# Patient Record
Sex: MALE | Race: BLACK OR AFRICAN AMERICAN | NOT HISPANIC OR LATINO | Employment: STUDENT
[De-identification: names, ages, dates, MRNs, and addresses within clinical notes are randomized per-mention and may not be internally consistent; named-entity substitution may affect disease eponyms.]

---

## 2024-03-23 ENCOUNTER — HEALTH MAINTENANCE LETTER (OUTPATIENT)
Age: 34
End: 2024-03-23

## 2024-04-03 ENCOUNTER — HOSPITAL ENCOUNTER (INPATIENT)
Facility: CLINIC | Age: 34
LOS: 36 days | Discharge: PSYCHIATRIC OR SUBSTANCE USE FACILITY | DRG: 885 | End: 2024-05-14
Attending: FAMILY MEDICINE | Admitting: PSYCHIATRY & NEUROLOGY
Payer: MEDICARE

## 2024-04-03 ENCOUNTER — TELEPHONE (OUTPATIENT)
Dept: BEHAVIORAL HEALTH | Facility: CLINIC | Age: 34
End: 2024-04-03
Payer: MEDICARE

## 2024-04-03 ENCOUNTER — TELEPHONE (OUTPATIENT)
Dept: BEHAVIORAL HEALTH | Facility: CLINIC | Age: 34
End: 2024-04-03

## 2024-04-03 DIAGNOSIS — R05.9 COUGH, UNSPECIFIED TYPE: ICD-10-CM

## 2024-04-03 DIAGNOSIS — F41.9 ANXIETY: ICD-10-CM

## 2024-04-03 DIAGNOSIS — R06.02 SOB (SHORTNESS OF BREATH): ICD-10-CM

## 2024-04-03 DIAGNOSIS — F25.0 SCHIZOAFFECTIVE DISORDER, BIPOLAR TYPE (H): ICD-10-CM

## 2024-04-03 DIAGNOSIS — Z13.6 CARDIOVASCULAR SCREENING; LDL GOAL LESS THAN 160: ICD-10-CM

## 2024-04-03 DIAGNOSIS — R45.1 AGITATION: ICD-10-CM

## 2024-04-03 DIAGNOSIS — F19.10 POLYSUBSTANCE ABUSE (H): ICD-10-CM

## 2024-04-03 DIAGNOSIS — F60.2 ANTISOCIAL PERSONALITY DISORDER (H): Primary | ICD-10-CM

## 2024-04-03 PROBLEM — F12.10 CANNABIS ABUSE: Status: ACTIVE | Noted: 2024-04-03

## 2024-04-03 LAB
ALBUMIN SERPL BCG-MCNC: 5 G/DL (ref 3.5–5.2)
ALP SERPL-CCNC: 74 U/L (ref 40–150)
ALT SERPL W P-5'-P-CCNC: 34 U/L (ref 0–70)
AMPHETAMINES UR QL SCN: ABNORMAL
ANION GAP SERPL CALCULATED.3IONS-SCNC: 11 MMOL/L (ref 7–15)
AST SERPL W P-5'-P-CCNC: 103 U/L (ref 0–45)
BARBITURATES UR QL SCN: ABNORMAL
BASOPHILS # BLD AUTO: 0.1 10E3/UL (ref 0–0.2)
BASOPHILS NFR BLD AUTO: 1 %
BENZODIAZ UR QL SCN: ABNORMAL
BILIRUB SERPL-MCNC: 1.5 MG/DL
BUN SERPL-MCNC: 24.5 MG/DL (ref 6–20)
BZE UR QL SCN: ABNORMAL
CALCIUM SERPL-MCNC: 9.8 MG/DL (ref 8.6–10)
CANNABINOIDS UR QL SCN: ABNORMAL
CHLORIDE SERPL-SCNC: 99 MMOL/L (ref 98–107)
CREAT SERPL-MCNC: 1.18 MG/DL (ref 0.67–1.17)
DEPRECATED HCO3 PLAS-SCNC: 28 MMOL/L (ref 22–29)
EGFRCR SERPLBLD CKD-EPI 2021: 84 ML/MIN/1.73M2
EOSINOPHIL # BLD AUTO: 0.1 10E3/UL (ref 0–0.7)
EOSINOPHIL NFR BLD AUTO: 1 %
ERYTHROCYTE [DISTWIDTH] IN BLOOD BY AUTOMATED COUNT: 13.4 % (ref 10–15)
FENTANYL UR QL: ABNORMAL
GLUCOSE SERPL-MCNC: 91 MG/DL (ref 70–99)
HCT VFR BLD AUTO: 42.7 % (ref 40–53)
HGB BLD-MCNC: 13.5 G/DL (ref 13.3–17.7)
IMM GRANULOCYTES # BLD: 0 10E3/UL
IMM GRANULOCYTES NFR BLD: 0 %
LYMPHOCYTES # BLD AUTO: 2.9 10E3/UL (ref 0.8–5.3)
LYMPHOCYTES NFR BLD AUTO: 26 %
MCH RBC QN AUTO: 27.3 PG (ref 26.5–33)
MCHC RBC AUTO-ENTMCNC: 31.6 G/DL (ref 31.5–36.5)
MCV RBC AUTO: 86 FL (ref 78–100)
MONOCYTES # BLD AUTO: 0.9 10E3/UL (ref 0–1.3)
MONOCYTES NFR BLD AUTO: 9 %
NEUTROPHILS # BLD AUTO: 7 10E3/UL (ref 1.6–8.3)
NEUTROPHILS NFR BLD AUTO: 63 %
NRBC # BLD AUTO: 0 10E3/UL
NRBC BLD AUTO-RTO: 0 /100
OPIATES UR QL SCN: ABNORMAL
PCP QUAL URINE (ROCHE): ABNORMAL
PLATELET # BLD AUTO: 213 10E3/UL (ref 150–450)
POTASSIUM SERPL-SCNC: 4.2 MMOL/L (ref 3.4–5.3)
PROT SERPL-MCNC: 7.8 G/DL (ref 6.4–8.3)
RBC # BLD AUTO: 4.94 10E6/UL (ref 4.4–5.9)
SODIUM SERPL-SCNC: 138 MMOL/L (ref 135–145)
WBC # BLD AUTO: 11.1 10E3/UL (ref 4–11)

## 2024-04-03 PROCEDURE — 99285 EMERGENCY DEPT VISIT HI MDM: CPT | Performed by: FAMILY MEDICINE

## 2024-04-03 PROCEDURE — 84155 ASSAY OF PROTEIN SERUM: CPT | Performed by: FAMILY MEDICINE

## 2024-04-03 PROCEDURE — 250N000013 HC RX MED GY IP 250 OP 250 PS 637: Performed by: FAMILY MEDICINE

## 2024-04-03 PROCEDURE — 80307 DRUG TEST PRSMV CHEM ANLYZR: CPT | Performed by: FAMILY MEDICINE

## 2024-04-03 PROCEDURE — 85025 COMPLETE CBC W/AUTO DIFF WBC: CPT | Performed by: FAMILY MEDICINE

## 2024-04-03 PROCEDURE — 36415 COLL VENOUS BLD VENIPUNCTURE: CPT | Performed by: FAMILY MEDICINE

## 2024-04-03 RX ORDER — LORAZEPAM 1 MG/1
1 TABLET ORAL ONCE
Status: COMPLETED | OUTPATIENT
Start: 2024-04-03 | End: 2024-04-03

## 2024-04-03 RX ORDER — ATORVASTATIN CALCIUM 40 MG/1
40 TABLET, FILM COATED ORAL DAILY
COMMUNITY
Start: 2024-01-02

## 2024-04-03 RX ORDER — ALBUTEROL SULFATE 90 UG/1
1-2 AEROSOL, METERED RESPIRATORY (INHALATION) 4 TIMES DAILY PRN
Status: DISCONTINUED | OUTPATIENT
Start: 2024-04-03 | End: 2024-05-14 | Stop reason: HOSPADM

## 2024-04-03 RX ORDER — HYDROXYZINE HYDROCHLORIDE 50 MG/1
50 TABLET, FILM COATED ORAL EVERY 6 HOURS PRN
Status: DISCONTINUED | OUTPATIENT
Start: 2024-04-03 | End: 2024-04-08

## 2024-04-03 RX ORDER — OLANZAPINE 10 MG/1
10 TABLET, ORALLY DISINTEGRATING ORAL ONCE
Status: COMPLETED | OUTPATIENT
Start: 2024-04-03 | End: 2024-04-03

## 2024-04-03 RX ORDER — GABAPENTIN 600 MG/1
600 TABLET ORAL 2 TIMES DAILY
Status: ON HOLD | COMMUNITY
Start: 2024-01-04 | End: 2024-05-13

## 2024-04-03 RX ORDER — HYDROXYZINE PAMOATE 50 MG/1
50 CAPSULE ORAL EVERY 6 HOURS PRN
Status: DISCONTINUED | OUTPATIENT
Start: 2024-04-03 | End: 2024-04-03

## 2024-04-03 RX ORDER — HYDROXYZINE PAMOATE 50 MG/1
50 CAPSULE ORAL EVERY 6 HOURS PRN
COMMUNITY
Start: 2024-02-27

## 2024-04-03 RX ORDER — CLOZAPINE 100 MG/1
100 TABLET ORAL AT BEDTIME
Status: DISCONTINUED | OUTPATIENT
Start: 2024-04-09 | End: 2024-04-11

## 2024-04-03 RX ORDER — GUAIFENESIN 200 MG/10ML
100 LIQUID ORAL EVERY 6 HOURS PRN
Status: DISCONTINUED | OUTPATIENT
Start: 2024-04-03 | End: 2024-05-14 | Stop reason: HOSPADM

## 2024-04-03 RX ORDER — ALBUTEROL SULFATE 90 UG/1
1-2 AEROSOL, METERED RESPIRATORY (INHALATION) EVERY 4 HOURS PRN
COMMUNITY
Start: 2023-08-22

## 2024-04-03 RX ORDER — HYDROCHLOROTHIAZIDE 25 MG/1
1 TABLET ORAL DAILY
Status: ON HOLD | COMMUNITY
Start: 2023-08-22 | End: 2024-05-13

## 2024-04-03 RX ORDER — CLOZAPINE 50 MG/1
50 TABLET ORAL DAILY
Status: DISCONTINUED | OUTPATIENT
Start: 2024-04-09 | End: 2024-05-14 | Stop reason: HOSPADM

## 2024-04-03 RX ORDER — HYDROCHLOROTHIAZIDE 25 MG/1
25 TABLET ORAL DAILY
Status: DISCONTINUED | OUTPATIENT
Start: 2024-04-03 | End: 2024-05-14 | Stop reason: HOSPADM

## 2024-04-03 RX ORDER — ATORVASTATIN CALCIUM 10 MG/1
40 TABLET, FILM COATED ORAL DAILY
Status: DISCONTINUED | OUTPATIENT
Start: 2024-04-03 | End: 2024-05-14 | Stop reason: HOSPADM

## 2024-04-03 RX ORDER — CLOZAPINE 25 MG/1
12.5 TABLET ORAL AT BEDTIME
Status: COMPLETED | OUTPATIENT
Start: 2024-04-03 | End: 2024-04-03

## 2024-04-03 RX ORDER — DIVALPROEX SODIUM 500 MG/1
500 TABLET, EXTENDED RELEASE ORAL 2 TIMES DAILY
Status: ON HOLD | COMMUNITY
Start: 2023-08-22 | End: 2024-05-13

## 2024-04-03 RX ORDER — DIVALPROEX SODIUM 500 MG/1
500 TABLET, EXTENDED RELEASE ORAL 2 TIMES DAILY
Status: DISCONTINUED | OUTPATIENT
Start: 2024-04-03 | End: 2024-04-12

## 2024-04-03 RX ORDER — CLOZAPINE 100 MG/1
3 TABLET ORAL AT BEDTIME
Status: ON HOLD | COMMUNITY
Start: 2024-01-13 | End: 2024-05-13

## 2024-04-03 RX ORDER — GUAIFENESIN 200 MG/10ML
100 LIQUID ORAL EVERY 6 HOURS PRN
COMMUNITY
Start: 2024-02-27

## 2024-04-03 RX ORDER — CLOZAPINE 25 MG/1
25 TABLET ORAL 2 TIMES DAILY
Status: COMPLETED | OUTPATIENT
Start: 2024-04-04 | End: 2024-04-05

## 2024-04-03 RX ORDER — CLOZAPINE 50 MG/1
50 TABLET ORAL 2 TIMES DAILY
Status: DISCONTINUED | OUTPATIENT
Start: 2024-04-06 | End: 2024-04-06

## 2024-04-03 RX ORDER — GABAPENTIN 600 MG/1
600 TABLET ORAL 2 TIMES DAILY
Status: DISCONTINUED | OUTPATIENT
Start: 2024-04-03 | End: 2024-04-12

## 2024-04-03 RX ADMIN — OLANZAPINE 10 MG: 10 TABLET, ORALLY DISINTEGRATING ORAL at 17:28

## 2024-04-03 RX ADMIN — GABAPENTIN 600 MG: 600 TABLET, FILM COATED ORAL at 23:55

## 2024-04-03 RX ADMIN — HYDROCHLOROTHIAZIDE 25 MG: 25 TABLET ORAL at 17:26

## 2024-04-03 RX ADMIN — DIVALPROEX SODIUM 500 MG: 250 TABLET, FILM COATED, EXTENDED RELEASE ORAL at 23:55

## 2024-04-03 RX ADMIN — CLOZAPINE 12.5 MG: 25 TABLET ORAL at 23:56

## 2024-04-03 RX ADMIN — ATORVASTATIN CALCIUM 40 MG: 40 TABLET, FILM COATED ORAL at 17:26

## 2024-04-03 RX ADMIN — OLANZAPINE 10 MG: 10 TABLET, ORALLY DISINTEGRATING ORAL at 14:07

## 2024-04-03 RX ADMIN — LORAZEPAM 1 MG: 1 TABLET ORAL at 17:27

## 2024-04-03 RX ADMIN — HYDROXYZINE HYDROCHLORIDE 50 MG: 50 TABLET, FILM COATED ORAL at 23:56

## 2024-04-03 ASSESSMENT — COLUMBIA-SUICIDE SEVERITY RATING SCALE - C-SSRS
6. HAVE YOU EVER DONE ANYTHING, STARTED TO DO ANYTHING, OR PREPARED TO DO ANYTHING TO END YOUR LIFE?: NO
1. IN THE PAST MONTH, HAVE YOU WISHED YOU WERE DEAD OR WISHED YOU COULD GO TO SLEEP AND NOT WAKE UP?: NO
2. HAVE YOU ACTUALLY HAD ANY THOUGHTS OF KILLING YOURSELF IN THE PAST MONTH?: NO

## 2024-04-03 ASSESSMENT — ACTIVITIES OF DAILY LIVING (ADL)
ADLS_ACUITY_SCORE: 35
ADLS_ACUITY_SCORE: 33
ADLS_ACUITY_SCORE: 35

## 2024-04-03 NOTE — TELEPHONE ENCOUNTER
R: [place]  Access Inpatient Bed Call Log 4/3/24 4:00PM   Intake has called facilities that have not updated the bed status within the last 12 hours.                 Adults:          [facility]               [facility] is at capacity            [facility] is posting 0 beds. [phone]    [facility] is posting 3 bed. Negative covid required.  [phone]        [facility] is posting 0 beds. Neg covid. No high school/[facility]. [phone] Per call to [place] currently full.    [facility] is posting 2 beds. [phone]   [facility] is posting 0 beds. [phone]    [facility] is posting 2 bed for Young adults. Negative covid. [phone].  PT IS NOT AGE APPROPRIATE   [facility] is posting 3 bed [phone]       Pt remains on waitlist pending appropriate availability.

## 2024-04-03 NOTE — PLAN OF CARE
Ward ASHLEIGH Dawson  April 3, 2024  Plan of Care Hand-off Note     Patient Care Path: inpatient mental health    Plan for Care:   Patient is manic, disorganized, confused.  He has not slept in 3 days and received narcan x2 last night.  Medication non-compliance over his pass this weekend is suspected.  Patient is trespassed from his [facility] program due to current functioning and property destruction.    Identified Goals and Safety Issues: Further evaluation and stablization    Overview:  Heidy Pop, ACT team, [phone]            Legal Status: Legal Status at Admission: Voluntary/Patient has signed consent for treatment    Psychiatry Consult: not ordered at this time       Updated Dr Claros regarding plan of care.           PETER RubinSW

## 2024-04-03 NOTE — ED TRIAGE NOTES
Patient presents from [facility] today after ECT today. Patient went into [facility] yesterday due to hypertension. Patient did not want to go to the psych [place]. Patient was elevated, yelling, narcan used, and patient unable to sleep for 3 days.     Triage Assessment (Adult)       Row Name 04/03/24 1158          Triage Assessment    Airway WDL WDL        Respiratory WDL    Respiratory WDL WDL        Skin Circulation/Temperature WDL    Skin Circulation/Temperature WDL WDL        Cardiac WDL    Cardiac WDL WDL        Peripheral/Neurovascular WDL    Peripheral Neurovascular WDL WDL        Cognitive/Neuro/Behavioral WDL    Cognitive/Neuro/Behavioral WDL X;all     Level of Consciousness alert     Arousal Level opens eyes spontaneously     Orientation oriented x 4     Speech spontaneous;clear     Mood/Behavior cooperative;anxious        [place] Coma Scale    Best Eye Response 4-->(E4) spontaneous     Best Motor Response 6-->(M6) obeys commands     Best Verbal Response 5-->(V5) oriented     [place] Coma Scale Score 15

## 2024-04-03 NOTE — ED NOTES
Contact Heidy Pop MS, [facility], CRC,CPRP  for more information at [phone] or [phone].

## 2024-04-03 NOTE — TELEPHONE ENCOUNTER
Chey cabrera,  with [facility] IRTS #[phone] called to update that Pt is en route to ED with Heidy, his ACT Radius .    Pt is in midst of a acute manic episode which is not his baseline.  Pt has not slept in approximately 85 hours and his behavior keeps escalating.  Per Chey for the last 4 days Pt has been tangential, agitated and destructive.  Per Chey, Pt is an active danger to himself and to others at this time.  The IRTs will probably be discharging him due to property damage.    Pt has a diagnosis of Schizoaffective disorder bipolar type.  Pt can appear better than he is.  Pt has been screaming at staff and other residents.  Pt was climbing 15 ft ladders to climb into the trees, throwing balls through and breaking windows and hitting staff.  Pt also last night appeared to have overdose twice on possible opiates.  Pt was given Narcan and the EMS was called each time but Pt refused transport to hospital.  Today Pt had an ECT appt and returned groggy but behavior continued.  Pt's ACT team came today and got Pt to voluntarily go to hospital.    Pts ACT Radius Case Mgrs are Yamila Cota #[phone] and Evi (Heidy) who is accompanying Pt to the ED.

## 2024-04-03 NOTE — ED NOTES
Per Heidy - Patient was very aggressive and breaking things at residence last night. Patient has been trespassed from current place of living.  were called to take patient, but due to a flaw in the system patient was not taken to retirement. [facility] is requesting patient be placed on a hold due to not taking medications and needing to sleep. Patient is rambling with flight of ideas. Patient cooperative with staff. Patient is his own guardian.

## 2024-04-03 NOTE — CONSULTS
Diagnostic Evaluation Consultation  Crisis Assessment    Patient Name: Ward Dawson  Age:  34 year old  Legal Sex: male  Gender Identity: male  Pronouns:   Race: Black or   Ethnicity: Not  or   Language: English      Patient was assessed: In person      Patient location: [facility] EMERGENCY DEPARTMENT                             ED16A    Referral Data and Chief Complaint  Ward Dawson presents to the ED other (see comment). Patient is presenting to the ED for the following concerns: Significant behavioral change, Verbal agitation, Worsening psychosocial stress, Substance use.   Factors that make the mental health crisis life threatening or complex are:  Patient was brought to the ED from [facility] following his ECT treatment this morning at [facility].  Patient states he does not know why he is here and also states he knows why he is here.  He presents as manic, disorganized, and confused.   He is distractable and not a clear historian at this time.  Patient states he has not slept in a long time and received narcan last night.  Per ACT team  and [facility] IRTS staff patient has been elevated, intrusive, and disruptive.  Patient broke a window yesterday.  IRTS reports patient had turned table over and tried using them as skate board ramps.  Patient was noted for have been fairly stable prior to his pass this weekend.  CM reports patient went to a skateboarding competition in [place].  Patient returned in a manic state.  CM reports pt may not appear as manic as he has been the past few days due to sedation and ECT this morning.  She reports patient has not slept in 3 days.  IRTS reports pt received narcan twice last night.  Patient has been trespassed from the IRTS facility.  [facility] is postive for cannabis..      Informed Consent and Assessment Methods  Explained the crisis assessment process, including applicable information disclosures and limits to  confidentiality, assessed understanding of the process, and obtained consent to proceed with the assessment.  Assessment methods included conducting a formal interview with patient, review of medical records, collaboration with medical staff, and obtaining relevant collateral information from family and community providers when available.  : done     Patient response to interventions: acceptance expressed  Coping skills were attempted to reduce the crisis:        History of the Crisis   Patient has a hx of schizoaffective disorder bipolar type, antisocial personality disorder, and cannabis use disorder.  Suspected polysubstance.  Patient has a hx of multiple commitments.  Patient is currently not under commitment.  CM reports they did not receive the amended commitment paperwork and had the wrong date.  Pt was under commitment for 9 months instead of the 12 pt was thought to be under.  CM reports they attempted to revoke patient's provisional discharge 1.5-2 months ago and learned at that time that he was no longer under commitment.  Patient has hx of prior commitments. multiple hospitalizations and came to the hospital from hospitals.    Brief Psychosocial History  Family:  Single, Children yes  Support System:   (ACT team)  Employment Status:  unemployed  Source of Income:  unable to assess  Financial Environmental Concerns:  other (see comments) (lost [facility] housing)  Current Hobbies:     Barriers in Personal Life:  mental health concerns    Significant Clinical History  Current Anxiety Symptoms:  anxious, racing thoughts  Current Depression/Trauma:  crying or feels like crying, impaired decision making, irritable, difficulty concentrating  Current Somatic Symptoms:  anxious, racing thoughts  Current Psychosis/Thought Disturbance:  distractability, high risk behavior, anger  Current Eating Symptoms:   (focused on food preferences)  Chemical Use History:  Alcohol:  (unable to assess)  Benzodiazepines:  (unable to  assess)  Opiates:  (unable to assess.  Received Narcan x2 last night, suspected optiates)  Cocaine:  (unable to assess)  Marijuana:  (positive for cannabis.)  Other Use:  (unable to assess)  Withdrawal Symptoms:  (denies)  Addictions:  (unable to assess)   Past diagnosis:  Personality Disorder (schizoaffective bipolar type, antisocial personality disorder, marijuana)  Family history:   (unknown)  Past treatment:  ACT, Inpatient Hospitalization, Residential Treatment, Civil Commitment, Case management, Psychiatric Medication Management, Other  Details of most recent treatment:  Patient is currently at [facility] and has been there since discharge from [facility] (2/19/-2/28/24)  Other relevant history:  Patient makes reference to his 10 year old son and being kept from him.       Collateral Information  Is there collateral information: Yes     Collateral information name, relationship, phone number:  Heidy Pop, ACT , [facility] ACT team [phone] and Chey at [facility] [phone].    What happened today: See collateral in presenting summary     What is different about patient's functioning: Manic, disorganized, yelling, disruptive, confused, not tracking appropriately, has not slept in 3 days and destroyed property.     Concern about alcohol/drug use:      What do you think the patient needs:      Has patient made comments about wanting to kill themselves/others: no    If d/c is recommended, can they take part in safety/aftercare planning:  yes    Additional collateral information:        Risk Assessment  [place] Suicide Severity Rating Scale Full Clinical Version:  Suicidal Ideation  Q6 Suicide Behavior (Lifetime): no          [place] Suicide Severity Rating Scale Recent:   Suicidal Ideation (Recent)  Q1 Wished to be Dead (Past Month): no  Q2 Suicidal Thoughts (Past Month): no  Level of Risk per Screen: no risks indicated          Environmental or Psychosocial Events:  challenging interpersonal relationships, impulsivity/recklessness, unstable housing, homelessness, ongoing abuse of substances  Protective Factors: Protective Factors: other (see comment) (bond to son)    Does the patient have thoughts of harming others? Feels Like Hurting Others: no  Previous Attempt to Hurt Others:  (unknown)  Current presentation: Confused, Irritable  Violence Threats in Past 6 Months: unknown  Current Violence Plan or Thoughts: IRTS program denies pt has been physically aggessive to people.  He has engaged in property destruction and has been tresspassed from the IRTS.  Is the patient engaging in sexually inappropriate behavior?: no  Duty to warn initiated: no  Duty to warn details: n/a    Is the patient engaging in sexually inappropriate behavior?  no        Mental Status Exam   Affect: Labile  Appearance: Appropriate  Attention Span/Concentration: Inattentive  Eye Contact: Variable    Fund of Knowledge: Appropriate   Language /Speech Content: Expressive Speech  Language /Speech Volume: Normal  Language /Speech Rate/Productions: Normal  Recent Memory: Variable  Remote Memory: Variable  Mood: Anxious, Irritable, Sad, Depressed  Orientation to Person: Yes   Orientation to Place: Yes  Orientation to Time of Day: Yes  Orientation to Date:  (did not assess)     Situation (Do they understand why they are here?): No  Psychomotor Behavior: Normal  Thought Content: Clear  Thought Form: Tangential     Mini-Cog Assessment  Number of Words Recalled:    Clock-Drawing Test:     Three Item Recall:    Mini-Cog Total Score:       Medication  Psychotropic medications:   Medication Orders - Psychiatric (From admission, onward)      None             Current Care Team  Patient Care Team:  No Ref-Primary, Physician as PCP - General  Heidy Pop, MS, LPCC, CRC, CPRP as   Camron Watson (Psychiatry)    Diagnosis  Patient Active Problem List   Diagnosis    CARDIOVASCULAR SCREENING; LDL GOAL LESS THAN  160    Schizoaffective disorder, bipolar type (H)    Antisocial personality disorder (H)    Cannabis abuse       Primary Problem This Admission  Active Hospital Problems    Schizoaffective disorder, bipolar type (H)      Antisocial personality disorder (H)      Cannabis abuse        Clinical Summary and Substantiation of Recommendations   Patient is manic, disorganized, confused.  He has not slept in 3 days and received narcan x2 last night.  Medication non-compliance over his pass this weekend is suspected.  Patient is trespassed from his [employer] program due to current functioning and property destruction.          Severe psychiatric, behavioral or other comorbid conditions are appropriate for management at inpatient mental health as indicated by at least one of the following: Psychiatric Symptoms, Impaired impulse control, judgement, or insight, Symptoms of impact to function, Comorbid substance use disorder  Severe dysfunction in daily living is present as indicated by at least one of the following: Other evidence of severe dysfunction  Situation and expectations are appropriate for inpatient care: Patient management/treatment at lower level of care is not feasible or is inappropriate  Inpatient mental health services are necessary to meet patient needs and at least one of the following: Specific condition related to admission diagnosis is present and judged likely to further improve at proposed level of care, Specific condition related to admission diagnosis is present and judged likely to deteriorate in absence of treatment at proposed level of care, Patient is receiving continuing care (eg, transition of care from less intensive level of care)      Patient coping skills attempted to reduce the crisis:       Disposition  Recommended disposition: Inpatient Mental Health        Reviewed case and recommendations with attending provider. Attending Name: Dr Jalloh       Attending concurs with disposition: yes        Patient and/or validated legal guardian concurs with disposition:   yes       Final disposition:  inpatient mental health    Legal status on admission: Voluntary/Patient has signed consent for treatment    Assessment Details   Total duration spent with the patient: 30 min     CPT code(s) utilized: 42758 - Psychotherapy for Crisis - 60 (30-74*) min    PRABHAKAR Rubin, Psychotherapist  [facility] - Triage & Transition Services  Callback: [phone]

## 2024-04-03 NOTE — TELEPHONE ENCOUNTER
S: [facility] , DEC  Mariam  calling at 1:56 PM  about a 34 year old/Male presenting with Bijal and Disorganized thinking.     B: Pt arrived via  Act Team CM . Presenting problem, stressors: Cpt came in from an IRTS program, after ECT tx this morning. He prefers [facility] for continuity of care for ECTs. Pt caused property destruction at IRTS and cannot go back. He is jumping from topic to topic. Pt has not been med compliant and reports that he has not slept in 85 hours.     Pt affect in ED: Labile and Tearful  Pt Dx: Schizoaffective Disorder and Antisocial Personality Disorder.  Previous IPMH hx? Yes: February 19th-28th, 2024 at [place].  Pt denies SI   Hx of suicide attempt? No  Pt denies SIB  Pt denies HI   Pt denies hallucinations .   Pt RARS Score: 2    Hx of aggression/violence, sexual offenses, legal concerns, Epic care plan? describe: Property destruction at IRTS. Recent felony, unknown reason.  Current concerns for aggression this visit? No  Does pt have a history of Civil Commitment? Yes, most recent commitment Clerical error on most recent, he was on a 9 month commitment instead of 12 mo.  Has  long hx since 2013.  Pt their own guardian? Yes    Pt is prescribed medication. Is patient medication compliant?  May have missed meds over the weekend.  Pt endorses OP services: Psychiatrist, Therapist, , and ACT Team [facility]  CD concerns: Actively using/consuming Cannabinoids. Narcan used twice last night for suspected opiate use. Utox negative for opiates.  Acute or chronic medical concerns: No  Does Pt present with specific needs, assistive devices, or exclusionary criteria? None    Pt is ambulatory  Pt is able to perform ADLs independently    A: Pt to be reviewed for IP admission. Pt is Voluntary  Preferred placement: [facility]    COVID Symptoms: No  If yes, COVID test required   Utox: Positive for Cannabinoids.    CMP: In process  CBC: Abnormalities: WBC 11.1      R: Patient cleared  and ready for behavioral bed placement: Yes  Pt placed on IPMH worklist? Yes    Does Patient need a Transfer Center request created? No, Pt is located within [facility] ED, [facility] ED, or [facility] ED

## 2024-04-03 NOTE — MEDICATION SCRIBE - ADMISSION MEDICATION HISTORY
Medication Scribe Admission Medication History    Admission medication history is complete. The information provided in this note is only as accurate as the sources available at the time of the update.    Information Source(s): Hospital records and CareEverywhere/SureScripts via N/A    Pertinent Information: Per collateral in chart, does not seem as if Pt has been med compliant this past week. Unsure of when last doses were.     Changes made to PTA medication list:  Added:   All meds below   Deleted:   Zyprexa   Changed: None    Allergies reviewed with patient and updates made in EHR: yes    Medication History Completed By: Summer Greer 4/3/2024 3:47 PM    PTA Med List   Medication Sig Last Dose    albuterol (PROAIR HFA/PROVENTIL HFA/VENTOLIN HFA) 108 (90 Base) MCG/ACT inhaler Inhale 1-2 puffs into the lungs 4 times daily Unknown    atorvastatin (LIPITOR) 40 MG tablet Take 40 mg by mouth daily Unknown    cloZAPine (CLOZARIL) 100 MG tablet Take 3 tablets by mouth at bedtime Unknown    divalproex sodium extended-release (DEPAKOTE ER) 500 MG 24 hr tablet Take 500 mg by mouth 2 times daily Unknown    gabapentin (NEURONTIN) 600 MG tablet Take 600 mg by mouth 2 times daily Unknown    guaiFENesin (ROBITUSSIN) 20 mg/mL liquid Take 100 mg by mouth every 6 hours as needed Unknown    hydrochlorothiazide (HYDRODIURIL) 25 MG tablet Take 1 tablet by mouth daily Unknown    hydrOXYzine (VISTARIL) 50 MG capsule Take 50 mg by mouth every 6 hours as needed Unknown

## 2024-04-04 ENCOUNTER — TELEPHONE (OUTPATIENT)
Dept: BEHAVIORAL HEALTH | Facility: CLINIC | Age: 34
End: 2024-04-04
Payer: MEDICARE

## 2024-04-04 PROCEDURE — 250N000013 HC RX MED GY IP 250 OP 250 PS 637: Performed by: FAMILY MEDICINE

## 2024-04-04 PROCEDURE — 250N000013 HC RX MED GY IP 250 OP 250 PS 637: Performed by: EMERGENCY MEDICINE

## 2024-04-04 PROCEDURE — 99292 CRITICAL CARE ADDL 30 MIN: CPT | Performed by: EMERGENCY MEDICINE

## 2024-04-04 PROCEDURE — 99291 CRITICAL CARE FIRST HOUR: CPT | Performed by: EMERGENCY MEDICINE

## 2024-04-04 RX ORDER — LORAZEPAM 2 MG/1
2 TABLET ORAL ONCE
Status: COMPLETED | OUTPATIENT
Start: 2024-04-04 | End: 2024-04-04

## 2024-04-04 RX ORDER — QUETIAPINE FUMARATE 50 MG/1
50 TABLET, FILM COATED ORAL ONCE
Status: COMPLETED | OUTPATIENT
Start: 2024-04-04 | End: 2024-04-04

## 2024-04-04 RX ORDER — OLANZAPINE 10 MG/1
10 TABLET, ORALLY DISINTEGRATING ORAL ONCE
Status: COMPLETED | OUTPATIENT
Start: 2024-04-04 | End: 2024-04-04

## 2024-04-04 RX ORDER — LORAZEPAM 1 MG/1
1 TABLET ORAL ONCE
Status: COMPLETED | OUTPATIENT
Start: 2024-04-04 | End: 2024-04-04

## 2024-04-04 RX ORDER — LORAZEPAM 2 MG/1
2 TABLET ORAL
Status: COMPLETED | OUTPATIENT
Start: 2024-04-04 | End: 2024-04-04

## 2024-04-04 RX ORDER — OLANZAPINE 5 MG/1
5 TABLET, ORALLY DISINTEGRATING ORAL
Status: COMPLETED | OUTPATIENT
Start: 2024-04-04 | End: 2024-04-04

## 2024-04-04 RX ORDER — HYDROXYZINE HYDROCHLORIDE 50 MG/1
50 TABLET, FILM COATED ORAL ONCE
Status: COMPLETED | OUTPATIENT
Start: 2024-04-04 | End: 2024-04-04

## 2024-04-04 RX ORDER — NICOTINE 21 MG/24HR
1 PATCH, TRANSDERMAL 24 HOURS TRANSDERMAL DAILY
Status: DISCONTINUED | OUTPATIENT
Start: 2024-04-04 | End: 2024-05-14 | Stop reason: HOSPADM

## 2024-04-04 RX ORDER — OLANZAPINE 5 MG/1
5 TABLET, ORALLY DISINTEGRATING ORAL ONCE
Status: COMPLETED | OUTPATIENT
Start: 2024-04-04 | End: 2024-04-04

## 2024-04-04 RX ADMIN — HYDROCHLOROTHIAZIDE 25 MG: 25 TABLET ORAL at 06:50

## 2024-04-04 RX ADMIN — DIVALPROEX SODIUM 500 MG: 250 TABLET, FILM COATED, EXTENDED RELEASE ORAL at 07:38

## 2024-04-04 RX ADMIN — NICOTINE 1 PATCH: 21 PATCH, EXTENDED RELEASE TRANSDERMAL at 15:32

## 2024-04-04 RX ADMIN — LORAZEPAM 2 MG: 2 TABLET ORAL at 04:35

## 2024-04-04 RX ADMIN — OLANZAPINE 5 MG: 5 TABLET, ORALLY DISINTEGRATING ORAL at 07:38

## 2024-04-04 RX ADMIN — HYDROXYZINE HYDROCHLORIDE 50 MG: 50 TABLET ORAL at 10:38

## 2024-04-04 RX ADMIN — QUETIAPINE FUMARATE 50 MG: 50 TABLET ORAL at 10:38

## 2024-04-04 RX ADMIN — GABAPENTIN 600 MG: 600 TABLET, FILM COATED ORAL at 06:52

## 2024-04-04 RX ADMIN — OLANZAPINE 5 MG: 5 TABLET, ORALLY DISINTEGRATING ORAL at 14:36

## 2024-04-04 RX ADMIN — HYDROXYZINE HYDROCHLORIDE 50 MG: 50 TABLET, FILM COATED ORAL at 17:38

## 2024-04-04 RX ADMIN — LORAZEPAM 2 MG: 2 TABLET ORAL at 14:36

## 2024-04-04 RX ADMIN — NICOTINE POLACRILEX 4 MG: 4 GUM, CHEWING BUCCAL at 15:32

## 2024-04-04 RX ADMIN — OLANZAPINE 10 MG: 10 TABLET, ORALLY DISINTEGRATING ORAL at 00:44

## 2024-04-04 RX ADMIN — ATORVASTATIN CALCIUM 40 MG: 40 TABLET, FILM COATED ORAL at 06:52

## 2024-04-04 RX ADMIN — LORAZEPAM 1 MG: 1 TABLET ORAL at 06:50

## 2024-04-04 RX ADMIN — LORAZEPAM 2 MG: 2 TABLET ORAL at 07:39

## 2024-04-04 NOTE — TELEPHONE ENCOUNTER
R: [place]  Access Inpatient Bed Call Log  4/4/2024 12:04 AM  Intake has called facilities that have not updated their bed status within the last 12 hours.??      ADULTS:     *[facility]  [place] -- [facility]: @ cap per website.  [place] -- [facility]:  @ cap per website. Per call @ 12:27AM, they are able to review. Faxed clinical @ 12:35AM  [place] -- [facility]: @ Posting 1 bed.  [place] -- [facility]: @ cap per website. -  12:14 AM Per Viky, they are capped.  [place] -- [facility]: @ Cap per website.  [facility] -- [facility]: @ cap per website.  [place] [facility] @ Posting 1 beds. Ages 18-28, Voluntary only, COVID test req'd, NO aggression, physical or sexual assault, violence hx or drug abuse, or psychosis. Pt not age appropriate   [place] -- [facility]: @ Cap per website.  [place] -- [facility]: @ cap per website.  [place] -- [facility]:  @ Posting 2 bed. - Per Nabila @ 12:32AM, no beds available at the moment      01:44 - Conchis from [facility] called back to report that pt was declined for placement d/t pt acuity    Pt remains on waitlist pending appropriate placement availability.

## 2024-04-04 NOTE — ED NOTES
Within an hour after restraint an in person face to face assessment was completed at 0521, including an evaluation of the patient's immediate reaction to the intervention, behavioral assessment and review/assessment of history, drugs and medications, recent labs, etc., and behavioral condition.  The patient experienced: No adverse physical outcome from seclusion/restraint initiation.  The intervention of restraint or seclusion needs to continue.       Nabila Martinez MD  04/04/24 0595

## 2024-04-04 NOTE — PROGRESS NOTES
"Triage & Transition Services, Extended Care       Patient: Cristel goes by \"Scoobie,\" uses he/him pronouns  Date of Service: April 4, 2024  Site of Service: [facility] EMERGENCY DEPARTMENT                             ED14  Patient was seen yes In person  Mode of Assessment: In person    Patient is followed related to: long wait time for admission  Notable observations from today's encounter include: Pt has presented irritable and aggressive while in the ED.  Restraints and seclusion has been needed to keep the patient and others in the ED safe. Pt was observed pacing in his room. He requested to meet with Writer in her office and expressed frustration when Writer informed him she doesn't have an office. Pt stated \"you're wasting my time then\".  The care team is working towards the following: Demonstrate Calm, Non Violent/Destructive Behavior for at least 24 hours  Significant status changes: no    Recommendations: inpatient mental health  Plan for Care Team Review: RN  Patient and/or validated legal guardian concurs: yes  Clinical Substantiation: Continue with recommendation for inpatient mental health admission for safety and stabilization. Patient is currently assessed to be an imminent risk to self or others if he were to discharge to the community.  If patient requests to discharge, due to acute symptoms of evens and agitation with limited insight, writer recommends reassessment of patient's risk to determine if involuntary hospitalization is necessary.      Legal Status:    Legal Status at Admission: Voluntary/Patient has signed consent for treatment    PRABHAKAR Donaldson   Licensed Mental Health Professional (LMHP), Extended Care  [phone]   "

## 2024-04-04 NOTE — ED NOTES
The patient was accepted at shift change signout pending inpatient mental health admission.  He did have some ongoing escalation, required additional medication treatment with Zyprexa (he does have a allergy listed to Haldol) as well as sedation.  He unfortunately did not seem to calm at all with this medication or with the seclusion.  I did attempt to order Ativan but did see a flag on this due to drug interaction with clozapine.  I did speak with the pharmacist who reviewed the patient's chart, does not feel there is likely a significant enough interaction to preclude the use of Ativan, felt that 2 mg orally is reasonable given his ongoing agitation and lack of response to Zyprexa thus far.  This was ordered, the patient does state that he will be willing to take an oral dose.    Critical care time: 30 minutes    Dictation Disclaimer: Some of this Note has been completed with voice-recognition dictation software. Although errors are generally corrected real-time, there is the potential for a rare error to be present in the completed chart.       Nabila Martinez MD  04/04/24 4335

## 2024-04-04 NOTE — TELEPHONE ENCOUNTER
R: [place]  Access Inpatient Bed Call Log 4/4/24 @ 5:15 PM     Intake has called facilities that have not updated the bed status within the last 12 hours.               PT IS CURRENTLY IN RESTRAINTS     Adults:     [facility] is at capacity.           [facility] is posting 0 beds. [phone];   [facility] is posting 0 beds. Negative covid required.  [phone]       [facility] is posting 0 beds. Neg covid. No high school/[employer]-psych. [phone]   [facility] is posting 0 beds. [phone]  [facility] is posting 0 beds.  [facility] is posting 0 beds. [phone]   [facility] is posting 0 beds for Young adults ages 18-28. Negative covid. [phone].   [facility] is posting 1 bed. [phone]    Pt remains on work list pending appropriate availability.

## 2024-04-04 NOTE — ED PROVIDER NOTES
7 AM signout     7:25 AM -called to the bedside as patient is becoming increasingly agitated.  Verbal de-escalation with security and nurse surrounding.  I performed a face-to-face with the patient who was able to express that he would willingly take oral medications.    Patient has allergy to Haldol.    Has tolerated 3 mg of oral Ativan.  Will dose with 2 additional milligrams of oral Ativan and 5 mg Zydis, along with patient's daily medication which is Depakote,     Active decision regarding behavioral emergency team code,   Will consider restraints via seclusion.     745am:  restraint via seclusion, given that patient was threatening to physically strike security staff after multiple attempts at de-escalation.  Fortunately, patient is agreeable to taking his oral medication.      7:55 AM: Patient in seclusion.  Alert, eyes open, ambulatory, breathing comfortably    ---------------------    1030am: Code 21 called as patient was posturing after he was released from seclusion restraints.  Security appropriately removed chairs attempted to de-escalate, tasers were pulled out but not use.  Patient again agreeable to take oral medications.  I reviewed his medications seem that he has had close to a maximum 24-hour dose of Zyprexa so we will switch to another atypical psychotic Seroquel for mood stabilization and agitation.  Patient also has taken hydroxyzine in the past and has not had it during this ED encounter so will dose with 50 mg of hydroxyzine now .      1045am: On seclusion, ambulatory in room    11am: Still in seclusion, breathing comfortably        Critical Care Addendum    My initial assessment, based on my review of nursing observations, review of vital signs, focused history, and physical exam, established that Ward Dawson has severe agitation, which requires immediate intervention, and therefore He is critically ill.     After the initial assessment, the care team initiated medication therapy with  p.o. Zyprexa and Ativan  to provide stabilization care. Due to the critical nature of this patient, I reassessed nursing observations, vital signs, physical exam, and mental status multiple times prior to He disposition.     Time also spent performing documentation, reviewing test results, and coordination of care.     Critical care time (excluding teaching time and procedures): 75 minutes.            Xavi Medeiros MD  04/04/24 0757       Xavi Medeiros MD  04/04/24 1111

## 2024-04-04 NOTE — ED NOTES
Pt woke-up agitated talking loudly and pacing in the garcia.  Pt able to be redirected by security at that time.  While walking in the garcia pt threw his phone.  When in garcia was verbally aggressive towards another pt, was redirected by security back into room.  Pt displaying manic like behavior, speech forced, thoughts disorganized.  Mood labile.  While in room pt would take shirt off and posture towards security pt able to be redirected.  Pt threw phone and glasses in room.  Pt broke glasses.  Pt lifted bed off the floor and started to bench press bed than threw bed up against the wall.  Code 21 called pt walked to seclusion.

## 2024-04-04 NOTE — ED NOTES
IP MH Referral Acuity Rating Score (RARS)    LMHP complete at referral to IP MH, with DEC; and, daily while awaiting IP MH placement. Call score to PPS.  CRITERIA SCORING   New 74 HH and Involuntary for IP MH (not adolescent) 0/1   Boarding over 24 hours 1/1   Vulnerable adult at least 55+ with multiple co morbidities; or, Patient age 11 or under 0/1   Suicide ideation without relief of precipitating factors 0/1   Current plan for suicide 0/1   Current plan for homicide 0/1   Imminent risk or actual attempt to seriously harm another without relief of factors precipitating the attempt 1/1   Severe dysfunction in daily living (ex: complete neglect for self care, extreme disruption in vegetative function, extreme deterioration in social interactions) 1/1   Recent (last 2 weeks) or current physical aggression in the ED 1/1   Restraints or seclusion episode in ED 1/1   Verbal aggression, agitation, yelling, etc., while in the ED 1/1   Active psychosis with psychomotor agitation or catatonia 1/1   Need for constant or near constant redirection (from leaving, from others, etc).  1/1   Intrusive or disruptive behaviors 0/1   TOTAL Acuity Total Score: 8

## 2024-04-04 NOTE — ED NOTES
While in seclusion pt looking out window threatening harm to a staff member. Pt asked numerous times to calm down, but con't to yell and hit door.

## 2024-04-04 NOTE — TELEPHONE ENCOUNTER
7:49 AM: Intake called [facility] and was informed that they do have beds available, but do not take direct admits. Pt would need to be assessed through APS, contact number is [phone].    7:53 AM: Intake called APS and spoke with Jonnathan. They are reviewing for low acuity only and are not able to accommodate Pt d/t acuity.     R: [place] MH Access Inpatient Bed Call Log 4/4/24 @ 7:25 am:    Intake has called facilities that have not updated the bed status within the last 12 hours.                 Adults:                     [facility] is at capacity.            [facility] is posting 0 beds. [phone]; per call at 7:25 am to Jonnathan, possibly 1 low acuity bed avail.   [facility] is posting 0 beds. Negative covid required.  [phone]        [facility] is posting 0 beds. Neg covid. No high school/[employer]-psych. [phone]; per call at 7:29 am to Shantel, they are at cap but we can call again later.    [facility] is posting 0 beds. [phone]   [facility] is posting 0 beds.   [facility] is posting 0 beds. [phone]      [facility] is posting 2 beds. [phone]       Pt remains on work list pending appropriate availability.

## 2024-04-04 NOTE — ED NOTES
Within an hour after restraint an in person face to face assessment was completed at 0125, including an evaluation of the patient's immediate reaction to the intervention, behavioral assessment and review/assessment of history, drugs and medications, recent labs, etc., and behavioral condition.  The patient experienced: No adverse physical outcome from seclusion/restraint initiation.  The intervention of restraint or seclusion needs to continue.       Nabila Martinez MD  04/04/24 0125

## 2024-04-04 NOTE — ED NOTES
Pt exited room and approached sitter, stepping directly into staffs face. Security was able to have Pt back away, Pt then grabbed chair sitter was sitting on, when security took chair away, Pt took an aggressive posture toward security, security pulled tazer on Pt, ordering hi back into his room, Code 21 called. Pt given oral Seroquel and Hydroxyzine. Pt returned to seclusion. Security observed Pt punching the wall and window on door, the pulling his right hand away in pain. MD notified, will address assessment of hand when safe.

## 2024-04-04 NOTE — ED NOTES
"0745, Pt was given emergency oral medication due to aggressive behavior, making threatening statements to harm security. Pt took medication with some prompting, went into other rooms, took chairs from other room. Pt continued to step into security and posture aggressively stating \"why are you getting in my face\". Pt stated \"If you keep putting your hands on me, I will have to defend myself.\" Security and writer explained that we have to keep a safe distance and that is why staff is extending their hands, so Pt does not walk into their bubble.\"  "

## 2024-04-04 NOTE — ED PROVIDER NOTES
ED Provider Note  [facility]      History     Chief Complaint   Patient presents with    Manic Behavior     Patient presents from [facility] today after ECT today. Patient went into [facility] due to hypertension. Patient did not want to go to the psych [place]. Patient was elevated, yelling, narcan used, and patient unable to sleep for 3 days.     HPI  Ward Dawson is a 34 year old male who arrives here in the emergency room from his [facility] facility through [employer]. patient has been having increased disruptive behaviors elevated mood yelling flipping tables and was noted to have been out on past this weekend returned increased manage in the and at times had been somewhat sedated with questioning whether or not he had used narcotics they utilize Narcan 2 times last night today patient remains manic disruptive and was sent to his normal appointment at [facility] for ECT this morning but continues to be manic staff who then sent him here for evaluation.    Past Medical History  No past medical history on file.  No past surgical history on file.  albuterol (PROAIR HFA/PROVENTIL HFA/VENTOLIN HFA) 108 (90 Base) MCG/ACT inhaler  atorvastatin (LIPITOR) 40 MG tablet  cloZAPine (CLOZARIL) 100 MG tablet  divalproex sodium extended-release (DEPAKOTE ER) 500 MG 24 hr tablet  gabapentin (NEURONTIN) 600 MG tablet  guaiFENesin (ROBITUSSIN) 20 mg/mL liquid  hydrochlorothiazide (HYDRODIURIL) 25 MG tablet  hydrOXYzine (VISTARIL) 50 MG capsule      Allergies   Allergen Reactions    Haloperidol Confusion and Other (See Comments)     Prone to EPSE. COnsider IM Zyprexa or be sure to give with benadryl     Family History  Family History   Problem Relation Age of Onset    Cancer No family hx of     Diabetes No family hx of     Hypertension No family hx of     Cerebrovascular Disease No family hx of     Thyroid Disease No family hx of     Glaucoma No family hx of     Macular Degeneration No family hx of      Social  History   Social History     Substance Use Topics    Alcohol use: No    Drug use: No         A medically appropriate review of systems was performed with pertinent positives and negatives noted in the HPI, and all other systems negative.    Physical Exam   BP: (!) 139/114  Pulse: 91  Temp: 98.5  F (36.9  C)  Resp: 16  Height: 182.9 cm (6')  Weight: 108 kg (238 lb)  SpO2: 94 %  Physical Exam  Constitutional:       General: He is not in acute distress.     Appearance: Normal appearance. He is not toxic-appearing.   HENT:      Head: Atraumatic.   Eyes:      General: No scleral icterus.     Conjunctiva/sclera: Conjunctivae normal.   Cardiovascular:      Rate and Rhythm: Normal rate.      Heart sounds: Normal heart sounds.   Pulmonary:      Effort: Pulmonary effort is normal. No respiratory distress.      Breath sounds: Normal breath sounds.   Abdominal:      Palpations: Abdomen is soft.      Tenderness: There is no abdominal tenderness.   Musculoskeletal:         General: No deformity.      Cervical back: Neck supple.   Skin:     General: Skin is warm.   Neurological:      General: No focal deficit present.      Mental Status: He is alert.      Sensory: No sensory deficit.      Motor: No weakness.      Coordination: Coordination normal.   Psychiatric:         Mood and Affect: Mood is anxious. Affect is inappropriate.         Speech: Speech is rapid and pressured and tangential.         Behavior: Behavior is agitated.         Thought Content: Thought content is paranoid. Thought content does not include homicidal or suicidal ideation.           ED Course, Procedures, & Data      Procedures          Results for orders placed or performed during the hospital encounter of 04/03/24   Urine Drug Screen Panel     Status: Abnormal   Result Value Ref Range    Amphetamines Urine Screen Negative Screen Negative    Barbituates Urine Screen Negative Screen Negative    Benzodiazepine Urine Screen Negative Screen Negative     Cannabinoids Urine Screen Positive (A) Screen Negative    Cocaine Urine Screen Negative Screen Negative    Fentanyl Qual Urine Screen Negative Screen Negative    Opiates Urine Screen Negative Screen Negative    PCP Urine Screen Negative Screen Negative   Comprehensive metabolic panel     Status: Abnormal   Result Value Ref Range    Sodium 138 135 - 145 mmol/L    Potassium 4.2 3.4 - 5.3 mmol/L    Carbon Dioxide (CO2) 28 22 - 29 mmol/L    Anion Gap 11 7 - 15 mmol/L    Urea Nitrogen 24.5 (H) 6.0 - 20.0 mg/dL    Creatinine 1.18 (H) 0.67 - 1.17 mg/dL    GFR Estimate 84 >60 mL/min/1.73m2    Calcium 9.8 8.6 - 10.0 mg/dL    Chloride 99 98 - 107 mmol/L    Glucose 91 70 - 99 mg/dL    Alkaline Phosphatase 74 40 - 150 U/L     (H) 0 - 45 U/L    ALT 34 0 - 70 U/L    Protein Total 7.8 6.4 - 8.3 g/dL    Albumin 5.0 3.5 - 5.2 g/dL    Bilirubin Total 1.5 (H) <=1.2 mg/dL   CBC with platelets and differential     Status: Abnormal   Result Value Ref Range    WBC Count 11.1 (H) 4.0 - 11.0 10e3/uL    RBC Count 4.94 4.40 - 5.90 10e6/uL    Hemoglobin 13.5 13.3 - 17.7 g/dL    Hematocrit 42.7 40.0 - 53.0 %    MCV 86 78 - 100 fL    MCH 27.3 26.5 - 33.0 pg    MCHC 31.6 31.5 - 36.5 g/dL    RDW 13.4 10.0 - 15.0 %    Platelet Count 213 150 - 450 10e3/uL    % Neutrophils 63 %    % Lymphocytes 26 %    % Monocytes 9 %    % Eosinophils 1 %    % Basophils 1 %    % Immature Granulocytes 0 %    NRBCs per 100 WBC 0 <1 /100    Absolute Neutrophils 7.0 1.6 - 8.3 10e3/uL    Absolute Lymphocytes 2.9 0.8 - 5.3 10e3/uL    Absolute Monocytes 0.9 0.0 - 1.3 10e3/uL    Absolute Eosinophils 0.1 0.0 - 0.7 10e3/uL    Absolute Basophils 0.1 0.0 - 0.2 10e3/uL    Absolute Immature Granulocytes 0.0 <=0.4 10e3/uL    Absolute NRBCs 0.0 10e3/uL   Urine Drug Screen     Status: Abnormal    Narrative    The following orders were created for panel order Urine Drug Screen.  Procedure                               Abnormality         Status                     ---------                                -----------         ------                     Urine Drug Screen Panel[[id number]]      Abnormal            Final result                 Please view results for these tests on the individual orders.   CBC with platelets differential     Status: Abnormal    Narrative    The following orders were created for panel order CBC with platelets differential.  Procedure                               Abnormality         Status                     ---------                               -----------         ------                     CBC with platelets and d...[[id number]]  Abnormal            Final result                 Please view results for these tests on the individual orders.     Medications   hydrOXYzine HCl (ATARAX) tablet 50 mg (has no administration in time range)   albuterol (PROVENTIL HFA/VENTOLIN HFA) inhaler (has no administration in time range)   atorvastatin (LIPITOR) tablet 40 mg (40 mg Oral $Given 4/3/24 1726)   divalproex sodium extended-release (DEPAKOTE ER) 24 hr tablet 500 mg (has no administration in time range)   gabapentin (NEURONTIN) tablet 600 mg (has no administration in time range)   guaiFENesin (ROBITUSSIN) 20 mg/mL solution 100 mg (has no administration in time range)   hydrochlorothiazide (HYDRODIURIL) tablet 25 mg (25 mg Oral $Given 4/3/24 1726)   cloZAPine (CLOZARIL) half-tab 12.5 mg (has no administration in time range)     Followed by   cloZAPine (CLOZARIL) tablet 25 mg (has no administration in time range)     Followed by   cloZAPine (CLOZARIL) tablet 50 mg (has no administration in time range)     Followed by   cloZAPine (CLOZARIL) tablet 75 mg (has no administration in time range)   cloZAPine (CLOZARIL) tablet 50 mg (has no administration in time range)   cloZAPine (CLOZARIL) tablet 100 mg (has no administration in time range)   OLANZapine zydis (zyPREXA) ODT tab 10 mg (10 mg Oral $Given 4/3/24 1407)   OLANZapine zydis (zyPREXA) ODT tab 10 mg (10 mg Oral $Given  4/3/24 1728)   LORazepam (ATIVAN) tablet 1 mg (1 mg Oral $Given 4/3/24 1727)     Labs Ordered and Resulted from Time of ED Arrival to Time of ED Departure   URINE DRUG SCREEN PANEL - Abnormal       Result Value    Amphetamines Urine Screen Negative      Barbituates Urine Screen Negative      Benzodiazepine Urine Screen Negative      Cannabinoids Urine Screen Positive (*)     Cocaine Urine Screen Negative      Fentanyl Qual Urine Screen Negative      Opiates Urine Screen Negative      PCP Urine Screen Negative     COMPREHENSIVE METABOLIC PANEL - Abnormal    Sodium 138      Potassium 4.2      Carbon Dioxide (CO2) 28      Anion Gap 11      Urea Nitrogen 24.5 (*)     Creatinine 1.18 (*)     GFR Estimate 84      Calcium 9.8      Chloride 99      Glucose 91      Alkaline Phosphatase 74       (*)     ALT 34      Protein Total 7.8      Albumin 5.0      Bilirubin Total 1.5 (*)    CBC WITH PLATELETS AND DIFFERENTIAL - Abnormal    WBC Count 11.1 (*)     RBC Count 4.94      Hemoglobin 13.5      Hematocrit 42.7      MCV 86      MCH 27.3      MCHC 31.6      RDW 13.4      Platelet Count 213      % Neutrophils 63      % Lymphocytes 26      % Monocytes 9      % Eosinophils 1      % Basophils 1      % Immature Granulocytes 0      NRBCs per 100 WBC 0      Absolute Neutrophils 7.0      Absolute Lymphocytes 2.9      Absolute Monocytes 0.9      Absolute Eosinophils 0.1      Absolute Basophils 0.1      Absolute Immature Granulocytes 0.0      Absolute NRBCs 0.0     COVID-19 VIRUS (CORONAVIRUS) BY PCR     No orders to display          Critical care was not performed.     Medical Decision Making  The patient's presentation was of high complexity (a chronic illness severe exacerbation, progression, or side effect of treatment).    The patient's evaluation involved:  ordering and/or review of 3+ test(s) in this encounter (see separate area of note for details)    The patient's management necessitated high risk (a decision regarding  hospitalization).    Assessment & Plan        I have reviewed the nursing notes. I have reviewed the findings, diagnosis, plan and need for follow up with the patient.    Patient will be admitted to inpatient psychiatric services for stabilization and treatment we will titrate his Clazuril and starting back on medications I believe he may have been noncompliant over the weekend on pass.    Final diagnoses:   Schizoaffective disorder, bipolar type (H)   Polysubstance abuse (H)       Jaswinder Claros  [facility] EMERGENCY DEPARTMENT  4/3/2024     Jaswinder Claros MD  04/03/24 2051

## 2024-04-04 NOTE — ED NOTES
"Writer attempted to discuss with Pt expectations for exiting seclusion, Pt stated he needed evidence I provided medications this morning. Pt also asked why I haven't come to see him yet this morning. Writer explained that I did, when I gave him medication. Writer offered bathroom and breakfast when we can trust he will not be aggressive to staff and other patients, Pt walked away stating \"I have no idea what you are talking about.\"  "

## 2024-04-04 NOTE — ED NOTES
Pt attempted to leave pushing at security.  Code 21 called. Pt walked back to room with direction given to him by security.  Pt remains outside of room near doorway listening to music.

## 2024-04-05 ENCOUNTER — TELEPHONE (OUTPATIENT)
Dept: BEHAVIORAL HEALTH | Facility: CLINIC | Age: 34
End: 2024-04-05
Payer: MEDICARE

## 2024-04-05 LAB
SARS-COV-2 RNA RESP QL NAA+PROBE: NEGATIVE
VALPROATE SERPL-MCNC: 68.4 UG/ML

## 2024-04-05 PROCEDURE — 36415 COLL VENOUS BLD VENIPUNCTURE: CPT

## 2024-04-05 PROCEDURE — 250N000011 HC RX IP 250 OP 636: Mod: JZ | Performed by: EMERGENCY MEDICINE

## 2024-04-05 PROCEDURE — 96372 THER/PROPH/DIAG INJ SC/IM: CPT | Performed by: FAMILY MEDICINE

## 2024-04-05 PROCEDURE — 80164 ASSAY DIPROPYLACETIC ACD TOT: CPT

## 2024-04-05 PROCEDURE — 250N000013 HC RX MED GY IP 250 OP 250 PS 637: Performed by: STUDENT IN AN ORGANIZED HEALTH CARE EDUCATION/TRAINING PROGRAM

## 2024-04-05 PROCEDURE — 250N000011 HC RX IP 250 OP 636: Performed by: FAMILY MEDICINE

## 2024-04-05 PROCEDURE — 250N000013 HC RX MED GY IP 250 OP 250 PS 637: Performed by: EMERGENCY MEDICINE

## 2024-04-05 PROCEDURE — 250N000013 HC RX MED GY IP 250 OP 250 PS 637: Performed by: FAMILY MEDICINE

## 2024-04-05 PROCEDURE — 96372 THER/PROPH/DIAG INJ SC/IM: CPT | Performed by: EMERGENCY MEDICINE

## 2024-04-05 PROCEDURE — 99232 SBSQ HOSP IP/OBS MODERATE 35: CPT

## 2024-04-05 PROCEDURE — 87635 SARS-COV-2 COVID-19 AMP PRB: CPT | Performed by: FAMILY MEDICINE

## 2024-04-05 RX ORDER — OLANZAPINE 10 MG/1
10 TABLET, ORALLY DISINTEGRATING ORAL ONCE
Status: COMPLETED | OUTPATIENT
Start: 2024-04-05 | End: 2024-04-05

## 2024-04-05 RX ORDER — OLANZAPINE 10 MG/2ML
10 INJECTION, POWDER, FOR SOLUTION INTRAMUSCULAR ONCE
Status: COMPLETED | OUTPATIENT
Start: 2024-04-05 | End: 2024-04-05

## 2024-04-05 RX ORDER — OLANZAPINE 10 MG/1
10 TABLET ORAL 2 TIMES DAILY PRN
Status: DISCONTINUED | OUTPATIENT
Start: 2024-04-05 | End: 2024-04-08

## 2024-04-05 RX ORDER — LORAZEPAM 2 MG/1
2 TABLET ORAL ONCE
Status: COMPLETED | OUTPATIENT
Start: 2024-04-05 | End: 2024-04-05

## 2024-04-05 RX ORDER — ZIPRASIDONE MESYLATE 20 MG/ML
20 INJECTION, POWDER, LYOPHILIZED, FOR SOLUTION INTRAMUSCULAR ONCE
Status: COMPLETED | OUTPATIENT
Start: 2024-04-05 | End: 2024-04-05

## 2024-04-05 RX ADMIN — OLANZAPINE 10 MG: 10 TABLET, ORALLY DISINTEGRATING ORAL at 13:23

## 2024-04-05 RX ADMIN — HYDROXYZINE HYDROCHLORIDE 50 MG: 50 TABLET, FILM COATED ORAL at 17:34

## 2024-04-05 RX ADMIN — HYDROCHLOROTHIAZIDE 25 MG: 25 TABLET ORAL at 09:02

## 2024-04-05 RX ADMIN — ATORVASTATIN CALCIUM 40 MG: 40 TABLET, FILM COATED ORAL at 09:03

## 2024-04-05 RX ADMIN — NICOTINE POLACRILEX 4 MG: 4 GUM, CHEWING BUCCAL at 06:00

## 2024-04-05 RX ADMIN — LORAZEPAM 2 MG: 2 TABLET ORAL at 13:23

## 2024-04-05 RX ADMIN — DIVALPROEX SODIUM 500 MG: 250 TABLET, FILM COATED, EXTENDED RELEASE ORAL at 21:31

## 2024-04-05 RX ADMIN — OLANZAPINE 10 MG: 10 INJECTION, POWDER, FOR SOLUTION INTRAMUSCULAR at 18:30

## 2024-04-05 RX ADMIN — DIVALPROEX SODIUM 500 MG: 250 TABLET, FILM COATED, EXTENDED RELEASE ORAL at 09:03

## 2024-04-05 RX ADMIN — OLANZAPINE 10 MG: 10 TABLET, ORALLY DISINTEGRATING ORAL at 17:34

## 2024-04-05 RX ADMIN — GUAIFENESIN 100 MG: 100 SOLUTION ORAL at 12:17

## 2024-04-05 RX ADMIN — ZIPRASIDONE MESYLATE 20 MG: 20 INJECTION, POWDER, LYOPHILIZED, FOR SOLUTION INTRAMUSCULAR at 03:25

## 2024-04-05 RX ADMIN — HYDROXYZINE HYDROCHLORIDE 50 MG: 50 TABLET, FILM COATED ORAL at 04:52

## 2024-04-05 RX ADMIN — DIVALPROEX SODIUM 500 MG: 250 TABLET, FILM COATED, EXTENDED RELEASE ORAL at 02:04

## 2024-04-05 RX ADMIN — HYDROXYZINE HYDROCHLORIDE 50 MG: 50 TABLET, FILM COATED ORAL at 12:14

## 2024-04-05 RX ADMIN — GABAPENTIN 600 MG: 600 TABLET, FILM COATED ORAL at 21:31

## 2024-04-05 RX ADMIN — GABAPENTIN 600 MG: 600 TABLET, FILM COATED ORAL at 09:03

## 2024-04-05 RX ADMIN — CLOZAPINE 25 MG: 25 TABLET ORAL at 02:05

## 2024-04-05 RX ADMIN — CLOZAPINE 25 MG: 25 TABLET ORAL at 21:31

## 2024-04-05 RX ADMIN — GABAPENTIN 600 MG: 600 TABLET, FILM COATED ORAL at 02:05

## 2024-04-05 RX ADMIN — CLOZAPINE 25 MG: 25 TABLET ORAL at 09:02

## 2024-04-05 NOTE — ED NOTES
Bed: ED15  Expected date: 4/4/24  Expected time:   Means of arrival:   Comments:  Hold 14 if good til dinner

## 2024-04-05 NOTE — TELEPHONE ENCOUNTER
R: [place]  Access Inpatient Bed Call Log 4/5/24 @ 12:15AM  Intake has called facilities that have not updated the bed status within the last 12 hours.                 [place]:  [facility] is at capacity.            [facility] is posting 0 beds. [phone]; Declined 4/4 d/t acuity    [facility] is posting 0 beds. Negative covid required.  [phone]        [facility] is posting 0 beds. Neg covid. No high school/[employer]-psych. [phone]; Per Owen @ 12:16AM, they are full     [facility] is posting 0 beds. [phone]   [facility] is posting 0 beds.   [facility] is posting 0 beds. [phone]    [facility] is posting 0 beds for Young adults ages 18-28. Negative covid. [phone].    [facility] is posting 1 bed. [phone]; Per [place] @ 12:117AM, they are at full capacity until 9 AM       Pt remains on work list pending appropriate availability

## 2024-04-05 NOTE — PROGRESS NOTES
"Triage & Transition Services, Extended Care       Patient: Cristel goes by \"Sundaybiaustin,\" uses he/him pronouns  Date of Service: April 5, 2024  Site of Service: [facility] EMERGENCY DEPARTMENT                             ED15  Patient was seen: no   Mode of Assessment:  (n/a)    Patient is followed related to: long wait time for admission    Notable observations from today's encounter include: Per review of the record and consultation with ED team.  Patient was not appropriate to see via telehealth.    The care team is working towards the following: Demonstrate Calm, Non Violent/Destructive Behavior for at least 24 hours    Significant status changes: no    Case Management included: Case Management Included: collaborating with patient's support system  Details on Collaborating with Patient's Support System: Collaborated with consulting psychiatry provider, Josephine Garcia.    Recommendations: Final Disposition / Recommended Care Path: inpatient mental health  Plan for Care reviewed with assigned Medical Provider: no  Plan for Care Team Review: RN, other (see comment) (consulting psychiatry provider, Josephine Garcia)  Patient and/or validated legal guardian concurs: yes  Clinical Substantiation: Recommendation continues for inpatient mental health admission for safety and stabilization.  Pt continues to be at risk to harm himself or others.  If patient requests to discharge, due to acute symptoms of evens and agitation with limited insight, reassess for involuntary hospitalization.    Summary: Recommendation continues for inpatient mental health admission for safety and stabilization.  Pt continues to be at risk to harm himself or others.  If patient requests to discharge, due to acute symptoms of evens and agitation with limited insight, reassess for involuntary hospitalization.    Legal Status:    Legal Status at Admission: Voluntary/Patient has signed consent for treatment    PRABHAKAR Rubin   Licensed " Mental Health Professional (LMHP), [facility]  [phone]

## 2024-04-05 NOTE — ED NOTES
Patient up in room and hallway. Pressured speech, demanding belongings. Educated that he was given watch earlier but as stated earlier, would not get additional jewelry. Patient allowed to have person bluetooth headphones.    Continued to be agitated and needing redirect. Agreeable to PO medication. Ativan and Zyprexa given per MAR.

## 2024-04-05 NOTE — ED NOTES
Report to Manuela TYLER RN. All questions and concerns addressed. Transferring care at this time.

## 2024-04-05 NOTE — TELEPHONE ENCOUNTER
R:   Intake Paged Anup @ 8:05am to present pt for unit 12.    Anup responded to PPS writer via TEAMs @ 8:09am and due to complexity of unit, no admits today.      Pt is voluntary and prefers [facility] placement only.    [facility] Bed search initiated @ 8:12am:  [facility] is posting 0 beds. [phone]; Per Oscar - no beds available    [facility] is posting 0 beds. Negative covid required.  [phone]        [facility] is posting 0 beds. Neg covid. No high school/[facility]. [phone]; Per Owen @ 12:16AM, they are full     [facility] is posting 0 beds. [phone]   [facility] is posting 0 beds.   [facility] is posting 0 beds. [phone]    [facility] is posting 0 beds for Young adults ages 18-28. Negative covid. [phone].    [facility] is posting 1 bed. [phone]; Per Celso - no 'high acuity' capacity.  Pt to remain on the Adult worklist pending Appropriate bed availaility

## 2024-04-05 NOTE — CONSULTS
"      Initial Psychiatric Consult   Consult date: 2024         Reason for Consult, requesting source:    follow up; ongoing agitation; recommendations   Requesting source:     Labs and imaging reviewed. Spoke with RN, Mary and Dr. Lauren.     DEC assessment by Mariam Ball from 4/3/24 reviewed        HPI:   Cristel is a 33 yo male with a past psychiatric history of schizoaffective disorder, bipolar type, antisocial personality disorder, and polysubstance use who presented to the ED from his IRTS facility with increasing mood dysregulation and property destruction. He has not slept in 3 days. Staff at the IRTS facility reported that patient had been quite stable prior to passes last weekend. Staff suspect possible substance use contributing to current evens. Patient received Narcan 2 times the night prior to coming to the ED. He has a history of numerous previous  mental health hospitalizations and commitments. Last MI commitment  24. Patient currently [place] [facility] [place] last treatment being the morning of 4/3/24.      Cristel reluctantly agreed to meet with me. He was listening to music loudly on the phone yet was receptive to turning down the volume when asked. He tells me he's not sure why he's here. He appeared to yawn, I asked him if he was tired and he told me no. He then accused writer of \"putting words in my mouth\" and stated \"you're dumb and incompetent if you have to ask me these questions.\" He denies that he has been disruptive in the ED. He denies substance use and denies medication noncompliance. He tells me he just needs to watch TV, He then says he needs to go and get his stuff from [place] where it is currently locked up. He has an ACT team and attempted to call someone named Yamila from his ACT team as I was standing talking to him. He denies SI and denies AH/VH. He says the people at his IRTS facility assume he's the one using heroin but that he's " not. He asks writer to go and figure out where his belongings are and find housing for him. He was observed to yawn several times and his speech was slurred and difficult to make out at times.     Per nursing, he has continued to escalate at various times today. Last Code 21 called for patient was at 0430 this morning. He has been intrusive with other staff, pacing the ED, and taking off his shirt. He has been playing music loudly. He has been awake since 0200. He continues to present as manic with pressured speech. He was agreeable to taking oral ativan and olanzapine about a half hour prior to meeting with me. He appeared much calmer and sedated following medication administration.         Past Psychiatric History:   Record review notes previous dx of schizoaffective disorder, bipolar type; antisocial personality disorder, polysubstance use, PTSD, and depression.     Psychiatrist is Dr. Watson.   Zeeshan manages blood draws and clozapine dispensing.   Heidy Pop, ACT , [facility] ACT team [phone] and Chey at [facility] I[facility] [phone].     Past medication trials noted in record review include: Benadryl, Risperdal, Zyprexa, Depakote ER, Tramadol, Klonopin, Hydroxyzine, Cogentin, Prazosin, Gabapentin, Trazodone, Seroquel, Thorazine, Haldol Dec CARLOS     Most recent Discharge Summary from 2/28/24 by Dr. Brock notes:  This patient has had many previous psychiatric hospitalizations, and the last [place] [facility] [place] ago. He was under a mental health commitment at that time, he has an ACT team through [facility]. He is no longer on a commitment, but works with the ACT team on a voluntary basis. After discussion of the indications, risks, benefits, alternatives and consequences of no treatment, the patient was agreeable to continuing on Depakote and Clozaril, although he requested a slightly reduced dose of Clozaril, as he finds 300 mg too sedating. His dose was reduced to 200 mg  "nightly, and he reported improved daytime wakefulness at this dose. He continued to sleep well each night during his stay. He was initially somewhat intrusive with peers, seem to have a \"assisted house\" mentality, where he needs to assert himself with peers, although he tended to be quite pleasant with staff. As his stay went on, this behavior decreased he became more calm, pleasant and cooperative, even when interacting with peers. He did not display any psychotic symptoms during his stay, and did not appear to be manic. He denied having depression or suicidal thoughts throughout his stay. He was interested in going to an IRTS from here, and referrals were made. He was accepted at [facility], which [facility]. They had a bed available for him there today. As such, he requested discharge. As there are no indications of dangerous ideations given the improvements noted above, his request for discharge was granted.         Substance Use and History:   UDS notable for cannabis. Record review notes that patient has previously reports using xanax, cannabis, cocaine, percocet, and OxyContin         Past Medical History:   PAST MEDICAL HISTORY: No past medical history on file.    PAST SURGICAL HISTORY: No past surgical history on file.          Family History:   FAMILY HISTORY:   Family History   Problem Relation Age of Onset    Cancer No family hx of     Diabetes No family hx of     Hypertension No family hx of     Cerebrovascular Disease No family hx of     Thyroid Disease No family hx of     Glaucoma No family hx of     Macular Degeneration No family hx of            Social History:   SOCIAL HISTORY:   Social History     Tobacco Use    Smoking status: Not on file    Smokeless tobacco: Not on file   Substance Use Topics    Alcohol use: No       Patient was residing at IRTS facility, per record review patient cannot return there due to property destruction. Record review indicates that patient has a 10 year " old son.          Physical ROS:   The 10 point Review of Systems is negative other than noted in the HPI or here.           Medications:     Current Facility-Administered Medications   Medication Dose Route Frequency Provider Last Rate Last Admin    atorvastatin (LIPITOR) tablet 40 mg  40 mg Oral Daily Jaswinder Claros MD   40 mg at 04/05/24 0903    [START ON 4/9/2024] cloZAPine (CLOZARIL) tablet 100 mg  100 mg Oral At Bedtime Jaswinder Claros MD        cloZAPine (CLOZARIL) tablet 25 mg  25 mg Oral BID Jaswinder Claros MD   25 mg at 04/05/24 0902    Followed by    [START ON 4/6/2024] cloZAPine (CLOZARIL) tablet 50 mg  50 mg Oral BID Jaswinder Claros MD        Followed by    [START ON 4/8/2024] cloZAPine (CLOZARIL) tablet 75 mg  75 mg Oral At Bedtime Jaswinder Claros MD        [START ON 4/9/2024] cloZAPine (CLOZARIL) tablet 50 mg  50 mg Oral Daily Jaswinder Claros MD        divalproex sodium extended-release (DEPAKOTE ER) 24 hr tablet 500 mg  500 mg Oral BID Jaswinder Claros MD   500 mg at 04/05/24 0903    gabapentin (NEURONTIN) tablet 600 mg  600 mg Oral BID Jaswinder Claros MD   600 mg at 04/05/24 0903    hydrochlorothiazide (HYDRODIURIL) tablet 25 mg  25 mg Oral Daily Jaswinder Claros MD   25 mg at 04/05/24 0902    nicotine (NICODERM CQ) 21 MG/24HR 24 hr patch 1 patch  1 patch Transdermal Daily Xavi Medeiros MD   1 patch at 04/04/24 1532              Allergies:     Allergies   Allergen Reactions    Haloperidol Confusion and Other (See Comments)     Prone to EPSE. COnsider IM Zyprexa or be sure to give with benadryl          Labs:     Recent Results (from the past 48 hour(s))   Urine Drug Screen Panel    Collection Time: 04/03/24 12:42 PM   Result Value Ref Range    Amphetamines Urine Screen Negative Screen Negative    Barbituates Urine Screen Negative Screen Negative    Benzodiazepine Urine Screen Negative Screen Negative     Cannabinoids Urine Screen Positive (A) Screen Negative    Cocaine Urine Screen Negative Screen Negative    Fentanyl Qual Urine Screen Negative Screen Negative    Opiates Urine Screen Negative Screen Negative    PCP Urine Screen Negative Screen Negative   Comprehensive metabolic panel    Collection Time: 04/03/24  2:16 PM   Result Value Ref Range    Sodium 138 135 - 145 mmol/L    Potassium 4.2 3.4 - 5.3 mmol/L    Carbon Dioxide (CO2) 28 22 - 29 mmol/L    Anion Gap 11 7 - 15 mmol/L    Urea Nitrogen 24.5 (H) 6.0 - 20.0 mg/dL    Creatinine 1.18 (H) 0.67 - 1.17 mg/dL    GFR Estimate 84 >60 mL/min/1.73m2    Calcium 9.8 8.6 - 10.0 mg/dL    Chloride 99 98 - 107 mmol/L    Glucose 91 70 - 99 mg/dL    Alkaline Phosphatase 74 40 - 150 U/L     (H) 0 - 45 U/L    ALT 34 0 - 70 U/L    Protein Total 7.8 6.4 - 8.3 g/dL    Albumin 5.0 3.5 - 5.2 g/dL    Bilirubin Total 1.5 (H) <=1.2 mg/dL   CBC with platelets and differential    Collection Time: 04/03/24  2:16 PM   Result Value Ref Range    WBC Count 11.1 (H) 4.0 - 11.0 10e3/uL    RBC Count 4.94 4.40 - 5.90 10e6/uL    Hemoglobin 13.5 13.3 - 17.7 g/dL    Hematocrit 42.7 40.0 - 53.0 %    MCV 86 78 - 100 fL    MCH 27.3 26.5 - 33.0 pg    MCHC 31.6 31.5 - 36.5 g/dL    RDW 13.4 10.0 - 15.0 %    Platelet Count 213 150 - 450 10e3/uL    % Neutrophils 63 %    % Lymphocytes 26 %    % Monocytes 9 %    % Eosinophils 1 %    % Basophils 1 %    % Immature Granulocytes 0 %    NRBCs per 100 WBC 0 <1 /100    Absolute Neutrophils 7.0 1.6 - 8.3 10e3/uL    Absolute Lymphocytes 2.9 0.8 - 5.3 10e3/uL    Absolute Monocytes 0.9 0.0 - 1.3 10e3/uL    Absolute Eosinophils 0.1 0.0 - 0.7 10e3/uL    Absolute Basophils 0.1 0.0 - 0.2 10e3/uL    Absolute Immature Granulocytes 0.0 <=0.4 10e3/uL    Absolute NRBCs 0.0 10e3/uL          Physical and Psychiatric Examination:     BP (!) 139/114 (BP Location: Left arm, Patient Position: Sitting, Cuff Size: Adult Regular)   Pulse 91   Temp 98.5  F (36.9  C) (Oral)    "Resp 16   Ht 1.829 m (6')   Wt 108 kg (238 lb)   SpO2 94%   BMI 32.28 kg/m    Weight is 238 lbs 0 oz  Body mass index is 32.28 kg/m .    Physical Exam:  I have reviewed the physical exam as documented by by the medical team and agree with findings and assessment and have no additional findings to add at this time.    Mental Status Exam:    Appearance: adequately groomed, appeared as age stated, and casually dressed, wearing shower cap, sitting on bed   Attitude:  somewhat cooperative  Eye Contact:  poor   Mood:   \"I'm tired\"  Affect:  mood congruent  Speech:  normal prosody and slurred speech, mumbling   Psychomotor Behavior:  no evidence of tardive dyskinesia, dystonia, or tics, intact station, gait and muscle tone, and sitting on bed eating   Thought Process:  disorganized  Associations:  no loose associations  Thought Content:  no evidence of suicidal ideation or homicidal ideation and no evidence of psychotic thought  Insight:  limited  Judgement:  limited  Oriented to:  time, person, and place  Attention Span and Concentration:  fair  Recent and Remote Memory:  fair               DSM-5 Diagnosis:      Schizoaffective disorder, bipolar type, currently manic       Antisocial personality disorder       Cannabis abuse          Assessment:   \"Cristel\" is a 35 yo male with a past psychiatric history of schizoaffective disorder, bipolar type, antisocial personality disorder, and polysubstance use who presented to the ED from his [facility] facility with increasing mood dysregulation, evens, and property destruction. He has not slept in 3 days. Staff at the [facility] facility reported that patient had been quite stable prior to passes last weekend. Staff suspect possible substance use contributing to current evens. Patient received Narcan 2 times the night prior to coming to the ED. UDS notable for cannabis. He has a history of numerous previous  mental health hospitalizations and commitments. Last MI commitment  " 1/13/24. Patient currently receives maintenance ECT through [facility] with last treatment being the morning of 4/3/24.      While in the ED, Cristel has been intrusive with staff. At times he has been verbally redirectable however he has also required restraints. Last code 21 called was at 0430 on 4/5/24. He continues to present with ongoing symptoms of evens including mood lability, pressured speech, and decreased need for sleep. Today, Cristel appeared somewhat sedated when I met with him. He had just received oral olanzapine and ativan approximately 30 min prior to me meeting with him. He denies SI and denies AH/VH. He continues to state that he's unsure why he's here and made accusatory statements directed at writer when being questioned about his current symptoms. Record review does indicate that patient does present as intrusive and often asserts himself at baseline. Given the acute change in presentation over the weekend while patient was on pass, noted by IRTS staff and ACT team, substance use and medication noncompliance are potential contributing factors to current presentation. Patient's Clozaril is being retitrated given concerns for medication noncompliance. Patient would benefit from additional observation and stabilization on an inpatient mental health unit, patient is voluntary at this time. Should placement be delayed and patient continues to stabilize, consider alternate disposition to community-based supports.           Summary of Recommendations:   Continue PTA medications including Depakote 500mg twice daily, gabapentin 600mg twcie daily, and Clozaril. Of note, Clozaril is being retitrated due to concerns with medication noncompliance over the weekend at his IRTS facility   PRN olanzapine 10mg twice daily for agitation and aggression   Labs reviewed, ANC 7.0 on 4/3/24  Valproic Acid level ordered to ensure therapeutic dosing of Depakote, could consider dosage increase to further target mood lability    Patient would benefit from additional observation and stabilization on  mental health unit. Patient is voluntary at this time.       AMY [facility]    Consult/Liaison Psychiatry   [facility]    Contact information available via [facility] Paging/Directory  If I am not available, then GEETA AKERS line ([phone]) should know who is covering our consult service.

## 2024-04-05 NOTE — ED PROVIDER NOTES
[facility] ED Mental Health Handoff Note:       Brief HPI:  This is a 34 year old male signed out to me by Dr. Lauren.  See initial ED Provider note for full details of the presentation. Interval history is pertinent for no events in the immediate time prior to signout.    Home meds reviewed and ordered/administered: Yes    Medically stable for inpatient mental health admission: Yes.    Evaluated by mental health: Yes. The recommendation is for inpatient mental health treatment. Bed search in process    Safety concerns: At the time I received sign out, the patient required medications for agitation and is now more calm and the patient required physical restraints due to agitation/violence, but those have been discontinued.    Hold Status:  Active Orders   N/A            Exam:   Patient Vitals for the past 24 hrs:   BP Pulse Resp SpO2   04/05/24 0913 (!) 143/102 87 16 96 %       General: Patient is in no acute distress and is resting comfortably.  He is pacing in the room.  HEENT: Normocephalic atraumatic  Neck: Supple  Cardiovascular: Heart rate normal  Pulmonary: Patient is in no respiratory distress  Extremities: No signs of any significant or life-threatening trauma.  Neurologic: No new focal neurologic deficits.      ED Course:    Medications   hydrOXYzine HCl (ATARAX) tablet 50 mg (50 mg Oral $Given 4/5/24 1734)   albuterol (PROVENTIL HFA/VENTOLIN HFA) inhaler (has no administration in time range)   atorvastatin (LIPITOR) tablet 40 mg (40 mg Oral $Given 4/5/24 0903)   divalproex sodium extended-release (DEPAKOTE ER) 24 hr tablet 500 mg (500 mg Oral $Given 4/5/24 0903)   gabapentin (NEURONTIN) tablet 600 mg (600 mg Oral $Given 4/5/24 0903)   guaiFENesin (ROBITUSSIN) 20 mg/mL solution 100 mg (100 mg Oral $Given 4/5/24 1217)   hydrochlorothiazide (HYDRODIURIL) tablet 25 mg (25 mg Oral $Given 4/5/24 0902)   cloZAPine (CLOZARIL) half-tab 12.5 mg (12.5 mg Oral $Given 4/3/24 5836)     Followed by   cloZAPine  (CLOZARIL) tablet 25 mg (25 mg Oral $Given 4/5/24 0902)     Followed by   cloZAPine (CLOZARIL) tablet 50 mg (has no administration in time range)     Followed by   cloZAPine (CLOZARIL) tablet 75 mg (has no administration in time range)   cloZAPine (CLOZARIL) tablet 50 mg (has no administration in time range)   cloZAPine (CLOZARIL) tablet 100 mg (has no administration in time range)   nicotine polacrilex (NICORETTE) gum 4 mg (4 mg Buccal $Given 4/5/24 0600)   nicotine (NICODERM CQ) 21 MG/24HR 24 hr patch 1 patch (1 patch Transdermal Not Given 4/5/24 0913)   OLANZapine (zyPREXA) tablet 10 mg (has no administration in time range)   OLANZapine zydis (zyPREXA) ODT tab 10 mg (10 mg Oral $Given 4/3/24 1407)   OLANZapine zydis (zyPREXA) ODT tab 10 mg (10 mg Oral $Given 4/3/24 1728)   LORazepam (ATIVAN) tablet 1 mg (1 mg Oral $Given 4/3/24 1727)   OLANZapine zydis (zyPREXA) ODT tab 10 mg (10 mg Oral $Given 4/4/24 0044)   LORazepam (ATIVAN) tablet 2 mg (2 mg Oral $Given 4/4/24 0435)   LORazepam (ATIVAN) tablet 1 mg (1 mg Oral $Given 4/4/24 0650)   LORazepam (ATIVAN) tablet 2 mg (2 mg Oral $Given 4/4/24 0739)   OLANZapine zydis (zyPREXA) ODT tab 5 mg (5 mg Oral $Given 4/4/24 0738)   hydrOXYzine HCl (ATARAX) tablet 50 mg (50 mg Oral $Given 4/4/24 1038)   QUEtiapine (SEROquel) tablet 50 mg (50 mg Oral $Given 4/4/24 1038)   LORazepam (ATIVAN) tablet 2 mg (2 mg Oral $Given 4/4/24 1436)   OLANZapine zydis (zyPREXA) ODT tab 5 mg (5 mg Oral $Given 4/4/24 1436)   ziprasidone (GEODON) injection 20 mg (20 mg Intramuscular $Given 4/5/24 0325)   OLANZapine zydis (zyPREXA) ODT tab 10 mg (10 mg Oral $Given 4/5/24 1323)   LORazepam (ATIVAN) tablet 2 mg (2 mg Oral $Given 4/5/24 1323)   OLANZapine zydis (zyPREXA) ODT tab 10 mg (10 mg Oral $Given 4/5/24 9622)            There were significant events during my shift.  Patient was coming out of the room, posturing at the staff, attempting to walk out of the emergency department and into other  patient's rooms.  A code 21 was called.  He had to be given oral meds and was placed in seclusion.      Critical Care Addendum  My initial assessment, based on my review of nursing observations, review of vital signs, focused history, physical exam, and and review of prior charts, prior meds given , established a high suspicion that Ward Dawson has severe agitation, which requires immediate intervention, and therefore he is critically ill.     After the initial assessment, the care team initiated medication therapy with Zyprexa and initiated physical restraints and seclusion to provide stabilization care to provide stabilization care. Due to the critical nature of this patient, I reassessed nursing observations, vital signs, physical exam, and respiratory status multiple times prior to his disposition.     Time also spent performing documentation, coordination of care, and and serial exam .     Critical care time (excluding teaching time and procedures): 20 minutes.       Patient was signed out to the oncoming provider      Impression:    ICD-10-CM    1. Schizoaffective disorder, bipolar type (H)  F25.0       2. Polysubstance abuse (H)  F19.10       3. Agitation  R45.1           Plan:    Awaiting inpatient mental health admission/transfer.      RESULTS:   Results for orders placed or performed during the hospital encounter of 04/03/24 (from the past 24 hour(s))   Psychiatry IP Consult: follow up; ongoing agitation; recommendations; Consultant may enter orders: No; Requesting provider? Other supervising provider; Name: Radha Barnett     Status: None ()    Collection Time: 04/05/24  6:49 AM    Josephine Duong APRN CNP     4/5/2024  5:24 PM        Initial Psychiatric Consult   Consult date: April 5, 2024         Reason for Consult, requesting source:    follow up; ongoing agitation; recommendations   Requesting source:     Labs and imaging reviewed. Spoke with RNMary and Dr. Lauren.       DEC  "assessment by Mariam Ball from 4/3/24 reviewed        HPI:   Cristel is a 33 yo male with a past psychiatric history of   schizoaffective disorder, bipolar type, antisocial personality   disorder, and polysubstance use who presented to the ED from his   IRTS facility with increasing mood dysregulation and property   destruction. He has not slept in 3 days. Staff at the [facility]S   facility reported that patient had been quite stable prior to   passes last weekend. Staff suspect possible substance use   contributing to current evens. Patient received Narcan 2 times   the night prior to coming to the ED. He has a history of numerous   previous  mental health hospitalizations and commitments. Last   MI commitment  24. Patient currently receives   maintenance ECT through [facility] with last treatment being the   morning of 4/3/24.      Cristel reluctantly agreed to meet with me. He was listening to   music loudly on the phone yet was receptive to turning down the   volume when asked. He tells me he's not sure why he's here. He   appeared to yawn, I asked him if he was tired and he told me no.   He then accused writer of \"putting words in my mouth\" and stated   \"you're dumb and incompetent if you have to ask me these   questions.\" He denies that he has been disruptive in the ED. He   denies substance use and denies medication noncompliance. He   tells me he just needs to watch TV, He then says he needs to go   and get his stuff from [place] where it is currently locked up.   He has an ACT team and attempted to call someone named Yamila   from his ACT team as I was standing talking to him. He denies SI   and denies AH/VH. He says the people at his IRTS facility assume   he's the one using heroin but that he's not. He asks writer to go   and figure out where his belongings are and find housing for him.   He was observed to yawn several times and his speech was slurred   and difficult to make out at times.     Per " nursing, he has continued to escalate at various times today.   Last Code 21 called for patient was at 0430 this morning. He has   been intrusive with other staff, pacing the ED, and taking off   his shirt. He has been playing music loudly. He has been awake   since 0200. He continues to present as manic with pressured   speech. He was agreeable to taking oral ativan and olanzapine   about a half hour prior to meeting with me. He appeared much   calmer and sedated following medication administration.         Past Psychiatric History:   Record review notes previous dx of schizoaffective disorder,   bipolar type; antisocial personality disorder, polysubstance use,   PTSD, and depression.     Psychiatrist is Dr. Watson.   Zeeshan manages blood draws and clozapine dispensing.   Heidy Pop, ACT , [facility] ACT team   [phone] and Chey at [facility] [phone].     Past medication trials noted in record review include: Benadryl,   Risperdal, Zyprexa, Depakote ER, Tramadol, Klonopin, Hydroxyzine,   Cogentin, Prazosin, Gabapentin, Trazodone, Seroquel, Thorazine,   Haldol Dec CARLOS     Most recent Discharge Summary from 2/28/24 by Dr. Brock notes:  This patient has had many previous psychiatric hospitalizations,   and the last was at [facility] [place] 3 years ago. He was under a mental   health commitment at that time, he has an ACT team through   [facility]. He is no longer on a commitment, but works with   the ACT team on a voluntary basis. After discussion of the   indications, risks, benefits, alternatives and consequences of no   treatment, the patient was agreeable to continuing on Depakote   and Clozaril, although he requested a slightly reduced dose of   Clozaril, as he finds 300 mg too sedating. His dose was reduced   to 200 mg nightly, and he reported improved daytime wakefulness   at this dose. He continued to sleep well each night during his   stay. He was initially somewhat  "intrusive with peers, seem to   have a \"[facility] house\" mentality, where he needs to assert himself   with peers, although he tended to be quite pleasant with staff.   As his stay went on, this behavior decreased he became more calm,   pleasant and cooperative, even when interacting with peers. He   did not display any psychotic symptoms during his stay, and did   not appear to be manic. He denied having depression or suicidal   thoughts throughout his stay. He was interested in going to an   IRTS from here, and referrals were made. He was accepted at [facility], which is [facility] of [facility]. They had a bed   available for him there today. As such, he requested discharge.   As there are no indications of dangerous ideations given the   improvements noted above, his request for discharge was granted.         Substance Use and History:   UDS notable for cannabis. Record review notes that patient has   previously reports using xanax, cannabis, cocaine, percocet, and   OxyContin         Past Medical History:   PAST MEDICAL HISTORY: No past medical history on file.    PAST SURGICAL HISTORY: No past surgical history on file.          Family History:   FAMILY HISTORY:   Family History   Problem Relation Age of Onset    Cancer No family hx of     Diabetes No family hx of     Hypertension No family hx of     Cerebrovascular Disease No family hx of     Thyroid Disease No family hx of     Glaucoma No family hx of     Macular Degeneration No family hx of            Social History:   SOCIAL HISTORY:   Social History     Tobacco Use    Smoking status: Not on file    Smokeless tobacco: Not on file   Substance Use Topics    Alcohol use: No       Patient was residing at IRTS facility, per record review patient   cannot return there due to property destruction. Record review   indicates that patient has a 10 year old son.          Physical ROS:   The 10 point Review of Systems is negative other than noted in   the HPI or " here.           Medications:     Current Facility-Administered Medications   Medication Dose Route Frequency Provider Last Rate Last Admin    atorvastatin (LIPITOR) tablet 40 mg  40 mg Oral Daily   Jaswinder Claros MD   40 mg at 04/05/24 0903    [START ON 4/9/2024] cloZAPine (CLOZARIL) tablet 100 mg  100 mg   Oral At Bedtime Jaswinder Claros MD        cloZAPine (CLOZARIL) tablet 25 mg  25 mg Oral BID Jaswinder Claros MD   25 mg at 04/05/24 0902    Followed by    [START ON 4/6/2024] cloZAPine (CLOZARIL) tablet 50 mg  50 mg   Oral BID Jaswinder Claros MD        Followed by    [START ON 4/8/2024] cloZAPine (CLOZARIL) tablet 75 mg  75 mg   Oral At Bedtime Jaswinder Claros MD        [START ON 4/9/2024] cloZAPine (CLOZARIL) tablet 50 mg  50 mg   Oral Daily Jaswinder Claros MD        divalproex sodium extended-release (DEPAKOTE ER) 24 hr tablet   500 mg  500 mg Oral BID Jaswinder Claros MD   500 mg at   04/05/24 0903    gabapentin (NEURONTIN) tablet 600 mg  600 mg Oral BID   Jaswinder Claros MD   600 mg at 04/05/24 0903    hydrochlorothiazide (HYDRODIURIL) tablet 25 mg  25 mg Oral Daily   Jaswinder Claros MD   25 mg at 04/05/24 0902    nicotine (NICODERM CQ) 21 MG/24HR 24 hr patch 1 patch  1 patch   Transdermal Daily Xavi Medeiros MD   1 patch at 04/04/24 1532              Allergies:     Allergies   Allergen Reactions    Haloperidol Confusion and Other (See Comments)     Prone to EPSE. COnsider IM Zyprexa or be sure to give with   benadryl          Labs:     Recent Results (from the past 48 hour(s))   Urine Drug Screen Panel    Collection Time: 04/03/24 12:42 PM   Result Value Ref Range    Amphetamines Urine Screen Negative Screen Negative    Barbituates Urine Screen Negative Screen Negative    Benzodiazepine Urine Screen Negative Screen Negative    Cannabinoids Urine Screen Positive (A) Screen Negative    Cocaine Urine Screen  Negative Screen Negative    Fentanyl Qual Urine Screen Negative Screen Negative    Opiates Urine Screen Negative Screen Negative    PCP Urine Screen Negative Screen Negative   Comprehensive metabolic panel    Collection Time: 04/03/24  2:16 PM   Result Value Ref Range    Sodium 138 135 - 145 mmol/L    Potassium 4.2 3.4 - 5.3 mmol/L    Carbon Dioxide (CO2) 28 22 - 29 mmol/L    Anion Gap 11 7 - 15 mmol/L    Urea Nitrogen 24.5 (H) 6.0 - 20.0 mg/dL    Creatinine 1.18 (H) 0.67 - 1.17 mg/dL    GFR Estimate 84 >60 mL/min/1.73m2    Calcium 9.8 8.6 - 10.0 mg/dL    Chloride 99 98 - 107 mmol/L    Glucose 91 70 - 99 mg/dL    Alkaline Phosphatase 74 40 - 150 U/L     (H) 0 - 45 U/L    ALT 34 0 - 70 U/L    Protein Total 7.8 6.4 - 8.3 g/dL    Albumin 5.0 3.5 - 5.2 g/dL    Bilirubin Total 1.5 (H) <=1.2 mg/dL   CBC with platelets and differential    Collection Time: 04/03/24  2:16 PM   Result Value Ref Range    WBC Count 11.1 (H) 4.0 - 11.0 10e3/uL    RBC Count 4.94 4.40 - 5.90 10e6/uL    Hemoglobin 13.5 13.3 - 17.7 g/dL    Hematocrit 42.7 40.0 - 53.0 %    MCV 86 78 - 100 fL    MCH 27.3 26.5 - 33.0 pg    MCHC 31.6 31.5 - 36.5 g/dL    RDW 13.4 10.0 - 15.0 %    Platelet Count 213 150 - 450 10e3/uL    % Neutrophils 63 %    % Lymphocytes 26 %    % Monocytes 9 %    % Eosinophils 1 %    % Basophils 1 %    % Immature Granulocytes 0 %    NRBCs per 100 WBC 0 <1 /100    Absolute Neutrophils 7.0 1.6 - 8.3 10e3/uL    Absolute Lymphocytes 2.9 0.8 - 5.3 10e3/uL    Absolute Monocytes 0.9 0.0 - 1.3 10e3/uL    Absolute Eosinophils 0.1 0.0 - 0.7 10e3/uL    Absolute Basophils 0.1 0.0 - 0.2 10e3/uL    Absolute Immature Granulocytes 0.0 <=0.4 10e3/uL    Absolute NRBCs 0.0 10e3/uL          Physical and Psychiatric Examination:     BP (!) 139/114 (BP Location: Left arm, Patient Position: Sitting,   Cuff Size: Adult Regular)   Pulse 91   Temp 98.5  F (36.9  C)   (Oral)   Resp 16   Ht 1.829 m (6')   Wt 108 kg (238 lb)     SpO2 94%   BMI 32.28  "kg/m    Weight is 238 lbs 0 oz  Body mass index is 32.28 kg/m .    Physical Exam:  I have reviewed the physical exam as documented by by the medical   team and agree with findings and assessment and have no   additional findings to add at this time.    Mental Status Exam:    Appearance: adequately groomed, appeared as age stated, and   casually dressed, wearing shower cap, sitting on bed   Attitude:  somewhat cooperative  Eye Contact:  poor   Mood:   \"I'm tired\"  Affect:  mood congruent  Speech:  normal prosody and slurred speech, mumbling   Psychomotor Behavior:  no evidence of tardive dyskinesia,   dystonia, or tics, intact station, gait and muscle tone, and   sitting on bed eating   Thought Process:  disorganized  Associations:  no loose associations  Thought Content:  no evidence of suicidal ideation or homicidal   ideation and no evidence of psychotic thought  Insight:  limited  Judgement:  limited  Oriented to:  time, person, and place  Attention Span and Concentration:  fair  Recent and Remote Memory:  fair               DSM-5 Diagnosis:      Schizoaffective disorder, bipolar type, currently manic       Antisocial personality disorder       Cannabis abuse          Assessment:   \"Cristel\" is a 33 yo male with a past psychiatric history of   schizoaffective disorder, bipolar type, antisocial personality   disorder, and polysubstance use who presented to the ED from his   [facility] facility with increasing mood dysregulation, evens, and   property destruction. He has not slept in 3 days. Staff at the   [facility] facility reported that patient had been quite stable prior   to passes last weekend. Staff suspect possible substance use   contributing to current evens. Patient received Narcan 2 times   the night prior to coming to the ED. UDS notable for cannabis. He   has a history of numerous previous  mental health   hospitalizations and commitments. Last MI commitment    24. Patient currently receives " [place] [facility]   with last treatment being the morning of 4/3/24.      While in the ED, Cristel has been intrusive with staff. At times   he has been verbally redirectable however he has also required   restraints. Last code 21 called was at 0430 on 4/5/24. He   continues to present with ongoing symptoms of evens including   mood lability, pressured speech, and decreased need for sleep.   Today, Cristel appeared somewhat sedated when I met with him. He   had just received oral olanzapine and ativan approximately 30 min   prior to me meeting with him. He denies SI and denies AH/VH. He   continues to state that he's unsure why he's here and made   accusatory statements directed at writer when being questioned   about his current symptoms. Record review does indicate that   patient does present as intrusive and often asserts himself at   baseline. Given the acute change in presentation over the weekend   while patient was on pass, noted by IRTS staff and ACT team,   substance use and medication noncompliance are potential   contributing factors to current presentation. Patient's Clozaril   is being retitrated given concerns for medication noncompliance.   Patient would benefit from additional observation and   stabilization on an inpatient mental health unit, patient is   voluntary at this time. Should placement be delayed and patient   continues to stabilize, consider alternate disposition to   community-based supports.           Summary of Recommendations:   Continue PTA medications including Depakote 500mg twice daily,   gabapentin 600mg twcie daily, and Clozaril. Of note, Clozaril is   being retitrated due to concerns with medication noncompliance   over the weekend at his IRTS facility   PRN olanzapine 10mg twice daily for agitation and aggression   Labs reviewed, ANC 7.0 on 4/3/24  Valproic Acid level ordered to ensure therapeutic dosing of   Depakote, could consider dosage increase to further  target mood   lability   Patient would benefit from additional observation and   stabilization on  mental health unit. Patient is voluntary at   this time.       AMY [facility]    Consult/Liaison Psychiatry   [facility]    Contact information available via [facility] Paging/Directory  If I am not available, then [facility] CL line ([phone]) should   know who is covering our consult service.                      MD David Paniagua David, MD  04/05/24 8363

## 2024-04-05 NOTE — ED NOTES
Code 21 called by Security. Writer was told pt was posturing and threatening staff.  Pt was playing loud music.  Writer asked pt to turn down music or close door. Initially, pt refuse to turn down but eventually did. Writer asked pt to close door to minimize noise to other pts around him, but pt refused. MARYBETH team tried to persuade pt to close door but pt refused.  Pt asked for a tooth brush and demanded that he gets one immediately while he broke his old tooth brush in front of writer.  Eventually, he was given a new tooth brush. Pt continues to threaten and posture at staff.  Multiple attempts to de-escalate pt but refuse to comply and ignores instructions to stay in room and continue to posture and threaten staff. Code 21 called. IM Geodon given and phone removed. Food given and pt continues to pace in room and stand by door. Continues with 1:1.

## 2024-04-05 NOTE — ED NOTES
Pt pacing in  with [facility] staff. Pt approached [facility] desk, removed his shirt and put on the shirt

## 2024-04-05 NOTE — ED NOTES
Patient remains up in hallways. When un approached patient appears more calm. This RN approached patient about cleaning trash out of room, had many empty containers straws utensils, old meal tray. Patient argumentative, agitated with this RN when removing trash.

## 2024-04-06 ENCOUNTER — TELEPHONE (OUTPATIENT)
Dept: BEHAVIORAL HEALTH | Facility: CLINIC | Age: 34
End: 2024-04-06
Payer: MEDICARE

## 2024-04-06 ENCOUNTER — APPOINTMENT (OUTPATIENT)
Dept: GENERAL RADIOLOGY | Facility: CLINIC | Age: 34
DRG: 885 | End: 2024-04-06
Attending: STUDENT IN AN ORGANIZED HEALTH CARE EDUCATION/TRAINING PROGRAM
Payer: MEDICARE

## 2024-04-06 PROCEDURE — 250N000013 HC RX MED GY IP 250 OP 250 PS 637

## 2024-04-06 PROCEDURE — 250N000013 HC RX MED GY IP 250 OP 250 PS 637: Performed by: FAMILY MEDICINE

## 2024-04-06 PROCEDURE — 73130 X-RAY EXAM OF HAND: CPT | Mod: RT

## 2024-04-06 PROCEDURE — 250N000013 HC RX MED GY IP 250 OP 250 PS 637: Performed by: EMERGENCY MEDICINE

## 2024-04-06 RX ORDER — CLOZAPINE 50 MG/1
50 TABLET ORAL 2 TIMES DAILY
Status: COMPLETED | OUTPATIENT
Start: 2024-04-06 | End: 2024-04-07

## 2024-04-06 RX ORDER — LORAZEPAM 2 MG/1
2 TABLET ORAL ONCE
Status: COMPLETED | OUTPATIENT
Start: 2024-04-06 | End: 2024-04-06

## 2024-04-06 RX ADMIN — NICOTINE 1 PATCH: 21 PATCH, EXTENDED RELEASE TRANSDERMAL at 08:29

## 2024-04-06 RX ADMIN — OLANZAPINE 10 MG: 10 TABLET, FILM COATED ORAL at 19:36

## 2024-04-06 RX ADMIN — DIVALPROEX SODIUM 500 MG: 250 TABLET, FILM COATED, EXTENDED RELEASE ORAL at 19:36

## 2024-04-06 RX ADMIN — LORAZEPAM 2 MG: 2 TABLET ORAL at 21:18

## 2024-04-06 RX ADMIN — ATORVASTATIN CALCIUM 40 MG: 40 TABLET, FILM COATED ORAL at 08:28

## 2024-04-06 RX ADMIN — Medication 1 LOZENGE: at 21:37

## 2024-04-06 RX ADMIN — OLANZAPINE 10 MG: 10 TABLET, FILM COATED ORAL at 08:28

## 2024-04-06 RX ADMIN — CLOZAPINE 50 MG: 50 TABLET ORAL at 08:28

## 2024-04-06 RX ADMIN — GABAPENTIN 600 MG: 600 TABLET, FILM COATED ORAL at 08:29

## 2024-04-06 RX ADMIN — HYDROXYZINE HYDROCHLORIDE 50 MG: 50 TABLET, FILM COATED ORAL at 19:37

## 2024-04-06 RX ADMIN — DIVALPROEX SODIUM 500 MG: 250 TABLET, FILM COATED, EXTENDED RELEASE ORAL at 08:28

## 2024-04-06 RX ADMIN — CLOZAPINE 50 MG: 50 TABLET ORAL at 19:38

## 2024-04-06 RX ADMIN — GABAPENTIN 600 MG: 600 TABLET, FILM COATED ORAL at 19:37

## 2024-04-06 RX ADMIN — HYDROCHLOROTHIAZIDE 25 MG: 25 TABLET ORAL at 08:29

## 2024-04-06 NOTE — ED NOTES
Pt has been hanging out in the hallway with his sitter listening to music on the computer. He has been cooperative. Pleasant and calm demeanor.

## 2024-04-06 NOTE — TELEPHONE ENCOUNTER
R: [place]  Access Inpatient Bed Call Log 4/6/24  @3:50 PM     Intake has called facilities that have not updated the bed status within the last 12 hours.                             [facility] is at capacity.           [facility] is posting 0 beds. [phone];   [facility] is posting 0 beds. Negative covid required             [facility] is posting 0 beds. Neg covid. No high school/[employer]-psych. [phone]   [facility] is posting 0 beds. [phone]  [facility] is posting 0 beds. [phone]   [facility] is posting 1 bed Ages 18-28.  Negative covid. [phone];  Pt is not age appropriate  [facility] is posting 0 beds.   [facility] is posting 0 beds. Low acuity. [phone]     Pt remains on the work list pending appropriate bed availability.

## 2024-04-06 NOTE — ED NOTES
Cristel continued to come out of his room, continued to go into other patients rooms.  Cristel again went again into the patients locker area and got all of his belongings again brought it to the nursing station and dumped it on the floor.  Cristel started disrobing his upper garment, ripping his scrub top and going in and out at the nursing station.  MD ordered seclusion

## 2024-04-06 NOTE — ED NOTES
Patient came out of his room, pacing in the HW, 1:1 attendant tried to redirect back patient to his room but refused.  Patient was very loud in the HW disrupting other patients in the HW.  Cristel then went into the patient's locker area started grabbing all of his belongings brought it all to the nursing station and yelling.  Security was called and patient was told to stop and again encouraged to go back to his room.  Cristel then went into other patient's rooms. Attendant stopped the patient and again redirected back to his room which patient did not do.  Cristel then went after a patient that is a minor who is in the HW. This minor was with his 1:1 attendant and was playing with his attendant.   Cristel went to the minor patient, talking loudly in his face. Security got involved and told patient to stopped.   Dr Hernandez was  updated and ordered oral Olanzapine.

## 2024-04-06 NOTE — ED NOTES
Patient calm, able to contract to safety. Restraints discontinued. Patient brought back to room 15. 2:1 attendant at .

## 2024-04-06 NOTE — ED NOTES
This writer received patient's care at 2300. Pt alert and oriented. Pt denies pain and discomfort. Shortly after the shift change, Pt went to bed has been sleeping ever since. Pt.refused vitals but asked for snack which was provided. Pt remained 2:1 staff at bedside.

## 2024-04-06 NOTE — ED NOTES
"This writer received pt's care at 0300. Pt was sleeping on the floor as he refused to sleep in bed, per report. Pt woke up when another pt was screaming. This pt continued to be loud telling staff to \"shut the fuck up when I'm not talking to you\". Pt requested his phone and his clothes to change into. Another RN told the pt that he could trade the clothes he was wearing as to not have too many clothes in his room and he was agreeable. Pt continued to tell staff to \"fuck off\" after being told not to. Pt started punching the door and stopped when asked. Pt agreed to a hand xray and was cooperative.  "

## 2024-04-06 NOTE — TELEPHONE ENCOUNTER
R: [place]  Access Inpatient Bed Call Log 4/5/24 @3:09 PM:    Intake has called facilities that have not updated the bed status within the last 12 hours.                               [facility] is at capacity            [facility] is posting 0 beds. [phone] Per call @8:25am, will know more after an hr when bed meeting is completed.   [facility] is posting 1 bed. Negative covid required              [facility] is posting 0 beds. Neg covid. No high school/Bisi-psych. [phone] Per call @8:26am, at capacity   [facility] is posting 0 beds. [phone]   [facility] is posting 0 beds. [phone]    [facility] is posting 0 beds. Negative covid. [phone] Per call @8:12am   [facility] is posting 0 bed [phone]       Pt remains on the work list pending appropriate bed availability.

## 2024-04-06 NOTE — ED NOTES
"Pt in room and walking in halls with loud music and not turning it down after multiple requests to turn it down. Pt then went to room and told staff \"if you cross the doorway, I am gonna hurt you\". Pt continued to threaten staff with similar remarks and was jumping on and off bed, and play loud music. Pt cooperated and took oral zyprexa, behavior continued. Code21 was called. Staff attempted to redirect pt and talk him into giving him his phone. Pt continued to be aggressive towards staff when phone was being taken away. Staff went hands on and pt in 5 point restraints. MD aware, order completed. 2:1 at bedside.   "

## 2024-04-06 NOTE — TELEPHONE ENCOUNTER
R:   No beds within [facility].   Pt preference is [facility] or [facility].  [facility] has no beds for review.  [facility] bed search initiated @ 7:20 AM:     [facility] is posting 0 beds. [phone]; per call at 7:05 am to [place], they are at cap.    [facility] is posting 0 beds. Negative covid required              [facility] is posting 0 beds. Neg covid. No high school/Bisi-psych. [phone]    [facility] is posting 0 beds. [phone]   [facility] is posting 0 beds. [phone]    [facility] is posting 0 beds. Negative covid. [phone]; per call at 7:08 am to [place], they have 1 y/a bed, 1 child bed, a handful of adol beds, no tami beds avail and no privates.    [facility] is posting 0 beds.    [facility] is posting 0 beds. Low acuity. [phone]   No bed availability     No bed availability in [facility].   Pt to remain on Adult worklist.

## 2024-04-06 NOTE — ED NOTES
While in seclusion patient started charging at the door and started banging his head at the door.   Dr Hernandez was informed.  5 point restraints ordered. Behavioral code was called.

## 2024-04-06 NOTE — ED NOTES
Patient remains in 5 points, still agitated, but does interact calmly with this RN. Patient requests RN look in his mouth, stating he has bitten his cheeks because his jaw is not aligned. RN offered patient orajel, patient accepted, provider notified. Patient asks about phone, RN reiterated patient is not allowed to have phone. Patient requests to watch TV, patient's sitters suggest waiting until after meds to turn on TV. Patient in agreement at this time

## 2024-04-06 NOTE — TELEPHONE ENCOUNTER
Bed search update ([facility]) @ 12:10AM         [facility] @ cap  [facility]: @ cap per website. Per Mark @ 12:10AM, unable to review tonight and suggests calling back later this morning   [facility]: @ cap per website   [facility]: @ cap per website   [facility]: @ cap per website   [facility]: @ cap per website. Per Jen @ 12:14AM, no beds available  [facility]: @ cap per website (Young Adult unit: No recent aggressions, violence or sexual assault. Neg covid required).     [facility]: @ cap per website   [facility]: @ cap per website   [facility]: @ cap per website     Pt remains on work list until appropriate placement is available

## 2024-04-07 ENCOUNTER — TELEPHONE (OUTPATIENT)
Dept: BEHAVIORAL HEALTH | Facility: CLINIC | Age: 34
End: 2024-04-07
Payer: MEDICARE

## 2024-04-07 PROCEDURE — 250N000013 HC RX MED GY IP 250 OP 250 PS 637: Performed by: EMERGENCY MEDICINE

## 2024-04-07 PROCEDURE — 250N000013 HC RX MED GY IP 250 OP 250 PS 637

## 2024-04-07 PROCEDURE — 250N000013 HC RX MED GY IP 250 OP 250 PS 637: Performed by: FAMILY MEDICINE

## 2024-04-07 RX ORDER — QUETIAPINE FUMARATE 50 MG/1
50 TABLET, FILM COATED ORAL ONCE
Status: COMPLETED | OUTPATIENT
Start: 2024-04-07 | End: 2024-04-07

## 2024-04-07 RX ORDER — POLYETHYLENE GLYCOL 3350 17 G
2 POWDER IN PACKET (EA) ORAL
Status: DISCONTINUED | OUTPATIENT
Start: 2024-04-07 | End: 2024-05-14 | Stop reason: HOSPADM

## 2024-04-07 RX ORDER — LORAZEPAM 1 MG/1
1 TABLET ORAL ONCE
Status: COMPLETED | OUTPATIENT
Start: 2024-04-07 | End: 2024-04-07

## 2024-04-07 RX ORDER — LORAZEPAM 2 MG/1
2 TABLET ORAL ONCE
Status: COMPLETED | OUTPATIENT
Start: 2024-04-07 | End: 2024-04-07

## 2024-04-07 RX ADMIN — NICOTINE POLACRILEX 4 MG: 4 GUM, CHEWING BUCCAL at 10:34

## 2024-04-07 RX ADMIN — QUETIAPINE FUMARATE 50 MG: 50 TABLET ORAL at 23:27

## 2024-04-07 RX ADMIN — Medication 1 LOZENGE: at 20:48

## 2024-04-07 RX ADMIN — HYDROXYZINE HYDROCHLORIDE 50 MG: 50 TABLET, FILM COATED ORAL at 18:09

## 2024-04-07 RX ADMIN — NICOTINE POLACRILEX 4 MG: 4 GUM, CHEWING BUCCAL at 06:34

## 2024-04-07 RX ADMIN — OLANZAPINE 10 MG: 10 TABLET, FILM COATED ORAL at 18:09

## 2024-04-07 RX ADMIN — Medication 1 LOZENGE: at 14:03

## 2024-04-07 RX ADMIN — HYDROCHLOROTHIAZIDE 25 MG: 25 TABLET ORAL at 07:54

## 2024-04-07 RX ADMIN — NICOTINE POLACRILEX 4 MG: 4 GUM, CHEWING BUCCAL at 07:57

## 2024-04-07 RX ADMIN — GABAPENTIN 600 MG: 600 TABLET, FILM COATED ORAL at 07:55

## 2024-04-07 RX ADMIN — GABAPENTIN 600 MG: 600 TABLET, FILM COATED ORAL at 19:19

## 2024-04-07 RX ADMIN — OLANZAPINE 10 MG: 10 TABLET, FILM COATED ORAL at 05:26

## 2024-04-07 RX ADMIN — ATORVASTATIN CALCIUM 40 MG: 40 TABLET, FILM COATED ORAL at 07:54

## 2024-04-07 RX ADMIN — LORAZEPAM 2 MG: 2 TABLET ORAL at 22:08

## 2024-04-07 RX ADMIN — DIVALPROEX SODIUM 500 MG: 250 TABLET, FILM COATED, EXTENDED RELEASE ORAL at 19:19

## 2024-04-07 RX ADMIN — HYDROXYZINE HYDROCHLORIDE 50 MG: 50 TABLET, FILM COATED ORAL at 12:19

## 2024-04-07 RX ADMIN — DIVALPROEX SODIUM 500 MG: 250 TABLET, FILM COATED, EXTENDED RELEASE ORAL at 07:54

## 2024-04-07 RX ADMIN — HYDROXYZINE HYDROCHLORIDE 50 MG: 50 TABLET, FILM COATED ORAL at 06:03

## 2024-04-07 RX ADMIN — LORAZEPAM 1 MG: 1 TABLET ORAL at 09:25

## 2024-04-07 RX ADMIN — CLOZAPINE 50 MG: 50 TABLET ORAL at 19:21

## 2024-04-07 RX ADMIN — CLOZAPINE 50 MG: 50 TABLET ORAL at 07:54

## 2024-04-07 NOTE — ED NOTES
Patient up in hallway, speaking with 1:1. Animated speech. Requests additional PRN for agitation. MD notified, 2mg ativan given per MAR. Patient remains redirectable, respectful of staff.

## 2024-04-07 NOTE — TELEPHONE ENCOUNTER
R: [place]  Access Inpatient Bed Call Log 4/7/24  @ 3:15 PM  Intake has called facilities that have not updated the bed status within the last 12 hours.                             [facility] is at capacity.           [facility] is posting 0 beds. [phone];   [facility] is posting 0 beds. Negative covid required             [facility] is posting 0 beds. Neg covid. No high school/Bisi-psych. [phone];   [facility] is posting 0 beds. [phone]  [facility] is posting 0 beds. [phone] [place] [address] is posting 2 beds .Ages 18-28.  Negative covid. [phone];   [facility] is posting 0 beds.   [facility] is posting 1 bed. Low acuity. [phone]    Pt remains on the work list pending appropriate bed availability.

## 2024-04-07 NOTE — ED NOTES
Patient cooperative with staff throughout evening. Watching tv, playing cards. More relaxed after PO ativan admin. Given mac n cheese. Patient agreeable to tidying room and bed, agrees to turning down lights and tv to prepare for sleep. Patient social with 1:1 staff and nurses, appropriate conversation, redirectable.

## 2024-04-07 NOTE — ED NOTES
"Patient fell asleep while sleeping around midnight. He woke up 30 minutes after. He took the jacket of one of the sitter, wore it then walked in the hallway. Patient was redirected to his room. The sitter's jacket was taken away from the patient. Patient again fell asleep. He woke up around 0510. He was asking for all his morning medication. Patient was reoriented with time. He said \"can you give me all my prn medications?\" PRN Zyprexa was given. Patient is currently up in the hallway interacting with the .   "

## 2024-04-07 NOTE — ED NOTES
Report to ALFRED Brothers. All questions and concerns addressed. Transferring care at this time.

## 2024-04-07 NOTE — ED NOTES
[facility] ED Mental Health Handoff Note:       Brief HPI:  This is a 34 year old male signed out to me by Dr. Martinez.  See initial ED Provider note for full details of the presentation.     Home meds reviewed and ordered/administered: Yes    Medically stable for inpatient mental health admission: Yes.    Evaluated by mental health: Yes. The recommendation is for inpatient mental health treatment. Bed search in process    Safety concerns: At the time I received sign out, there were no safety concerns.    Hold Status:  Active Orders   N/A            Exam:   Patient Vitals for the past 24 hrs:   BP Temp Temp src Pulse Resp   04/06/24 0830 -- -- -- -- 16   04/06/24 0815 -- -- -- -- 16   04/06/24 0800 -- -- -- -- 16   04/06/24 0755 138/74 98.2  F (36.8  C) Oral 97 16   04/06/24 0745 -- -- -- -- 16           ED Course:    Medications   hydrOXYzine HCl (ATARAX) tablet 50 mg (50 mg Oral $Given 4/7/24 0603)   albuterol (PROVENTIL HFA/VENTOLIN HFA) inhaler (has no administration in time range)   atorvastatin (LIPITOR) tablet 40 mg (40 mg Oral $Given 4/6/24 0828)   divalproex sodium extended-release (DEPAKOTE ER) 24 hr tablet 500 mg (500 mg Oral $Given 4/6/24 1936)   gabapentin (NEURONTIN) tablet 600 mg (600 mg Oral $Given 4/6/24 1937)   guaiFENesin (ROBITUSSIN) 20 mg/mL solution 100 mg (100 mg Oral $Given 4/5/24 1217)   hydrochlorothiazide (HYDRODIURIL) tablet 25 mg (25 mg Oral $Given 4/6/24 0829)   cloZAPine (CLOZARIL) tablet 50 mg (has no administration in time range)   cloZAPine (CLOZARIL) tablet 100 mg (has no administration in time range)   nicotine polacrilex (NICORETTE) gum 4 mg (4 mg Buccal $Given 4/7/24 0634)   nicotine (NICODERM CQ) 21 MG/24HR 24 hr patch 1 patch (1 patch Transdermal $Patch/Med Applied 4/6/24 0829)   OLANZapine (zyPREXA) tablet 10 mg (10 mg Oral $Given 4/7/24 4540)   benzocaine (ORAJEL MAXIMUM STRENGTH) 20 % gel (has no administration in time range)   cloZAPine (CLOZARIL) half-tab 12.5 mg  (12.5 mg Oral $Given 4/3/24 2356)     Followed by   cloZAPine (CLOZARIL) tablet 25 mg ( Oral Canceled Entry 4/6/24 0936)     Followed by   cloZAPine (CLOZARIL) tablet 50 mg (50 mg Oral $Given 4/6/24 1938)     Followed by   cloZAPine (CLOZARIL) tablet 75 mg (has no administration in time range)   benzocaine-menthol (CHLORASEPTIC) 6-10 MG lozenge 1 lozenge (1 lozenge Buccal $Given 4/6/24 2137)   OLANZapine zydis (zyPREXA) ODT tab 10 mg (10 mg Oral $Given 4/3/24 1407)   OLANZapine zydis (zyPREXA) ODT tab 10 mg (10 mg Oral $Given 4/3/24 1728)   LORazepam (ATIVAN) tablet 1 mg (1 mg Oral $Given 4/3/24 1727)   OLANZapine zydis (zyPREXA) ODT tab 10 mg (10 mg Oral $Given 4/4/24 0044)   LORazepam (ATIVAN) tablet 2 mg (2 mg Oral $Given 4/4/24 0435)   LORazepam (ATIVAN) tablet 1 mg (1 mg Oral $Given 4/4/24 0650)   LORazepam (ATIVAN) tablet 2 mg (2 mg Oral $Given 4/4/24 0739)   OLANZapine zydis (zyPREXA) ODT tab 5 mg (5 mg Oral $Given 4/4/24 0738)   hydrOXYzine HCl (ATARAX) tablet 50 mg (50 mg Oral $Given 4/4/24 1038)   QUEtiapine (SEROquel) tablet 50 mg (50 mg Oral $Given 4/4/24 1038)   LORazepam (ATIVAN) tablet 2 mg (2 mg Oral $Given 4/4/24 1436)   OLANZapine zydis (zyPREXA) ODT tab 5 mg (5 mg Oral $Given 4/4/24 1436)   ziprasidone (GEODON) injection 20 mg (20 mg Intramuscular $Given 4/5/24 0325)   OLANZapine zydis (zyPREXA) ODT tab 10 mg (10 mg Oral $Given 4/5/24 1323)   LORazepam (ATIVAN) tablet 2 mg (2 mg Oral $Given 4/5/24 1323)   OLANZapine zydis (zyPREXA) ODT tab 10 mg (10 mg Oral $Given 4/5/24 1734)   OLANZapine (zyPREXA) injection 10 mg (10 mg Intramuscular $Given 4/5/24 1830)   LORazepam (ATIVAN) tablet 2 mg (2 mg Oral $Given 4/6/24 2118)            There were no significant events during my shift.    Patient was signed out to the oncoming provider, Dr. Austin      Impression:    ICD-10-CM    1. Schizoaffective disorder, bipolar type (H)  F25.0       2. Polysubstance abuse (H)  F19.10       3. Agitation  R45.1            Plan:    Awaiting inpatient mental health admission/transfer.      RESULTS:   No results found for this visit on 04/03/24 (from the past 24 hour(s)).          MD Salvador Simon Alda L, MD  04/07/24 1210

## 2024-04-07 NOTE — TELEPHONE ENCOUNTER
Bed search update ([facility]) @ 12AM:        [facility]: No appropriate bed available   [facility]   [facility]: @ cap per website   [facility]: @ cap per website. Per Viky @ 12:03AM, they are at capacity    [facility]: @ cap per website   [facility]: @ cap per website   [facility]: Posting 1 bed (Young Adult unit: No recent aggressions, violence or sexual assault. Neg covid required).  Pt not age appropriate     [facility]: @ cap per website   [facility]: @ cap per website   [facility]: Posting 1 bed. Per [place] @ 12:06AM, they are at full capacity until 9AM    Pt remains on work list until appropriate placement is available

## 2024-04-07 NOTE — TELEPHONE ENCOUNTER
R:  No current beds available for review for [facility] @ [facility] [place]  @ 7:15am  [facility] Bed search Initiated@ 7:17am -    [facility] is posting 0 beds. [phone]; per call at 7:25am Per Kelly - no high acuity bed availability (and no other beds available for review too).    [facility] is posting 0 beds. Negative covid required              [facility] is posting 0 beds. Neg covid. No high school/Bisi-psych. [phone]    [facility] is posting 0 beds. [phone]   [facility] is posting 0 beds. [phone]    [facility] is posting 0 beds. Negative covid. [phone]; per call at 7:17am they have 1 y/a bed, 1 child bed, a handful of adol beds, no tami beds avail and no privates.    [facility] is posting 0 beds.    [facility] is posting 0 beds. Low acuity. [phone]     Pt to remain on adult worklist pending Bed availability

## 2024-04-08 ENCOUNTER — TELEPHONE (OUTPATIENT)
Dept: BEHAVIORAL HEALTH | Facility: CLINIC | Age: 34
End: 2024-04-08
Payer: MEDICARE

## 2024-04-08 PROBLEM — R45.1 AGITATION: Status: ACTIVE | Noted: 2024-04-08

## 2024-04-08 PROBLEM — F19.10 POLYSUBSTANCE ABUSE (H): Status: ACTIVE | Noted: 2024-04-08

## 2024-04-08 LAB — GLUCOSE BLDC GLUCOMTR-MCNC: 117 MG/DL (ref 70–99)

## 2024-04-08 PROCEDURE — 250N000013 HC RX MED GY IP 250 OP 250 PS 637: Performed by: EMERGENCY MEDICINE

## 2024-04-08 PROCEDURE — 99223 1ST HOSP IP/OBS HIGH 75: CPT

## 2024-04-08 PROCEDURE — 250N000013 HC RX MED GY IP 250 OP 250 PS 637: Performed by: PSYCHIATRY & NEUROLOGY

## 2024-04-08 PROCEDURE — 250N000013 HC RX MED GY IP 250 OP 250 PS 637: Performed by: FAMILY MEDICINE

## 2024-04-08 PROCEDURE — 250N000013 HC RX MED GY IP 250 OP 250 PS 637

## 2024-04-08 PROCEDURE — 99291 CRITICAL CARE FIRST HOUR: CPT | Performed by: EMERGENCY MEDICINE

## 2024-04-08 PROCEDURE — 82962 GLUCOSE BLOOD TEST: CPT

## 2024-04-08 PROCEDURE — 124N000002 HC R&B MH UMMC

## 2024-04-08 RX ORDER — OLANZAPINE 10 MG/1
10 TABLET ORAL 3 TIMES DAILY PRN
Status: DISCONTINUED | OUTPATIENT
Start: 2024-04-08 | End: 2024-05-14 | Stop reason: HOSPADM

## 2024-04-08 RX ORDER — ACETAMINOPHEN 500 MG
1000 TABLET ORAL ONCE
Status: COMPLETED | OUTPATIENT
Start: 2024-04-08 | End: 2024-04-08

## 2024-04-08 RX ORDER — LORAZEPAM 2 MG/1
2 TABLET ORAL ONCE
Status: COMPLETED | OUTPATIENT
Start: 2024-04-08 | End: 2024-04-08

## 2024-04-08 RX ORDER — ACETAMINOPHEN 325 MG/1
650 TABLET ORAL EVERY 4 HOURS PRN
Status: DISCONTINUED | OUTPATIENT
Start: 2024-04-08 | End: 2024-05-14 | Stop reason: HOSPADM

## 2024-04-08 RX ORDER — POLYETHYLENE GLYCOL 3350 17 G
2 POWDER IN PACKET (EA) ORAL
Status: DISCONTINUED | OUTPATIENT
Start: 2024-04-08 | End: 2024-04-08

## 2024-04-08 RX ORDER — HYDROXYZINE HYDROCHLORIDE 50 MG/1
50 TABLET, FILM COATED ORAL EVERY 6 HOURS PRN
Status: DISCONTINUED | OUTPATIENT
Start: 2024-04-08 | End: 2024-04-11

## 2024-04-08 RX ORDER — MAGNESIUM HYDROXIDE/ALUMINUM HYDROXICE/SIMETHICONE 120; 1200; 1200 MG/30ML; MG/30ML; MG/30ML
30 SUSPENSION ORAL EVERY 4 HOURS PRN
Status: DISCONTINUED | OUTPATIENT
Start: 2024-04-08 | End: 2024-05-14 | Stop reason: HOSPADM

## 2024-04-08 RX ORDER — POLYETHYLENE GLYCOL 3350 17 G/17G
17 POWDER, FOR SOLUTION ORAL DAILY PRN
Status: DISCONTINUED | OUTPATIENT
Start: 2024-04-08 | End: 2024-04-15

## 2024-04-08 RX ORDER — OLANZAPINE 10 MG/2ML
10 INJECTION, POWDER, FOR SOLUTION INTRAMUSCULAR 3 TIMES DAILY PRN
Status: DISCONTINUED | OUTPATIENT
Start: 2024-04-08 | End: 2024-05-14 | Stop reason: HOSPADM

## 2024-04-08 RX ORDER — QUETIAPINE FUMARATE 50 MG/1
50 TABLET, FILM COATED ORAL ONCE
Status: COMPLETED | OUTPATIENT
Start: 2024-04-08 | End: 2024-04-08

## 2024-04-08 RX ORDER — HYDROXYZINE HYDROCHLORIDE 25 MG/1
25 TABLET, FILM COATED ORAL EVERY 4 HOURS PRN
Status: DISCONTINUED | OUTPATIENT
Start: 2024-04-08 | End: 2024-04-08

## 2024-04-08 RX ORDER — TRAZODONE HYDROCHLORIDE 50 MG/1
50 TABLET, FILM COATED ORAL
Status: DISCONTINUED | OUTPATIENT
Start: 2024-04-08 | End: 2024-05-14 | Stop reason: HOSPADM

## 2024-04-08 RX ADMIN — ACETAMINOPHEN 650 MG: 325 TABLET, FILM COATED ORAL at 22:17

## 2024-04-08 RX ADMIN — OLANZAPINE 10 MG: 10 TABLET, FILM COATED ORAL at 22:17

## 2024-04-08 RX ADMIN — ACETAMINOPHEN 1000 MG: 500 TABLET ORAL at 21:11

## 2024-04-08 RX ADMIN — OLANZAPINE 10 MG: 10 TABLET, FILM COATED ORAL at 13:30

## 2024-04-08 RX ADMIN — QUETIAPINE FUMARATE 50 MG: 50 TABLET ORAL at 04:14

## 2024-04-08 RX ADMIN — HYDROXYZINE HYDROCHLORIDE 50 MG: 50 TABLET, FILM COATED ORAL at 13:30

## 2024-04-08 RX ADMIN — HYDROXYZINE HYDROCHLORIDE 50 MG: 50 TABLET, FILM COATED ORAL at 04:14

## 2024-04-08 RX ADMIN — GABAPENTIN 600 MG: 600 TABLET, FILM COATED ORAL at 08:19

## 2024-04-08 RX ADMIN — Medication 1 LOZENGE: at 17:07

## 2024-04-08 RX ADMIN — TRAZODONE HYDROCHLORIDE 50 MG: 50 TABLET ORAL at 23:32

## 2024-04-08 RX ADMIN — LORAZEPAM 2 MG: 2 TABLET ORAL at 04:14

## 2024-04-08 RX ADMIN — Medication 1 LOZENGE: at 04:09

## 2024-04-08 RX ADMIN — ATORVASTATIN CALCIUM 40 MG: 40 TABLET, FILM COATED ORAL at 08:19

## 2024-04-08 RX ADMIN — DIVALPROEX SODIUM 500 MG: 250 TABLET, FILM COATED, EXTENDED RELEASE ORAL at 20:32

## 2024-04-08 RX ADMIN — GABAPENTIN 600 MG: 600 TABLET, FILM COATED ORAL at 20:33

## 2024-04-08 RX ADMIN — HYDROCHLOROTHIAZIDE 25 MG: 25 TABLET ORAL at 08:19

## 2024-04-08 RX ADMIN — NICOTINE 1 PATCH: 21 PATCH, EXTENDED RELEASE TRANSDERMAL at 04:41

## 2024-04-08 RX ADMIN — HYDROXYZINE HYDROCHLORIDE 50 MG: 50 TABLET, FILM COATED ORAL at 20:32

## 2024-04-08 RX ADMIN — CLOZAPINE 75 MG: 25 TABLET ORAL at 08:47

## 2024-04-08 RX ADMIN — OLANZAPINE 10 MG: 10 TABLET, FILM COATED ORAL at 04:04

## 2024-04-08 RX ADMIN — DIVALPROEX SODIUM 500 MG: 250 TABLET, FILM COATED, EXTENDED RELEASE ORAL at 08:19

## 2024-04-08 ASSESSMENT — ACTIVITIES OF DAILY LIVING (ADL)
LAUNDRY: UNABLE TO COMPLETE
ADLS_ACUITY_SCORE: 35
HYGIENE/GROOMING: INDEPENDENT
ORAL_HYGIENE: INDEPENDENT
ADLS_ACUITY_SCORE: 35
ADLS_ACUITY_SCORE: 28
ADLS_ACUITY_SCORE: 35
ADLS_ACUITY_SCORE: 35
DRESS: INDEPENDENT
ADLS_ACUITY_SCORE: 35
ADLS_ACUITY_SCORE: 28

## 2024-04-08 NOTE — ED NOTES
IP MH Referral Acuity Rating Score (RARS)    LMHP complete at referral to IP MH, with DEC; and, daily while awaiting IP MH placement. Call score to PPS.  CRITERIA SCORING   New 74 HH and Involuntary for IP MH (not adolescent) 0/1   Boarding over 24 hours 1/1   Vulnerable adult at least 55+ with multiple co morbidities; or, Patient age 11 or under 0/1   Suicide ideation without relief of precipitating factors 0/1   Current plan for suicide 0/1   Current plan for homicide 0/1   Imminent risk or actual attempt to seriously harm another without relief of factors precipitating the attempt 1/1   Severe dysfunction in daily living (ex: complete neglect for self care, extreme disruption in vegetative function, extreme deterioration in social interactions) 1/1   Recent (last 2 weeks) or current physical aggression in the ED 1/1   Restraints or seclusion episode in ED 1/1   Verbal aggression, agitation, yelling, etc., while in the ED 1/1   Active psychosis with psychomotor agitation or catatonia 1/1   Need for constant or near constant redirection (from leaving, from others, etc).  1/1   Intrusive or disruptive behaviors 1/1   TOTAL Acuity Total Score: 9

## 2024-04-08 NOTE — CONSULTS
"  Ward Dawson MRN# [id number]   Age: 34 year old YOB: 1990   Date of Admission to ED: 4/3/2024    In person visit Details:     Patient was assessed and interviewed face-to-face in person with this writer quintin. Patient was observed to be able to participate in the assessment as evidenced by verbal consent. Assessment methods included conducting a formal interview with patient, review of medical records, collaboration with medical staff, and obtaining relevant collateral information from family and community providers when available.        Reason for Consult:   This note is being entered to supplement the psychiatry consultation note that was completed on April 3, 2024 by the licensed mental health professional have reviewed the pertinent clinical details related to their encounter. I am being consulted to offer additional guidance on psychiatric pharmacological interventions    Writer met patient in his room face-to-face by himself patient was alert and oriented x 4 currently denied suicidal ideation or homicidal ideation or self injury behavior.  Patient was very disorganized during my assessment and interview.  He was also feeling sedated although patient did receive Ativan overnight 2 mg and Seroquel plus Clazuril overnight.  Currently patient is voluntary for inpatient mental health unit, due to severe aggression while staying [facility] facility patient need to be inpatient mental health unit patient have very poor compliance with the medication, his Clozaril restarted while staying in the emergency room.  Patient said to me \" I do not need this medications, I need a shot once a month or once a year so I do not forget to take or not.\"  Currently patient is taking Clozaril can be titrated up slowly also continue with Depakote 500 mg twice daily while in the emergency room his Depakote level was therapeutic 68.4..  During my assessment and interview patient ask for pain medication especially his " oxycodone patient told me he have herniated disc writer told him oxycodone will not be prescribed for him he can continue to take his gabapentin for his chronic pain..    Currently patient is voluntary for inpatient mental health unit but if he asked to leave the hospital AGAINST MEDICAL ADVICE please reassess and I will place hold on him    There is genetic loading for none known.  Medical history does not appear to be significant.  Substance use does not* appear to be playing a contributing role in the patient's presentation.  Patient appears to cope with stress/frustration/emotion by withdrawing.  Stressors include chronic mental health issues, peer issues, and family dynamics.      I have reviewed the nursing notes. I have reviewed the findings, diagnosis, plan and need for follow up with the patient.         HPI:      HPI  Ward Dawson is a 34 year old male who arrives here in the emergency room from his [facility] facility through [facility]. patient has been having increased disruptive behaviors elevated mood yelling flipping tables and was noted to have been out on past this weekend returned increased manage in the and at times had been somewhat sedated with questioning whether or not he had used narcotics they utilize Narcan 2 times last night today patient remains manic disruptive and was sent to his normal appointment at [facility] for ECT this morning but continues to be manic staff who then sent him here for evaluation.        Pt has *not required locked seclusion or restraints in the past 24 hours to maintain safety, please refer to RN documentation for further details.  Substance use does not appear to be playing a contributing role in the patient's presentation  Brief Therapeutic Intervention(s):   Provided active listening, unconditional positive regard, and validation. Engaged in cognitive restructuring/ reframing, looked at common cognitive distortions and challenged negative thoughts. Engaged in guided  discovery, explored patient's perspectives and helped expand them through socratic dialogue. Provided positive reinforcement for progress towards goals, gains in knowledge, and application of skills previously taught.  Engaged in social skills training. Explored and identified early warning signs to anger.        Past Psychiatric History:     See DEC  note        Substance Use and History:     See DEC  note        Past Medical History:   PAST MEDICAL HISTORY: No past medical history on file.    PAST SURGICAL HISTORY: No past surgical history on file.            Allergies:     Allergies   Allergen Reactions    Haloperidol Confusion and Other (See Comments)     Prone to EPSE. COnsider IM Zyprexa or be sure to give with benadryl             Medications:   I have reviewed this patient's current medications  Current Facility-Administered Medications   Medication Dose Route Frequency Provider Last Rate Last Admin    albuterol (PROVENTIL HFA/VENTOLIN HFA) inhaler  1-2 puff Inhalation 4x Daily PRN Jaswinder Claros MD        atorvastatin (LIPITOR) tablet 40 mg  40 mg Oral Daily Jaswinder Claros MD   40 mg at 04/08/24 0819    benzocaine (ORAJEL MAXIMUM STRENGTH) 20 % gel   Mouth/Throat 4x Daily PRN Owen Bradshaw MD        benzocaine-menthol (CHLORASEPTIC) 6-10 MG lozenge 1 lozenge  1 lozenge Buccal Q1H PRN Angela Austin MD   1 lozenge at 04/08/24 0409    [START ON 4/9/2024] cloZAPine (CLOZARIL) tablet 100 mg  100 mg Oral At Bedtime Jaswinder Claros MD        [START ON 4/9/2024] cloZAPine (CLOZARIL) tablet 50 mg  50 mg Oral Daily Jaswinder Claros MD        divalproex sodium extended-release (DEPAKOTE ER) 24 hr tablet 500 mg  500 mg Oral BID Jaswinder Claros MD   500 mg at 04/08/24 0819    gabapentin (NEURONTIN) tablet 600 mg  600 mg Oral BID Jaswinder Claros MD   600 mg at 04/08/24 0819    guaiFENesin (ROBITUSSIN) 20 mg/mL solution 100 mg   100 mg Oral Q6H PRN Jaswinder Claros MD   100 mg at 04/05/24 1217    hydrochlorothiazide (HYDRODIURIL) tablet 25 mg  25 mg Oral Daily Jaswinder Claros MD   25 mg at 04/08/24 0819    hydrOXYzine HCl (ATARAX) tablet 50 mg  50 mg Oral Q6H PRN Jaswinder Claros MD   50 mg at 04/08/24 0414    nicotine (COMMIT) lozenge 2 mg  2 mg Buccal Q1H PRN Claudia Franco MD        nicotine (NICODERM CQ) 21 MG/24HR 24 hr patch 1 patch  1 patch Transdermal Daily Xavi Medeiros MD   1 patch at 04/08/24 0441    OLANZapine (zyPREXA) tablet 10 mg  10 mg Oral BID PRN Josephine Garcia APRN CNP   10 mg at 04/08/24 0404     Current Outpatient Medications   Medication Sig Dispense Refill    albuterol (PROAIR HFA/PROVENTIL HFA/VENTOLIN HFA) 108 (90 Base) MCG/ACT inhaler Inhale 1-2 puffs into the lungs every 4 hours as needed for shortness of breath      atorvastatin (LIPITOR) 40 MG tablet Take 40 mg by mouth daily      cloZAPine (CLOZARIL) 100 MG tablet Take 3 tablets by mouth at bedtime      divalproex sodium extended-release (DEPAKOTE ER) 500 MG 24 hr tablet Take 500 mg by mouth 2 times daily      gabapentin (NEURONTIN) 600 MG tablet Take 600 mg by mouth 2 times daily      guaiFENesin (ROBITUSSIN) 20 mg/mL liquid Take 100 mg by mouth every 6 hours as needed      hydrochlorothiazide (HYDRODIURIL) 25 MG tablet Take 1 tablet by mouth daily      hydrOXYzine (VISTARIL) 50 MG capsule Take 50 mg by mouth every 6 hours as needed                Family History:   FAMILY HISTORY:   Family History   Problem Relation Age of Onset    Cancer No family hx of     Diabetes No family hx of     Hypertension No family hx of     Cerebrovascular Disease No family hx of     Thyroid Disease No family hx of     Glaucoma No family hx of     Macular Degeneration No family hx of               Social History:   *       - Collateral information from the famly/friend: none         PTA Medications:   (Not in a hospital admission)          Allergies:     Allergies   Allergen Reactions    Haloperidol Confusion and Other (See Comments)     Prone to EPSE. COnsider IM Zyprexa or be sure to give with benadryl          Labs:   No results found for this or any previous visit (from the past 48 hour(s)).       Physical and Psychiatric Examination:     BP (!) 133/98   Pulse 89   Temp 97.5  F (36.4  C) (Oral)   Resp 13   Ht 1.829 m (6')   Wt 108 kg (238 lb)   SpO2 98%   BMI 32.28 kg/m    Weight is 238 lbs 0 oz  Body mass index is 32.28 kg/m .    Mental Status Exam:  Appearance: awake, alert, poorly groomed, unkempt, disheveled , and untidy  Attitude:  guarded and uncooperative  Eye Contact:  poor   Mood:  angry and anxious  Affect:  guarded  Speech:  mumbling and rambling  Language: fluent and intact in English  Psychomotor, Gait, Musculoskeletal:  physical agitation and physical retardation  Thought Process:  disorganized, evidence of thought blocking present, and illogical  Associations:  no loose associations  Thought Content:  auditory hallucinations present, visual hallucinations present, patient appears to be responding to internal stimuli, and obsessions present  Insight:  limited  Judgement:  poor  Oriented to:  time, person, and place  Attention Span and Concentration:  poor  Recent and Remote Memory:  poor  Fund of Knowledge:  low-normal         Diagnoses:      Schizoaffective disorder, bipolar type (H)  Polysubstance abuse (H)  Agitation         Recommendations:     Continue PTA medications including Depakote 500mg twice daily, gabapentin 600mg twcie daily, and Clozaril. Of note, Clozaril is being retitrated due to concerns with medication noncompliance over the weekend at his [facility] facility   PRN olanzapine 10mg twice daily for agitation and aggression   Labs reviewed, ANC 7.0 on 4/3/24  Valproic Acid level ordered to ensure therapeutic dosing of Depakote, could consider dosage increase to further target mood lability   Patient would benefit  from additional observation and stabilization on  mental health unit. Patient is voluntary at this time if patient asked to leave AGAINST MEDICAL ADVICE please reassess or place 72-hour hold       - Consulted with [facility]  licensed mental health professional, ED physician Dr. Virgen  asked if they would like this writer to enter orders in the EHR,  patient's ED RN regarding this case.    Please call [facility]/DEC at [phone] if you have follow-up questions or wish to place another consult.  Randy Noble, Psychiatric Nurse practitioner    Attestation:  Time with:  Patient: 30 minutes  Treatment Team: 25 Minutes  Chart Review: 30  minutes    Total time spent was 85 minutes. Over 50% of times was spent counseling and coordination of care.    I thank  primary ED provider and extended care team very much for letting me participate in the care of this patient.    I, Randy Noble, CNP, APRN, Psychiatric Nurse Practitioner have personally performed an examination of this patient.  I have edited the note to reflect all relevant changes.  I have discussed this patient with the care team April 8, 2024.  I have reviewed all vitals and laboratory findings.    Disclaimer: This note consists of symbols derived from keyboarding,

## 2024-04-08 NOTE — TELEPHONE ENCOUNTER
Bed search update ([facility]) @ 12AM:       [facility]: No appropriate beds available   [facility]: @ cap per website. Per Mark @ 12AM, no beds available   [facility]: @ cap per website   [facility]: @ cap per website. Per Viky @ 12AM, they are capped    [facility]: @ cap per website   [facility]: @ cap per website   [facility]: Posting 1 bed (Young Adult unit: No recent aggressions, violence or sexual assault. Neg covid required). Pt not age appropriate      [facility]: @ cap per website   [facility]: @ cap per website   [facility]: Posting 1 bed. Per Nabila @ 12:05AM, they are at full capacity    Pt remains on work list until appropriate placement is available

## 2024-04-08 NOTE — TELEPHONE ENCOUNTER
R:  No current high acuity beds available for IPMH review within [facility].   Pt is Vol and prefers [facility] placement only.     [facility] Bed search initiated @ 8:00am :    [facility] is posting 0 beds. [phone]; per call at 7:06 am to [facility], they are at cap.    [facility] is posting 0 beds. Negative covid required   - [facility].            [facility] is posting 0 beds. Neg covid. No high school/[facility]-psych. [phone]; per call at 7:07 am to [facility], they are at cap.    [facility] is [facility] 0 beds. [phone]   [facility] is posting 0 beds. [phone]; per call at 7:09 am to Alida, they are at cap.    [facility] is posting 1 bed. Ages 18-28.  Negative covid. [phone]; per call at 7:11 am to Kristine, they have 1 child bed, some female adol, 2 adol's either gender, no tami beds and no y/a beds avail.    [facility] is posting 0 beds.    [facility] is posting 1 bed. Low acuity. [phone] [facility]  [facility].            Pt to remain on adult worklist pending appropriate bed availability for IPMH review.

## 2024-04-08 NOTE — ED PROVIDER NOTES
[facility] ED Mental Health Handoff Note:       Brief HPI:  This is a 34 year old male who arrived to the emergency department for evaluation of increasing disruptive behaviors in setting of elated mood.  At that time patient flipping over tables from care facility.  Per chart review multiple behavioral codes called on this patient as he was posturing and at times requiring seclusion from restraint.  Patient did have DEC evaluation with recommendation for acute hospitalization in the psychiatric unit for stabilization.  Home meds reviewed and ordered/administered: Yes    Medically stable for inpatient mental health admission: Yes.    Evaluated by mental health: Yes. The recommendation is for inpatient mental health treatment. Bed search in process    Safety concerns: At the time I received sign out, there were no safety concerns.    Hold Status:  Active Orders   N/A         Exam:   Patient Vitals for the past 24 hrs:   BP Temp Temp src Pulse Resp SpO2   04/07/24 2340 137/89 -- -- 95 -- --   04/07/24 2048 (!) 147/83 -- -- 100 16 98 %   04/07/24 0751 139/87 97.8  F (36.6  C) Oral 71 18 95 %         ED Course:    Medications   hydrOXYzine HCl (ATARAX) tablet 50 mg (50 mg Oral $Given 4/8/24 0414)   albuterol (PROVENTIL HFA/VENTOLIN HFA) inhaler (has no administration in time range)   atorvastatin (LIPITOR) tablet 40 mg (40 mg Oral $Given 4/7/24 0754)   divalproex sodium extended-release (DEPAKOTE ER) 24 hr tablet 500 mg (500 mg Oral $Given 4/7/24 1919)   gabapentin (NEURONTIN) tablet 600 mg (600 mg Oral $Given 4/7/24 1919)   guaiFENesin (ROBITUSSIN) 20 mg/mL solution 100 mg (100 mg Oral $Given 4/5/24 1217)   hydrochlorothiazide (HYDRODIURIL) tablet 25 mg (25 mg Oral $Given 4/7/24 0754)   cloZAPine (CLOZARIL) tablet 50 mg (has no administration in time range)   cloZAPine (CLOZARIL) tablet 100 mg (has no administration in time range)   nicotine (NICODERM CQ) 21 MG/24HR 24 hr patch 1 patch (1 patch Transdermal  $Patch/Med Applied 4/8/24 0441)   OLANZapine (zyPREXA) tablet 10 mg (10 mg Oral $Given 4/8/24 0404)   benzocaine (ORAJEL MAXIMUM STRENGTH) 20 % gel (has no administration in time range)   cloZAPine (CLOZARIL) half-tab 12.5 mg (12.5 mg Oral $Given 4/3/24 2356)     Followed by   cloZAPine (CLOZARIL) tablet 25 mg ( Oral Canceled Entry 4/6/24 0936)     Followed by   cloZAPine (CLOZARIL) tablet 50 mg (50 mg Oral $Given 4/7/24 1921)     Followed by   cloZAPine (CLOZARIL) tablet 75 mg (has no administration in time range)   benzocaine-menthol (CHLORASEPTIC) 6-10 MG lozenge 1 lozenge (1 lozenge Buccal $Given 4/8/24 0409)   nicotine (COMMIT) lozenge 2 mg (has no administration in time range)   OLANZapine zydis (zyPREXA) ODT tab 10 mg (10 mg Oral $Given 4/3/24 1407)   OLANZapine zydis (zyPREXA) ODT tab 10 mg (10 mg Oral $Given 4/3/24 1728)   LORazepam (ATIVAN) tablet 1 mg (1 mg Oral $Given 4/3/24 1727)   OLANZapine zydis (zyPREXA) ODT tab 10 mg (10 mg Oral $Given 4/4/24 0044)   LORazepam (ATIVAN) tablet 2 mg (2 mg Oral $Given 4/4/24 0435)   LORazepam (ATIVAN) tablet 1 mg (1 mg Oral $Given 4/4/24 0650)   LORazepam (ATIVAN) tablet 2 mg (2 mg Oral $Given 4/4/24 0739)   OLANZapine zydis (zyPREXA) ODT tab 5 mg (5 mg Oral $Given 4/4/24 0738)   hydrOXYzine HCl (ATARAX) tablet 50 mg (50 mg Oral $Given 4/4/24 1038)   QUEtiapine (SEROquel) tablet 50 mg (50 mg Oral $Given 4/4/24 1038)   LORazepam (ATIVAN) tablet 2 mg (2 mg Oral $Given 4/4/24 1436)   OLANZapine zydis (zyPREXA) ODT tab 5 mg (5 mg Oral $Given 4/4/24 1436)   ziprasidone (GEODON) injection 20 mg (20 mg Intramuscular $Given 4/5/24 0325)   OLANZapine zydis (zyPREXA) ODT tab 10 mg (10 mg Oral $Given 4/5/24 1323)   LORazepam (ATIVAN) tablet 2 mg (2 mg Oral $Given 4/5/24 1323)   OLANZapine zydis (zyPREXA) ODT tab 10 mg (10 mg Oral $Given 4/5/24 1734)   OLANZapine (zyPREXA) injection 10 mg (10 mg Intramuscular $Given 4/5/24 1830)   LORazepam (ATIVAN) tablet 2 mg (2 mg Oral $Given  4/6/24 2118)   LORazepam (ATIVAN) tablet 1 mg (1 mg Oral $Given 4/7/24 0925)   LORazepam (ATIVAN) tablet 2 mg (2 mg Oral $Given 4/7/24 2208)   QUEtiapine (SEROquel) tablet 50 mg (50 mg Oral $Given 4/7/24 2327)   LORazepam (ATIVAN) tablet 2 mg (2 mg Oral $Given 4/8/24 0414)   QUEtiapine (SEROquel) tablet 50 mg (50 mg Oral $Given 4/8/24 0414)            There were no significant events during my shift.    Patient was signed out to the oncoming provider.      Impression:    ICD-10-CM    1. Schizoaffective disorder, bipolar type (H)  F25.0       2. Polysubstance abuse (H)  F19.10       3. Agitation  R45.1           Plan:    Awaiting inpatient mental health admission/transfer.      RESULTS:   No results found for this visit on 04/03/24 (from the past 24 hour(s)).          MD Promise Llanes, Tyson Singh MD  04/08/24 5987

## 2024-04-08 NOTE — TELEPHONE ENCOUNTER
R: [place]  Access Inpatient Bed Call Log 4/8/24  @ 4:25 PM:    Intake has called facilities that have not updated the bed status within the last 12 hours.                               [facility] is at capacity.            [facility] is posting 0 beds. [phone]; per call at 7:06 am to Mark, they are at cap.    [facility] is posting 0 beds. Negative covid [facility] is posting 0 beds. Neg covid. No high school/[facility]-psych. [phone]; per call at 7:07 am to Shantel, they are at cap.    [facility] is posting 0 beds. [phone]   [facility] is posting 0 beds. [phone]; per call at 7:09 am to Weed, they are at cap.    [facility] is posting 1 bed. Ages 18-28.  Negative covid. [phone]; per call at 7:11 am to Kristine, they have 1 child bed, some female adol, 2 adol's either gender, no tami beds and no y/a beds avail.    [facility] is posting 0 beds.    [facility] is posting 1 bed. Low acuity. [phone]     6:40 PM Intake called ED spoke with ALFRED [place], Pt has not been in restraints since 4/6. RN will update chart notes.     6:47 PM Makennad Susie   8:03 PM Intake called Dr. Richards accepted pt to unit 30/   8:16 PM Intake called unit 30 - CRN is on break- advised Pt is in queue for review.   8:31 PM CRN Viky called intake - Pt needs an SIO - She will need to obtain appropriate staffing   8:33 PM Intake called ED spoke with ALFRED Guerrero - She will call unit 30 with report and to discuss staffing.

## 2024-04-08 NOTE — PROGRESS NOTES
"Triage & Transition Services, Extended Care       Patient: Cristel goes by \"Sundaybie,\" uses he/him pronouns  Date of Service: April 8, 2024  Site of Service: [facility] EMERGENCY DEPARTMENT                              Patient was seen no   Mode of Assessment: In person    Patient is followed related to: long wait time for admission  Notable observations from today's encounter include: Patient was sleeping when Writer arrived. Writer spoke with 1:1 sitter who reports patient has had a good morning. He spent time calling his mom, son and brother. Pt fell asleep after making the calls and has been challenging to wake since. Pt didnt even wake when his brother attempted to visit earlier.  The care team is working towards the following: Learn and Demonstrate at Least One Skill Focused on Crisis Stabilization  Significant status changes: no      Recommendations: inpatient mental health  Plan for Care reviewed with assigned Medical Provider: yes  Plan for Care Team Review: other (see comment) (Randy Noble)  Patient and/or validated legal guardian concurs: yes  Clinical Substantiation: Recommendation continues for inpatient mental health admission for safety and stabilization.  Pt continues to be at risk to harm himself or others.  If patient requests to discharge, due to acute symptoms of evens and agitation with limited insight, reassess for involuntary hospitalization.    Legal Status:    Legal Status at Admission: Voluntary/Patient has signed consent for treatment    PRABHAKAR Donaldson   Licensed Mental Health Professional (LMHP), Extended Care  [phone]           "

## 2024-04-08 NOTE — ED NOTES
"Progress Note:    Patient last placed in restraints 4/6    Currently exhibits delusions (somatic, grandeur). Hyper verbal with psychomotor agitation, but redirectable with firm boundaries, alternative options, and diversional activities like playing \"basketball\" with staff. Has not required restraints as of recent.     Takes his medications without protest and asks for PRNs when he feels agitated, thus self regulating with prescribed medications. Cooperative.   "

## 2024-04-08 NOTE — ED PROVIDER NOTES
Notified by the nurse that patient is agitated and threatening other patients.  Had been receiving as needed doses of Zyprexa but according to staff that had not been helpful.  Did receive Seroquel and Ativan on evening shift yesterday which was reportedly effective.  Patient is also requesting hydroxyzine.  Those meds are ordered now.  Will continue to monitor, awaiting mental health admission      Critical Care Addendum  My initial assessment, based on my review of nursing observations, review of vital signs, and focused history, established a high suspicion that Ward Dawson has severe agitation, which requires immediate intervention, and therefore he is critically ill.     After the initial assessment, the care team initiated medication therapy with serqoquel, ativan, hydroxyzine  to provide stabilization care. Due to the critical nature of this patient, I reassessed nursing observations, vital signs, physical exam, mental status, and neurologic status multiple times prior to his disposition.     Time also spent performing documentation and coordination of care.     Critical care time (excluding teaching time and procedures): 30 minutes.       Tyson Giron DO  04/08/24 0414

## 2024-04-09 ENCOUNTER — TELEPHONE (OUTPATIENT)
Dept: BEHAVIORAL HEALTH | Facility: CLINIC | Age: 34
End: 2024-04-09
Payer: MEDICARE

## 2024-04-09 PROCEDURE — 250N000013 HC RX MED GY IP 250 OP 250 PS 637: Performed by: FAMILY MEDICINE

## 2024-04-09 PROCEDURE — 250N000013 HC RX MED GY IP 250 OP 250 PS 637: Performed by: EMERGENCY MEDICINE

## 2024-04-09 PROCEDURE — 250N000013 HC RX MED GY IP 250 OP 250 PS 637: Performed by: PSYCHIATRY & NEUROLOGY

## 2024-04-09 PROCEDURE — 250N000013 HC RX MED GY IP 250 OP 250 PS 637

## 2024-04-09 PROCEDURE — 124N000002 HC R&B MH UMMC

## 2024-04-09 PROCEDURE — 99222 1ST HOSP IP/OBS MODERATE 55: CPT | Mod: AI | Performed by: NURSE PRACTITIONER

## 2024-04-09 PROCEDURE — 250N000013 HC RX MED GY IP 250 OP 250 PS 637: Performed by: NURSE PRACTITIONER

## 2024-04-09 RX ORDER — DIPHENHYDRAMINE HYDROCHLORIDE 50 MG/ML
50 INJECTION INTRAMUSCULAR; INTRAVENOUS EVERY 8 HOURS PRN
Status: DISCONTINUED | OUTPATIENT
Start: 2024-04-09 | End: 2024-04-09

## 2024-04-09 RX ORDER — LORAZEPAM 2 MG/ML
2 INJECTION INTRAMUSCULAR EVERY 6 HOURS PRN
Status: DISCONTINUED | OUTPATIENT
Start: 2024-04-09 | End: 2024-05-14 | Stop reason: HOSPADM

## 2024-04-09 RX ORDER — DIPHENHYDRAMINE HYDROCHLORIDE 50 MG/ML
50 INJECTION INTRAMUSCULAR; INTRAVENOUS EVERY 6 HOURS PRN
Status: DISCONTINUED | OUTPATIENT
Start: 2024-04-09 | End: 2024-05-14 | Stop reason: HOSPADM

## 2024-04-09 RX ORDER — LORAZEPAM 2 MG/ML
2 INJECTION INTRAMUSCULAR EVERY 8 HOURS PRN
Status: DISCONTINUED | OUTPATIENT
Start: 2024-04-09 | End: 2024-04-09

## 2024-04-09 RX ORDER — HALOPERIDOL 5 MG/ML
5 INJECTION INTRAMUSCULAR EVERY 8 HOURS PRN
Status: DISCONTINUED | OUTPATIENT
Start: 2024-04-09 | End: 2024-04-09

## 2024-04-09 RX ORDER — HALOPERIDOL 5 MG/1
5 TABLET ORAL EVERY 6 HOURS PRN
Status: DISCONTINUED | OUTPATIENT
Start: 2024-04-09 | End: 2024-05-14 | Stop reason: HOSPADM

## 2024-04-09 RX ORDER — HALOPERIDOL 5 MG/1
5 TABLET ORAL EVERY 8 HOURS PRN
Status: DISCONTINUED | OUTPATIENT
Start: 2024-04-09 | End: 2024-04-09

## 2024-04-09 RX ORDER — LORAZEPAM 2 MG/1
2 TABLET ORAL EVERY 8 HOURS PRN
Status: DISCONTINUED | OUTPATIENT
Start: 2024-04-09 | End: 2024-04-09

## 2024-04-09 RX ORDER — HALOPERIDOL 5 MG/ML
5 INJECTION INTRAMUSCULAR EVERY 6 HOURS PRN
Status: DISCONTINUED | OUTPATIENT
Start: 2024-04-09 | End: 2024-05-14 | Stop reason: HOSPADM

## 2024-04-09 RX ORDER — DIPHENHYDRAMINE HCL 50 MG
50 CAPSULE ORAL EVERY 8 HOURS PRN
Status: DISCONTINUED | OUTPATIENT
Start: 2024-04-09 | End: 2024-04-09

## 2024-04-09 RX ORDER — LORAZEPAM 2 MG/1
2 TABLET ORAL EVERY 6 HOURS PRN
Status: DISCONTINUED | OUTPATIENT
Start: 2024-04-09 | End: 2024-05-14 | Stop reason: HOSPADM

## 2024-04-09 RX ORDER — DIPHENHYDRAMINE HCL 50 MG
50 CAPSULE ORAL EVERY 6 HOURS PRN
Status: DISCONTINUED | OUTPATIENT
Start: 2024-04-09 | End: 2024-05-14 | Stop reason: HOSPADM

## 2024-04-09 RX ADMIN — CLOZAPINE 100 MG: 100 TABLET ORAL at 18:48

## 2024-04-09 RX ADMIN — TRAZODONE HYDROCHLORIDE 50 MG: 50 TABLET ORAL at 20:27

## 2024-04-09 RX ADMIN — HYDROCHLOROTHIAZIDE 25 MG: 25 TABLET ORAL at 08:20

## 2024-04-09 RX ADMIN — ACETAMINOPHEN 650 MG: 325 TABLET, FILM COATED ORAL at 08:19

## 2024-04-09 RX ADMIN — OLANZAPINE 10 MG: 10 TABLET, FILM COATED ORAL at 17:43

## 2024-04-09 RX ADMIN — OLANZAPINE 10 MG: 10 TABLET, FILM COATED ORAL at 03:47

## 2024-04-09 RX ADMIN — ACETAMINOPHEN 650 MG: 325 TABLET, FILM COATED ORAL at 03:47

## 2024-04-09 RX ADMIN — LORAZEPAM 2 MG: 2 TABLET ORAL at 13:31

## 2024-04-09 RX ADMIN — NICOTINE POLACRILEX 2 MG: 2 LOZENGE ORAL at 21:41

## 2024-04-09 RX ADMIN — CLOZAPINE 50 MG: 50 TABLET ORAL at 08:19

## 2024-04-09 RX ADMIN — DIVALPROEX SODIUM 500 MG: 250 TABLET, FILM COATED, EXTENDED RELEASE ORAL at 18:49

## 2024-04-09 RX ADMIN — GABAPENTIN 600 MG: 600 TABLET, FILM COATED ORAL at 18:49

## 2024-04-09 RX ADMIN — Medication 1 LOZENGE: at 08:20

## 2024-04-09 RX ADMIN — NICOTINE 1 PATCH: 21 PATCH, EXTENDED RELEASE TRANSDERMAL at 08:20

## 2024-04-09 RX ADMIN — GABAPENTIN 600 MG: 600 TABLET, FILM COATED ORAL at 08:20

## 2024-04-09 RX ADMIN — LORAZEPAM 2 MG: 2 TABLET ORAL at 20:28

## 2024-04-09 RX ADMIN — TRAZODONE HYDROCHLORIDE 50 MG: 50 TABLET ORAL at 02:25

## 2024-04-09 RX ADMIN — DIVALPROEX SODIUM 500 MG: 250 TABLET, FILM COATED, EXTENDED RELEASE ORAL at 08:20

## 2024-04-09 RX ADMIN — OLANZAPINE 10 MG: 10 TABLET, FILM COATED ORAL at 09:59

## 2024-04-09 RX ADMIN — Medication 1 LOZENGE: at 02:50

## 2024-04-09 RX ADMIN — HALOPERIDOL 5 MG: 5 TABLET ORAL at 19:40

## 2024-04-09 RX ADMIN — HALOPERIDOL 5 MG: 5 TABLET ORAL at 13:31

## 2024-04-09 RX ADMIN — NICOTINE POLACRILEX 2 MG: 2 LOZENGE ORAL at 11:57

## 2024-04-09 RX ADMIN — DIPHENHYDRAMINE HYDROCHLORIDE 50 MG: 50 CAPSULE ORAL at 13:31

## 2024-04-09 RX ADMIN — DIPHENHYDRAMINE HYDROCHLORIDE 50 MG: 50 CAPSULE ORAL at 19:40

## 2024-04-09 RX ADMIN — TRAZODONE HYDROCHLORIDE 50 MG: 50 TABLET ORAL at 21:34

## 2024-04-09 RX ADMIN — ATORVASTATIN CALCIUM 40 MG: 40 TABLET, FILM COATED ORAL at 08:20

## 2024-04-09 ASSESSMENT — ACTIVITIES OF DAILY LIVING (ADL)
HYGIENE/GROOMING: INDEPENDENT
ADLS_ACUITY_SCORE: 28
DRESS: INDEPENDENT
ADLS_ACUITY_SCORE: 28
ORAL_HYGIENE: INDEPENDENT
ADLS_ACUITY_SCORE: 28
ORAL_HYGIENE: INDEPENDENT
DRESS: SCRUBS (BEHAVIORAL HEALTH)
ADLS_ACUITY_SCORE: 28
HYGIENE/GROOMING: INDEPENDENT
ADLS_ACUITY_SCORE: 28
LAUNDRY: UNABLE TO COMPLETE
LAUNDRY: UNABLE TO COMPLETE
ADLS_ACUITY_SCORE: 28

## 2024-04-09 ASSESSMENT — PATIENT HEALTH QUESTIONNAIRE - PHQ9: SUM OF ALL RESPONSES TO PHQ9 QUESTIONS 1 & 2: 0

## 2024-04-09 ASSESSMENT — LIFESTYLE VARIABLES: SKIP TO QUESTIONS 9-10: 1

## 2024-04-09 NOTE — PLAN OF CARE
[id number]   Patient Belongings   Did you bring any home meds/supplements to the hospital?  No   Patient Belongings locker   Patient Belongings Put in Hospital Secure Location (Security or Locker, etc.) cell phone/electronics;clothing;glasses   Belongings Search Yes   Clothing Search Yes   Second Staff wily RN and Yakov FLOR     Goal Outcome Evaluation:    Clothin underwear, 1 black sweater, 1 white T-shirt, 1 black Football Jacket, 1 sweatpants, 1 shorts, 1 pair of Jeans     Bag #1:   Adidas shoes, Gold watch, broken flip phone, and toiletries     Bag #2:   Polaroid Camera, Black watch, 2 pair of glasses, 2 cell phones, 2 lighters. And small Black bag with loose change.     Bag #3:   3 books and jewelry    Envelope:   3 Vape pens and one butane lighter.       A               Admission:  I am responsible for any personal items that are not sent to the safe or pharmacy.  [facility] is not responsible for loss, theft or damage of any property in my possession.    Signature:  _________________________________ Date: _______  Time: _____                                              Staff Signature:  ____________________________ Date: ________  Time: _____      2nd Staff person, if patient is unable/unwilling to sign:    Signature: ________________________________ Date: ________  Time: _____     Discharge:  [facility] has returned all of my personal belongings:    Signature: _________________________________ Date: ________  Time: _____                                          Staff Signature:  ____________________________ Date: ________  Time: _____
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
## 2024-04-09 NOTE — PLAN OF CARE
BEH IP Unit Acuity Rating Score (UARS)  Patient is given one point for every criteria they meet.    CRITERIA SCORING   On a 72 hour hold, court hold, committed, stay of commitment, or revocation. 0/1    Patient LOS on BEH unit exceeds 20 days. 0/1  LOS: 1   Patient under guardianship, 55+, otherwise medically complex, or under age 11. 0/1   Suicide ideation without relief of precipitating factors. 0/1   Current plan for suicide. 0/1   Current plan for homicide. 0/1   Imminent risk or actual attempt to seriously harm another without relief of factors precipitating the attempt. 1/1   Severe dysfunction in daily living (ex: complete neglect for self care, extreme disruption in vegetative function, extreme deterioration in social interactions). 1/1   Recent (last 7 days) or current physical aggression in the ED or on unit. 1/1 Last: 4/6   Restraints or seclusion episode in past 72 hours. 0/1   Recent (last 7 days) or current verbal aggression, agitation, yelling, etc., while in the ED or unit. 1/1 Last: 4/9   Active psychosis. 1/1   Need for constant or near constant redirection (from leaving, from others, etc).  1/1   Intrusive or disruptive behaviors. 1/1   Patient requires 3 or more hours of individualized nursing care per 8-hour shift (i.e. for ADLs, meds, therapeutic interventions). 1/1   TOTAL 8

## 2024-04-09 NOTE — PROGRESS NOTES
Occupational Therapy Non-Attendance Note:    Pt has not attended scheduled occupational therapy sessions. Patient does not appear appropriate for groups at this time due to intrusive, threatening, and unpredictable behavior. Continue to assess and encourage attendance and participation when appropriate.

## 2024-04-09 NOTE — ED NOTES
Pt requesting to check his BG. .Pt notified. Requesting PRN meds and night meds. Will give. No other complaints.
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
## 2024-04-09 NOTE — TELEPHONE ENCOUNTER
5:59 PM unit 30 called asking why Pt is not in 12 que yet.  After investigating and per Viky, unit 30 Charge RN Pt is being transferred to unit 12 and both providers have signed off and Anup will be the new Provider.      Intake put Pt in 12 Que and added to the admit board.     6:08 PM Updated unit 12 and they were waiting for Pt     Intake sent email to leadership documenting possible miscommunication.
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
## 2024-04-09 NOTE — PROGRESS NOTES
"The Writer was assigned as SIO staff to the pt for the NOC shift. Pt was manic and hyperverbal with the Writer and other staff throughout the night. When the Writer would not engage the patient in his hyperverbal manic conversations the pt would be insulting to the writer with comments like \"What is the matter with you don't you like your job?\" Pt was walking up to staff and standing at the nurse's station being intrusive with staff throughput the night with requests and demands for one to one conversations about random topics like what kind of music he liked to listen to.    At 0445 the patient decided he wanted to eat more food even though he had a full meals worth of snack earlier in the shift. At this time the CN was the only staff available to help with this request. Pt proceeded to make demand after demand for more access to different food and more food. The CN made at least 5 trips in and out of the kitchen for additional requests that appeared each time he finished the previous one like Hot sauce, 6 orange juices and then pepper etc.    When the CN explained he had to do 15 minutes checks and was leaving the dining area the pt  said over hie shoulder in the CN direction \"why don't you have the lazy imra do the checks instead\". The pt approached the patient and began to talk to him stating that he needed to be respectful as he and the staff are being quite polite to him even answering his 5 requests to go to the kitchen he made to the CN.    The pt then exploded with anger and started claiming he was being respectful and the Writer responded \"Alright then we are done taking about this\" The pt responded by continuing to scream in the Writer's direction \"Why don't you just leave and go home! \"If you don't like your job just get out of here\" The CN had returned to the area and the Writer and the CN decided to switch assignments to help de-escalate the situation.  "

## 2024-04-09 NOTE — PLAN OF CARE
Problem: Adult Behavioral Health Plan of Care  Goal: Optimized Coping Skills in Response to Life Stressors  Outcome: Progressing  Intervention: Promote Effective Coping Strategies  Recent Flowsheet Documentation  Taken 4/9/2024 1100 by True Tom RN  Supportive Measures:   active listening utilized   decision-making supported   goal-setting facilitated   positive reinforcement provided   problem-solving facilitated   verbalization of feelings encouraged   self-responsibility promoted   self-care encouraged     Problem: Manic Behavior Episode  Goal: Decreased Manic Symptoms  Outcome: Progressing  Intervention: Promote Mood Equilibrium  Recent Flowsheet Documentation  Taken 4/9/2024 1100 by True Tom RN  Behavior Management:   boundaries reinforced   impulse control promoted  Supportive Measures:   active listening utilized   decision-making supported   goal-setting facilitated   positive reinforcement provided   problem-solving facilitated   verbalization of feelings encouraged   self-responsibility promoted   self-care encouraged   Goal Outcome Evaluation:    Plan of Care Reviewed With: patient      Pt was observed pacing the halls and talking to staff and peers at the beginning of the shift early this shift. The patient was hyper-verbal, hyperactive, and loud, requiring ongoing redirection. At times, pt became animated and shouted senseless things intermittently, requiring further redirection. At times, the patient's behavior was calm but dysregulated fast. Pt did connect well with another a few pt's but also caused others distress. For example, he was disruptive, loud, paranoid, and challenging to redirect in the space where other pts were watching TV.     The patient was accusatory, seen having hysterical laughs, had poor boundaries, and was med-seeking and staff-seeking. Pt reported pain 1000/10 to BLE, and Tylenol was administered with minimal relief. he stated he needed Percocet and was  dismissive about trying to order gabapentin. PRN Zyprexa 10 mg for agitation was given, and Pt stated that the medication helped.     The patient's behavior escalated after staff asked the patient to go to their rooms during a code. He removed his shirt, walked out of his room, and used vulgar language. He requested PRNs, and with the help of the [facility] team and Security, the patient was verbally redirected to his room and took PRNs Haldol, Benadrly, and Ativan. The patient calmed down and was sleeping at 2 p.m.     Mood and affect, anxious, angry, labile, euphoric, bizarre. He denied all mental health symptoms, even with the stated behaviors. Pt had behavioral outbursts, which happened fast and sometimes were unpredictable. He denies SI, SIB, and AVH. Pt wants a liquid diet, and the provider is aware. Plan is to transfer pt to station 12. The care plan is ongoing.

## 2024-04-10 LAB
BASOPHILS # BLD AUTO: 0 10E3/UL (ref 0–0.2)
BASOPHILS NFR BLD AUTO: 1 %
EOSINOPHIL # BLD AUTO: 0.1 10E3/UL (ref 0–0.7)
EOSINOPHIL NFR BLD AUTO: 2 %
IMM GRANULOCYTES # BLD: 0 10E3/UL
IMM GRANULOCYTES NFR BLD: 0 %
LYMPHOCYTES # BLD AUTO: 2.1 10E3/UL (ref 0.8–5.3)
LYMPHOCYTES NFR BLD AUTO: 37 %
MONOCYTES # BLD AUTO: 0.5 10E3/UL (ref 0–1.3)
MONOCYTES NFR BLD AUTO: 9 %
NEUTROPHILS # BLD AUTO: 3 10E3/UL (ref 1.6–8.3)
NEUTROPHILS NFR BLD AUTO: 51 %
NRBC # BLD AUTO: 0 10E3/UL
NRBC BLD AUTO-RTO: 0 /100
WBC # BLD AUTO: 5.7 10E3/UL (ref 4–11)

## 2024-04-10 PROCEDURE — 250N000013 HC RX MED GY IP 250 OP 250 PS 637: Performed by: CLINICAL NURSE SPECIALIST

## 2024-04-10 PROCEDURE — 99233 SBSQ HOSP IP/OBS HIGH 50: CPT | Performed by: CLINICAL NURSE SPECIALIST

## 2024-04-10 PROCEDURE — 250N000013 HC RX MED GY IP 250 OP 250 PS 637

## 2024-04-10 PROCEDURE — 250N000013 HC RX MED GY IP 250 OP 250 PS 637: Performed by: PSYCHIATRY & NEUROLOGY

## 2024-04-10 PROCEDURE — 250N000013 HC RX MED GY IP 250 OP 250 PS 637: Performed by: EMERGENCY MEDICINE

## 2024-04-10 PROCEDURE — 124N000002 HC R&B MH UMMC

## 2024-04-10 PROCEDURE — 250N000013 HC RX MED GY IP 250 OP 250 PS 637: Performed by: FAMILY MEDICINE

## 2024-04-10 PROCEDURE — 85048 AUTOMATED LEUKOCYTE COUNT: CPT | Performed by: FAMILY MEDICINE

## 2024-04-10 PROCEDURE — 90832 PSYTX W PT 30 MINUTES: CPT

## 2024-04-10 PROCEDURE — 36415 COLL VENOUS BLD VENIPUNCTURE: CPT | Performed by: FAMILY MEDICINE

## 2024-04-10 RX ORDER — IBUPROFEN 200 MG
400 TABLET ORAL EVERY 6 HOURS PRN
Status: DISCONTINUED | OUTPATIENT
Start: 2024-04-10 | End: 2024-05-14 | Stop reason: HOSPADM

## 2024-04-10 RX ORDER — HYDROXYZINE HYDROCHLORIDE 50 MG/1
100 TABLET, FILM COATED ORAL ONCE
Status: COMPLETED | OUTPATIENT
Start: 2024-04-10 | End: 2024-04-10

## 2024-04-10 RX ADMIN — NICOTINE POLACRILEX 2 MG: 2 LOZENGE ORAL at 09:37

## 2024-04-10 RX ADMIN — DIVALPROEX SODIUM 500 MG: 250 TABLET, FILM COATED, EXTENDED RELEASE ORAL at 19:57

## 2024-04-10 RX ADMIN — TRAZODONE HYDROCHLORIDE 50 MG: 50 TABLET ORAL at 23:58

## 2024-04-10 RX ADMIN — OLANZAPINE 10 MG: 10 TABLET, FILM COATED ORAL at 00:24

## 2024-04-10 RX ADMIN — CLOZAPINE 100 MG: 100 TABLET ORAL at 19:57

## 2024-04-10 RX ADMIN — CLOZAPINE 50 MG: 50 TABLET ORAL at 08:18

## 2024-04-10 RX ADMIN — HYDROXYZINE HYDROCHLORIDE 50 MG: 50 TABLET, FILM COATED ORAL at 12:48

## 2024-04-10 RX ADMIN — Medication 5 MG: at 21:43

## 2024-04-10 RX ADMIN — GABAPENTIN 600 MG: 600 TABLET, FILM COATED ORAL at 19:56

## 2024-04-10 RX ADMIN — DIVALPROEX SODIUM 500 MG: 250 TABLET, FILM COATED, EXTENDED RELEASE ORAL at 08:18

## 2024-04-10 RX ADMIN — GABAPENTIN 600 MG: 600 TABLET, FILM COATED ORAL at 08:18

## 2024-04-10 RX ADMIN — HYDROXYZINE HYDROCHLORIDE 100 MG: 50 TABLET, FILM COATED ORAL at 21:57

## 2024-04-10 RX ADMIN — Medication 1 LOZENGE: at 18:48

## 2024-04-10 RX ADMIN — Medication 1 LOZENGE: at 09:37

## 2024-04-10 RX ADMIN — HALOPERIDOL 5 MG: 5 TABLET ORAL at 03:29

## 2024-04-10 RX ADMIN — ATORVASTATIN CALCIUM 40 MG: 40 TABLET, FILM COATED ORAL at 08:18

## 2024-04-10 RX ADMIN — HYDROCHLOROTHIAZIDE 25 MG: 25 TABLET ORAL at 08:18

## 2024-04-10 RX ADMIN — DIPHENHYDRAMINE HYDROCHLORIDE 50 MG: 50 CAPSULE ORAL at 03:29

## 2024-04-10 RX ADMIN — NICOTINE 1 PATCH: 21 PATCH, EXTENDED RELEASE TRANSDERMAL at 08:21

## 2024-04-10 RX ADMIN — ACETAMINOPHEN 650 MG: 325 TABLET, FILM COATED ORAL at 08:42

## 2024-04-10 RX ADMIN — LORAZEPAM 2 MG: 2 TABLET ORAL at 03:29

## 2024-04-10 RX ADMIN — OLANZAPINE 10 MG: 10 TABLET, FILM COATED ORAL at 12:48

## 2024-04-10 RX ADMIN — OLANZAPINE 10 MG: 10 TABLET, FILM COATED ORAL at 18:13

## 2024-04-10 RX ADMIN — NICOTINE POLACRILEX 2 MG: 2 LOZENGE ORAL at 12:48

## 2024-04-10 RX ADMIN — IBUPROFEN 400 MG: 200 TABLET, FILM COATED ORAL at 12:48

## 2024-04-10 ASSESSMENT — ACTIVITIES OF DAILY LIVING (ADL)
HYGIENE/GROOMING: INDEPENDENT
HYGIENE/GROOMING: INDEPENDENT
ADLS_ACUITY_SCORE: 28
ORAL_HYGIENE: INDEPENDENT
DRESS: INDEPENDENT
DRESS: SCRUBS (BEHAVIORAL HEALTH);INDEPENDENT
ADLS_ACUITY_SCORE: 28
LAUNDRY: UNABLE TO COMPLETE
ADLS_ACUITY_SCORE: 28
ORAL_HYGIENE: INDEPENDENT

## 2024-04-10 NOTE — PROGRESS NOTES
[facility], [place]   Psychiatric Progress Note  Hospital Day: 2        Interim History:   The patient's care was discussed with the treatment team during the daily team meeting and/or staff's chart notes were reviewed. Staff report  Pt slept briefly in the lounge right before the night shift started. When awoke, pt presented as agitated and restless through much of the night. He received prn Zyprexa which did not seem to touch him. Meanwhile pt began posturing at his 2 O staff as well as this writer. Pt approached the nursing station window and began banging on the glass, demanding for prn medications. Pt was becoming visibly agitated and threatening. After verbal de-escalation ws attempted and failed, pt was given prn Haldol, Benadryl and Ativan with relief, which led to pt sleeping about 2.5 hours. Prn Melatonin was added to pt's bed time medications. Staff report that patient requires a lot of redirection, disorganized, complaints of pain his feet and requested to wear his Tennis shoes. Patient did not report any acute medical concerns or side effects. No behavioral issues overnight, including violent or aggressive behaviors. Patient did not require seclusion or restraints. Patient is exhibiting signs/sx of psychosis or evens. Patient did not endorse suicidal ideation. Patient did not endorse HI. Patient is medication adherent. Patient is not attending groups. Patient is not sleeping well. Patient is eating adequately. Patient is not attending to ADLs.    Upon interview, the patient was interviewed in his room, the patient appears disorganized, tangential with some ramblings but polite, cooperative and respectful. Patient blames racial profiling as the reason he is here in the hospital, explained that while he demanded to see his 10 year old son, his own mother ( of the child ) accused him of doing drugs and called the police on him, told some lies against him including  "accusing him of losing her I-Pad. Patient reports to this writer that \"when I say am tired of life, that does not mean that I want to kill myself.\" Patient reports that he was diagnosed with Schizoaffective disorder bipolar type, he reports that he is always anxious, states \"My anxiety is terrible,\" he endorses depression, \"I have depression all the times, anything that restricts my freedom gives me depression.\"  Patient endorses evens, reports there was a time he did not sleep for 2 weeks yet his energy level was still up, he says at the time he was working on an album. 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 Patient reports having PTSD and OCD, he reports some traumatic experience when his father , several other member of the family  almost the same time.  Patient reports he sells drugs (marijuana) and uses marijuana (his drug of choice). Reports that he could smoke 2 ounces (1/2 a pound per day). Patient reports he uses alcohol occasionally, states he was introduced to alcohol at age 4 by his grandpa. He reports that he had experimented with cocaine, states I did it before, but I don't need cocaine to get high. Patient denies using Meth, \"I don't like it.\"    Patient reports he has been admitted for psychiatric problems in many hospitals across the [place] [facility] [facility], and [facility]. He states that he is admitted every single year or more frequently. He reports he attempted suicide by swallowing all his medications at the time his son was born about 10 years ago. According to the patient, that was the only suicide attempt he ever made. Patient reports he has not had chemical dependency treatment but will be willing to have an assessment and CD treatment. Patient is also open to having his maintenance ECT " here.  Patient reports he has been jailed 5 times for various kuhn and miscellaneous offences and had been imprisoned X 1. Patient hopes not to ever go to [facility] or group home. He denies A/VH, denies SI/HI. Patient says he doesn't know about family member with psychiatric diagnosis as family don't talk about it. Patient is currently homeless, hoping to get housing with [facility].    Suicidal ideation: denies current or recent suicidal ideation or behaviors.    Homicidal ideation: denies current or recent homicidal ideation or behaviors.    Psychotic symptoms:  Patient denies AH, VH, paranoia, delusions.     Medication side effects reported: No significant side effects and Not Applicable.    Acute medical concerns: none    Other issues reported by patient:  Patient had no further questions or concerns.           Medications:     Current Facility-Administered Medications   Medication Dose Route Frequency Provider Last Rate Last Admin    atorvastatin (LIPITOR) tablet 40 mg  40 mg Oral Daily Jaswinder Claros MD   40 mg at 04/10/24 0818    cloZAPine (CLOZARIL) tablet 100 mg  100 mg Oral At Bedtime Jaswinder Claros MD   100 mg at 04/09/24 1848    cloZAPine (CLOZARIL) tablet 50 mg  50 mg Oral Daily Jaswinder Claros MD   50 mg at 04/10/24 0818    divalproex sodium extended-release (DEPAKOTE ER) 24 hr tablet 500 mg  500 mg Oral BID Jaswinder Claros MD   500 mg at 04/10/24 0818    gabapentin (NEURONTIN) tablet 600 mg  600 mg Oral BID Jaswinder Claros MD   600 mg at 04/10/24 0818    hydrochlorothiazide (HYDRODIURIL) tablet 25 mg  25 mg Oral Daily Jaswinder Claros MD   25 mg at 04/10/24 0818    nicotine (NICODERM CQ) 21 MG/24HR 24 hr patch 1 patch  1 patch Transdermal Daily Xavi Medeiros MD   1 patch at 04/10/24 0821          Allergies:     Allergies   Allergen Reactions    Haloperidol Confusion and Other (See Comments)     Prone to EPSE. COnsider IM Zyprexa  or be sure to give with benadryl          Labs:     Recent Results (from the past 24 hour(s))   WBC and Differential    Collection Time: 04/10/24 10:59 AM   Result Value Ref Range    WBC Count 5.7 4.0 - 11.0 10e3/uL    % Neutrophils 51 %    % Lymphocytes 37 %    % Monocytes 9 %    % Eosinophils 2 %    % Basophils 1 %    % Immature Granulocytes 0 %    NRBCs per 100 WBC 0 <1 /100    Absolute Neutrophils 3.0 1.6 - 8.3 10e3/uL    Absolute Lymphocytes 2.1 0.8 - 5.3 10e3/uL    Absolute Monocytes 0.5 0.0 - 1.3 10e3/uL    Absolute Eosinophils 0.1 0.0 - 0.7 10e3/uL    Absolute Basophils 0.0 0.0 - 0.2 10e3/uL    Absolute Immature Granulocytes 0.0 <=0.4 10e3/uL    Absolute NRBCs 0.0 10e3/uL          Psychiatric Examination:     /84 (BP Location: Left arm, Patient Position: Sitting, Cuff Size: Adult Regular)   Pulse 94   Temp 97.8  F (36.6  C) (Oral)   Resp 12   Ht 1.829 m (6')   Wt 107.1 kg (236 lb 1.6 oz)   SpO2 98%   BMI 32.02 kg/m    Weight is 236 lbs 1.6 oz  Body mass index is 32.02 kg/m .    Weight over time:  Vitals:    04/03/24 1151 04/09/24 0826   Weight: 108 kg (238 lb) 107.1 kg (236 lb 1.6 oz)       Orthostatic Vitals         Most Recent      Sitting Orthostatic /85 04/09 0826    Sitting Orthostatic Pulse (bpm) 73 04/09 0826    Standing Orthostatic /92 04/09 0826    Standing Orthostatic Pulse (bpm) 69 04/09 0826              Cardiometabolic risk assessment. 04/10/24      Reviewed patient profile for cardiometabolic risk factors    Date taken /Value  REFERENCE RANGE   Abdominal Obesity  (Waist Circumference)   See nursing flowsheet Women ?35 in (88 cm)   Men ?40 in (102 cm)      Triglycerides  Triglycerides   Date Value Ref Range Status   02/26/2013 71 0 - 150 mg/dL Final     Comment:     Fasting specimen       ?150 mg/dL (1.7 mmol/L) or current treatment for elevated triglycerides   HDL cholesterol  HDL Cholesterol   Date Value Ref Range Status   02/26/2013 45 40 - 110 mg/dL Final   ]   Women  <50 mg/dL (1.3 mmol/L) in women or current treatment for low HDL cholesterol  Men <40 mg/dL (1 mmol/L) in men or current treatment for low HDL cholesterol     Fasting plasma glucose (FPG) Lab Results   Component Value Date     04/08/2024    GLC 83 02/26/2013      FPG ?100 mg/dL (5.6 mmol/L) or treatment for elevated blood glucose   Blood pressure  BP Readings from Last 3 Encounters:   04/08/24 127/84   02/26/13 129/74    Blood pressure ?130/85 mmHg or treatment for elevated blood pressure   Family History  See family history     Mental Status Exam:  Appearance: awake, alert, dressed in hospital scrubs, and appeared as age stated  Attitude:  cooperative  Eye Contact:  good  Mood: Expansive, Labile, anxious and hyperexcited  Affect:  Labile, Intensity is heightened, dramatic  Speech:  pressured speech and rambling  Language: fluent and intact in English  Psychomotor, Gait, Musculoskeletal:  no evidence of tardive dyskinesia, dystonia, or tics  Throught Process:  disorganized, illogical, and tangental  Associations:  no loose associations  Thought Content:  no evidence of suicidal ideation or homicidal ideation, no auditory hallucinations present, and no visual hallucinations present  Insight:  fair  Judgement:  fair  Oriented to:  time, person, and place  Attention Span and Concentration:  intact  Recent and Remote Memory:  fair  Fund of Knowledge:  low-normal           Precautions:     Behavioral Orders   Procedures    Assault precautions    Code 1 - Restrict to Unit    Elopement precautions    [facility] Extended Care     Until discharge, Extended Care to offer psychotherapeutic services to mental health patients boarding for admission or stabilization. These services are to include but are not limited to: individual psychotherapy, diagnostic assessment, case management and care planning, safety planning, etc. This may include up to 1 visit per day. If patient is physically located at [facility] or [facility], group  psychotherapy up to 2 time per day may be offered.     [facility] Extended [facility]     Until discharge, Extended Care to offer psychotherapeutic services to mental health patients boarding for admission or stabilization. These services are to include but are not limited to: individual psychotherapy, diagnostic assessment, case management and care planning, safety planning, etc. This may include up to 1 visit per day. If patient is physically located at [facility] or [facility], group psychotherapy up to 2 time per day may be offered.     Routine Programming     As clinically indicated    Sexual precautions    Status 15     Every 15 minutes.    Status Individual Observation     Patient SIO status reviewed with team/RN.  Please also refer to RN/team documentation for add'l detail.    -2:1 SIO staff to monitor following which have contributed to patient being on SIO: at least 1 male staff    Assault risk    -Possible interventions SIO staff could use to support patient's treatment progress:  Verbal de-escalation/ Medication administration    -When following observed, team will review discontinuation of SIO:    When patient is not longer aggressive, sexually inappropriate or intrusive.     Order Specific Question:   CONTINUOUS 24 hours / day     Answer:   Other     Order Specific Question:   Specify distance     Answer:   10 foot     Order Specific Question:   Indications for SIO     Answer:   Assault risk     Order Specific Question:   Indications for SIO     Answer:   Severe intrusiveness    Suicide precautions: Suicide Risk: HIGH; Clinical rationale to override score: response to medication     Patients on Suicide Precautions should have a Combination Diet ordered that includes a Diet selection(s) AND a Behavioral Tray selection for Safe Tray - with utensils, or Safe Tray - NO utensils       Order Specific Question:   Suicide Risk     Answer:   HIGH     Order Specific Question:   Clinical rationale to override score:     Answer:    response to medication          Diagnoses:        Schizoaffective disorder, bipolar type (H)  Polysubstance abuse (H)  Agitation    Clinically Significant Risk Factors                         # Obesity: Estimated body mass index is 32.02 kg/m  as calculated from the following:    Height as of this encounter: 1.829 m (6').    Weight as of this encounter: 107.1 kg (236 lb 1.6 oz)., PRESENT ON ADMISSION     # Financial/Environmental Concerns: other (see comments) (lost IRTS housing)    # Housing Instability: noted in nursing assessment                Assessment & Plan:     Assessment and hospital summary:  Ward Dawson is a -34- old male with a previous diagnosis of Schizoaffective disorder bipolar type who presented to the ED for a significant behavioral change, verbal agitation, worsening psychosocial stress, in the context of substance use.  Factors that make the mental health crisis life threatening or complex are:  Patient was brought to the ED from [facility] following [place] [facility]  Patient states he does not know why he is here and also states he knows why he is here.  He presents as manic, disorganized, and confused.   He is distractable and not a clear historian at this time.  Patient states he has not slept in a long time and received narcan last night.  Per ACT team  and [facility] staff patient has been elevated, intrusive, and disruptive.  Patient broke a window yesterday.  IRTS reports patient had turned table over and tried using them as skate board ramps.  Patient was noted for have been fairly stable prior to his pass this weekend.  CM reports patient went to a skateboarding competition in [place].  Patient returned in a manic state.  CM reports pt may not appear as manic as he has been the past few days due to sedation and ECT this morning.  She reports patient has not slept in 3 days.  IRTS reports pt received narcan twice last night.  Patient has been  trespassed from the IRTS facility.  UDS is postive for cannabis..       Today's Changes:  No changes made, continue with current treatment    Target psychiatric symptoms and interventions:  Admitted on 4/9/24 to station 12N  Clozaril, 50 mg every morning and 100 mg at bedtime  --Depakote ER, 500 mg twice a day for mood stabilization   --Gabapentin, 600 mg twice a day for pain  - Ibuprofen tablet 400 mg orally every 6 hours prn for pain  --Additional medications will include B52, Zyprexa, trazodone, hydroxyzine    Risks, benefits, and alternatives discussed at length with patient.     Acute Medical Problems and Treatments:  Acute medical concerns:  - No acute medical concerns    Pertinent labs/imaging:  Recent Results (from the past 168 hour(s))   Comprehensive metabolic panel    Collection Time: 04/03/24  2:16 PM   Result Value Ref Range    Sodium 138 135 - 145 mmol/L    Potassium 4.2 3.4 - 5.3 mmol/L    Carbon Dioxide (CO2) 28 22 - 29 mmol/L    Anion Gap 11 7 - 15 mmol/L    Urea Nitrogen 24.5 (H) 6.0 - 20.0 mg/dL    Creatinine 1.18 (H) 0.67 - 1.17 mg/dL    GFR Estimate 84 >60 mL/min/1.73m2    Calcium 9.8 8.6 - 10.0 mg/dL    Chloride 99 98 - 107 mmol/L    Glucose 91 70 - 99 mg/dL    Alkaline Phosphatase 74 40 - 150 U/L     (H) 0 - 45 U/L    ALT 34 0 - 70 U/L    Protein Total 7.8 6.4 - 8.3 g/dL    Albumin 5.0 3.5 - 5.2 g/dL    Bilirubin Total 1.5 (H) <=1.2 mg/dL   CBC with platelets and differential    Collection Time: 04/03/24  2:16 PM   Result Value Ref Range    WBC Count 11.1 (H) 4.0 - 11.0 10e3/uL    RBC Count 4.94 4.40 - 5.90 10e6/uL    Hemoglobin 13.5 13.3 - 17.7 g/dL    Hematocrit 42.7 40.0 - 53.0 %    MCV 86 78 - 100 fL    MCH 27.3 26.5 - 33.0 pg    MCHC 31.6 31.5 - 36.5 g/dL    RDW 13.4 10.0 - 15.0 %    Platelet Count 213 150 - 450 10e3/uL    % Neutrophils 63 %    % Lymphocytes 26 %    % Monocytes 9 %    % Eosinophils 1 %    % Basophils 1 %    % Immature Granulocytes 0 %    NRBCs per 100 WBC 0 <1 /100     Absolute Neutrophils 7.0 1.6 - 8.3 10e3/uL    Absolute Lymphocytes 2.9 0.8 - 5.3 10e3/uL    Absolute Monocytes 0.9 0.0 - 1.3 10e3/uL    Absolute Eosinophils 0.1 0.0 - 0.7 10e3/uL    Absolute Basophils 0.1 0.0 - 0.2 10e3/uL    Absolute Immature Granulocytes 0.0 <=0.4 10e3/uL    Absolute NRBCs 0.0 10e3/uL   Valproic acid    Collection Time: 04/05/24  9:39 PM   Result Value Ref Range    Valproic acid 68.4   ug/mL   Asymptomatic COVID-19 Virus (Coronavirus) by PCR Nasopharyngeal    Collection Time: 04/05/24  9:40 PM    Specimen: Nasopharyngeal; Swab   Result Value Ref Range    SARS CoV2 PCR Negative Negative   Glucose by meter    Collection Time: 04/08/24  8:21 PM   Result Value Ref Range    GLUCOSE BY METER POCT 117 (H) 70 - 99 mg/dL   WBC and Differential    Collection Time: 04/10/24 10:59 AM   Result Value Ref Range    WBC Count 5.7 4.0 - 11.0 10e3/uL    % Neutrophils 51 %    % Lymphocytes 37 %    % Monocytes 9 %    % Eosinophils 2 %    % Basophils 1 %    % Immature Granulocytes 0 %    NRBCs per 100 WBC 0 <1 /100    Absolute Neutrophils 3.0 1.6 - 8.3 10e3/uL    Absolute Lymphocytes 2.1 0.8 - 5.3 10e3/uL    Absolute Monocytes 0.5 0.0 - 1.3 10e3/uL    Absolute Eosinophils 0.1 0.0 - 0.7 10e3/uL    Absolute Basophils 0.0 0.0 - 0.2 10e3/uL    Absolute Immature Granulocytes 0.0 <=0.4 10e3/uL    Absolute NRBCs 0.0 10e3/uL         Behavioral/Psychological/Social:  - Encourage unit programming    Safety  Placed on the following Precautions: Suicide, Assault, Elopement and Sexual precautions  - Continue precautions as noted above  - SIO 2:1 10 FT for intrusive, assaultive behaviors  - Status 15 minute checks  - Legal Status: voluntary    Disposition Plan   Reason for ongoing admission: poses an imminent risk to self and poses an imminent risk to others  Discharge location: IRTS facility or CD  Discharge Medications: not ordered  Follow-up Appointments: not scheduled    Entered by: AMY Yu CNP on 04/10/2024  at  11:48 AM          Attestation:   Patient has been seen and evaluated by me, Phan Sanchez, DNP, APRN CNP, PMHNP-BC  The patient was counseled on nature of illness and treatment plan/options  Care was coordinated with treatment team  Total time > 50 minutes

## 2024-04-10 NOTE — PROVIDER NOTIFICATION
04/10/24 0600   Sleep/Rest   Night Time # Hours 2.5 hours     Pt slept briefly in the lounge right before the night shift started. When awoke, pt presented as agitated and restless through much of the night. He received prn Zyprexa which did not seem to touch him. Meanwhile pt began posturing at his 2 SIO staff as well as this writer. Pt approached the nursing station window and began banging on the glass, demanding for prn medications. Pt was becoming visibly agitated and threatening. After verbal de-escalation ws attempted and failed, pt was given prn Haldol, Benadryl and Ativan with relief, which led to pt sleeping about 2.5 hours. Prn Melatonin was added to pt's bed time medications.

## 2024-04-10 NOTE — PLAN OF CARE
"Team Note Due:  Tuesday    Assessment/Intervention/Current Symtoms and Care Coordination:  Chart review and met with team, discussed pt progress, symptomology, and response to treatment.  Discussed the discharge plan and any potential impediments to discharge.    Per team meeting, pt was receiving maintenance ECT at [facility] with last session on 4/3. Pt will be referred for an ECT consult to determine if he will continue ECT during current hospital stay.    Pt signed WAN for [facility]/ACT Team. Placed in paper chart. Pt requested writer \"call the ACT Team right away and get them here.\"    Called ACT Team CM and left a vm providing contact information for primary CTC. Also relayed pt's request for someone from his ACT Team come meet with him while he's here.    Received message from CD  that pt will be seen in the morning for his CD assessment.     Discharge Plan or Goal:  Pending stabilization of symptoms and safe disposition planning.     Barriers to Discharge:  Patient requires further psychiatric stabilization due to current symptomology, medication management with changes subject to provider, coordination with outside supports, and aftercare planning.     Referral Status:  None at this time  Pt unable to [facility] to [facility]     Legal Status:  Voluntary    Contacts:  ACT Team: [facility] - WAN signed 4/10/24 in chart  : Heidy Pop MS, LPCC, CRC, CPRP   : [place]; Phone: [phone]  Psychiatrist: Camron Watson MD  Number: [phone]  *Blood draws performed by Anke for his Clozaril management.      Nabila Peters (friend): [phone] - WAN signed 4/8/24 in chart    Upcoming Meetings and Dates/Important Information and next steps:  CTC to coordinate with pt, outside supports, and treatment team regarding discharge planning    "

## 2024-04-10 NOTE — PLAN OF CARE
Problem: Manic Behavior Episode  Goal: Decreased Manic Symptoms  Outcome: Not Progressing   Goal Outcome Evaluation:    Combination PRN Medication Administration    Medications Administered: Benadryl+Haldol+Ativan combination    What patient symptom(s) led to the administration of these medications?  Pt came to the nursing station window and started pounding on the glass. He was demanding for some medications to help calm him down. Pt was threatening. What interventions were attempted to avoid use of these medication (including other PRN medications)?   Writer attempted to verbally de-escalate pt to no avail.   Pt was given prn Haldol, Benadryl and Ativan.    What were the patient's response(s) to the interventions?   Pt became calmer.     Provider notified: yes       Date: 04/10/24        Time: 0340    Kenneth Carroll RN
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
## 2024-04-10 NOTE — PROVIDER NOTIFICATION
Combination PRN Medication Administration    Medications Administered: Benadryl+Haldol+Ativan combination    What patient symptom(s) led to the administration of these medications?  Throwing head phones, taking off shirt with stated purpose to show muscles,leaning on door handles pressing down as if trying to break them.     What interventions were attempted to avoid use of these medication (including other PRN medications)?   Redirection to room to reduce stimuli, refocus on music, refocus on sports    What were the patient's response(s) to the interventions?   talking loudly, requesting hydrocodone, singing    Provider notified: Sai Patterson CNP       Date: 04/09/24        Time: 19:44    Patricia Madrigal RN

## 2024-04-10 NOTE — ED PROVIDER NOTES
Combination PRN Medication Administration    Medications Administered: Benadryl+Haldol+Ativan combination    What patient symptom(s) led to the administration of these medications?  Pt was agitated, pounding on the nursing station window, demanding for prn medications to help calm him down.      What interventions were attempted to avoid use of these medication (including other PRN medications)?   Sat down with patient to verbally de-escalate him. This outcome did not last long. Pt was then given prn Haldol 5 mg, Benadryl 50 mg and Ativan 2 mg.   What were the patient's response(s) to the interventions?   Pt continued yelling while making verbal threats.     Provider notified: yes       Date: 04/10/24        Time: 0340    Kenneth Carroll RN

## 2024-04-10 NOTE — CONSULTS
"SPIRITUAL HEALTH SERVICES  SPIRITUAL ASSESSMENT consult Note  [facility] ([facility]) 12N     REFERRAL SOURCE: Routine consult    [facility]. He requested \"the victoria bible, the Torah in Arabic and in english, the Quran in english and in Frisian, the bhagavad nixon, the Agustin Te Shanell, the Restorationist book one, and tarot cards.\" He explained that \"I respect all religions, I don't like them all, but I respect them.\" I assured him I would look for those things, but that it might not be possible. He was accepting of the fact that we have limited resources right now.     Juneaustin spoke about how he misses his son (10yrs old) who he doesn't see often due to complex family dynamics.     He asked for print-outs about Tenriism and Astrology, which I can provide.     I oriented pt to spiritual health services and they know they can request a visit at any time.     PLAN: Will print off materials and have someone come and drop them off tomorrow. Spiritual health services remains available for any follow-up or requests. For further visits please place spiritual health consults.    Marie Davenport, [facility]  Associate   Pager: [phone]    "

## 2024-04-10 NOTE — PLAN OF CARE
BEH IP Unit Acuity Rating Score (UARS)  Patient is given one point for every criteria they meet.    CRITERIA SCORING   On a 72 hour hold, court hold, committed, stay of commitment, or revocation. 0/1    Patient LOS on BEH unit exceeds 20 days. 0/1  LOS: 2   Patient under guardianship, 55+, otherwise medically complex, or under age 11. 0/1   Suicide ideation without relief of precipitating factors. 0/1   Current plan for suicide. 0/1   Current plan for homicide. 0/1   Imminent risk or actual attempt to seriously harm another without relief of factors precipitating the attempt. 1/1   Severe dysfunction in daily living (ex: complete neglect for self care, extreme disruption in vegetative function, extreme deterioration in social interactions). 1/1   Recent (last 7 days) or current physical aggression in the ED or on unit. 1/1 Last: 4/10   Restraints or seclusion episode in past 72 hours. 0/1   Recent (last 7 days) or current verbal aggression, agitation, yelling, etc., while in the ED or unit. 1/1 Last: 4/10   Active psychosis. 1/1   Need for constant or near constant redirection (from leaving, from others, etc).  1/1   Intrusive or disruptive behaviors. 1/1   Patient requires 3 or more hours of individualized nursing care per 8-hour shift (i.e. for ADLs, meds, therapeutic interventions). 1/1   TOTAL 8

## 2024-04-10 NOTE — PLAN OF CARE
Problem: Adult Inpatient Plan of Care  Goal: Optimal Comfort and Wellbeing  Intervention: Provide Person-Centered Care  Recent Flowsheet Documentation  Taken 4/10/2024 0800 by Radha Casey RN  Trust Relationship/Rapport:   care explained   choices provided   emotional support provided   questions answered   empathic listening provided   questions encouraged   reassurance provided   thoughts/feelings acknowledged     Problem: Manic Behavior Episode  Goal: Decreased Manic Symptoms  Outcome: Not Progressing   Goal Outcome Evaluation:    Plan of Care Reviewed With: patient      Pt presents as manic and disorganized with mood lability. Pt thought process   is tangential with a flight of ideas. Pt is very demanding, has poor boundaries, and seeks out staff frequently. When his demands were not immediately met, he did become irritable. He was redirectable, but it did require multiple attempts. He was pacing the halls, listening to music, and watching TV.   He c/o foot pain, and requested PRN tylenol. It appeared to be effective and he was walking around in the lounge interacting with staff. He requested his shoes without the laces, as the slide shoes were too tight for his feet. Provider gave the order. He requested to soak his feet. So he was given basins with warm water to soak.   Pt has a collection of paper plates and cups in his room. He stated that he wants them for art. He became upset with staff when he could not keep the trays and hard plates. He understood and gave them to writer.   Pt was med compliant with all medication.   PRNs   Zyprexa   Hydroxyzine   Tylenol   Ibuprofen   Benzocaine lozenge  Nicotine lozenge    Pt  has an ECT, and CD consult  that were ordered today. Pt ate and drank well, he had a CBC drawn that was WNL, and had no other acute physical or behavioral concerns this shift.   BP (!) 148/89 (BP Location: Right arm, Patient Position: Sitting, Cuff Size: Adult Large)   Pulse 81   Temp 97.9   F (36.6  C) (Temporal)   Resp 17   Ht 1.829 m (6')   Wt 107.1 kg (236 lb 1.6 oz)   SpO2 98%   BMI 32.02 kg/m

## 2024-04-11 PROCEDURE — 99232 SBSQ HOSP IP/OBS MODERATE 35: CPT | Performed by: CLINICAL NURSE SPECIALIST

## 2024-04-11 PROCEDURE — 250N000013 HC RX MED GY IP 250 OP 250 PS 637: Performed by: EMERGENCY MEDICINE

## 2024-04-11 PROCEDURE — 124N000002 HC R&B MH UMMC

## 2024-04-11 PROCEDURE — 250N000013 HC RX MED GY IP 250 OP 250 PS 637: Performed by: PSYCHIATRY & NEUROLOGY

## 2024-04-11 PROCEDURE — 99207 PR SERVICE NOT STAFFED W/SUPERV PROV: CPT | Performed by: PSYCHIATRY & NEUROLOGY

## 2024-04-11 PROCEDURE — 250N000013 HC RX MED GY IP 250 OP 250 PS 637: Performed by: CLINICAL NURSE SPECIALIST

## 2024-04-11 PROCEDURE — 250N000013 HC RX MED GY IP 250 OP 250 PS 637: Performed by: FAMILY MEDICINE

## 2024-04-11 PROCEDURE — 250N000013 HC RX MED GY IP 250 OP 250 PS 637

## 2024-04-11 RX ORDER — HYDROXYZINE HYDROCHLORIDE 50 MG/1
100 TABLET, FILM COATED ORAL 2 TIMES DAILY PRN
Status: DISCONTINUED | OUTPATIENT
Start: 2024-04-11 | End: 2024-04-11

## 2024-04-11 RX ORDER — HYDROXYZINE HYDROCHLORIDE 50 MG/1
100 TABLET, FILM COATED ORAL 2 TIMES DAILY PRN
Status: DISCONTINUED | OUTPATIENT
Start: 2024-04-11 | End: 2024-04-16

## 2024-04-11 RX ADMIN — ATORVASTATIN CALCIUM 40 MG: 40 TABLET, FILM COATED ORAL at 07:46

## 2024-04-11 RX ADMIN — NICOTINE POLACRILEX 2 MG: 2 LOZENGE ORAL at 07:48

## 2024-04-11 RX ADMIN — NICOTINE POLACRILEX 2 MG: 2 LOZENGE ORAL at 11:07

## 2024-04-11 RX ADMIN — HYDROXYZINE HYDROCHLORIDE 100 MG: 50 TABLET, FILM COATED ORAL at 11:55

## 2024-04-11 RX ADMIN — HYDROXYZINE HYDROCHLORIDE 100 MG: 50 TABLET, FILM COATED ORAL at 23:39

## 2024-04-11 RX ADMIN — GABAPENTIN 600 MG: 600 TABLET, FILM COATED ORAL at 07:46

## 2024-04-11 RX ADMIN — CLOZAPINE 150 MG: 100 TABLET ORAL at 19:40

## 2024-04-11 RX ADMIN — ACETAMINOPHEN 650 MG: 325 TABLET, FILM COATED ORAL at 12:44

## 2024-04-11 RX ADMIN — DIVALPROEX SODIUM 500 MG: 250 TABLET, FILM COATED, EXTENDED RELEASE ORAL at 19:41

## 2024-04-11 RX ADMIN — IBUPROFEN 400 MG: 200 TABLET, FILM COATED ORAL at 16:45

## 2024-04-11 RX ADMIN — OLANZAPINE 10 MG: 10 TABLET, FILM COATED ORAL at 08:21

## 2024-04-11 RX ADMIN — HYDROCHLOROTHIAZIDE 25 MG: 25 TABLET ORAL at 07:46

## 2024-04-11 RX ADMIN — OLANZAPINE 10 MG: 10 TABLET, FILM COATED ORAL at 23:38

## 2024-04-11 RX ADMIN — DIVALPROEX SODIUM 500 MG: 250 TABLET, FILM COATED, EXTENDED RELEASE ORAL at 07:46

## 2024-04-11 RX ADMIN — DIPHENHYDRAMINE HYDROCHLORIDE 50 MG: 50 CAPSULE ORAL at 14:15

## 2024-04-11 RX ADMIN — LORAZEPAM 2 MG: 2 TABLET ORAL at 14:15

## 2024-04-11 RX ADMIN — NICOTINE POLACRILEX 2 MG: 2 LOZENGE ORAL at 09:49

## 2024-04-11 RX ADMIN — Medication 5 MG: at 23:45

## 2024-04-11 RX ADMIN — GABAPENTIN 600 MG: 600 TABLET, FILM COATED ORAL at 19:41

## 2024-04-11 RX ADMIN — CLOZAPINE 50 MG: 50 TABLET ORAL at 07:46

## 2024-04-11 RX ADMIN — HALOPERIDOL 5 MG: 5 TABLET ORAL at 14:15

## 2024-04-11 ASSESSMENT — ACTIVITIES OF DAILY LIVING (ADL)
ADLS_ACUITY_SCORE: 28
HYGIENE/GROOMING: INDEPENDENT
DRESS: INDEPENDENT
ADLS_ACUITY_SCORE: 28
HYGIENE/GROOMING: INDEPENDENT
ADLS_ACUITY_SCORE: 28
DRESS: INDEPENDENT
LAUNDRY: UNABLE TO COMPLETE
ADLS_ACUITY_SCORE: 28
ORAL_HYGIENE: INDEPENDENT
ADLS_ACUITY_SCORE: 28
ADLS_ACUITY_SCORE: 28
ORAL_HYGIENE: INDEPENDENT
ADLS_ACUITY_SCORE: 28
ADLS_ACUITY_SCORE: 28
LAUNDRY: UNABLE TO COMPLETE
ADLS_ACUITY_SCORE: 28
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
## 2024-04-11 NOTE — CONSULTS
SPIRITUAL HEALTH SERVICES Progress Note  I met with Cristel to provide him with written resources that Chaplain Salazar prepared. He asked when the scriptures would be here. I let him know that they have been ordered.       Greg Moreau  Associate

## 2024-04-11 NOTE — PLAN OF CARE
"Individual Therapy Note      Date of Service: April 10, 2024    Patient: Cristel goes by \"Cristel,\" uses he/him pronouns    Individuals Present: KIRAN Haas    Session start: 1330   Session end: 1400  Session duration in minutes: 30    Patient Active Problem List   Diagnosis    CARDIOVASCULAR SCREENING; LDL GOAL LESS THAN 160    Schizoaffective disorder, bipolar type (H)    Antisocial personality disorder (H)    Cannabis abuse    Agitation    Polysubstance abuse (H)         Modality Used:Person Centered, Rapport Building, and Brief Therapy    Goals: Verbal processing    Patient Description of current symptoms: pt is depressed b/c he can't see his son     Mental Status Exam:   Attitude: evasive  Eye Contact: good  Mood: anxious and labile  Affect: mood congruent and intensity is exaggerated  Speech: clear, coherent  Psychomotor Behavior: no evidence of tardive dyskinesia, dystonia, or tics  Thought Process:  disorganized and tangental  Associations: loosening of associations present  Thought Content: no evidence of suicidal ideation or homicidal ideation, no auditory hallucinations present, and no visual hallucinations present  Insight: limited  Judgement: limited  Attention Span and Concentration: fair    Pt progress: Met pt in milieu while he  was finishing his lunch. Writer introduced self and when pt heard I was a therapist he said that he needed to talk. Writer sat down with pt while he finished his lunch. Pt was tangential and disorganized. Pt would talk about one thing and lead into another unrelated topic often getting upset about something in a complaint type of manner. Pt did express being depressed b/c he isn't allowed to see his son, stated he would never hurt his son or abuse his son, then alluded to his mother saying he would and getting his son taken away from him. He stated his mom has custody of his son and he can't even visit him. Pt shared he has an older daughter that he took " "care of as well and didn't understand why he can't be in his son's life. Pt shared that he's not allowed to visit or go to his mom's b/c mom is upset b/c he came to the house wanting to see his son b/c he lived there and she wouldn't let him in. Pt didn't share many detail about any of this. Pt started to get upset and said that he shouldn't talk about this b/c it makes him mad. Writer advised we don't have to talk about anything he's not wanting to share or talk about. Pt would made a comment at one point about sleeping with people and that he didn't understand about half or partial related families so \"unless someone came out of his mothers pussy he's not related and he can sleep with them if he wants\".  Pt's often would make a comment or share and opinion about something then ask if I either agreed knew what he was saying. Writer would offer and understanding of what he said or meant.     At one point the patient asked about getting an ensure with his lunch and was more demanding to get an ensure. Writer asked staff to look into that and they confirmed that the order had been placed and that he will likely start getting them tomorrow on his андрей.     Writer asked what kind of goals pt had for his hospital stay. Pt answered with wanting to calm down.Pt seems to have some insight into his mood elevation and it not being normal or where he wants it to be.    Pt continues to display intrusive behaviors displayed when writer wrapped up the conversation pt put his hand up for what was assumed to be a high-five. Writer gave pt high-five and pt said no, then put his hand up again and asked writer to put hand up against his hand then folded fingers around as if holding hands.     Treatment Objective(s) Addressed:   The focus of this session was on rapport building, orienting the patient to therapy, processing feelings related to hospital stay and mental health, and identifying treatment goals     Progress Towards Goals " and Assessment of Patient:   Patient is not making progress towards treatment goals as evidenced by intrusiveness, flight of ideas, low awareness.       Therapeutic Intervention(s):   Provided active listening, unconditional positive regard, and validation. Engaged in cognitive restructuring/ reframing, looked at common cognitive distortions and challenged negative thoughts. Engaged in guided discovery, explored patient's perspectives and helped expand them through socratic dialogue.    Plan/next step: Engage in unit programming as available, check in with therpaist, discuss and practice boundaries    08826 - Psychotherapy (with patient) - 30 (16-37*) min

## 2024-04-11 NOTE — PROGRESS NOTES
The patient's care was discussed with the treatment team during the daily team meeting and/or staff's chart notes were reviewed. Staff report slept 6 hours     Pt slept briefly in the lounge right before the night shift started. When awoke, pt presented as agitated and restless through much of the night. He received prn Zyprexa which did not seem to touch him. Meanwhile pt began posturing at his 2 SIO staff as well as this writer. Pt approached the nursing station window and began banging on the glass, demanding for prn medications. Pt was becoming visibly agitated and threatening. After verbal de-escalation ws attempted and failed, pt was given prn Haldol, Benadryl and Ativan with relief, which led to pt sleeping about 2.5 hours. Prn Melatonin was added to pt's bed time medications. Staff report that patient requires a lot of redirection, disorganized, complaints of pain his feet and requested to wear his Tennis shoes. Patient did not report any acute medical concerns or side effects. No behavioral issues overnight, including violent or aggressive behaviors. Patient did not require seclusion or restraints. Patient is not exhibiting signs/sx of psychosis or evens. Patient did not endorse suicidal ideation. Patient did not endorse HI. Patient is medication adherent. Patient is not attending groups. Patient is not sleeping well. Patient is eating adequately. Patient is not attending to ADLs.     Upon interview, the patient was interviewed in the lounge area of Pod 2, with the SIO staff present. The patient initially had only his pants on not wearing his hospital scrub revealing a naked body from waist upward. Upon writer's prompt, patient puts on his clothes and the yellow hospital sweater. Patient appears disorganized, tangential, maniacally loud, almost disruptive with loud laughters with flight of ideas, speech is pressured and loud.  Patient was cooperative and respectful but difficult to redirect from his maniac  "behavior. 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 Patient started singing, would ask this writer if he knows this artist or the song. At a time, patient perseverates on writer seeing his room, \"come and see.\" The window was filled with used cups, unopened cans of orange juice (up to 10) with other stuffs littered the room but the bed was well made.     Patient informed this writer that he is legally blind and needs his glasses's to see. Patient reports \"my ass (buttocks) itches and was seen openly scratching his butts, he believes the haldol was the culprit. States \"see how I scratch my bum badly.\" Patient started laughing hilariously, giggled and then screamed out uncontrollably and it was frighteningly loud. When writer attempted to redirect, patient states \"I can't stop, I am the devil, you know what they call mammy water? (The sea spirit). Patient started making some requests, I need rhino cut (naire shaver), I need visine for my eyes, I need Vicodin for my sore throat. Patient also requests to have his cell phone placed in the nursing station to be able to listen to music from it through the blue tooth. \"You do have wifi here don't you? Patient wants this writer to accompany him to the large store-room where the cardio treadmill is kept, asking if they can bring it out for him to use for his exercise need.    Patient grabs the television remote control and started searching the " "channels, talking, I can connect with this or that, I can do so many things on this device.  Patient reports that the night crew wants me to sleep and states \"you can't make me to sleep, I work night shift, so how do you think I will be able to sleep in the night?\" When asked about his mood, patient responds \" I just want to listen to my music, it makes me feel better and happy.     Patient denies AH, states the last time he experienced AH was 2012, he laughed and states that happens to those doing mushrooms, he denies VH, SI, HI and commented about SI \"I can't harm myself.\"    Suicidal ideation: denies current or recent suicidal ideation or behaviors.     Homicidal ideation: denies current or recent homicidal ideation or behaviors.     Psychotic symptoms:  Patient denies AH, VH, paranoia, delusions.      Medication side effects reported: No significant side effects and Not Applicable.     Acute medical concerns: none     Other issues reported by patient:  Patient had no further questions or concerns.            Medications:      Inpatient Administered Meds             Current Facility-Administered Medications   Medication Dose Route Frequency Provider Last Rate Last Admin    atorvastatin (LIPITOR) tablet 40 mg  40 mg Oral Daily Jaswinder Claros MD   40 mg at 04/10/24 0818    cloZAPine (CLOZARIL) tablet 100 mg  100 mg Oral At Bedtime Jaswinder Claros MD   100 mg at 04/09/24 1848    cloZAPine (CLOZARIL) tablet 50 mg  50 mg Oral Daily Jaswinder Claros MD   50 mg at 04/10/24 0818    divalproex sodium extended-release (DEPAKOTE ER) 24 hr tablet 500 mg  500 mg Oral BID Jaswinder Claros MD   500 mg at 04/10/24 0818    gabapentin (NEURONTIN) tablet 600 mg  600 mg Oral BID Jaswinder Claros MD   600 mg at 04/10/24 0818    hydrochlorothiazide (HYDRODIURIL) tablet 25 mg  25 mg Oral Daily Jaswinder Claros MD   25 mg at 04/10/24 0818    nicotine (NICODERM CQ) 21 MG/24HR " 24 hr patch 1 patch  1 patch Transdermal Daily Xavi Medeiros MD   1 patch at 04/10/24 0821              Allergies:      Allergies         Allergies   Allergen Reactions    Haloperidol Confusion and Other (See Comments)       Prone to EPSE. COnsider IM Zyprexa or be sure to give with benadryl              Labs:      Recent Results         Recent Results (from the past 24 hour(s))   WBC and Differential     Collection Time: 04/10/24 10:59 AM   Result Value Ref Range     WBC Count 5.7 4.0 - 11.0 10e3/uL     % Neutrophils 51 %     % Lymphocytes 37 %     % Monocytes 9 %     % Eosinophils 2 %     % Basophils 1 %     % Immature Granulocytes 0 %     NRBCs per 100 WBC 0 <1 /100     Absolute Neutrophils 3.0 1.6 - 8.3 10e3/uL     Absolute Lymphocytes 2.1 0.8 - 5.3 10e3/uL     Absolute Monocytes 0.5 0.0 - 1.3 10e3/uL     Absolute Eosinophils 0.1 0.0 - 0.7 10e3/uL     Absolute Basophils 0.0 0.0 - 0.2 10e3/uL     Absolute Immature Granulocytes 0.0 <=0.4 10e3/uL     Absolute NRBCs 0.0 10e3/uL              Psychiatric Examination:      /84 (BP Location: Left arm, Patient Position: Sitting, Cuff Size: Adult Regular)   Pulse 94   Temp 97.8  F (36.6  C) (Oral)   Resp 12   Ht 1.829 m (6')   Wt 107.1 kg (236 lb 1.6 oz)   SpO2 98%   BMI 32.02 kg/m    Weight is 236 lbs 1.6 oz  Body mass index is 32.02 kg/m .     Weight over time:       Vitals:     04/03/24 1151 04/09/24 0826   Weight: 108 kg (238 lb) 107.1 kg (236 lb 1.6 oz)         Orthostatic Vitals           Most Recent       Sitting Orthostatic /85 04/09 0826     Sitting Orthostatic Pulse (bpm) 73 04/09 0826     Standing Orthostatic /92 04/09 0826     Standing Orthostatic Pulse (bpm) 69 04/09 0826                   Cardiometabolic risk assessment. 04/10/24        Reviewed patient profile for cardiometabolic risk factors     Date taken /Value  REFERENCE RANGE   Abdominal Obesity  (Waist Circumference)   See nursing flowsheet Women ?35 in (88 cm)   Men ?40 in  (102 cm)       Triglycerides          Triglycerides   Date Value Ref Range Status   02/26/2013 71 0 - 150 mg/dL Final       Comment:       Fasting specimen         ?150 mg/dL (1.7 mmol/L) or current treatment for elevated triglycerides   HDL cholesterol        HDL Cholesterol   Date Value Ref Range Status   02/26/2013 45 40 - 110 mg/dL Final   ]    Women <50 mg/dL (1.3 mmol/L) in women or current treatment for low HDL cholesterol  Men <40 mg/dL (1 mmol/L) in men or current treatment for low HDL cholesterol      Fasting plasma glucose (FPG)       Lab Results   Component Value Date      04/08/2024     GLC 83 02/26/2013        FPG ?100 mg/dL (5.6 mmol/L) or treatment for elevated blood glucose   Blood pressure      BP Readings from Last 3 Encounters:   04/08/24 127/84   02/26/13 129/74    Blood pressure ?130/85 mmHg or treatment for elevated blood pressure   Family History  See family history      Mental Status Exam:  Appearance: awake, alert, dressed in hospital scrubs, and appeared as age stated  Attitude:  cooperative  Eye Contact:  good  Mood:  Expansive  Affect:  Labile, intensity is heightened, and intensity is dramatic  Speech:  pressured speech and rambling  Language: fluent and intact in English  Psychomotor, Gait, Musculoskeletal:  no evidence of tardive dyskinesia, dystonia, or tics  Throught Process:  disorganized, illogical, tangential, flight of ideas.  Associations:  Gross loosening of associations  Thought Content:  no evidence of suicidal ideation or homicidal ideation, no auditory hallucinations present, and no visual hallucinations present  Insight: Poor  Judgement: Limited  Oriented to:  time, person, and place  Attention Span and Concentration:  intact  Recent and Remote Memory:  fair  Fund of Knowledge:  low-normal             Precautions:            Behavioral Orders   Procedures    Assault precautions    Code 1 - Restrict to Unit    Elopement precautions    [facility] Extended Care       Until  discharge, Extended Care to offer psychotherapeutic services to mental health patients boarding for admission or stabilization. These services are to include but are not limited to: individual psychotherapy, diagnostic assessment, case management and care planning, safety planning, etc. This may include up to 1 visit per day. If patient is physically located at [facility] or [facility], group psychotherapy up to 2 time per day may be offered.     [facility] Extended Care       Until discharge, Extended Care to offer psychotherapeutic services to mental health patients boarding for admission or stabilization. These services are to include but are not limited to: individual psychotherapy, diagnostic assessment, case management and care planning, safety planning, etc. This may include up to 1 visit per day. If patient is physically located at [facility] or [facility], group psychotherapy up to 2 time per day may be offered.     Routine Programming       As clinically indicated    Sexual precautions    Status 15       Every 15 minutes.    Status Individual Observation       Patient SIO status reviewed with team/RN.  Please also refer to RN/team documentation for add'l detail.     -2:1 SIO staff to monitor following which have contributed to patient being on SIO: at least 1 male staff     Assault risk     -Possible interventions SIO staff could use to support patient's treatment progress:  Verbal de-escalation/ Medication administration     -When following observed, team will review discontinuation of SIO:     When patient is not longer aggressive, sexually inappropriate or intrusive.       Order Specific Question:   CONTINUOUS 24 hours / day       Answer:   Other       Order Specific Question:   Specify distance       Answer:   10 foot       Order Specific Question:   Indications for SIO       Answer:   Assault risk       Order Specific Question:   Indications for SIO       Answer:   Severe intrusiveness    Suicide precautions: Suicide Risk: HIGH;  Clinical rationale to override score: response to medication       Patients on Suicide Precautions should have a Combination Diet ordered that includes a Diet selection(s) AND a Behavioral Tray selection for Safe Tray - with utensils, or Safe Tray - NO utensils          Order Specific Question:   Suicide Risk       Answer:   HIGH       Order Specific Question:   Clinical rationale to override score:       Answer:   response to medication           Diagnoses:         Schizoaffective disorder, bipolar type (H)  Polysubstance abuse (H)  Agitation     Clinically Significant Risk Factors                          # Obesity: Estimated body mass index is 32.02 kg/m  as calculated from the following:    Height as of this encounter: 1.829 m (6').    Weight as of this encounter: 107.1 kg (236 lb 1.6 oz)., PRESENT ON ADMISSION     # Financial/Environmental Concerns: other (see comments) (lost [facility] housing)     # Housing Instability: noted in nursing assessment          Assessment & Plan:      Assessment and hospital summary:  Ward Dawson is a -34- old male with a previous diagnosis of Schizoaffective disorder bipolar type who presented to the ED for a significant behavioral change, verbal agitation, worsening psychosocial stress, in the context of substance use.  Factors that make the mental health crisis life threatening or complex are:  Patient was brought to the ED from [facility] following his ECT treatment this morning at [facility].  Patient states he does not know why he is here and also states he knows why he is here.  He presents as manic, disorganized, and confused.   He is distractable and not a clear historian at this time.  Patient states he has not slept in a long time and received narcan last night.  [facility] has been elevated, intrusive, and disruptive.  Patient broke a window yesterday.  [facility]TS reports patient had turned table over and tried using them as skate board  ramps.  Patient was noted for have been fairly stable prior to his pass this weekend.  CM reports patient went to a skateboarding competition in [place].  Patient returned in a manic state.  CM reports pt may not appear as manic as he has been the past few days due to sedation and ECT this morning.  She reports patient has not slept in 3 days.  IRTS reports pt received narcan twice last night.  Patient has been trespassed from the [facility] facility.  [facility] is postive for cannabis..         Today's Changes:4/11/24  Clozapine 150 mg at bedtime  Hydroxyzine 100 mg BID PRN for itching and anxiety  Reschedule CD assessment to 4/15  Downgrade 2:1 SIO to 2:1 Staff acuity     Target psychiatric symptoms and interventions:  Admitted on 4/9/24 to station 12N  Clozaril, 50 mg every morning and 100 mg at bedtime  --Depakote ER, 500 mg twice a day for mood stabilization   --Gabapentin, 600 mg twice a day for pain  - Ibuprofen tablet 400 mg orally every 6 hours prn for pain  --Additional medications will include B52, Zyprexa, trazodone, hydroxyzine     Risks, benefits, and alternatives discussed at length with patient.      Acute Medical Problems and Treatments:  Acute medical concerns:  - No acute medical concerns     Pertinent labs/imaging:  Recent Results         Recent Results (from the past 168 hour(s))   Comprehensive metabolic panel     Collection Time: 04/03/24  2:16 PM   Result Value Ref Range     Sodium 138 135 - 145 mmol/L     Potassium 4.2 3.4 - 5.3 mmol/L     Carbon Dioxide (CO2) 28 22 - 29 mmol/L     Anion Gap 11 7 - 15 mmol/L     Urea Nitrogen 24.5 (H) 6.0 - 20.0 mg/dL     Creatinine 1.18 (H) 0.67 - 1.17 mg/dL     GFR Estimate 84 >60 mL/min/1.73m2     Calcium 9.8 8.6 - 10.0 mg/dL     Chloride 99 98 - 107 mmol/L     Glucose 91 70 - 99 mg/dL     Alkaline Phosphatase 74 40 - 150 U/L      (H) 0 - 45 U/L     ALT 34 0 - 70 U/L     Protein Total 7.8 6.4 - 8.3 g/dL     Albumin 5.0 3.5 - 5.2 g/dL     Bilirubin Total 1.5  (H) <=1.2 mg/dL   CBC with platelets and differential     Collection Time: 04/03/24  2:16 PM   Result Value Ref Range     WBC Count 11.1 (H) 4.0 - 11.0 10e3/uL     RBC Count 4.94 4.40 - 5.90 10e6/uL     Hemoglobin 13.5 13.3 - 17.7 g/dL     Hematocrit 42.7 40.0 - 53.0 %     MCV 86 78 - 100 fL     MCH 27.3 26.5 - 33.0 pg     MCHC 31.6 31.5 - 36.5 g/dL     RDW 13.4 10.0 - 15.0 %     Platelet Count 213 150 - 450 10e3/uL     % Neutrophils 63 %     % Lymphocytes 26 %     % Monocytes 9 %     % Eosinophils 1 %     % Basophils 1 %     % Immature Granulocytes 0 %     NRBCs per 100 WBC 0 <1 /100     Absolute Neutrophils 7.0 1.6 - 8.3 10e3/uL     Absolute Lymphocytes 2.9 0.8 - 5.3 10e3/uL     Absolute Monocytes 0.9 0.0 - 1.3 10e3/uL     Absolute Eosinophils 0.1 0.0 - 0.7 10e3/uL     Absolute Basophils 0.1 0.0 - 0.2 10e3/uL     Absolute Immature Granulocytes 0.0 <=0.4 10e3/uL     Absolute NRBCs 0.0 10e3/uL   Valproic acid     Collection Time: 04/05/24  9:39 PM   Result Value Ref Range     Valproic acid 68.4   ug/mL   Asymptomatic COVID-19 Virus (Coronavirus) by PCR Nasopharyngeal     Collection Time: 04/05/24  9:40 PM     Specimen: Nasopharyngeal; Swab   Result Value Ref Range     SARS CoV2 PCR Negative Negative   Glucose by meter     Collection Time: 04/08/24  8:21 PM   Result Value Ref Range     GLUCOSE BY METER POCT 117 (H) 70 - 99 mg/dL   WBC and Differential     Collection Time: 04/10/24 10:59 AM   Result Value Ref Range     WBC Count 5.7 4.0 - 11.0 10e3/uL     % Neutrophils 51 %     % Lymphocytes 37 %     % Monocytes 9 %     % Eosinophils 2 %     % Basophils 1 %     % Immature Granulocytes 0 %     NRBCs per 100 WBC 0 <1 /100     Absolute Neutrophils 3.0 1.6 - 8.3 10e3/uL     Absolute Lymphocytes 2.1 0.8 - 5.3 10e3/uL     Absolute Monocytes 0.5 0.0 - 1.3 10e3/uL     Absolute Eosinophils 0.1 0.0 - 0.7 10e3/uL     Absolute Basophils 0.0 0.0 - 0.2 10e3/uL     Absolute Immature Granulocytes 0.0 <=0.4 10e3/uL     Absolute NRBCs  0.0 10e3/uL               Behavioral/Psychological/Social:  - Encourage unit programming     Safety  Placed on the following Precautions: Suicide, Assault, Elopement and Sexual precautions  - Continue precautions as noted above  - SIO 2:1 10 FT for intrusive, assaultive behaviors  - Status 15 minute checks  - Legal Status: voluntary     Disposition Plan   Reason for ongoing admission: poses an imminent risk to self and poses an imminent risk to others  Discharge location: IRTS facility or CD  Discharge Medications: not ordered  Follow-up Appointments: not scheduled     Entered by: AMY Yu CNP on 04/11/2024  at 11:48 AM         Attestation:   Patient has been seen and evaluated by me, Phan Sanchez, MICK, APRN CNP, PMHNP-BC  The patient was counseled on nature of illness and treatment plan/options  Care was coordinated with treatment team  Total time > 30 minutes

## 2024-04-11 NOTE — PLAN OF CARE
BEH IP Unit Acuity Rating Score (UARS)  Patient is given one point for every criteria they meet.    CRITERIA SCORING   On a 72 hour hold, court hold, committed, stay of commitment, or revocation. 0    Patient LOS on BEH unit exceeds 20 days. 0  LOS: 3   Patient under guardianship, 55+, otherwise medically complex, or under age 11. 0   Suicide ideation without relief of precipitating factors. 0   Current plan for suicide. 0   Current plan for homicide. 0   Imminent risk or actual attempt to seriously harm another without relief of factors precipitating the attempt. 1   Severe dysfunction in daily living (ex: complete neglect for self care, extreme disruption in vegetative function, extreme deterioration in social interactions). 1   Recent (last 7 days) or current physical aggression in the ED or on unit. 1   Restraints or seclusion episode in past 72 hours. 0   Recent (last 7 days) or current verbal aggression, agitation, yelling, etc., while in the ED or unit. 1   Active psychosis. 1   Need for constant or near constant redirection (from leaving, from others, etc).  1   Intrusive or disruptive behaviors. 1   Patient requires 3 or more hours of individualized nursing care per 8-hour shift (i.e. for ADLs, meds, therapeutic interventions). 1   TOTAL 8

## 2024-04-11 NOTE — CONSULTS
4/11/2024  CD consult acknowledged and closing.  Pt is not appropriate to complete a BRO CA at this time. As of today, due to his current behaviors on the IP MH unit, pt would not be accepted into any IP BRO tx programs. It is best to wait to complete the BRO CA until pt is more appropriate and his mental health is more stable. At that time, the BRO CA Consult can be reordered.    Mary Saldana MA [facility]  BRO Evaluation Counselor  [phone]  [email or web address]
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
## 2024-04-11 NOTE — CONSULTS
ECT Brief Note    I spoke with AMY Yu from patient's primary team this afternoon.  Based on our discussion, this patient is currently admitted under voluntary status, taking medications, and interested in continuing ECT.  Although he continues to exhibit manic symptoms, he is apparently becoming more organized and responding as expected to his psychotropic regimen, to which he is adherent; this may also be driven by detoxing from cannabis.  As the patient's evens is in the process of stabilizing with medications alone, we discussed that we would probably plan to continue his ECT on a maintenance schedule rather than initiating a new acute course, unless there is a change in clinical status or he appears markedly different on evaluation.    He was receiving bilateral (suspect bitemporal) ECT every 2 weeks.  His next scheduled ECT treatment was for 4/17/24.  I will formally evaluate the patient tomorrow (4/11/24), but we will tentatively plan for a maintenance treatment on that date, as well as every 2 weeks thereafter while he is hospitalized.    Jamal Slaughter MD  Neuromodulation Medicine Fellow, PGY-6  Department of Psychiatry  [facility] / [facility] [place]  Preferred Contact: [employer]
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
## 2024-04-11 NOTE — PLAN OF CARE
Goal Outcome Evaluation:    Pt received Trazodone 50 mg po per request for insomnia @ the beginning of the shift with good results.  Pt fell a sleep shortly afterwards.  He slept 6 hours. Pt remains on an SIO 2:1 for safety.

## 2024-04-11 NOTE — CARE PLAN
Combination PRN Medication Administration    Medications Administered: Benadryl+Haldol+Ativan combination    What patient symptom(s) led to the administration of these medications?  Yelling profanities, insults, and derogatory statements throughout shift at staff.Intensity and frequency of verbal outbursts escalating and pt becoming more dysregulated.     What interventions were attempted to avoid use of these medication (including other PRN medications)? Olanzapine and hydroxyzine administered previous in shift with no apparent effect. Staff and writer attempting to reinforce appropriate behavior, ignore inappropriate behaviors, and offer distraction.     What were the patient's response(s) to the interventions?   Previous medication had no apparent effect. Verbal redirection generally escalated pt further.     Provider notified: 4/11/2024       Date: 04/11/24        Time: 1441     Gordon Agosto RN

## 2024-04-11 NOTE — PLAN OF CARE
"\"I don't care when my court dates are: that's what my PO is for.\"  \"I need a high protein diet: look at me.\"  \"I need podiatry:see those callouses?\"  \"I need a lactose free diet; milk makes shit.\"  \"I want to see a dietitian.\"  \"[place], [place].\"- yelling repeatedly.  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 Ensure and Boost.\" Eating many extra snacks, mostly carbohydrates. (And BMI >32.)  \"I want to have breakfast with my son. My mother is preventing me from having contact for no reason.\"  \"I'm not going to sleep unless you tuck me in.- Okay,I'm not going to sleep.\"    Hyperverbal with flight of ideas, casual use of profanities. Expansive, labile mood . Attention seeking, especially with female staff, often childlike. Some crowing, singing, yelling, some talking. Requestful. Hyperreactivity and increased intensity.  Childlike manner with attention seeking behaviours. Decreased attention.  Remains intrusive. Making up games. Appears calmer with decreased stimuli.      ECT consult, CD consult pending    Wants dietary consult for his dietary preferences. Wants podiatry consult  for his callouses.    Received prns of Zyprexa 10 mg at 18:13, then one time 100 mg Hydroxyzine at 21:57.     Appearance: appears vain but disheveled  Body Language:some strutting, occasional restlessness  Attitude:manipulative  Mood: expansive  Affect: labile, hyper reactive, increased intensity  Thought Process: flight of ideas  Thought content:  self absorbed  Perceptions: grandiosity  Suicidal/homicidal:denies/denies  Knowledge,Insight,Judgement: intact/impaired/impaired  Attention/Concentration:impaired/fair  Memory:Recent-impaired                Remote-intact  Speech:at times " pressured, loud  Cognition: oriented x 4  Psychomotor: some restlessness  Sleep/Activity level: insomnia  Motivation: to be determined     Plan: Monitor and document mood and behaviour, thought process and content. Establish and maintain therapeutic relationship. Educate about diagnoses, medications, treatment, legal status, plan of care. Address preexisting and concurrent medical concerns.      P:Unstable mood, anti social behaviour  G:Stable mood, social accomodation  0:not progressing

## 2024-04-12 PROCEDURE — 250N000013 HC RX MED GY IP 250 OP 250 PS 637: Performed by: EMERGENCY MEDICINE

## 2024-04-12 PROCEDURE — 124N000002 HC R&B MH UMMC

## 2024-04-12 PROCEDURE — 250N000013 HC RX MED GY IP 250 OP 250 PS 637: Performed by: PSYCHIATRY & NEUROLOGY

## 2024-04-12 PROCEDURE — 250N000013 HC RX MED GY IP 250 OP 250 PS 637: Performed by: FAMILY MEDICINE

## 2024-04-12 PROCEDURE — 250N000013 HC RX MED GY IP 250 OP 250 PS 637

## 2024-04-12 PROCEDURE — 99232 SBSQ HOSP IP/OBS MODERATE 35: CPT | Performed by: CLINICAL NURSE SPECIALIST

## 2024-04-12 PROCEDURE — 250N000013 HC RX MED GY IP 250 OP 250 PS 637: Performed by: CLINICAL NURSE SPECIALIST

## 2024-04-12 RX ORDER — DIVALPROEX SODIUM 500 MG/1
500 TABLET, EXTENDED RELEASE ORAL DAILY
Status: DISCONTINUED | OUTPATIENT
Start: 2024-04-13 | End: 2024-05-14 | Stop reason: HOSPADM

## 2024-04-12 RX ORDER — CLOZAPINE 200 MG/1
200 TABLET ORAL AT BEDTIME
Status: DISCONTINUED | OUTPATIENT
Start: 2024-04-12 | End: 2024-05-14 | Stop reason: HOSPADM

## 2024-04-12 RX ORDER — DIVALPROEX SODIUM 500 MG/1
1000 TABLET, EXTENDED RELEASE ORAL AT BEDTIME
Status: DISCONTINUED | OUTPATIENT
Start: 2024-04-12 | End: 2024-05-14 | Stop reason: HOSPADM

## 2024-04-12 RX ORDER — DIVALPROEX SODIUM 500 MG/1
1000 TABLET, EXTENDED RELEASE ORAL 2 TIMES DAILY
Status: DISCONTINUED | OUTPATIENT
Start: 2024-04-12 | End: 2024-04-12

## 2024-04-12 RX ADMIN — DIVALPROEX SODIUM 1000 MG: 500 TABLET, FILM COATED, EXTENDED RELEASE ORAL at 21:43

## 2024-04-12 RX ADMIN — NICOTINE 1 PATCH: 21 PATCH, EXTENDED RELEASE TRANSDERMAL at 07:42

## 2024-04-12 RX ADMIN — DIVALPROEX SODIUM 500 MG: 250 TABLET, FILM COATED, EXTENDED RELEASE ORAL at 07:06

## 2024-04-12 RX ADMIN — CLOZAPINE 50 MG: 50 TABLET ORAL at 07:06

## 2024-04-12 RX ADMIN — HYDROCHLOROTHIAZIDE 25 MG: 25 TABLET ORAL at 07:42

## 2024-04-12 RX ADMIN — ATORVASTATIN CALCIUM 40 MG: 40 TABLET, FILM COATED ORAL at 07:42

## 2024-04-12 RX ADMIN — Medication 900 MG: at 19:02

## 2024-04-12 RX ADMIN — DIPHENHYDRAMINE HYDROCHLORIDE 50 MG: 50 CAPSULE ORAL at 10:39

## 2024-04-12 RX ADMIN — CLOZAPINE 200 MG: 200 TABLET ORAL at 19:02

## 2024-04-12 RX ADMIN — OLANZAPINE 10 MG: 10 TABLET, FILM COATED ORAL at 07:42

## 2024-04-12 RX ADMIN — NICOTINE POLACRILEX 2 MG: 2 LOZENGE ORAL at 20:14

## 2024-04-12 RX ADMIN — GABAPENTIN 600 MG: 600 TABLET, FILM COATED ORAL at 07:07

## 2024-04-12 RX ADMIN — LORAZEPAM 2 MG: 2 TABLET ORAL at 10:39

## 2024-04-12 RX ADMIN — ACETAMINOPHEN 650 MG: 325 TABLET, FILM COATED ORAL at 17:29

## 2024-04-12 RX ADMIN — HALOPERIDOL 5 MG: 5 TABLET ORAL at 10:40

## 2024-04-12 RX ADMIN — Medication 1 LOZENGE: at 00:58

## 2024-04-12 RX ADMIN — Medication 1 LOZENGE: at 20:14

## 2024-04-12 RX ADMIN — IBUPROFEN 400 MG: 200 TABLET, FILM COATED ORAL at 00:30

## 2024-04-12 ASSESSMENT — ACTIVITIES OF DAILY LIVING (ADL)
ADLS_ACUITY_SCORE: 28
ADLS_ACUITY_SCORE: 38
ADLS_ACUITY_SCORE: 28
ADLS_ACUITY_SCORE: 28
ORAL_HYGIENE: INDEPENDENT
ADLS_ACUITY_SCORE: 28
ADLS_ACUITY_SCORE: 38
ORAL_HYGIENE: INDEPENDENT
LAUNDRY: UNABLE TO COMPLETE
ADLS_ACUITY_SCORE: 28
ADLS_ACUITY_SCORE: 38
ADLS_ACUITY_SCORE: 38
HYGIENE/GROOMING: PROMPTS;INDEPENDENT
ADLS_ACUITY_SCORE: 28
ADLS_ACUITY_SCORE: 28
ADLS_ACUITY_SCORE: 38
ADLS_ACUITY_SCORE: 28
ADLS_ACUITY_SCORE: 28
HYGIENE/GROOMING: PROMPTS;INDEPENDENT
ADLS_ACUITY_SCORE: 38
DRESS: INDEPENDENT
DRESS: INDEPENDENT
ADLS_ACUITY_SCORE: 38
ADLS_ACUITY_SCORE: 38
ADLS_ACUITY_SCORE: 28

## 2024-04-12 NOTE — PROGRESS NOTES
"Pt. was unable to keep an appropriate distance from staff. Staff has asked and explained in detail that there needs to be an appropriate distance kept at all times from staff and peers. Writer observed pt. making racist statements towards another staff. Writer attempted to redirect pt. From those behaviors and explain in detail that \"we are humans also. We are here to keep you safe and comfortable. We come to work to help pt.not to be verbally abused.\" Pt. Then proceeded to mock the writer. Pt. Stated \"I dont give a fuck, This is my unit. Fuck you.\" Pt. Then proceeded to  a unit ball and threaten staff. Pt. Screamed \" Want to play dodge ball? Want to play fucking dodge ball pussies\" . Staff stated: \"please don't throw that ball at us.\" Pt. Threw the ball in the general direction of staff at full speed. Did not make contact . Will continue to observe.   "

## 2024-04-12 NOTE — PROGRESS NOTES
Combination PRN Medication Administration    Medications Administered: Benadryl+Haldol+Ativan combination    What patient symptom(s) led to the administration of these medications?  Agitation and aggression- yelling profanities, insults and making threatening statements toward staff. Pounding on the nursing station windows. Threw a ball toward staff and pushed a chair toward staff. Pt is become increasingly dysregulated.    What interventions were attempted to avoid use of these medication (including other PRN medications)?   PRN Olanzapine given at 0742 with no apparent effect. Staff attempts to verbally redirect and deescalate. Distraction activities offered.  Offered positive reinforcement for pro-social behavior.       What were the patient's response(s) to the interventions?   Verbal redirection appears to escalate his behavior further. He is resistive to distraction techniques.     Provider notified: Phan Sanchez NP       Date: 04/12/24        Time: 1050    Raquel Hicks RN

## 2024-04-12 NOTE — PLAN OF CARE
"Team Note Due:  Friday    Assessment/Intervention/Current Symtoms and Care Coordination:  Chart review and met with team, discussed pt progress, symptomology, and response to treatment.  Discussed the discharge plan and any potential impediments to discharge.    Introduced self to Ward \"Scoobie.\" He signed consent form for [facility].     Writer called [facility], verified that his belongings are there.    Called his ACT , Heidy Pop, to discuss picking up his belongings. She is out of the office until 4/15.     Called his ACT , Beata, to discuss picking up his belongings. No answer, left voicemail.     Updated Scoobie about belongings being held for his  around 1:30 PM. He presented with less manic symptoms at this time and was able to discuss community resources with writer.      Discharge Plan or Goal:  When patient's symptoms have reduced and a safe discharge can be facilitated, options include: Residential treatment, IRTS, shelter      Barriers to Discharge:  Ward presents with symptoms of evens that put him at risk of an unsafe discharge      Referral Status:  Referral needs pending as patient stabilizes      Legal Status:  Voluntary    Contacts:  [facility]: [phone]   ACT : Heidy Pop, [phone]  ACT : Beata, [phone]  ACT Psychiatrist: Camron Watson MD, [phone]     Upcoming Meetings and Dates/Important Information and next steps:  Symptom stabilization     "

## 2024-04-12 NOTE — PLAN OF CARE
BEH IP Unit Acuity Rating Score (UARS)  Patient is given one point for every criteria they meet.    CRITERIA SCORING   On a 72 hour hold, court hold, committed, stay of commitment, or revocation. 0    Patient LOS on BEH unit exceeds 20 days. 0  LOS: 4   Patient under guardianship, 55+, otherwise medically complex, or under age 11. 0   Suicide ideation without relief of precipitating factors. 0   Current plan for suicide. 0   Current plan for homicide. 0   Imminent risk or actual attempt to seriously harm another without relief of factors precipitating the attempt. 1   Severe dysfunction in daily living (ex: complete neglect for self care, extreme disruption in vegetative function, extreme deterioration in social interactions). 1   Recent (last 7 days) or current physical aggression in the ED or on unit. 1   Restraints or seclusion episode in past 72 hours. 0   Recent (last 7 days) or current verbal aggression, agitation, yelling, etc., while in the ED or unit. 1   Active psychosis. 0   Need for constant or near constant redirection (from leaving, from others, etc).  1   Intrusive or disruptive behaviors. 1   Patient requires 3 or more hours of individualized nursing care per 8-hour shift (i.e. for ADLs, meds, therapeutic interventions). 1   TOTAL 7

## 2024-04-12 NOTE — PROGRESS NOTES
Approximately 0720 AM, Writer heard pt. Screaming at the top of his lungs towards the staff in the lounge. Writer then entered the lounge in an attempt to redirect those behaviors. During this interaction the pt. Attempted to Close the distance between Writer and pt. Writer used his Applied proxemics in an attempt to manage a safe distance. Writer attempted a persuasion sequence with the pt. Which failed due to the fact the S.T. was screaming over any attempt at explaining the expectation for safety and appropriate behaviors. Pt. Made racist statements towards a unit staff. Staff redirected the pt. Away from the abuse. Pt. Did not acknowledge the redirection. Pt. Is not able to maintain safe and appropriate distance from staff.  Will continue to observe.

## 2024-04-12 NOTE — PROGRESS NOTES
"The patient's care was discussed with the treatment team during the daily team meeting and/or staff's chart notes were reviewed. Staff report slept 4.75 hours. Evening staff report: patient was joking, crowing, singing, laughing, talking, playing. Continues with elevated expansive mood, congruent affect. No insults, threats, or aggression. Remains intrusive, hyperreactive, at times loud, hyperverbal. Continuing efforts to remain the center of attention. Continuing casual use of profanity. Difficulty maintaining attention, focus, boundaries. Calmer with decreased stimuli. Staff report that patient was not able to maintain or respect boundary, yelling and verbally abusive to staff, making racist statement toward staff mocking and using 'N' words and throwing ball to hit staff. Patient was screaming and attempted to move closer to staff not respecting boundaries and not redirectable. Staff report patient was pounding on the nursing station windows, demonstrated physical and verbal aggression, yelling profanities, insult and making threatening statements toward staff, hence was given an emergency medications to calm him down.      Upon interview, patient was interviewed in pod 2 lounge, patient appears restless and very distractible, patient was talkative, with pressured speech, disorganized, he was without a hospital scrub (only the pants on) prior to this writer's arrival, then he picked his clothes and wore it. Patient had lots of requests, asking for his tirado watch, cardio-exercise machine, His cell Phone for school and music, he promised that \"I am not going to use it for illegal thing you know?\" Then patient states \"I have just signed an WAN for my IRTS to get me my things.\" How long is it going to take the  to get my things here? \"I request for the  phone number but they didn't give me.\" Patient was reassured that staff are working with his IRTS to get his things but it is difficult to say how " "long it was going to take the  to deliver them.  It takes an effort to redirect patient back to the assessment as he continues to be talkative with flight of ideas.   Patient reports his mood as \"just ok while still talking, patient digressed, saying \"I love your outfit Srikanth Shant, you look sharp.\" Patient was very disruptive prior to this assessment, he was pounding hard on the pod 1 & 2 door, nurses station window and yelling out profanities that nursing staff had to medicate him with emergency medication haloperidol 5 mg, lorazepam 2 mg and  diphenhydramine 50 mg  with good effect. Patient informs this writer, \"I want ativan, it works for my anxiety, that's what they gave me in the ED.\" Writer reminded patient that ativan was among the emergency medication that was given him this morning. Patient acknowledged that the medication is calming him down saying \"my anxiety is 6/10 is no longer 10 as it was before.\"  While making request for his phone to be connected with the TV for music, patient started arguing and verbally abusive to the PA's challenging them that they don't know nothing, \"I will connect it myself.\" Patient reports that he slept well and felt well rested, insisted this writer follow him to his room, shows a bible and a quran, unlike the day people, see the overnight staff did a good job, they gave me these when I asked for it. He was trying to remember the names but could not, until writer mentioned the names of the night people and then said, yes, he is a very good man. Patient was encouraged to eat his lunch before it gets cold.   Patient denies A/VH, SI/HI, or thoughts of self-harm.\"      Suicidal ideation: denies current or recent suicidal ideation or behaviors.     Homicidal ideation: denies current or recent homicidal ideation or behaviors.     Psychotic symptoms:  Patient denies AH, VH, paranoia, delusions.      Medication side effects reported: No significant side effects and Not " Applicable.     Acute medical concerns: none     Other issues reported by patient:  Patient had no further questions or concerns.            Medications:      Inpatient Administered Meds                     Current Facility-Administered Medications   Medication Dose Route Frequency Provider Last Rate Last Admin    atorvastatin (LIPITOR) tablet 40 mg  40 mg Oral Daily Jaswinder Claros MD   40 mg at 04/10/24 0818    cloZAPine (CLOZARIL) tablet 100 mg  100 mg Oral At Bedtime Jaswinder Claros MD   100 mg at 04/09/24 1848    cloZAPine (CLOZARIL) tablet 50 mg  50 mg Oral Daily Jaswinder Claros MD   50 mg at 04/10/24 0818    divalproex sodium extended-release (DEPAKOTE ER) 24 hr tablet 500 mg  500 mg Oral BID Jaswinder Claros MD   500 mg at 04/10/24 0818    gabapentin (NEURONTIN) tablet 600 mg  600 mg Oral BID Jaswinder Claros MD   600 mg at 04/10/24 0818    hydrochlorothiazide (HYDRODIURIL) tablet 25 mg  25 mg Oral Daily Jaswinder Claros MD   25 mg at 04/10/24 0818    nicotine (NICODERM CQ) 21 MG/24HR 24 hr patch 1 patch  1 patch Transdermal Daily Xavi Medeiros MD   1 patch at 04/10/24 0821              Allergies:      Allergies             Allergies   Allergen Reactions    Haloperidol Confusion and Other (See Comments)       Prone to EPSE. COnsider IM Zyprexa or be sure to give with benadryl              Labs:      Recent Results             Recent Results (from the past 24 hour(s))   WBC and Differential     Collection Time: 04/10/24 10:59 AM   Result Value Ref Range     WBC Count 5.7 4.0 - 11.0 10e3/uL     % Neutrophils 51 %     % Lymphocytes 37 %     % Monocytes 9 %     % Eosinophils 2 %     % Basophils 1 %     % Immature Granulocytes 0 %     NRBCs per 100 WBC 0 <1 /100     Absolute Neutrophils 3.0 1.6 - 8.3 10e3/uL     Absolute Lymphocytes 2.1 0.8 - 5.3 10e3/uL     Absolute Monocytes 0.5 0.0 - 1.3 10e3/uL     Absolute Eosinophils 0.1 0.0 - 0.7 10e3/uL      Absolute Basophils 0.0 0.0 - 0.2 10e3/uL     Absolute Immature Granulocytes 0.0 <=0.4 10e3/uL     Absolute NRBCs 0.0 10e3/uL              Psychiatric Examination:      /84 (BP Location: Left arm, Patient Position: Sitting, Cuff Size: Adult Regular)   Pulse 94   Temp 97.8  F (36.6  C) (Oral)   Resp 12   Ht 1.829 m (6')   Wt 107.1 kg (236 lb 1.6 oz)   SpO2 98%   BMI 32.02 kg/m    Weight is 236 lbs 1.6 oz  Body mass index is 32.02 kg/m .     Weight over time:          Vitals:     04/03/24 1151 04/09/24 0826   Weight: 108 kg (238 lb) 107.1 kg (236 lb 1.6 oz)         Orthostatic Vitals           Most Recent       Sitting Orthostatic /85 04/09 0826     Sitting Orthostatic Pulse (bpm) 73 04/09 0826     Standing Orthostatic /92 04/09 0826     Standing Orthostatic Pulse (bpm) 69 04/09 0826                   Cardiometabolic risk assessment. 04/10/24        Reviewed patient profile for cardiometabolic risk factors     Date taken /Value  REFERENCE RANGE   Abdominal Obesity  (Waist Circumference)   See nursing flowsheet Women ?35 in (88 cm)   Men ?40 in (102 cm)       Triglycerides                Triglycerides   Date Value Ref Range Status   02/26/2013 71 0 - 150 mg/dL Final       Comment:       Fasting specimen         ?150 mg/dL (1.7 mmol/L) or current treatment for elevated triglycerides   HDL cholesterol            HDL Cholesterol   Date Value Ref Range Status   02/26/2013 45 40 - 110 mg/dL Final   ]    Women <50 mg/dL (1.3 mmol/L) in women or current treatment for low HDL cholesterol  Men <40 mg/dL (1 mmol/L) in men or current treatment for low HDL cholesterol      Fasting plasma glucose (FPG)           Lab Results   Component Value Date      04/08/2024     GLC 83 02/26/2013        FPG ?100 mg/dL (5.6 mmol/L) or treatment for elevated blood glucose   Blood pressure        BP Readings from Last 3 Encounters:   04/08/24 127/84   02/26/13 129/74    Blood pressure ?130/85 mmHg or treatment for  elevated blood pressure   Family History  See family history      Mental Status Exam:  Appearance: awake, alert, dressed in hospital scrubs, and appeared as age stated  Attitude:  cooperative, manipulative  Eye Contact:  good  Mood:  Expansive  Affect:  Labile, intensity is heightened, and intensity is dramatic  Speech:  pressured speech and rambling  Language: fluent and intact in English  Psychomotor, Gait, Musculoskeletal:  no evidence of tardive dyskinesia, dystonia, or tics  Throught Process:  disorganized, illogical, tangential, flight of ideas.  Associations:  Loosening of associations  Thought Content:  no evidence of suicidal ideation or homicidal ideation, no auditory hallucinations present, and no visual hallucinations present  Insight: Poor  Judgement: Limited  Oriented to:  time, person, and place  Attention Span and Concentration: Easily distracted  Recent and Remote Memory:  fair  Fund of Knowledge:  low-normal             Precautions:                Behavioral Orders   Procedures    Assault precautions    Code 1 - Restrict to Unit    Elopement precautions    AS Extended Care       Until discharge, Extended Care to offer psychotherapeutic services to mental health patients boarding for admission or stabilization. These services are to include but are not limited to: individual psychotherapy, diagnostic assessment, case management and care planning, safety planning, etc. This may include up to 1 visit per day. If patient is physically located at [facility] or [facility], group psychotherapy up to 2 time per day may be offered.     AS Extended Care       Until discharge, Extended Care to offer psychotherapeutic services to mental health patients boarding for admission or stabilization. These services are to include but are not limited to: individual psychotherapy, diagnostic assessment, case management and care planning, safety planning, etc. This may include up to 1 visit per day. If patient is physically  located at [facility] or [facility], group psychotherapy up to 2 time per day may be offered.     Routine Programming       As clinically indicated    Sexual precautions    Status 15       Every 15 minutes.    Status Individual Observation       Patient SIO status reviewed with team/RN.  Please also refer to RN/team documentation for add'l detail.     -2:1 SIO staff to monitor following which have contributed to patient being on SIO: at least 1 male staff     Assault risk     -Possible interventions SIO staff could use to support patient's treatment progress:  Verbal de-escalation/ Medication administration     -When following observed, team will review discontinuation of SIO:     When patient is not longer aggressive, sexually inappropriate or intrusive.       Order Specific Question:   CONTINUOUS 24 hours / day       Answer:   Other       Order Specific Question:   Specify distance       Answer:   10 foot       Order Specific Question:   Indications for SIO       Answer:   Assault risk       Order Specific Question:   Indications for SIO       Answer:   Severe intrusiveness    Suicide precautions: Suicide Risk: HIGH; Clinical rationale to override score: response to medication       Patients on Suicide Precautions should have a Combination Diet ordered that includes a Diet selection(s) AND a Behavioral Tray selection for Safe Tray - with utensils, or Safe Tray - NO utensils          Order Specific Question:   Suicide Risk       Answer:   HIGH       Order Specific Question:   Clinical rationale to override score:       Answer:   response to medication           Diagnoses:         Schizoaffective disorder, bipolar type (H)  Polysubstance abuse (H)  Agitation     Clinically Significant Risk Factors                          # Obesity: Estimated body mass index is 32.02 kg/m  as calculated from the following:    Height as of this encounter: 1.829 m (6').    Weight as of this encounter: 107.1 kg (236 lb 1.6 oz)., PRESENT ON  ADMISSION     # Financial/Environmental Concerns: other (see comments) (lost IRTS housing)     # Housing Instability: noted in nursing assessment          Assessment & Plan:      Assessment and hospital summary:  Ward Dawson is a -34- old male with a previous diagnosis of Schizoaffective disorder bipolar type who presented to the ED for a significant behavioral change, verbal agitation, worsening psychosocial stress, in the context of substance use.  Factors that make the mental health crisis life threatening or complex are:  Patient was brought to the ED from \[facility]\"" following his ECT treatment this morning at [facility] [place] [facility].  Patient states he does not know why he is here and also states he knows why he is here.  He presents as manic, disorganized, and confused.   He is distractable and not a clear historian at this time.  Patient states he has not slept in a long time and received narcan last night.  Per ACT team  and [facility] IRT[facility] staff patient has been elevated, intrusive, and disruptive.  Patient broke a window yesterday.  IRTS reports patient had turned table over and tried using them as skate board ramps.  Patient was noted for have been fairly stable prior to his pass this weekend.  CM reports patient went to a skChartbeating competition in [place].  Patient returned in a manic state.  CM reports pt may not appear as manic as he has been the past few days due to sedation and ECT this morning.  She reports patient has not slept in 3 days.  IRTS reports pt received narcan twice last night.  Patient has been trespassed from the IRTS facility.  S is postive for cannabis..         Today's Changes:4/12/24  Clozapine 200 mg at bedtime  Gabapentin to 900 mg BID  Depakote to 1000 mg at bedtime starting today     Target psychiatric symptoms and interventions:  Admitted on 4/9/24 to station 12N  Clozaril, 50 mg every morning and 200 mg at bedtime  --Depakote ER, 500 mg daily and 1000 mg at bedtime for mood  stabilization   --Gabapentin, 900 mg twice a day for pain  - Ibuprofen tablet 400 mg orally every 6 hours prn for pain  --Additional medications will include B52, Zyprexa, trazodone, hydroxyzine     Risks, benefits, and alternatives discussed at length with patient.      Acute Medical Problems and Treatments:  Acute medical concerns:  - No acute medical concerns     Pertinent labs/imaging:  Recent Results             Recent Results (from the past 168 hour(s))   Comprehensive metabolic panel     Collection Time: 04/03/24  2:16 PM   Result Value Ref Range     Sodium 138 135 - 145 mmol/L     Potassium 4.2 3.4 - 5.3 mmol/L     Carbon Dioxide (CO2) 28 22 - 29 mmol/L     Anion Gap 11 7 - 15 mmol/L     Urea Nitrogen 24.5 (H) 6.0 - 20.0 mg/dL     Creatinine 1.18 (H) 0.67 - 1.17 mg/dL     GFR Estimate 84 >60 mL/min/1.73m2     Calcium 9.8 8.6 - 10.0 mg/dL     Chloride 99 98 - 107 mmol/L     Glucose 91 70 - 99 mg/dL     Alkaline Phosphatase 74 40 - 150 U/L      (H) 0 - 45 U/L     ALT 34 0 - 70 U/L     Protein Total 7.8 6.4 - 8.3 g/dL     Albumin 5.0 3.5 - 5.2 g/dL     Bilirubin Total 1.5 (H) <=1.2 mg/dL   CBC with platelets and differential     Collection Time: 04/03/24  2:16 PM   Result Value Ref Range     WBC Count 11.1 (H) 4.0 - 11.0 10e3/uL     RBC Count 4.94 4.40 - 5.90 10e6/uL     Hemoglobin 13.5 13.3 - 17.7 g/dL     Hematocrit 42.7 40.0 - 53.0 %     MCV 86 78 - 100 fL     MCH 27.3 26.5 - 33.0 pg     MCHC 31.6 31.5 - 36.5 g/dL     RDW 13.4 10.0 - 15.0 %     Platelet Count 213 150 - 450 10e3/uL     % Neutrophils 63 %     % Lymphocytes 26 %     % Monocytes 9 %     % Eosinophils 1 %     % Basophils 1 %     % Immature Granulocytes 0 %     NRBCs per 100 WBC 0 <1 /100     Absolute Neutrophils 7.0 1.6 - 8.3 10e3/uL     Absolute Lymphocytes 2.9 0.8 - 5.3 10e3/uL     Absolute Monocytes 0.9 0.0 - 1.3 10e3/uL     Absolute Eosinophils 0.1 0.0 - 0.7 10e3/uL     Absolute Basophils 0.1 0.0 - 0.2 10e3/uL     Absolute Immature  Granulocytes 0.0 <=0.4 10e3/uL     Absolute NRBCs 0.0 10e3/uL   Valproic acid     Collection Time: 04/05/24  9:39 PM   Result Value Ref Range     Valproic acid 68.4   ug/mL   Asymptomatic COVID-19 Virus (Coronavirus) by PCR Nasopharyngeal     Collection Time: 04/05/24  9:40 PM     Specimen: Nasopharyngeal; Swab   Result Value Ref Range     SARS CoV2 PCR Negative Negative   Glucose by meter     Collection Time: 04/08/24  8:21 PM   Result Value Ref Range     GLUCOSE BY METER POCT 117 (H) 70 - 99 mg/dL   WBC and Differential     Collection Time: 04/10/24 10:59 AM   Result Value Ref Range     WBC Count 5.7 4.0 - 11.0 10e3/uL     % Neutrophils 51 %     % Lymphocytes 37 %     % Monocytes 9 %     % Eosinophils 2 %     % Basophils 1 %     % Immature Granulocytes 0 %     NRBCs per 100 WBC 0 <1 /100     Absolute Neutrophils 3.0 1.6 - 8.3 10e3/uL     Absolute Lymphocytes 2.1 0.8 - 5.3 10e3/uL     Absolute Monocytes 0.5 0.0 - 1.3 10e3/uL     Absolute Eosinophils 0.1 0.0 - 0.7 10e3/uL     Absolute Basophils 0.0 0.0 - 0.2 10e3/uL     Absolute Immature Granulocytes 0.0 <=0.4 10e3/uL     Absolute NRBCs 0.0 10e3/uL               Behavioral/Psychological/Social:  - Encourage unit programming     Safety  Placed on the following Precautions: Suicide, Assault, Elopement and Sexual precautions  - Continue precautions as noted above  -  2:1 staff acuity for intrusive, assaultive behaviors  - Status 15 minute checks  - Legal Status: voluntary     Disposition Plan   Reason for ongoing admission: poses an imminent risk to self and poses an imminent risk to others  Discharge location: IRTS facility or CD  Discharge Medications: not ordered  Follow-up Appointments: not scheduled     Entered by: AMY Yu CNP on 04/11/2024  at 11:48 AM         Attestation:   Patient has been seen and evaluated by Phan gill, MICK, APRN CNP, PMHNP-BC  The patient was counseled on nature of illness and treatment plan/options  Care was  coordinated with treatment team  Total time > 30 minutes

## 2024-04-12 NOTE — PLAN OF CARE
Problem: Adult Inpatient Plan of Care  Goal: Absence of Hospital-Acquired Illness or Injury  Outcome: Progressing   Goal Outcome Evaluation:    Even ing nurse administered several prn medication at the change of shift (evening to night shift)  Pt was manic at the beginning of the shift yelling in the garcia and pounding on the nurses station window.  The prn medication did not start working until 0145 when the pt fell a sleep.  He slept for the rest of the shift.  He slept for a total of 4.75 hours.  He remains on pod 2 with 2 acuity staff for safety.

## 2024-04-12 NOTE — PLAN OF CARE
"\"How're you doin'?\"  \"You know they still didn't send the ensure? 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 I want all of them.\"    Joking, crowing, singing, laughing, talking, playing. Continues with elevated expansive mood, congruent affect. No insults, threats, or aggression. Remains intrusive, hyperreactive, at times loud, hyperverbal. Continuing efforts to remain the center of attention. Continuing casual use of profanity. Difficulty maintaining attention, focus, boundaries. Calmer with decreased stimuli.    ECT: Needs hospitalist clearance, MOCA, (EKG and repeat of laboratory working within 30 days) to resume maintenance ECT, next due 4/17/24.    CD assessment rescheduled to 4/15/24.       ECT consult, CD consult pending     Wants dietary consult for his dietary preferences. Wants podiatry consult  for his callouses.     Appearance: appears vain but disheveled  Body Language:some strutting, occasional restlessness, comfortable  Attitude:manipulative, attention seeking  Mood: expansive  Affect: labile, hyper reactive, increased intensity  Thought Process: flight of ideas  Thought content:  self absorbed  Perceptions: grandiosity  Suicidal/homicidal:denies/denies  Knowledge,Insight,Judgement: intact/impaired/impaired  Attention/Concentration:impaired/fair  Memory:Recent-impaired                Remote-intact  Speech:at times pressured, loud  Cognition: oriented x 4  Psychomotor: some restlessness  Sleep/Activity level: insomnia  Motivation: decreased      Plan: Monitor and document mood and behaviour, thought process and content. Establish and maintain therapeutic relationship. Educate about diagnoses, medications, treatment, legal status, plan of care. Address preexisting and concurrent medical " concerns.      P:Unstable mood, anti social behaviour  G:Stable mood, social accomodation  0: progressing
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
## 2024-04-13 PROCEDURE — 250N000013 HC RX MED GY IP 250 OP 250 PS 637: Performed by: PSYCHIATRY & NEUROLOGY

## 2024-04-13 PROCEDURE — 250N000013 HC RX MED GY IP 250 OP 250 PS 637: Performed by: EMERGENCY MEDICINE

## 2024-04-13 PROCEDURE — 250N000013 HC RX MED GY IP 250 OP 250 PS 637: Performed by: FAMILY MEDICINE

## 2024-04-13 PROCEDURE — 250N000013 HC RX MED GY IP 250 OP 250 PS 637: Performed by: CLINICAL NURSE SPECIALIST

## 2024-04-13 PROCEDURE — 124N000002 HC R&B MH UMMC

## 2024-04-13 RX ADMIN — CLOZAPINE 200 MG: 200 TABLET ORAL at 20:02

## 2024-04-13 RX ADMIN — CLOZAPINE 50 MG: 50 TABLET ORAL at 07:34

## 2024-04-13 RX ADMIN — NICOTINE POLACRILEX 2 MG: 2 LOZENGE ORAL at 09:22

## 2024-04-13 RX ADMIN — Medication 1 LOZENGE: at 11:20

## 2024-04-13 RX ADMIN — HYDROXYZINE HYDROCHLORIDE 100 MG: 50 TABLET, FILM COATED ORAL at 09:12

## 2024-04-13 RX ADMIN — NICOTINE 1 PATCH: 21 PATCH, EXTENDED RELEASE TRANSDERMAL at 07:42

## 2024-04-13 RX ADMIN — OLANZAPINE 10 MG: 10 TABLET, FILM COATED ORAL at 03:05

## 2024-04-13 RX ADMIN — Medication 900 MG: at 07:34

## 2024-04-13 RX ADMIN — Medication 5 MG: at 23:56

## 2024-04-13 RX ADMIN — TRAZODONE HYDROCHLORIDE 50 MG: 50 TABLET ORAL at 23:56

## 2024-04-13 RX ADMIN — Medication 900 MG: at 20:02

## 2024-04-13 RX ADMIN — OLANZAPINE 10 MG: 10 TABLET, FILM COATED ORAL at 21:10

## 2024-04-13 RX ADMIN — Medication 1 LOZENGE: at 03:28

## 2024-04-13 RX ADMIN — Medication 1 LOZENGE: at 09:21

## 2024-04-13 RX ADMIN — ACETAMINOPHEN 650 MG: 325 TABLET, FILM COATED ORAL at 17:15

## 2024-04-13 RX ADMIN — ACETAMINOPHEN 650 MG: 325 TABLET, FILM COATED ORAL at 03:05

## 2024-04-13 RX ADMIN — NICOTINE POLACRILEX 2 MG: 2 LOZENGE ORAL at 14:26

## 2024-04-13 RX ADMIN — DIVALPROEX SODIUM 1000 MG: 500 TABLET, FILM COATED, EXTENDED RELEASE ORAL at 20:02

## 2024-04-13 RX ADMIN — HYDROCHLOROTHIAZIDE 25 MG: 25 TABLET ORAL at 07:34

## 2024-04-13 RX ADMIN — ATORVASTATIN CALCIUM 40 MG: 40 TABLET, FILM COATED ORAL at 07:34

## 2024-04-13 RX ADMIN — GUAIFENESIN 100 MG: 100 SOLUTION ORAL at 19:35

## 2024-04-13 RX ADMIN — GUAIFENESIN 100 MG: 100 SOLUTION ORAL at 11:20

## 2024-04-13 RX ADMIN — Medication 1 LOZENGE: at 17:15

## 2024-04-13 RX ADMIN — DIVALPROEX SODIUM 500 MG: 500 TABLET, FILM COATED, EXTENDED RELEASE ORAL at 07:34

## 2024-04-13 RX ADMIN — HYDROXYZINE HYDROCHLORIDE 100 MG: 50 TABLET, FILM COATED ORAL at 03:05

## 2024-04-13 RX ADMIN — ALBUTEROL SULFATE 2 PUFF: 90 AEROSOL, METERED RESPIRATORY (INHALATION) at 11:25

## 2024-04-13 RX ADMIN — IBUPROFEN 400 MG: 200 TABLET, FILM COATED ORAL at 12:22

## 2024-04-13 RX ADMIN — Medication 1 LOZENGE: at 20:33

## 2024-04-13 RX ADMIN — ACETAMINOPHEN 650 MG: 325 TABLET, FILM COATED ORAL at 08:47

## 2024-04-13 ASSESSMENT — ACTIVITIES OF DAILY LIVING (ADL)
ADLS_ACUITY_SCORE: 28
ADLS_ACUITY_SCORE: 38
ADLS_ACUITY_SCORE: 28
ADLS_ACUITY_SCORE: 38
ADLS_ACUITY_SCORE: 28
ADLS_ACUITY_SCORE: 38
HYGIENE/GROOMING: INDEPENDENT
ADLS_ACUITY_SCORE: 28
HYGIENE/GROOMING: INDEPENDENT
ADLS_ACUITY_SCORE: 28
DRESS: INDEPENDENT
ADLS_ACUITY_SCORE: 28
ORAL_HYGIENE: INDEPENDENT
DRESS: INDEPENDENT
ADLS_ACUITY_SCORE: 28
ADLS_ACUITY_SCORE: 28
ADLS_ACUITY_SCORE: 38
ADLS_ACUITY_SCORE: 28
ORAL_HYGIENE: INDEPENDENT
ADLS_ACUITY_SCORE: 38
ADLS_ACUITY_SCORE: 28
ADLS_ACUITY_SCORE: 38

## 2024-04-13 NOTE — PLAN OF CARE
RN Assessment:    Pt presented with labile and animated affect. Pt appeared anxious and pt was hyperactive. Pt was alert and oriented x 4. Pt denied having SI, HI, thoughts of SIB, and hallucinations. Pt endorsed having moderate lower back and foot pain. Pt given PRN medications for pain. Pt had no new medical concerns. Pt did not sleep well last night. Pt feels the medications that are currently ordered are working well. No medication side effects endorsed by pt or observed by writer. Pt was present in the milieu. Continue to monitor for safety and changes in medical condition.       PRN Medications passed this shift:    0847: Acetaminophen 650 mg PO for lower back pain. This medication was effective per pt report.    0912: Hydroxyzine 100 mg PO for anxiety. When writer assessed for medication efficacy pt appeared less anxious.    0921: Chloraseptic 6-10 mg lozenge PO for sore throat. This medication passed by other RN. This medication was effective per pt report.     0922: Nicotine lozenge 2 mg PO for nicotine withdrawal symptoms. This medication passed by there RN.    1120: Chloraseptic 6-10 mg lozenge for sore throat. This medication was effective per pt report.     1120: Robitussin oral solution 100 mg PO for cough. This medication was effective per pt report.     1125: Albuterol inhaler two puffs for shortness of breath. This medication was effective per pt report.     1222: Ibuprofen 400 mg PO for inflammatory pain. This medication was effective per pt report.     1426: Nicotine lozenge 2 mg PO for nicotine withdrawal symptoms.    6

## 2024-04-13 NOTE — PLAN OF CARE
Problem: Sleep Disturbance  Goal: Adequate Sleep/Rest  4/13/2024 0438 by Maryann Ruiz RN  Outcome: Not Progressing   Goal Outcome Evaluation:    Pt was asleep at the start of shift. Pt woke up at 0245, appeared agitated. Pt was laughing inappropriately, and making in appropriate statement. Pt complained of pain rated  at 7. given Acetaminophen 650 mg for pain. Patient remained awake and needed lot of redirection to lower his voice. Pt took of his cloth and was redirected to put on the cloth. Pt was giving Benzol  Menthol 1 Lozenge forc/o sore throat, Zyprexa 10 mg 0305 for increased agitation, being disruptive on the unit and non redirectable. Pt became calm,  remained awake for the rest of the shift.

## 2024-04-13 NOTE — PROGRESS NOTES
CLINICAL NUTRITION SERVICES - BRIEF NOTE     Nutrition Prescription    RECOMMENDATIONS FOR MDs/PROVIDERS TO ORDER:  None at this time    Malnutrition Status:    Patient does not meet two of the established criteria necessary for diagnosing malnutrition    Recommendations already ordered by Registered Dietitian (RD):  Vanilla Ensure Max with meals    Future/Additional Recommendations:  RD to sign off and will remain available by consult if new nutrition concerns arise       REASON FOR ASSESSMENT  Ward Dawson is a/an 34 year old male assessed by the dietitian for Patient/Family Request: Per RN pt requests Ensure shakes.    Reason for admission:  evaluation for manic behavior  PMH: schizoaffective disorder, antisocial personality disorder, polysubstance abuse   From [facility]    Findings  Writer did not speak to pt in person d/t chart review noting aggressive and agitated behavior. Pt requests Ensure protein shakes with meals.   No current intakes to assess.     LABS    Electrolytes  Potassium (mmol/L)   Date Value   04/03/2024 4.2    Blood Glucose  Glucose (mg/dL)   Date Value   04/03/2024 91   02/26/2013 83     GLUCOSE BY METER POCT (mg/dL)   Date Value   04/08/2024 117 (H)    Inflammatory Markers  WBC Count (10e3/uL)   Date Value   04/10/2024 5.7   04/03/2024 11.1 (H)     Albumin (g/dL)   Date Value   04/03/2024 5.0      Sodium (mmol/L)   Date Value   04/03/2024 138    Renal  Urea Nitrogen (mg/dL)   Date Value   04/03/2024 24.5 (H)     Creatinine (mg/dL)   Date Value   04/03/2024 1.18 (H)     Additional  Triglycerides (mg/dL)   Date Value   02/26/2013 71     Platelet Count (10e3/uL)   Date Value   04/03/2024 213        182.9 cm  Body mass index is 32.02 kg/m .  Weight appears to be stable.  Wt Readings from Last 10 Encounters:   04/09/24 107.1 kg (236 lb 1.6 oz)   02/26/13 80.7 kg (178 lb)   Per Care Everywhere:  02/19/2024 99.6 kg (219 lb 9.6 oz)  01/02/2024 108.5 kg (239 lb 4.8 oz)   04/09/2023     101.1 kg  (222 lb 14.4 oz)     INTERVENTIONS  Implementation  Nutrition Education: Per provider order if indicated   Medical food supplement therapy        Follow up/Monitoring  Progress toward goals will be monitored and evaluated per protocol.    Viky OLIVER  Behavioral Health Adult & Pediatric Dietitian  Contact via [employer] or Desk phone: [phone]  On Call Pager (weekends only): [phone]
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
## 2024-04-14 PROCEDURE — 124N000002 HC R&B MH UMMC

## 2024-04-14 PROCEDURE — 250N000013 HC RX MED GY IP 250 OP 250 PS 637: Performed by: CLINICAL NURSE SPECIALIST

## 2024-04-14 PROCEDURE — 250N000013 HC RX MED GY IP 250 OP 250 PS 637: Performed by: EMERGENCY MEDICINE

## 2024-04-14 PROCEDURE — 250N000013 HC RX MED GY IP 250 OP 250 PS 637: Performed by: PSYCHIATRY & NEUROLOGY

## 2024-04-14 PROCEDURE — 250N000013 HC RX MED GY IP 250 OP 250 PS 637: Performed by: FAMILY MEDICINE

## 2024-04-14 RX ADMIN — NICOTINE 1 PATCH: 21 PATCH, EXTENDED RELEASE TRANSDERMAL at 07:58

## 2024-04-14 RX ADMIN — CLOZAPINE 50 MG: 50 TABLET ORAL at 07:58

## 2024-04-14 RX ADMIN — ACETAMINOPHEN 650 MG: 325 TABLET, FILM COATED ORAL at 14:47

## 2024-04-14 RX ADMIN — CLOZAPINE 200 MG: 200 TABLET ORAL at 19:35

## 2024-04-14 RX ADMIN — Medication 900 MG: at 19:35

## 2024-04-14 RX ADMIN — ATORVASTATIN CALCIUM 40 MG: 40 TABLET, FILM COATED ORAL at 07:57

## 2024-04-14 RX ADMIN — DIVALPROEX SODIUM 500 MG: 500 TABLET, FILM COATED, EXTENDED RELEASE ORAL at 07:57

## 2024-04-14 RX ADMIN — HYDROXYZINE HYDROCHLORIDE 100 MG: 50 TABLET, FILM COATED ORAL at 20:36

## 2024-04-14 RX ADMIN — OLANZAPINE 10 MG: 10 TABLET, FILM COATED ORAL at 04:21

## 2024-04-14 RX ADMIN — IBUPROFEN 400 MG: 200 TABLET, FILM COATED ORAL at 09:46

## 2024-04-14 RX ADMIN — GUAIFENESIN 100 MG: 100 SOLUTION ORAL at 20:40

## 2024-04-14 RX ADMIN — GUAIFENESIN 100 MG: 100 SOLUTION ORAL at 08:36

## 2024-04-14 RX ADMIN — TRAZODONE HYDROCHLORIDE 50 MG: 50 TABLET ORAL at 20:36

## 2024-04-14 RX ADMIN — Medication 1 LOZENGE: at 09:07

## 2024-04-14 RX ADMIN — HYDROXYZINE HYDROCHLORIDE 100 MG: 50 TABLET, FILM COATED ORAL at 12:57

## 2024-04-14 RX ADMIN — POLYETHYLENE GLYCOL 3350 17 G: 17 POWDER, FOR SOLUTION ORAL at 20:40

## 2024-04-14 RX ADMIN — Medication 1 LOZENGE: at 04:21

## 2024-04-14 RX ADMIN — OLANZAPINE 10 MG: 10 TABLET, FILM COATED ORAL at 20:36

## 2024-04-14 RX ADMIN — Medication 900 MG: at 07:57

## 2024-04-14 RX ADMIN — OLANZAPINE 10 MG: 10 TABLET, FILM COATED ORAL at 12:57

## 2024-04-14 RX ADMIN — Medication 1 LOZENGE: at 18:26

## 2024-04-14 RX ADMIN — HYDROCHLOROTHIAZIDE 25 MG: 25 TABLET ORAL at 07:58

## 2024-04-14 RX ADMIN — DIVALPROEX SODIUM 1000 MG: 500 TABLET, FILM COATED, EXTENDED RELEASE ORAL at 19:35

## 2024-04-14 RX ADMIN — ACETAMINOPHEN 650 MG: 325 TABLET, FILM COATED ORAL at 05:24

## 2024-04-14 ASSESSMENT — ACTIVITIES OF DAILY LIVING (ADL)
ADLS_ACUITY_SCORE: 28
ORAL_HYGIENE: INDEPENDENT
ADLS_ACUITY_SCORE: 28
DRESS: INDEPENDENT;SCRUBS (BEHAVIORAL HEALTH)
ADLS_ACUITY_SCORE: 28
HYGIENE/GROOMING: INDEPENDENT
DRESS: INDEPENDENT;SCRUBS (BEHAVIORAL HEALTH)
HYGIENE/GROOMING: INDEPENDENT
ORAL_HYGIENE: INDEPENDENT
ADLS_ACUITY_SCORE: 28

## 2024-04-14 NOTE — PLAN OF CARE
"Pt awake at start of shift.     Breathing quiet and unlabored when sleeping.     Pt had no c/o pain or discomfort during the HS.     Appears to have slept 2.75 hours.     Pt continues on 2:1 SIO/10 ft space distance.     Pt was agitated/pacing when not sleeping. He was demanding and displayed very little patience with any requests. When in the common areas he was singing/talking loudly and not accepting of verbal redirection. Pt asked for medication but was not specific in his requests and would state, \"Just give me all my PRN's.\" Zyprexa administration appears to lessen agitation. Patient  continues to display increased talkativeness, rapid speech, and grandiose and racing thoughts.    Pt utilized the following PRN meds:    2356  Melatonin 5mg  Trazodone 50mg   Outcome: Pt slept for a small time frame     0421  Zyprexa 10mg  Chloraseptic Lozenge   Outcome: Pt appears less agitated/aggressive     0524  Acetaminophen 650mg    For c/o a headache with pain 2/10  Outcome: Appeared effective     Goal Outcome Evaluation:  Problem: Adult Behavioral Health Plan of Care  Goal: Adheres to Safety Considerations for Self and Others  Intervention: Develop and Maintain Individualized Safety Plan  Recent Flowsheet Documentation  Taken 4/14/2024 0000 by Rachel Sin, RN  Safety Measures: (2:1 observation maintained) --  Goal: Absence of New-Onset Illness or Injury  Intervention: Identify and Manage Fall Risk  Recent Flowsheet Documentation  Taken 4/14/2024 0000 by Rachel Sin, RN  Safety Measures: (2:1 observation maintained) --     Problem: Adult Behavioral Health Plan of Care  Goal: Absence of New-Onset Illness or Injury  Intervention: Identify and Manage Fall Risk  Recent Flowsheet Documentation  Taken 4/14/2024 0000 by Rachel Sin, RN  Safety Measures: (2:1 observation maintained) --                           "

## 2024-04-14 NOTE — PLAN OF CARE
Problem: Manic Behavior Episode  Goal: Decreased Manic Symptoms  Outcome: Not Progressing   Goal Outcome Evaluation:    Presents with manic symptoms, taking very loudly, flight of ideas, laughing very loudly and frequently. Labile. Shirt off. Making sexual comments. Social, in milieu talking with staff, playing games. Endorsed high anxiety. Refused to answer depression assessment, stated that this information was personal. Denied A/VH. Denied having any thoughts of harming self.    Slept for 2 hours during shift, from approximately 0981-3311.     Took scheduled medication without issue. Given PRN zyprexa 10mg for agitation, indicated medication was not effective. Given PRN Robitussin.     Endorsing lower back pain, rated 7/10 given PRN tylenol, indicated medication was not effective. Hypertensive, 153/95; pt had been very active before       BP (!) 153/95 (BP Location: Left arm, Patient Position: Sitting, Cuff Size: Adult Large)   Pulse 99   Temp 97.6  F (36.4  C) (Oral)   Resp 16   Ht 1.829 m (6')   Wt 107.1 kg (236 lb 1.6 oz)   SpO2 96%   BMI 32.02 kg/m

## 2024-04-14 NOTE — PLAN OF CARE
Day Shift    Patient continues on 2:1 acuity staffing to manage his impulsive and unpredictable aggressive behaviors.  He remains manic appearing.  Ward slept poorly last night with only 2.75 hours of sleep noted during the NOC shift of 4/14/24.  He presents as elated and grandiose with poor social boundaries.  Abdullahi makes very frequent requests and becomes agitated when his demands are not immediately met.  His speech is pressured, rambling, and tangential.  His affect and eye contact are notable for increased intensity.  Abdullahi did not engage in any physically aggressive behaviors today, but he did become agitated and verbally aggressive on several occasions.  He accepted all of his scheduled medications this shift without incident, and no adverse side effects have been reported or observed.  Later in the shift, he c/o breakthrough symptoms (anxiety, agitation) and requested a dose of PRN Zyprexa (to target agitation) and PRN Hydroxyzine (to target anxiety); he received these PRN medications at 12:57.  These PRN medications appeared to offer him some relief, as Ward appeared less hyperactive over the next few hours.  He complained of back and foot pain this shift, receiving PRN Ibuprofen at 09:46 and Tylenol at 14:47.  He reported mild pain relief with each of these interventions.  Apart from this, he denies any acute physical concerns at this time. /80 (BP Location: Left arm, Patient Position: Chair)   Pulse 88   Temp 97.6  F (36.4  C) (Oral)   Resp 16   Ht 1.829 m (6')   Wt 107.1 kg (236 lb 1.6 oz)   SpO2 98%   BMI 32.02 kg/m        Evening Shift    Abdullahi appeared less hyperactive in the first few hours of this evening shift.  He was intrusive at times, but he required less redirection to maintain appropriate social boundaries.  After eating dinner, Abdullahi napped in his room for about 1.5 hours. He was very hyperactive, tense, and intrusive upon awakening, making frequent requests  and demands.  He was not interested in taking PRN medications to target this spike in manic symptoms.  He eventually accepted his HS medications, and they were given at 19:35. The HS medications appeared to offer him some relief from his persistent manic symptoms and agitation; however, he needed almost continual redirection over the next couple hours to refrain from engaging in disruptive behaviors, such as playing his music at the maximum volume setting.  He was laughing, singing/ rapping, and running around hysterically.  He would become argumentative when firmly redirected from his maladaptive behaviors.  Abdullahi was eventually receptive to taking PRN medications to target his persistent symptoms.  He accepted the following PRN medications at 20:36: Zyprexa 10 mg (to target agitation); Hydroxyzine 100 mg (to target anxiety); Trazodone 50 mg (to target insomnia).  He reported constipation and was receptive to consuming two containers of Prune juice and taking a dose of PRN Miralax at 20:40.  He was also given a dose of PRN Robitussin at 20:40  to manage cough/ cold symptoms.  Apart from this, he denies any acute physical concerns at this time.  BP (!) 156/90 (BP Location: Right arm, Patient Position: Sitting, Cuff Size: Adult Large)   Pulse 98   Temp 97.3  F (36.3  C) (Oral)   Resp 16   Ht 1.829 m (6')   Wt 107.1 kg (236 lb 1.6 oz)   SpO2 98%   BMI 32.02 kg/m

## 2024-04-15 LAB
ALBUMIN SERPL BCG-MCNC: 4.5 G/DL (ref 3.5–5.2)
ALP SERPL-CCNC: 73 U/L (ref 40–150)
ALT SERPL W P-5'-P-CCNC: 28 U/L (ref 0–70)
ANION GAP SERPL CALCULATED.3IONS-SCNC: 10 MMOL/L (ref 7–15)
AST SERPL W P-5'-P-CCNC: 31 U/L (ref 0–45)
BASOPHILS # BLD AUTO: 0 10E3/UL (ref 0–0.2)
BASOPHILS NFR BLD AUTO: 1 %
BILIRUB SERPL-MCNC: 0.7 MG/DL
BUN SERPL-MCNC: 18.6 MG/DL (ref 6–20)
CALCIUM SERPL-MCNC: 8.9 MG/DL (ref 8.6–10)
CHLORIDE SERPL-SCNC: 100 MMOL/L (ref 98–107)
CREAT SERPL-MCNC: 0.97 MG/DL (ref 0.67–1.17)
DEPRECATED HCO3 PLAS-SCNC: 29 MMOL/L (ref 22–29)
EGFRCR SERPLBLD CKD-EPI 2021: >90 ML/MIN/1.73M2
EOSINOPHIL # BLD AUTO: 0.1 10E3/UL (ref 0–0.7)
EOSINOPHIL NFR BLD AUTO: 2 %
ERYTHROCYTE [DISTWIDTH] IN BLOOD BY AUTOMATED COUNT: 13.2 % (ref 10–15)
GLUCOSE SERPL-MCNC: 117 MG/DL (ref 70–99)
HCT VFR BLD AUTO: 44.9 % (ref 40–53)
HGB BLD-MCNC: 14 G/DL (ref 13.3–17.7)
IMM GRANULOCYTES # BLD: 0 10E3/UL
IMM GRANULOCYTES NFR BLD: 0 %
LYMPHOCYTES # BLD AUTO: 2.1 10E3/UL (ref 0.8–5.3)
LYMPHOCYTES NFR BLD AUTO: 33 %
MCH RBC QN AUTO: 27.5 PG (ref 26.5–33)
MCHC RBC AUTO-ENTMCNC: 31.2 G/DL (ref 31.5–36.5)
MCV RBC AUTO: 88 FL (ref 78–100)
MONOCYTES # BLD AUTO: 0.3 10E3/UL (ref 0–1.3)
MONOCYTES NFR BLD AUTO: 5 %
NEUTROPHILS # BLD AUTO: 3.8 10E3/UL (ref 1.6–8.3)
NEUTROPHILS NFR BLD AUTO: 59 %
NRBC # BLD AUTO: 0 10E3/UL
NRBC BLD AUTO-RTO: 0 /100
PLATELET # BLD AUTO: 234 10E3/UL (ref 150–450)
POTASSIUM SERPL-SCNC: 4.2 MMOL/L (ref 3.4–5.3)
PROT SERPL-MCNC: 7.2 G/DL (ref 6.4–8.3)
RBC # BLD AUTO: 5.09 10E6/UL (ref 4.4–5.9)
SODIUM SERPL-SCNC: 139 MMOL/L (ref 135–145)
WBC # BLD AUTO: 6.3 10E3/UL (ref 4–11)

## 2024-04-15 PROCEDURE — 250N000013 HC RX MED GY IP 250 OP 250 PS 637: Performed by: EMERGENCY MEDICINE

## 2024-04-15 PROCEDURE — 250N000013 HC RX MED GY IP 250 OP 250 PS 637: Performed by: PHYSICIAN ASSISTANT

## 2024-04-15 PROCEDURE — 82247 BILIRUBIN TOTAL: CPT | Performed by: CLINICAL NURSE SPECIALIST

## 2024-04-15 PROCEDURE — 250N000013 HC RX MED GY IP 250 OP 250 PS 637: Performed by: CLINICAL NURSE SPECIALIST

## 2024-04-15 PROCEDURE — 93005 ELECTROCARDIOGRAM TRACING: CPT

## 2024-04-15 PROCEDURE — 250N000013 HC RX MED GY IP 250 OP 250 PS 637: Performed by: FAMILY MEDICINE

## 2024-04-15 PROCEDURE — 250N000013 HC RX MED GY IP 250 OP 250 PS 637

## 2024-04-15 PROCEDURE — 124N000002 HC R&B MH UMMC

## 2024-04-15 PROCEDURE — 85048 AUTOMATED LEUKOCYTE COUNT: CPT | Performed by: CLINICAL NURSE SPECIALIST

## 2024-04-15 PROCEDURE — 36415 COLL VENOUS BLD VENIPUNCTURE: CPT | Performed by: CLINICAL NURSE SPECIALIST

## 2024-04-15 PROCEDURE — 99232 SBSQ HOSP IP/OBS MODERATE 35: CPT | Performed by: CLINICAL NURSE SPECIALIST

## 2024-04-15 PROCEDURE — 99222 1ST HOSP IP/OBS MODERATE 55: CPT | Performed by: PHYSICIAN ASSISTANT

## 2024-04-15 PROCEDURE — 250N000013 HC RX MED GY IP 250 OP 250 PS 637: Performed by: PSYCHIATRY & NEUROLOGY

## 2024-04-15 RX ORDER — FLUTICASONE PROPIONATE 50 MCG
1 SPRAY, SUSPENSION (ML) NASAL DAILY
Status: DISCONTINUED | OUTPATIENT
Start: 2024-04-15 | End: 2024-05-14 | Stop reason: HOSPADM

## 2024-04-15 RX ORDER — POLYETHYLENE GLYCOL 3350 17 G/17G
17 POWDER, FOR SOLUTION ORAL DAILY
Status: DISCONTINUED | OUTPATIENT
Start: 2024-04-15 | End: 2024-05-14 | Stop reason: HOSPADM

## 2024-04-15 RX ORDER — PANTOPRAZOLE SODIUM 40 MG/1
40 TABLET, DELAYED RELEASE ORAL
Status: DISCONTINUED | OUTPATIENT
Start: 2024-04-16 | End: 2024-05-14 | Stop reason: HOSPADM

## 2024-04-15 RX ORDER — QUETIAPINE FUMARATE 100 MG/1
100 TABLET, FILM COATED ORAL AT BEDTIME
Status: DISCONTINUED | OUTPATIENT
Start: 2024-04-15 | End: 2024-05-14 | Stop reason: HOSPADM

## 2024-04-15 RX ADMIN — FLUTICASONE PROPIONATE 1 SPRAY: 50 SPRAY, METERED NASAL at 11:54

## 2024-04-15 RX ADMIN — CLOZAPINE 200 MG: 200 TABLET ORAL at 19:09

## 2024-04-15 RX ADMIN — TRAZODONE HYDROCHLORIDE 50 MG: 50 TABLET ORAL at 22:40

## 2024-04-15 RX ADMIN — HYDROXYZINE HYDROCHLORIDE 100 MG: 50 TABLET, FILM COATED ORAL at 14:36

## 2024-04-15 RX ADMIN — ALBUTEROL SULFATE 1 PUFF: 90 AEROSOL, METERED RESPIRATORY (INHALATION) at 11:34

## 2024-04-15 RX ADMIN — Medication 1 LOZENGE: at 14:36

## 2024-04-15 RX ADMIN — ACETAMINOPHEN 650 MG: 325 TABLET, FILM COATED ORAL at 02:25

## 2024-04-15 RX ADMIN — OLANZAPINE 10 MG: 10 TABLET, FILM COATED ORAL at 20:12

## 2024-04-15 RX ADMIN — DIPHENHYDRAMINE HYDROCHLORIDE 50 MG: 50 CAPSULE ORAL at 03:20

## 2024-04-15 RX ADMIN — Medication 900 MG: at 19:09

## 2024-04-15 RX ADMIN — TRAZODONE HYDROCHLORIDE 50 MG: 50 TABLET ORAL at 02:43

## 2024-04-15 RX ADMIN — NICOTINE 1 PATCH: 21 PATCH, EXTENDED RELEASE TRANSDERMAL at 09:56

## 2024-04-15 RX ADMIN — DIVALPROEX SODIUM 500 MG: 500 TABLET, FILM COATED, EXTENDED RELEASE ORAL at 09:56

## 2024-04-15 RX ADMIN — POLYETHYLENE GLYCOL 3350 17 G: 17 POWDER, FOR SOLUTION ORAL at 11:38

## 2024-04-15 RX ADMIN — HALOPERIDOL 5 MG: 5 TABLET ORAL at 03:20

## 2024-04-15 RX ADMIN — ATORVASTATIN CALCIUM 40 MG: 40 TABLET, FILM COATED ORAL at 09:56

## 2024-04-15 RX ADMIN — CLOZAPINE 50 MG: 50 TABLET ORAL at 09:56

## 2024-04-15 RX ADMIN — Medication 1 LOZENGE: at 21:53

## 2024-04-15 RX ADMIN — Medication 1 LOZENGE: at 03:20

## 2024-04-15 RX ADMIN — Medication 900 MG: at 09:56

## 2024-04-15 RX ADMIN — DIVALPROEX SODIUM 1000 MG: 500 TABLET, FILM COATED, EXTENDED RELEASE ORAL at 19:08

## 2024-04-15 RX ADMIN — HYDROXYZINE HYDROCHLORIDE 100 MG: 50 TABLET, FILM COATED ORAL at 22:39

## 2024-04-15 RX ADMIN — QUETIAPINE 100 MG: 100 TABLET ORAL at 19:09

## 2024-04-15 RX ADMIN — TRAZODONE HYDROCHLORIDE 50 MG: 50 TABLET ORAL at 20:14

## 2024-04-15 RX ADMIN — IBUPROFEN 400 MG: 200 TABLET, FILM COATED ORAL at 14:59

## 2024-04-15 RX ADMIN — LORAZEPAM 2 MG: 2 TABLET ORAL at 03:20

## 2024-04-15 RX ADMIN — HYDROCHLOROTHIAZIDE 25 MG: 25 TABLET ORAL at 09:56

## 2024-04-15 ASSESSMENT — ACTIVITIES OF DAILY LIVING (ADL)
ADLS_ACUITY_SCORE: 28
LAUNDRY: UNABLE TO COMPLETE
ADLS_ACUITY_SCORE: 28
HYGIENE/GROOMING: INDEPENDENT
ADLS_ACUITY_SCORE: 28
ADLS_ACUITY_SCORE: 28
DRESS: SCRUBS (BEHAVIORAL HEALTH)
ADLS_ACUITY_SCORE: 28
ORAL_HYGIENE: INDEPENDENT
ADLS_ACUITY_SCORE: 28
ADLS_ACUITY_SCORE: 28
LAUNDRY: UNABLE TO COMPLETE
ADLS_ACUITY_SCORE: 28
ADLS_ACUITY_SCORE: 28
DRESS: INDEPENDENT
ORAL_HYGIENE: PROMPTS
HYGIENE/GROOMING: INDEPENDENT
ADLS_ACUITY_SCORE: 28

## 2024-04-15 NOTE — CONSULTS
"[facility], [place]  Internal Medicine Consult    Ward Dawson MRN# [id number]   Age: 34 year old YOB: 1990     Date of Admission: 4/3/2024  Date of Consult:  4/15/2024    Requesting Service: Behavioral Health - Conchis Hebert MD  Reason for Consult: Pre ECT Medicine Evaluation   Indication for ECT: Bijal    Chief Complaint: Bijal  HPI: Mr. Hopper \"Scoobie\" Eunice is a 35 yo male with hx of schizoaffective disorder admitted to [facility] IP psychiatry unit after presenting to ED 4/3 with increased disruptive behaviors and manic sxs. Internal medicine consulted today for medical evaluation prior to receiving ECT tentatively scheduled for 4/17.     Review of Systems: Difficult historian due to pressured, rapid and tangential mental state but at this time Mr. Dawson is very somatic, specifically c/o acute on chronic back and knee pain. L ankle pain from \"recently twisting it\", heartburn, rhinitis, and constipation.     Past Medical History:   Prior Anesthesia: Yes  If yes, any complications: No    Prior ECT: Yes  Response: Improvement in mood  Side Effects: None reported    Cardiovascular: CAD No, MI No, HTN Yes, CHF No, pacemaker or ICD No  Pulmonary: Asthma/COPD No, Prior respiratory failure or need for emergent intubation No, On theophylline No (note that theophylline use is a contraindication to proceeding with ECT, needs to be tapered off prior)  Neurological: Brain tumor No, TIA/CVA No, Dementia No, Neuromuscular Disease (including post polio syndrome) No, Seizures and/or Epilepsy No, Head Injury No, Intracranial Hemorrhage No  Diabetes: No    Past Surgical History:   No past surgical history on file.    Allergies:      Allergies   Allergen Reactions    Haloperidol Confusion and Other (See Comments)     Prone to EPSE. COnsider IM Zyprexa or be sure to give with benadryl       Medication list reviewed.    Physical Exam:  /77 (BP Location: Right arm, Patient " Position: Sitting, Cuff Size: Adult Regular)   Pulse 85   Temp 97.1  F (36.2  C) (Temporal)   Resp 16   Ht 1.829 m (6')   Wt 107.1 kg (236 lb 1.6 oz)   SpO2 99%   BMI 32.02 kg/m     Cardiovascular: RRR. S1, S2. No murmurs, rubs, gallops.   Pulmonary: Effort normal. Lungs CTAB with no wheezing, rales, rhonchi.   Neurological: A&Ox3. No acute focal deficits. PERRLA.     Data:  EKG (4/15/24): Sinus rhythm and otherwise unremarkable.    Labs were obtained within the last 30 days on 4/15/24 and are as follows:   CMP  Recent Labs   Lab 04/15/24  0953 04/08/24  2021     --    POTASSIUM 4.2  --    CHLORIDE 100  --    CO2 29  --    ANIONGAP 10  --    * 117*   BUN 18.6  --    CR 0.97  --    GFRESTIMATED >90  --    ANUP 8.9  --    PROTTOTAL 7.2  --    ALBUMIN 4.5  --    BILITOTAL 0.7  --    ALKPHOS 73  --    AST 31  --    ALT 28  --      CBC  Recent Labs   Lab 04/15/24  0953 04/10/24  1059   WBC 6.3 5.7   RBC 5.09  --    HGB 14.0  --    HCT 44.9  --    MCV 88  --    MCH 27.5  --    MCHC 31.2*  --    RDW 13.2  --      --        Recommendations:   Diuretics should be held the morning of ECT. All other antihypertensive medications can be continued.     Patient does not have absolute medical contraindications to proceeding with ECT at this time. Treatment plan per psychiatry.     Dejuan Laureano PA-C  [facility]    Medical Decision Making       45 MINUTES SPENT BY ME on the date of service doing chart review, history, exam, documentation & further activities per the note.

## 2024-04-15 NOTE — PLAN OF CARE
"Team Note Due:  Friday    Assessment/Intervention/Current Symtoms and Care Coordination:  Chart review and met with team, discussed pt progress, symptomology, and response to treatment.  Discussed the discharge plan and any potential impediments to discharge.    Met with Juneaustin to discuss hospitalization. Discussed CD assessment and potential discharge to CD treatment. He stated that he only uses cannabis so didn't need treatment. Chart indicates and ACT CM verified that Narcan was administered at [facility] prior to hospitalization.     Spoke to Yamila ACT . Somebody from the ACT team will come to visit him this week. She plans on calling this afternoon.     Cristel signed consent form for his mom, Candis.     Spoke to his mom, Candis. She said that for 12 years he has been going in and out of hospitals and IRTS facilities and that she wants him to go to treatment, as his substance use is the \"root of the problem.\" Stated \"the only thing he has never tried is the drug treatment center.\" Asked writer if this could be mandated, discussed legal status, she understood that he is voluntary.     ACT CM, Yamila, picking up his belongings from IRTS and storing them at the ACT office. Yamila verified that she communicated this to him on the phone today.      Discharge Plan or Goal:  When patient's symptoms have reduced and a safe discharge can be facilitated, options include: Residential treatment, IRTS, shelter      Barriers to Discharge:  Ward presents with symptoms of evens that put him at risk of an unsafe discharge      Referral Status:  Referral needs pending as patient stabilizes      Legal Status:  Voluntary    Contacts:  [facility]: [phone] (Consent)  ACT : Heidy Pop, [phone] (WAN)  ACT : Yamila, [phone] (WAN)  ACT Psychiatrist: Camron Watson MD, [phone] (WAN)  Mom: Candis, [phone] (Consent)  Friend: Nabila Peters, [phone] (WAN)   "   Upcoming Meetings and Dates/Important Information and next steps:  Symptom stabilization

## 2024-04-15 NOTE — PROGRESS NOTES
Pre-op medical clearance reviewed by JADEN, Dr. Marin and patient approved to start ECT in ECT suite 4/17/24.

## 2024-04-15 NOTE — PLAN OF CARE
Problem: Manic Behavior Episode  Goal: Decreased Manic Symptoms  Outcome: Progressing   Patient was in his room sleeping at the start of shift. He was up at about 9:30 am and during that time he took all of his scheduled medications. His affect this shift is labile and mood is euphoric/ elevated. Patient is alert and oriented x3 with less behaviors this shift. He is hyper verbal and can be loud at times. He was visible in the lounge area and he interacted with writer in a respectful manner. He was compliant with EKG, lab draw and vitals. Patient was seen by internal medicine in preparation for his ECT on Wednesday. ECT called and told writer that patient is all set for ECT on Wednesday. During this shift, patient did not call staff names and was overall redirectable. He spent most of the shift listening to music using his phone. He ate 100% of breakfast and lunch without issues. He did not complain of pain and appears to be managing his behavior well. Patient complained of shortness of breath and requested his inhaler. He endorsed feeling anxious, but denies all other psych symptoms. PRN hydroxyzine 100 mg was administered to manage patient's anxiety. Patient has a lot of cups, juice, crackers and other items in his room and refused to let staff clean up. He did not take a shower this shift. Patient expressed that he would like to attend group because he gets bored.     Prn given  Albuterol   Chloraseptic   Hydroxyzine 100 mg  Ibuprofen 400 mg     /77 (BP Location: Right arm, Patient Position: Sitting, Cuff Size: Adult Regular)   Pulse 85   Temp 97.1  F (36.2  C) (Temporal)   Resp 16   Ht 1.829 m (6')   Wt 107.1 kg (236 lb 1.6 oz)   SpO2 99%   BMI 32.02 kg/m

## 2024-04-15 NOTE — PROVIDER NOTIFICATION
"Combination PRN Medication Administration    Medications Administered: Benadryl+Haldol+Ativan combination administered PO at 0320    What patient symptom(s) led to the administration of these medications?  Pt was threatening to punch window and growling. Pt continued threats of violence and assault to staff.      What interventions were attempted to avoid use of these medication (including other PRN medications)?   Zyprexa dose was at max for 24 hour period and could not be administered.   Wtr and several others attempted to verbally de-escalate pt using persuasion and redirection.     What were the patient's response(s) to the interventions?   Pt growling and screaming caused 2:1 acuity staff to come into the nurses station and close the door. Pt demanded a PRN and while wtr was speaking with patient, pt called this wtr a \"fat racist bitch\" and stated that he was going to punch the window.  Wtr attempted to remove herself from the de-escalation attempt and pt continued to scream and growl. Wtr called MARYBETH team member in an attempt to allow pt another attempt at verbal de-escalation. Wtr had male RN administer PO Benadryl, Haldol, and Ativan.  Pt took his Haldol and Ativan and opened his Benadryl capsule and poured it in his coffee. He then proceeded to drink the coffee but wtr was unable to determine if he ingested all the Benadryl.     Provider notified: Dr. Reyes        Date: 04/15/24        Time: 8147 (paged at [phone])    Rachel Sin RN   "

## 2024-04-15 NOTE — PROGRESS NOTES
"The patient's care was discussed with the treatment team during the daily team meeting and/or staff's chart notes were reviewed. Staff report slept 4.5 hours. Staff report patient was agitated and aggressive, received psych emergency medications for agitation, Trazodone and Tylenol for sleep and pain respectively. Pt growling and screaming caused 2:1 acuity staff to come into the nurses station and close the door. Pt demanded a PRN and while wtr was speaking with patient, pt called this wtr a \"fat racist bitch\" and stated that he was going to punch the window. Wtr attempted to remove herself from the de-escalation attempt and pt continued to scream and growl. Wtr called MARYBETH team member in an attempt to allow pt another attempt at verbal de-escalation. Wtr had male RN administer PO Benadryl, Haldol, and Ativan. Pt took his Haldol and Ativan and opened his Benadryl capsule and poured it in his coffee. He then proceeded to drink the coffee but wtr was unable to determine if he ingested all the Benadryl.       Upon interview, patient was interviewed in his room, patient appears restless, impatient, hyper verbal,  distractible, loud pressured speech, disorganized, and maniac. He was without a hospital scrub (only the pants on) showing tattooed upper body from waist upward. Patient was argumentative and verbally abusive to staff, who asked to de-clutter his room, patient states the room is well organized and nothing needed to be cleaned out. Patient was querying one of his acuity staff to tell him the difference between consolidation and cleaning. Patient still manifesting sxs of evens, though remained calm, attentive and respectful to this writer throughout the interview period.  Patient started the assessment with requests for \"my clothes, my watch, jewelries\" Lots of perseveration for \"I want to have my phone for school work and for music.\" Patient reports that his advisor called this morning but staff would not let " "the advisor talk to him. Writer educated the patient that the advisor can only talk with him if he signed an WAN for him for confidentiality purpose. Patient says he would sign WAN for him. Throughout this interview, patient observed to be labile, disorganized, tangential, with somatic complaints, he walks slowly in his tennis shoes as if injured on the bilateral feet/ankle that writer had to ask what happened to his legs. Patient reports pain in his left ankles states he recently twisted it. Despite reporting pain, patient continues to laugh out loud inappropriately, screamed and sometimes growled. Patient reports his medications are working but also request for Seroquel \"that works for my sleep.\" Patient reports that he was last on 300 mg of Seroquel at bedtime. Patient denies A/VH, SI/HI, denies thoughts of self-harm.              Medications:      Inpatient Administered Meds                     Current Facility-Administered Medications   Medication Dose Route Frequency Provider Last Rate Last Admin    atorvastatin (LIPITOR) tablet 40 mg  40 mg Oral Daily Jaswinder Claros MD   40 mg at 04/10/24 0818    cloZAPine (CLOZARIL) tablet 100 mg  100 mg Oral At Bedtime Jaswinder Claros MD   100 mg at 04/09/24 1848    cloZAPine (CLOZARIL) tablet 50 mg  50 mg Oral Daily Jaswinder Claros MD   50 mg at 04/10/24 0818    divalproex sodium extended-release (DEPAKOTE ER) 24 hr tablet 500 mg  500 mg Oral BID Jaswinder Claros MD   500 mg at 04/10/24 0818    gabapentin (NEURONTIN) tablet 600 mg  600 mg Oral BID Jaswinder Claros MD   600 mg at 04/10/24 0818    hydrochlorothiazide (HYDRODIURIL) tablet 25 mg  25 mg Oral Daily Jaswinedr Claros MD   25 mg at 04/10/24 0818    nicotine (NICODERM CQ) 21 MG/24HR 24 hr patch 1 patch  1 patch Transdermal Daily Xavi Medeiros MD   1 patch at 04/10/24 0821              Allergies:      Allergies             Allergies   Allergen " Reactions    Haloperidol Confusion and Other (See Comments)       Prone to EPSE. COnsider IM Zyprexa or be sure to give with benadryl              Labs:      Recent Results             Recent Results (from the past 24 hour(s))   WBC and Differential     Collection Time: 04/10/24 10:59 AM   Result Value Ref Range     WBC Count 5.7 4.0 - 11.0 10e3/uL     % Neutrophils 51 %     % Lymphocytes 37 %     % Monocytes 9 %     % Eosinophils 2 %     % Basophils 1 %     % Immature Granulocytes 0 %     NRBCs per 100 WBC 0 <1 /100     Absolute Neutrophils 3.0 1.6 - 8.3 10e3/uL     Absolute Lymphocytes 2.1 0.8 - 5.3 10e3/uL     Absolute Monocytes 0.5 0.0 - 1.3 10e3/uL     Absolute Eosinophils 0.1 0.0 - 0.7 10e3/uL     Absolute Basophils 0.0 0.0 - 0.2 10e3/uL     Absolute Immature Granulocytes 0.0 <=0.4 10e3/uL     Absolute NRBCs 0.0 10e3/uL              Psychiatric Examination:      /84 (BP Location: Left arm, Patient Position: Sitting, Cuff Size: Adult Regular)   Pulse 94   Temp 97.8  F (36.6  C) (Oral)   Resp 12   Ht 1.829 m (6')   Wt 107.1 kg (236 lb 1.6 oz)   SpO2 98%   BMI 32.02 kg/m    Weight is 236 lbs 1.6 oz  Body mass index is 32.02 kg/m .     Weight over time:          Vitals:     04/03/24 1151 04/09/24 0826   Weight: 108 kg (238 lb) 107.1 kg (236 lb 1.6 oz)         Orthostatic Vitals           Most Recent       Sitting Orthostatic /85 04/09 0826     Sitting Orthostatic Pulse (bpm) 73 04/09 0826     Standing Orthostatic /92 04/09 0826     Standing Orthostatic Pulse (bpm) 69 04/09 0826                   Cardiometabolic risk assessment. 04/10/24        Reviewed patient profile for cardiometabolic risk factors     Date taken /Value  REFERENCE RANGE   Abdominal Obesity  (Waist Circumference)   See nursing flowsheet Women ?35 in (88 cm)   Men ?40 in (102 cm)       Triglycerides                Triglycerides   Date Value Ref Range Status   02/26/2013 71 0 - 150 mg/dL Final       Comment:       Fasting  specimen         ?150 mg/dL (1.7 mmol/L) or current treatment for elevated triglycerides   HDL cholesterol            HDL Cholesterol   Date Value Ref Range Status   02/26/2013 45 40 - 110 mg/dL Final   ]    Women <50 mg/dL (1.3 mmol/L) in women or current treatment for low HDL cholesterol  Men <40 mg/dL (1 mmol/L) in men or current treatment for low HDL cholesterol      Fasting plasma glucose (FPG)           Lab Results   Component Value Date      04/08/2024     GLC 83 02/26/2013        FPG ?100 mg/dL (5.6 mmol/L) or treatment for elevated blood glucose   Blood pressure        BP Readings from Last 3 Encounters:   04/08/24 127/84   02/26/13 129/74    Blood pressure ?130/85 mmHg or treatment for elevated blood pressure   Family History  See family history      Mental Status Exam:  Appearance: awake, alert, had his shirt off, only had pants on and appeared as age stated  Attitude:  cooperative, manipulative,   Eye Contact:  good  Mood:  Expansive,   Affect:  Labile, intensity is heightened, and intensity is dramatic  Speech:  pressured speech and rambling  Language: fluent and intact in English  Psychomotor, Gait, Musculoskeletal:  no evidence of tardive dyskinesia, dystonia, or tics  Throught Process:  disorganized, illogical, tangential, flight of ideas.  Associations:  Loosening of associations  Thought Content:  no evidence of suicidal ideation or homicidal ideation, no auditory hallucinations present, and no visual hallucinations present  Insight: Poor  Judgement: poor  Oriented to:  time, person, and place  Attention Span and Concentration: Easily distracted  Recent and Remote Memory:  fair  Fund of Knowledge:  low-normal             Precautions:                Behavioral Orders   Procedures    Assault precautions    Code 1 - Restrict to Unit    Elopement precautions    [facility] Extended Care       Until discharge, Extended Care to offer psychotherapeutic services to mental health patients boarding for  admission or stabilization. These services are to include but are not limited to: individual psychotherapy, diagnostic assessment, case management and care planning, safety planning, etc. This may include up to 1 visit per day. If patient is physically located at [facility] or [facility], group psychotherapy up to 2 time per day may be offered.     [facility]       Until discharge, Extended Care to offer psychotherapeutic services to mental health patients boarding for admission or stabilization. These services are to include but are not limited to: individual psychotherapy, diagnostic assessment, case management and care planning, safety planning, etc. This may include up to 1 visit per day. If patient is physically located at [facility] or [facility], group psychotherapy up to 2 time per day may be offered.     Routine Programming       As clinically indicated    Sexual precautions    Status 15       Every 15 minutes.    Status Individual Observation       Patient SIO status reviewed with team/RN.  Please also refer to RN/team documentation for add'l detail.     -2:1 SIO staff to monitor following which have contributed to patient being on SIO: at least 1 male staff     Assault risk     -Possible interventions SIO staff could use to support patient's treatment progress:  Verbal de-escalation/ Medication administration     -When following observed, team will review discontinuation of SIO:     When patient is not longer aggressive, sexually inappropriate or intrusive.       Order Specific Question:   CONTINUOUS 24 hours / day       Answer:   Other       Order Specific Question:   Specify distance       Answer:   10 foot       Order Specific Question:   Indications for SIO       Answer:   Assault risk       Order Specific Question:   Indications for SIO       Answer:   Severe intrusiveness    Suicide precautions: Suicide Risk: HIGH; Clinical rationale to override score: response to medication       Patients on Suicide Precautions  should have a Combination Diet ordered that includes a Diet selection(s) AND a Behavioral Tray selection for Safe Tray - with utensils, or Safe Tray - NO utensils          Order Specific Question:   Suicide Risk       Answer:   HIGH       Order Specific Question:   Clinical rationale to override score:       Answer:   response to medication           Diagnoses:         Schizoaffective disorder, bipolar type (H)  Polysubstance abuse (H)  Agitation     Clinically Significant Risk Factors                          # Obesity: Estimated body mass index is 32.02 kg/m  as calculated from the following:    Height as of this encounter: 1.829 m (6').    Weight as of this encounter: 107.1 kg (236 lb 1.6 oz)., PRESENT ON ADMISSION     # Financial/Environmental Concerns: other (see comments) (lost I[facility] housing)     # Housing Instability: noted in nursing assessment          Assessment & Plan:      Assessment and hospital summary:  Ward Dawson is a -34- old male with a previous diagnosis of Schizoaffective disorder bipolar type who presented to the ED for a significant behavioral change, verbal agitation, worsening psychosocial stress, in the context of substance use.  Factors that make the mental health crisis life threatening or complex are:  Patient was brought to the ED from \[facility]\"" following his ECT treatment this morning at [facility].  Patient states he does not know why he is here and also states he knows why he is here.  He presents as manic, disorganized, and confused.   He is distractable and not a clear historian at this time.  Patient states he has not slept in a long time and received narcan last night.  Per ACT team  and [facility] staff patient has been elevated, intrusive, and disruptive.  Patient broke a window yesterday.  IRTS reports patient had turned table over and tried using them as skate board ramps.  Patient was noted for have been fairly stable prior to his pass this weekend.  CM reports  patient went to a skateboarding competition in [place].  Patient returned in a manic state.  CM reports pt may not appear as manic as he has been the past few days due to sedation and ECT this morning.  She reports patient has not slept in 3 days.  IRTS reports pt received narcan twice last night.  Patient has been trespassed from the IRTS facility.  UDS is postive for cannabis..         Today's Changes:4/15/24  Seroquel 100 mg at bedtime     Target psychiatric symptoms and interventions:  Admitted on 4/9/24 to station 12N  Clozaril, 50 mg every morning and 200 mg at bedtime  --Depakote ER, 500 mg daily and 1000 mg at bedtime for mood stabilization   --Gabapentin, 900 mg twice a day for pain  - Ibuprofen tablet 400 mg orally every 6 hours prn for pain  --Additional medications will include B52, Zyprexa, trazodone, hydroxyzine     Risks, benefits, and alternatives discussed at length with patient.      Acute Medical Problems and Treatments:  Acute medical concerns:  - No acute medical concerns     Pertinent labs/imaging:  Recent Results             Recent Results (from the past 168 hour(s))   Comprehensive metabolic panel     Collection Time: 04/03/24  2:16 PM   Result Value Ref Range     Sodium 138 135 - 145 mmol/L     Potassium 4.2 3.4 - 5.3 mmol/L     Carbon Dioxide (CO2) 28 22 - 29 mmol/L     Anion Gap 11 7 - 15 mmol/L     Urea Nitrogen 24.5 (H) 6.0 - 20.0 mg/dL     Creatinine 1.18 (H) 0.67 - 1.17 mg/dL     GFR Estimate 84 >60 mL/min/1.73m2     Calcium 9.8 8.6 - 10.0 mg/dL     Chloride 99 98 - 107 mmol/L     Glucose 91 70 - 99 mg/dL     Alkaline Phosphatase 74 40 - 150 U/L      (H) 0 - 45 U/L     ALT 34 0 - 70 U/L     Protein Total 7.8 6.4 - 8.3 g/dL     Albumin 5.0 3.5 - 5.2 g/dL     Bilirubin Total 1.5 (H) <=1.2 mg/dL   CBC with platelets and differential     Collection Time: 04/03/24  2:16 PM   Result Value Ref Range     WBC Count 11.1 (H) 4.0 - 11.0 10e3/uL     RBC Count 4.94 4.40 - 5.90 10e6/uL      Hemoglobin 13.5 13.3 - 17.7 g/dL     Hematocrit 42.7 40.0 - 53.0 %     MCV 86 78 - 100 fL     MCH 27.3 26.5 - 33.0 pg     MCHC 31.6 31.5 - 36.5 g/dL     RDW 13.4 10.0 - 15.0 %     Platelet Count 213 150 - 450 10e3/uL     % Neutrophils 63 %     % Lymphocytes 26 %     % Monocytes 9 %     % Eosinophils 1 %     % Basophils 1 %     % Immature Granulocytes 0 %     NRBCs per 100 WBC 0 <1 /100     Absolute Neutrophils 7.0 1.6 - 8.3 10e3/uL     Absolute Lymphocytes 2.9 0.8 - 5.3 10e3/uL     Absolute Monocytes 0.9 0.0 - 1.3 10e3/uL     Absolute Eosinophils 0.1 0.0 - 0.7 10e3/uL     Absolute Basophils 0.1 0.0 - 0.2 10e3/uL     Absolute Immature Granulocytes 0.0 <=0.4 10e3/uL     Absolute NRBCs 0.0 10e3/uL   Valproic acid     Collection Time: 04/05/24  9:39 PM   Result Value Ref Range     Valproic acid 68.4   ug/mL   Asymptomatic COVID-19 Virus (Coronavirus) by PCR Nasopharyngeal     Collection Time: 04/05/24  9:40 PM     Specimen: Nasopharyngeal; Swab   Result Value Ref Range     SARS CoV2 PCR Negative Negative   Glucose by meter     Collection Time: 04/08/24  8:21 PM   Result Value Ref Range     GLUCOSE BY METER POCT 117 (H) 70 - 99 mg/dL   WBC and Differential     Collection Time: 04/10/24 10:59 AM   Result Value Ref Range     WBC Count 5.7 4.0 - 11.0 10e3/uL     % Neutrophils 51 %     % Lymphocytes 37 %     % Monocytes 9 %     % Eosinophils 2 %     % Basophils 1 %     % Immature Granulocytes 0 %     NRBCs per 100 WBC 0 <1 /100     Absolute Neutrophils 3.0 1.6 - 8.3 10e3/uL     Absolute Lymphocytes 2.1 0.8 - 5.3 10e3/uL     Absolute Monocytes 0.5 0.0 - 1.3 10e3/uL     Absolute Eosinophils 0.1 0.0 - 0.7 10e3/uL     Absolute Basophils 0.0 0.0 - 0.2 10e3/uL     Absolute Immature Granulocytes 0.0 <=0.4 10e3/uL     Absolute NRBCs 0.0 10e3/uL               Behavioral/Psychological/Social:  - Encourage unit programming     Safety  Placed on the following Precautions: Suicide, Assault, Elopement and Sexual precautions  - Continue  precautions as noted above  -  2:1 staff acuity for intrusive, assaultive behaviors  - Status 15 minute checks  - Legal Status: voluntary     Disposition Plan   Reason for ongoing admission: poses an imminent risk to self and poses an imminent risk to others  Discharge location: IRTS facility or   Discharge Medications: not ordered  Follow-up Appointments: not scheduled     Entered by: AMY Yu CNP on 04/11/2024  at 11:48 AM         Attestation:   Patient has been seen and evaluated by me, Phan Sanchez, MICK, AMY CNP, PMHNP-BC  The patient was counseled on nature of illness and treatment plan/options  Care was coordinated with treatment team  Total time > 30 minutes

## 2024-04-15 NOTE — PLAN OF CARE
Pt asleep at start of shift.     Breathing quiet and unlabored when sleeping.     Appears to have slept 4.5 hours.     Pt continues on 2:1 Acuity staffing.      Pt utilized the following PRN meds:    0225  Acetaminophen 650mg     0243  Trazodone 50mg    0320  Chloraseptic lozenge  Benadryl 50mg  Haldol 5mg  Lorazepam 2mg   (See Note)    Pt was aggressive and agitated with wtr in specific. Wtr engaged as little as possible while still providing for patients needs and requests.       Goal Outcome Evaluation:  Problem: Adult Behavioral Health Plan of Care  Goal: Adheres to Safety Considerations for Self and Others  Intervention: Develop and Maintain Individualized Safety Plan  Recent Flowsheet Documentation  Taken 4/15/2024 0000 by Rachel Sin, RN  Safety Measures: (2:1 observation maintained) --  Goal: Absence of New-Onset Illness or Injury  Intervention: Identify and Manage Fall Risk  Recent Flowsheet Documentation  Taken 4/15/2024 0000 by Rachel Sin, RN  Safety Measures: (2:1 observation maintained) --     Problem: Adult Behavioral Health Plan of Care  Goal: Absence of New-Onset Illness or Injury  Intervention: Identify and Manage Fall Risk  Recent Flowsheet Documentation  Taken 4/15/2024 0000 by Rachel Sin, RN  Safety Measures: (2:1 observation maintained) --

## 2024-04-15 NOTE — PLAN OF CARE
BEH IP Unit Acuity Rating Score (UARS)  Patient is given one point for every criteria they meet.    CRITERIA SCORING   On a 72 hour hold, court hold, committed, stay of commitment, or revocation. 0    Patient LOS on BEH unit exceeds 20 days. 0  LOS: 7   Patient under guardianship, 55+, otherwise medically complex, or under age 11. 0   Suicide ideation without relief of precipitating factors. 0   Current plan for suicide. 0   Current plan for homicide. 0   Imminent risk or actual attempt to seriously harm another without relief of factors precipitating the attempt. 1   Severe dysfunction in daily living (ex: complete neglect for self care, extreme disruption in vegetative function, extreme deterioration in social interactions). 1   Recent (last 7 days) or current physical aggression in the ED or on unit. 1   Restraints or seclusion episode in past 72 hours. 0   Recent (last 7 days) or current verbal aggression, agitation, yelling, etc., while in the ED or unit. 1   Active psychosis. 0   Need for constant or near constant redirection (from leaving, from others, etc).  1   Intrusive or disruptive behaviors. 1   Patient requires 3 or more hours of individualized nursing care per 8-hour shift (i.e. for ADLs, meds, therapeutic interventions). 0   TOTAL 6

## 2024-04-16 PROCEDURE — 250N000013 HC RX MED GY IP 250 OP 250 PS 637: Performed by: EMERGENCY MEDICINE

## 2024-04-16 PROCEDURE — 250N000013 HC RX MED GY IP 250 OP 250 PS 637: Performed by: PHYSICIAN ASSISTANT

## 2024-04-16 PROCEDURE — 250N000013 HC RX MED GY IP 250 OP 250 PS 637: Performed by: CLINICAL NURSE SPECIALIST

## 2024-04-16 PROCEDURE — 124N000002 HC R&B MH UMMC

## 2024-04-16 PROCEDURE — 99232 SBSQ HOSP IP/OBS MODERATE 35: CPT | Performed by: CLINICAL NURSE SPECIALIST

## 2024-04-16 PROCEDURE — H2032 ACTIVITY THERAPY, PER 15 MIN: HCPCS

## 2024-04-16 PROCEDURE — 250N000013 HC RX MED GY IP 250 OP 250 PS 637: Performed by: PSYCHIATRY & NEUROLOGY

## 2024-04-16 PROCEDURE — 250N000013 HC RX MED GY IP 250 OP 250 PS 637: Performed by: FAMILY MEDICINE

## 2024-04-16 RX ORDER — LORATADINE 10 MG/1
10 TABLET ORAL DAILY
Status: DISCONTINUED | OUTPATIENT
Start: 2024-04-16 | End: 2024-05-14 | Stop reason: HOSPADM

## 2024-04-16 RX ORDER — HYDROXYZINE HYDROCHLORIDE 50 MG/1
100 TABLET, FILM COATED ORAL 2 TIMES DAILY PRN
Status: DISCONTINUED | OUTPATIENT
Start: 2024-04-16 | End: 2024-05-14 | Stop reason: HOSPADM

## 2024-04-16 RX ORDER — POLYETHYLENE GLYCOL 3350 17 G
4 POWDER IN PACKET (EA) ORAL
Status: DISCONTINUED | OUTPATIENT
Start: 2024-04-16 | End: 2024-05-08

## 2024-04-16 RX ORDER — HYDROXYZINE HYDROCHLORIDE 50 MG/1
100 TABLET, FILM COATED ORAL 2 TIMES DAILY PRN
Status: DISCONTINUED | OUTPATIENT
Start: 2024-04-16 | End: 2024-04-22

## 2024-04-16 RX ORDER — HYDROXYZINE HYDROCHLORIDE 50 MG/1
50 TABLET, FILM COATED ORAL EVERY 6 HOURS PRN
Status: DISCONTINUED | OUTPATIENT
Start: 2024-04-16 | End: 2024-05-14 | Stop reason: HOSPADM

## 2024-04-16 RX ADMIN — GUAIFENESIN 100 MG: 100 SOLUTION ORAL at 03:17

## 2024-04-16 RX ADMIN — ALBUTEROL SULFATE 2 PUFF: 90 AEROSOL, METERED RESPIRATORY (INHALATION) at 10:27

## 2024-04-16 RX ADMIN — DIVALPROEX SODIUM 500 MG: 500 TABLET, FILM COATED, EXTENDED RELEASE ORAL at 08:51

## 2024-04-16 RX ADMIN — NICOTINE 1 PATCH: 21 PATCH, EXTENDED RELEASE TRANSDERMAL at 10:15

## 2024-04-16 RX ADMIN — Medication 900 MG: at 08:52

## 2024-04-16 RX ADMIN — OLANZAPINE 10 MG: 10 TABLET, FILM COATED ORAL at 03:17

## 2024-04-16 RX ADMIN — ATORVASTATIN CALCIUM 40 MG: 40 TABLET, FILM COATED ORAL at 08:51

## 2024-04-16 RX ADMIN — PANTOPRAZOLE SODIUM 40 MG: 40 TABLET, DELAYED RELEASE ORAL at 08:51

## 2024-04-16 RX ADMIN — ACETAMINOPHEN 650 MG: 325 TABLET, FILM COATED ORAL at 05:12

## 2024-04-16 RX ADMIN — POLYETHYLENE GLYCOL 3350 17 G: 17 POWDER, FOR SOLUTION ORAL at 08:52

## 2024-04-16 RX ADMIN — HYDROCHLOROTHIAZIDE 25 MG: 25 TABLET ORAL at 08:52

## 2024-04-16 RX ADMIN — LORATADINE 10 MG: 10 TABLET ORAL at 11:50

## 2024-04-16 RX ADMIN — QUETIAPINE 100 MG: 100 TABLET ORAL at 19:14

## 2024-04-16 RX ADMIN — NICOTINE POLACRILEX 2 MG: 2 LOZENGE ORAL at 12:21

## 2024-04-16 RX ADMIN — NICOTINE POLACRILEX 2 MG: 2 LOZENGE ORAL at 05:12

## 2024-04-16 RX ADMIN — CLOZAPINE 50 MG: 50 TABLET ORAL at 08:51

## 2024-04-16 RX ADMIN — FLUTICASONE PROPIONATE 1 SPRAY: 50 SPRAY, METERED NASAL at 10:16

## 2024-04-16 RX ADMIN — Medication 1 LOZENGE: at 05:18

## 2024-04-16 RX ADMIN — GUAIFENESIN 100 MG: 100 SOLUTION ORAL at 09:15

## 2024-04-16 RX ADMIN — HYDROXYZINE HYDROCHLORIDE 100 MG: 50 TABLET, FILM COATED ORAL at 05:13

## 2024-04-16 RX ADMIN — CLOZAPINE 200 MG: 200 TABLET ORAL at 19:14

## 2024-04-16 ASSESSMENT — ACTIVITIES OF DAILY LIVING (ADL)
ADLS_ACUITY_SCORE: 28
HYGIENE/GROOMING: INDEPENDENT
ADLS_ACUITY_SCORE: 28
DRESS: INDEPENDENT
ADLS_ACUITY_SCORE: 28
ORAL_HYGIENE: INDEPENDENT
ADLS_ACUITY_SCORE: 28
LAUNDRY: UNABLE TO COMPLETE
ADLS_ACUITY_SCORE: 28

## 2024-04-16 NOTE — PLAN OF CARE
BEH IP Unit Acuity Rating Score (UARS)  Patient is given one point for every criteria they meet.    CRITERIA SCORING   On a 72 hour hold, court hold, committed, stay of commitment, or revocation. 0    Patient LOS on BEH unit exceeds 20 days. 0  LOS: 8   Patient under guardianship, 55+, otherwise medically complex, or under age 11. 0   Suicide ideation without relief of precipitating factors. 0   Current plan for suicide. 0   Current plan for homicide. 0   Imminent risk or actual attempt to seriously harm another without relief of factors precipitating the attempt. 1   Severe dysfunction in daily living (ex: complete neglect for self care, extreme disruption in vegetative function, extreme deterioration in social interactions). 1   Recent (last 7 days) or current physical aggression in the ED or on unit. 1   Restraints or seclusion episode in past 72 hours. 0   Recent (last 7 days) or current verbal aggression, agitation, yelling, etc., while in the ED or unit. 1   Active psychosis. 0   Need for constant or near constant redirection (from leaving, from others, etc).  1   Intrusive or disruptive behaviors. 1   Patient requires 3 or more hours of individualized nursing care per 8-hour shift (i.e. for ADLs, meds, therapeutic interventions). 0   TOTAL 6

## 2024-04-16 NOTE — PROGRESS NOTES
"The patient's care was discussed with the treatment team during the daily team meeting and/or staff's chart notes were reviewed. Staff report slept 3 hours. Removed his shirt, pacing around, talking loud, tangential, flight of idea, difficult to redirect. Per evening staff: \"Can you print out pictures of these gods for me?\"  \"Hell-fuck, where's the speaker at? What do you mean they're using them over there?\" (the other pod.) \"It's for ME.\"  \"My shirt? But those bitches love it.\" (Referring to psych assoc.)  \"Where's my ensure?\" (Had nutrition consult confirming does not need it and BMI>32.)  \"I can get whatever the fuck I want.\"  \"Wow, fucking niggers.\"  \"What thing is he?\" Asking about a psych associate wearing make-up and a winter knitted hat.  \"I'm gonna fuck you up for real now.\"  \"You know 100 mg of Seroquel won't do much, don't you? I'm a big irma; I need more of everything.\"     Mostly loud, hyperverbal, pressured, with casual use of profanities. Labile mood and affect. Remains flirtateous with females, challenging with males. Lascivious manner with decreased boundaries. Removed his shirt and refused to put it back on.     Exaggerated self-esteem, hypersexuality continue. Some flight of ideas. Impaired insight and judgement. Some crowing, yelling, singing. Impaired ability to delay gratification. Ego-centric and concrete, attention and reaction-seeking.     Kept turning up the speaker in spite of requests to use a reasonable volume no one need shout over. Speaker was moved into nurses station- he consequently threatened once, then backed off.      Continues collecting piles of everything in his room: linens, condiments, food items, cups... .      Systolic BP elevated: 163, re-check WNL     EKG today showed possible anterior infarct. Medical clearance done. Still is supposed to have MOCA , watch ECT DVD.    Maintenance ECT continues 4/17 (so should not have Depakote or Gabapentin evening before)      CD consult " "still due. No cough but asking for cough medicine.     Had prn Zyprexa 10 mg and Trazodone 50 mg at 20:17 with slight effect. Received another Trazdone 50 mg at 22:40 along with 100 mg hyroxyzine  again with some relief.      Upon interview, patient was interviewed in his room, patient appears calm, cooperative, polite. Patient  requested for his medical record print out, requested for Claritin for allergy, itchy eyes and scratchy skin. Patient requests this writer call his mother and tell her \"no drug in my system except marijuana.\" Patient talked about his upbringing, when he was brought from [facility] and when he went back to [facility], where he schooled etc. Patient inform writer where he lived in [facility] and his state of [place], patient then started boasting \"I am the dale of [facility], then jumps to wanting to finish his album, started talking about prominent artists and musicians in [facility] and [place] [facility] [place]. Patient also talked about his family, about his father and mother ancestral linage-prominent name in [facility]. Patient then resulted to talking about his 3 phones in his luggage that he needed to use for his school work and and his music. Discussed CD assessment with patient and he is amenable to being assessed and going to CD treatment. Patient was informed that now that he is becoming clearer, would be able to participate in assessment with the possibility of considering him for treatment becoming higher. Patient feels he is ready for the assessment today and was reassured the consult will be ordered today. Patient requests to participate in groups, understanding this is therapeutic for him, especially painting. Patient reports that his medications are working with no side effects, writer explained why we stated the Seroquel with 100 mg to start titrating upward and patient demonstrates understanding.  Patient denies A/VH, SI/HI, and thoughts of self-harm.               Medications:      Inpatient " Administered Meds                     Current Facility-Administered Medications   Medication Dose Route Frequency Provider Last Rate Last Admin    atorvastatin (LIPITOR) tablet 40 mg  40 mg Oral Daily Jaswinder Claros MD   40 mg at 04/10/24 0818    cloZAPine (CLOZARIL) tablet 100 mg  100 mg Oral At Bedtime Jaswinder Claros MD   100 mg at 04/09/24 1848    cloZAPine (CLOZARIL) tablet 50 mg  50 mg Oral Daily Jaswinder Claros MD   50 mg at 04/10/24 0818    divalproex sodium extended-release (DEPAKOTE ER) 24 hr tablet 500 mg  500 mg Oral BID Jaswinder Claros MD   500 mg at 04/10/24 0818    gabapentin (NEURONTIN) tablet 600 mg  600 mg Oral BID Jaswinder Claros MD   600 mg at 04/10/24 0818    hydrochlorothiazide (HYDRODIURIL) tablet 25 mg  25 mg Oral Daily Jaswinder Claros MD   25 mg at 04/10/24 0818    nicotine (NICODERM CQ) 21 MG/24HR 24 hr patch 1 patch  1 patch Transdermal Daily Xavi Medeiros MD   1 patch at 04/10/24 0821              Allergies:      Allergies             Allergies   Allergen Reactions    Haloperidol Confusion and Other (See Comments)       Prone to EPSE. COnsider IM Zyprexa or be sure to give with benadryl              Labs:      Recent Results             Recent Results (from the past 24 hour(s))   WBC and Differential     Collection Time: 04/10/24 10:59 AM   Result Value Ref Range     WBC Count 5.7 4.0 - 11.0 10e3/uL     % Neutrophils 51 %     % Lymphocytes 37 %     % Monocytes 9 %     % Eosinophils 2 %     % Basophils 1 %     % Immature Granulocytes 0 %     NRBCs per 100 WBC 0 <1 /100     Absolute Neutrophils 3.0 1.6 - 8.3 10e3/uL     Absolute Lymphocytes 2.1 0.8 - 5.3 10e3/uL     Absolute Monocytes 0.5 0.0 - 1.3 10e3/uL     Absolute Eosinophils 0.1 0.0 - 0.7 10e3/uL     Absolute Basophils 0.0 0.0 - 0.2 10e3/uL     Absolute Immature Granulocytes 0.0 <=0.4 10e3/uL     Absolute NRBCs 0.0 10e3/uL              Psychiatric Examination:       /84 (BP Location: Left arm, Patient Position: Sitting, Cuff Size: Adult Regular)   Pulse 94   Temp 97.8  F (36.6  C) (Oral)   Resp 12   Ht 1.829 m (6')   Wt 107.1 kg (236 lb 1.6 oz)   SpO2 98%   BMI 32.02 kg/m    Weight is 236 lbs 1.6 oz  Body mass index is 32.02 kg/m .     Weight over time:          Vitals:     04/03/24 1151 04/09/24 0826   Weight: 108 kg (238 lb) 107.1 kg (236 lb 1.6 oz)         Orthostatic Vitals           Most Recent       Sitting Orthostatic /85 04/09 0826     Sitting Orthostatic Pulse (bpm) 73 04/09 0826     Standing Orthostatic /92 04/09 0826     Standing Orthostatic Pulse (bpm) 69 04/09 0826                   Cardiometabolic risk assessment. 04/10/24        Reviewed patient profile for cardiometabolic risk factors     Date taken /Value  REFERENCE RANGE   Abdominal Obesity  (Waist Circumference)   See nursing flowsheet Women ?35 in (88 cm)   Men ?40 in (102 cm)       Triglycerides                Triglycerides   Date Value Ref Range Status   02/26/2013 71 0 - 150 mg/dL Final       Comment:       Fasting specimen         ?150 mg/dL (1.7 mmol/L) or current treatment for elevated triglycerides   HDL cholesterol            HDL Cholesterol   Date Value Ref Range Status   02/26/2013 45 40 - 110 mg/dL Final   ]    Women <50 mg/dL (1.3 mmol/L) in women or current treatment for low HDL cholesterol  Men <40 mg/dL (1 mmol/L) in men or current treatment for low HDL cholesterol      Fasting plasma glucose (FPG)           Lab Results   Component Value Date      04/08/2024     GLC 83 02/26/2013        FPG ?100 mg/dL (5.6 mmol/L) or treatment for elevated blood glucose   Blood pressure        BP Readings from Last 3 Encounters:   04/08/24 127/84   02/26/13 129/74    Blood pressure ?130/85 mmHg or treatment for elevated blood pressure   Family History  See family history      Mental Status Exam:  Appearance: awake, alert, had his shirt off, only had pants on and appeared as  age stated  Attitude:  cooperative  Eye Contact:  good  Mood:  Expansive,   Affect:  Labile, intensity is heightened, and intensity is dramatic  Speech:  pressured speech, getting less rambling, clearer  Language: fluent and intact in English  Psychomotor, Gait, Musculoskeletal:  no evidence of tardive dyskinesia, dystonia, or tics  Throught Process:  disorganized, illogical, tangential, flight of ideas.  Associations:  Loosening of associations  Thought Content:  no evidence of suicidal ideation or homicidal ideation, no auditory hallucinations present, and no visual hallucinations present  Insight: Fair  Judgement: fair  Oriented to:  time, person, and place  Attention Span and Concentration: Easily distracted  Recent and Remote Memory:  fair  Fund of Knowledge:  low-normal             Precautions:                Behavioral Orders   Procedures    Assault precautions    Code 1 - Restrict to Unit    Elopement precautions    [facility]       Until discharge, Extended Care to offer psychotherapeutic services to mental health patients boarding for admission or stabilization. These services are to include but are not limited to: individual psychotherapy, diagnostic assessment, case management and care planning, safety planning, etc. This may include up to 1 visit per day. If patient is physically located at [facility] or [facility], group psychotherapy up to 2 time per day may be offered.     [facility]       Until discharge, Extended Care to offer psychotherapeutic services to mental health patients boarding for admission or stabilization. These services are to include but are not limited to: individual psychotherapy, diagnostic assessment, case management and care planning, safety planning, etc. This may include up to 1 visit per day. If patient is physically located at [facility] or [facility], group psychotherapy up to 2 time per day may be offered.     Routine Programming       As clinically indicated    Sexual  precautions    Status 15       Every 15 minutes.    Status Individual Observation       Patient SIO status reviewed with team/RN.  Please also refer to RN/team documentation for add'l detail.     -2:1 SIO staff to monitor following which have contributed to patient being on SIO: at least 1 male staff     Assault risk     -Possible interventions SIO staff could use to support patient's treatment progress:  Verbal de-escalation/ Medication administration     -When following observed, team will review discontinuation of SIO:     When patient is not longer aggressive, sexually inappropriate or intrusive.       Order Specific Question:   CONTINUOUS 24 hours / day       Answer:   Other       Order Specific Question:   Specify distance       Answer:   10 foot       Order Specific Question:   Indications for SIO       Answer:   Assault risk       Order Specific Question:   Indications for SIO       Answer:   Severe intrusiveness    Suicide precautions: Suicide Risk: HIGH; Clinical rationale to override score: response to medication       Patients on Suicide Precautions should have a Combination Diet ordered that includes a Diet selection(s) AND a Behavioral Tray selection for Safe Tray - with utensils, or Safe Tray - NO utensils          Order Specific Question:   Suicide Risk       Answer:   HIGH       Order Specific Question:   Clinical rationale to override score:       Answer:   response to medication           Diagnoses:         Schizoaffective disorder, bipolar type (H)  Polysubstance abuse (H)  Agitation     Clinically Significant Risk Factors                          # Obesity: Estimated body mass index is 32.02 kg/m  as calculated from the following:    Height as of this encounter: 1.829 m (6').    Weight as of this encounter: 107.1 kg (236 lb 1.6 oz)., PRESENT ON ADMISSION     # Financial/Environmental Concerns: other (see comments) (lost IRTS housing)     # Housing Instability: noted in nursing assessment           Assessment & Plan:      Assessment and hospital summary:  Ward Dawson is a -34- old male with a previous diagnosis of Schizoaffective disorder bipolar type who presented to the ED for a significant behavioral change, verbal agitation, worsening psychosocial stress, in the context of substance use.  Factors that make the mental health crisis life threatening or complex are:  [facility] at [facility].  Patient states he does not know why he is here and also states he knows why he is here.  He presents as manic, disorganized, and confused.   He is distractable and not a clear historian at this time.  Patient states he has not slept in a long time and received narcan last night.  Per ACT team  and [facility] staff patient has been elevated, intrusive, and disruptive.  Patient broke a window yesterday.  [facility] reports patient had turned table over and tried using them as skate board ramps.  Patient was noted for have been fairly stable prior to his pass this weekend.  CM reports patient went to a skateboarding competition in [place].  Patient returned in a manic state.  CM reports pt may not appear as manic as he has been the past few days due to sedation and ECT this morning.  She reports patient has not slept in 3 days.  [facility] reports pt received narcan twice last night.  Patient has been trespassed from the IRTS facility.  S is postive for cannabis..         Today's Changes:4/16/24  No changes made  Claritin 10 mg oral daily.     Target psychiatric symptoms and interventions:  Admitted on 4/9/24 to station 12N  Clozaril, 50 mg every morning and 200 mg at bedtime  --Depakote ER, 500 mg daily and 1000 mg at bedtime for mood stabilization   --Gabapentin, 900 mg twice a day for pain  - Ibuprofen tablet 400 mg orally every 6 hours prn for pain  --Additional medications will include B52, Zyprexa, trazodone, hydroxyzine     Risks, benefits,  and alternatives discussed at length with patient.      Acute Medical Problems and Treatments:  Acute medical concerns:  - No acute medical concerns     Pertinent labs/imaging:  Recent Results             Recent Results (from the past 168 hour(s))   Comprehensive metabolic panel     Collection Time: 04/03/24  2:16 PM   Result Value Ref Range     Sodium 138 135 - 145 mmol/L     Potassium 4.2 3.4 - 5.3 mmol/L     Carbon Dioxide (CO2) 28 22 - 29 mmol/L     Anion Gap 11 7 - 15 mmol/L     Urea Nitrogen 24.5 (H) 6.0 - 20.0 mg/dL     Creatinine 1.18 (H) 0.67 - 1.17 mg/dL     GFR Estimate 84 >60 mL/min/1.73m2     Calcium 9.8 8.6 - 10.0 mg/dL     Chloride 99 98 - 107 mmol/L     Glucose 91 70 - 99 mg/dL     Alkaline Phosphatase 74 40 - 150 U/L      (H) 0 - 45 U/L     ALT 34 0 - 70 U/L     Protein Total 7.8 6.4 - 8.3 g/dL     Albumin 5.0 3.5 - 5.2 g/dL     Bilirubin Total 1.5 (H) <=1.2 mg/dL   CBC with platelets and differential     Collection Time: 04/03/24  2:16 PM   Result Value Ref Range     WBC Count 11.1 (H) 4.0 - 11.0 10e3/uL     RBC Count 4.94 4.40 - 5.90 10e6/uL     Hemoglobin 13.5 13.3 - 17.7 g/dL     Hematocrit 42.7 40.0 - 53.0 %     MCV 86 78 - 100 fL     MCH 27.3 26.5 - 33.0 pg     MCHC 31.6 31.5 - 36.5 g/dL     RDW 13.4 10.0 - 15.0 %     Platelet Count 213 150 - 450 10e3/uL     % Neutrophils 63 %     % Lymphocytes 26 %     % Monocytes 9 %     % Eosinophils 1 %     % Basophils 1 %     % Immature Granulocytes 0 %     NRBCs per 100 WBC 0 <1 /100     Absolute Neutrophils 7.0 1.6 - 8.3 10e3/uL     Absolute Lymphocytes 2.9 0.8 - 5.3 10e3/uL     Absolute Monocytes 0.9 0.0 - 1.3 10e3/uL     Absolute Eosinophils 0.1 0.0 - 0.7 10e3/uL     Absolute Basophils 0.1 0.0 - 0.2 10e3/uL     Absolute Immature Granulocytes 0.0 <=0.4 10e3/uL     Absolute NRBCs 0.0 10e3/uL   Valproic acid     Collection Time: 04/05/24  9:39 PM   Result Value Ref Range     Valproic acid 68.4   ug/mL   Asymptomatic COVID-19 Virus (Coronavirus) by  PCR Nasopharyngeal     Collection Time: 04/05/24  9:40 PM     Specimen: Nasopharyngeal; Swab   Result Value Ref Range     SARS CoV2 PCR Negative Negative   Glucose by meter     Collection Time: 04/08/24  8:21 PM   Result Value Ref Range     GLUCOSE BY METER POCT 117 (H) 70 - 99 mg/dL   WBC and Differential     Collection Time: 04/10/24 10:59 AM   Result Value Ref Range     WBC Count 5.7 4.0 - 11.0 10e3/uL     % Neutrophils 51 %     % Lymphocytes 37 %     % Monocytes 9 %     % Eosinophils 2 %     % Basophils 1 %     % Immature Granulocytes 0 %     NRBCs per 100 WBC 0 <1 /100     Absolute Neutrophils 3.0 1.6 - 8.3 10e3/uL     Absolute Lymphocytes 2.1 0.8 - 5.3 10e3/uL     Absolute Monocytes 0.5 0.0 - 1.3 10e3/uL     Absolute Eosinophils 0.1 0.0 - 0.7 10e3/uL     Absolute Basophils 0.0 0.0 - 0.2 10e3/uL     Absolute Immature Granulocytes 0.0 <=0.4 10e3/uL     Absolute NRBCs 0.0 10e3/uL               Behavioral/Psychological/Social:  - Encourage unit programming     Safety  Placed on the following Precautions: Suicide, Assault, Elopement and Sexual precautions  - Continue precautions as noted above  -  2:1 staff acuity for intrusive, assaultive behaviors  - Status 15 minute checks  - Legal Status: voluntary     Disposition Plan   Reason for ongoing admission: poses an imminent risk to self and poses an imminent risk to others  Discharge location: IRTS facility or CD  Discharge Medications: not ordered  Follow-up Appointments: not scheduled     Entered by: AMY Yu CNP on 04/16/2024  at 12:50 PM         Attestation:   Patient has been seen and evaluated by Phan gill, MICK, AMY CNP, PMHNP-BC  The patient was counseled on nature of illness and treatment plan/options  Care was coordinated with treatment team  Total time > 30 minutes

## 2024-04-16 NOTE — PROGRESS NOTES
"Pt participated in dance/movement therapy using both verbal and nonverbal interventions. He described his body as \"feeling numb\" but he was able to explain variations in tension, range of movement and ability to enjoy the movement.  For example, he noted differences between the range of motion he was able to do comfortably with each ankle.  He described physical pain in his knees, neck and shoulders that limited his range of dance expression.      Pt was able to do reciprocal movements with a ball and initiate movements with this therapist and unit staff in a team-building manner.  He lacked some insight about the creative and communicative expression of his own movement and explained to this therapist that it was confusing for him to reflect on his own somatic experience.  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      Pt was cooperative and pleasant, requesting more therapies like this that use music.  He used friendly bridging motion to conclude the session with this therapist, sharing a hope for another similar session in the near future.       04/16/24 7803   Expressive Therapy   Therapy Type dance/movement   Minutes of Treatment 45       "

## 2024-04-16 NOTE — PLAN OF CARE
Team Note Due:  Friday    Assessment/Intervention/Current Symtoms and Care Coordination:  Chart review and met with team, discussed pt progress, symptomology, and response to treatment.  Discussed the discharge plan and any potential impediments to discharge.    Met with Cristel to discuss next steps. [facility] will plan on doing consult tomorrow. Throughout this conversation he was respectful with an organized thought process, speech was coherent. He sat with writer and calmly discussed his goals. He understood the purpose of the assessment. [place]. He is tolerating hospitalization well, stating that he is fine with being here until a safe discharge plan is in place. Writer encouraged him to be open and honest about his substance use during CD assessment, he agreed. He asked if provider had spoken to his mom yet, writer did not know. He stated he felt it was important that provider speak to his mom, despite challenging interpersonal dynamics between him and his mom.      Discharge Plan or Goal:  When patient's symptoms have reduced and a safe discharge can be facilitated, options include: Residential treatment, IRTS, shelter      Barriers to Discharge:  Ward presents with symptoms of evens that put him at risk of an unsafe discharge      Referral Status:  Referral needs pending as patient stabilizes      Legal Status:  Voluntary    Contacts:  [facility]: [phone] (Consent)  ACT : Heidy Pop, [phone] (WAN)  ACT : Yamila [phone] (WAN)  ACT Psychiatrist: Camron Waston MD, [phone] (WAN)  Mom: Candis, [phone] (Consent)  Friend: Nabila Peters, [phone] (WAN)     Upcoming Meetings and Dates/Important Information and next steps:  Symptom stabilization

## 2024-04-16 NOTE — PLAN OF CARE
Problem: Manic Behavior Episode  Goal: Decreased Manic Symptoms  Outcome: Progressing   Goal Outcome Evaluation:    Pt awake at beginning of this shift. Went to bed on his own around midnight. Slept undisturbed until 0230.     Pt became chatty, tangential, jumping from topic to topic, inappropriately laughing. Took his shirt off at one point. Pacing around talking, becoming loud. Verbal de escalation, snacks and distraction not successful.     Prn zyprexa given as he was getting agitated.  Settled down for about an hour before he started again.       Total prns given this shift:    \Zyprexa for agitation  tylenol for generalized body aches,  throat lozenge for scratchy throat  cough syrup  Hydroxyzine for anxiety    Finally settled down in bed with assist of PA who tucked him in bed around 0545. Slept approximately 3 hours.
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
## 2024-04-16 NOTE — PLAN OF CARE
Patient continues on 2:1 acuity staffing to manage his intrusive and aggressive behaviors.  He remains manic appearing.  Abdullahi slept poorly last night with only 3 hours of sleep noted during the NOC shift of 4/16/24; he slept for the first couple hours of this day shift, and he took his scheduled AM medications without incident at 08:51.  He did not make any overtly paranoid or delusional statements today.  He remains elated and grandiose.  He was less labile and demanding today when compared to his presentation upon transfer to [address].  His frustration tolerance appears to be improving; he was more receptive to limit setting today.  Karen maintained good behavioral control with no episodes of severe agitation or physically aggressive behavior noted this shift.   He remains loud and pressured with frequent use of profanity and hypersexual, graphic content.   He started on Loratadine today, and RN writer provided patient education on the anticipated therapeutic and potential adverse SE's associated with this medication.  He affirmed an understanding of this teaching.  He has requested PRN medications for various somatic complaints this shift: Robitussin at 09:15.  albuterol inhaler at 10:27.  Apart from this, he denies any SE's or acute physical concerns at this time.  Asymptomatic HTN noted upon vital signs assessment this AM (152/82) and prior to taking his AM dose of hydrochlorothiazide. BP (!) 152/82   Pulse 76   Temp 97.1  F (36.2  C) (Temporal)   Resp 16   Ht 1.829 m (6')   Wt 107.1 kg (236 lb 1.6 oz)   SpO2 98%   BMI 32.02 kg/m

## 2024-04-17 ENCOUNTER — ANESTHESIA EVENT (OUTPATIENT)
Dept: BEHAVIORAL HEALTH | Facility: CLINIC | Age: 34
DRG: 885 | End: 2024-04-17
Payer: MEDICARE

## 2024-04-17 ENCOUNTER — APPOINTMENT (OUTPATIENT)
Dept: BEHAVIORAL HEALTH | Facility: CLINIC | Age: 34
DRG: 885 | End: 2024-04-17
Attending: CLINICAL NURSE SPECIALIST
Payer: MEDICARE

## 2024-04-17 ENCOUNTER — ANESTHESIA (OUTPATIENT)
Dept: BEHAVIORAL HEALTH | Facility: CLINIC | Age: 34
DRG: 885 | End: 2024-04-17
Payer: MEDICARE

## 2024-04-17 LAB
ATRIAL RATE - MUSE: 85 BPM
BASOPHILS # BLD AUTO: 0 10E3/UL (ref 0–0.2)
BASOPHILS NFR BLD AUTO: 1 %
DIASTOLIC BLOOD PRESSURE - MUSE: NORMAL MMHG
EOSINOPHIL # BLD AUTO: 0 10E3/UL (ref 0–0.7)
EOSINOPHIL NFR BLD AUTO: 1 %
HOLD SPECIMEN: NORMAL
IMM GRANULOCYTES # BLD: 0 10E3/UL
IMM GRANULOCYTES NFR BLD: 0 %
INTERPRETATION ECG - MUSE: NORMAL
LYMPHOCYTES # BLD AUTO: 1.4 10E3/UL (ref 0.8–5.3)
LYMPHOCYTES NFR BLD AUTO: 25 %
MONOCYTES # BLD AUTO: 0.3 10E3/UL (ref 0–1.3)
MONOCYTES NFR BLD AUTO: 5 %
NEUTROPHILS # BLD AUTO: 3.8 10E3/UL (ref 1.6–8.3)
NEUTROPHILS NFR BLD AUTO: 68 %
NRBC # BLD AUTO: 0 10E3/UL
NRBC BLD AUTO-RTO: 0 /100
P AXIS - MUSE: 73 DEGREES
PR INTERVAL - MUSE: 152 MS
QRS DURATION - MUSE: 94 MS
QT - MUSE: 378 MS
QTC - MUSE: 449 MS
R AXIS - MUSE: 1 DEGREES
SYSTOLIC BLOOD PRESSURE - MUSE: NORMAL MMHG
T AXIS - MUSE: 29 DEGREES
VENTRICULAR RATE- MUSE: 85 BPM
WBC # BLD AUTO: 5.6 10E3/UL (ref 4–11)

## 2024-04-17 PROCEDURE — 250N000013 HC RX MED GY IP 250 OP 250 PS 637: Performed by: PHYSICIAN ASSISTANT

## 2024-04-17 PROCEDURE — 370N000017 HC ANESTHESIA TECHNICAL FEE, PER MIN

## 2024-04-17 PROCEDURE — 36415 COLL VENOUS BLD VENIPUNCTURE: CPT | Performed by: FAMILY MEDICINE

## 2024-04-17 PROCEDURE — 250N000013 HC RX MED GY IP 250 OP 250 PS 637: Performed by: CLINICAL NURSE SPECIALIST

## 2024-04-17 PROCEDURE — 250N000009 HC RX 250: Performed by: CLINICAL NURSE SPECIALIST

## 2024-04-17 PROCEDURE — 90870 ELECTROCONVULSIVE THERAPY: CPT

## 2024-04-17 PROCEDURE — 90870 ELECTROCONVULSIVE THERAPY: CPT | Performed by: ANESTHESIOLOGY

## 2024-04-17 PROCEDURE — 90870 ELECTROCONVULSIVE THERAPY: CPT | Performed by: PSYCHIATRY & NEUROLOGY

## 2024-04-17 PROCEDURE — 250N000013 HC RX MED GY IP 250 OP 250 PS 637: Performed by: EMERGENCY MEDICINE

## 2024-04-17 PROCEDURE — C7901 HC HOPD MH 30-60 MINS: HCPCS | Performed by: COUNSELOR

## 2024-04-17 PROCEDURE — 250N000013 HC RX MED GY IP 250 OP 250 PS 637: Performed by: PSYCHIATRY & NEUROLOGY

## 2024-04-17 PROCEDURE — 90870 ELECTROCONVULSIVE THERAPY: CPT | Performed by: NURSE ANESTHETIST, CERTIFIED REGISTERED

## 2024-04-17 PROCEDURE — 99232 SBSQ HOSP IP/OBS MODERATE 35: CPT | Performed by: CLINICAL NURSE SPECIALIST

## 2024-04-17 PROCEDURE — 124N000002 HC R&B MH UMMC

## 2024-04-17 PROCEDURE — 250N000013 HC RX MED GY IP 250 OP 250 PS 637: Performed by: FAMILY MEDICINE

## 2024-04-17 PROCEDURE — 250N000011 HC RX IP 250 OP 636: Performed by: ANESTHESIOLOGY

## 2024-04-17 PROCEDURE — 85048 AUTOMATED LEUKOCYTE COUNT: CPT | Performed by: FAMILY MEDICINE

## 2024-04-17 PROCEDURE — 250N000009 HC RX 250: Performed by: ANESTHESIOLOGY

## 2024-04-17 RX ORDER — NALOXONE HYDROCHLORIDE 0.4 MG/ML
0.1 INJECTION, SOLUTION INTRAMUSCULAR; INTRAVENOUS; SUBCUTANEOUS
Status: CANCELLED | OUTPATIENT
Start: 2024-04-17

## 2024-04-17 RX ORDER — ONDANSETRON 2 MG/ML
4 INJECTION INTRAMUSCULAR; INTRAVENOUS EVERY 30 MIN PRN
Status: CANCELLED | OUTPATIENT
Start: 2024-04-17

## 2024-04-17 RX ORDER — SODIUM CHLORIDE, SODIUM LACTATE, POTASSIUM CHLORIDE, CALCIUM CHLORIDE 600; 310; 30; 20 MG/100ML; MG/100ML; MG/100ML; MG/100ML
INJECTION, SOLUTION INTRAVENOUS CONTINUOUS
Status: CANCELLED | OUTPATIENT
Start: 2024-04-17

## 2024-04-17 RX ORDER — ACETAMINOPHEN 160 MG
TABLET,DISINTEGRATING ORAL 2 TIMES DAILY PRN
Status: DISCONTINUED | OUTPATIENT
Start: 2024-04-17 | End: 2024-04-17

## 2024-04-17 RX ORDER — ACETAMINOPHEN 160 MG
TABLET,DISINTEGRATING ORAL 2 TIMES DAILY PRN
Status: ACTIVE | OUTPATIENT
Start: 2024-04-17 | End: 2024-04-22

## 2024-04-17 RX ORDER — METHOHEXITAL IN WATER/PF 100MG/10ML
SYRINGE (ML) INTRAVENOUS PRN
Status: DISCONTINUED | OUTPATIENT
Start: 2024-04-17 | End: 2024-04-17

## 2024-04-17 RX ORDER — ONDANSETRON 4 MG/1
4 TABLET, ORALLY DISINTEGRATING ORAL EVERY 30 MIN PRN
Status: CANCELLED | OUTPATIENT
Start: 2024-04-17

## 2024-04-17 RX ORDER — CAFFEINE AND SODIUM BENZOATE 125 MG/ML
INJECTION, SOLUTION INTRAMUSCULAR; INTRAVENOUS PRN
Status: DISCONTINUED | OUTPATIENT
Start: 2024-04-17 | End: 2024-04-17

## 2024-04-17 RX ADMIN — Medication 900 MG: at 19:09

## 2024-04-17 RX ADMIN — HYDROCHLOROTHIAZIDE 25 MG: 25 TABLET ORAL at 09:23

## 2024-04-17 RX ADMIN — ATORVASTATIN CALCIUM 40 MG: 40 TABLET, FILM COATED ORAL at 09:23

## 2024-04-17 RX ADMIN — Medication 900 MG: at 09:23

## 2024-04-17 RX ADMIN — DIVALPROEX SODIUM 500 MG: 500 TABLET, FILM COATED, EXTENDED RELEASE ORAL at 09:23

## 2024-04-17 RX ADMIN — HYDROGEN PEROXIDE 3 APPLICATOR: 3 SOLUTION TOPICAL at 17:29

## 2024-04-17 RX ADMIN — SUCCINYLCHOLINE CHLORIDE 60 MG: 20 INJECTION, SOLUTION INTRAMUSCULAR; INTRAVENOUS; PARENTERAL at 08:27

## 2024-04-17 RX ADMIN — DIVALPROEX SODIUM 1000 MG: 500 TABLET, FILM COATED, EXTENDED RELEASE ORAL at 19:09

## 2024-04-17 RX ADMIN — NICOTINE 1 PATCH: 21 PATCH, EXTENDED RELEASE TRANSDERMAL at 09:22

## 2024-04-17 RX ADMIN — GUAIFENESIN 100 MG: 100 SOLUTION ORAL at 14:27

## 2024-04-17 RX ADMIN — Medication 1 LOZENGE: at 14:27

## 2024-04-17 RX ADMIN — FLUTICASONE PROPIONATE 1 SPRAY: 50 SPRAY, METERED NASAL at 09:24

## 2024-04-17 RX ADMIN — CLOZAPINE 200 MG: 200 TABLET ORAL at 19:09

## 2024-04-17 RX ADMIN — PANTOPRAZOLE SODIUM 40 MG: 40 TABLET, DELAYED RELEASE ORAL at 09:23

## 2024-04-17 RX ADMIN — HYDROXYZINE HYDROCHLORIDE 100 MG: 50 TABLET, FILM COATED ORAL at 19:09

## 2024-04-17 RX ADMIN — CAFFEINE AND SODIUM BENZOATE 500 MG: 125 INJECTION, SOLUTION INTRAMUSCULAR; INTRAVENOUS at 08:27

## 2024-04-17 RX ADMIN — LORATADINE 10 MG: 10 TABLET ORAL at 09:23

## 2024-04-17 RX ADMIN — CLOZAPINE 50 MG: 50 TABLET ORAL at 09:23

## 2024-04-17 RX ADMIN — Medication 120 MG: at 08:26

## 2024-04-17 RX ADMIN — QUETIAPINE 100 MG: 100 TABLET ORAL at 19:09

## 2024-04-17 RX ADMIN — Medication 5 MG: at 21:57

## 2024-04-17 RX ADMIN — POLYETHYLENE GLYCOL 3350 17 G: 17 POWDER, FOR SOLUTION ORAL at 09:21

## 2024-04-17 ASSESSMENT — ACTIVITIES OF DAILY LIVING (ADL)
ADLS_ACUITY_SCORE: 28
HYGIENE/GROOMING: SHOWER;INDEPENDENT
ADLS_ACUITY_SCORE: 28
DRESS: SCRUBS (BEHAVIORAL HEALTH);INDEPENDENT
ADLS_ACUITY_SCORE: 28
HYGIENE/GROOMING: INDEPENDENT
DRESS: SCRUBS (BEHAVIORAL HEALTH);INDEPENDENT
ADLS_ACUITY_SCORE: 28
ADLS_ACUITY_SCORE: 28
ORAL_HYGIENE: INDEPENDENT
ADLS_ACUITY_SCORE: 28
ORAL_HYGIENE: INDEPENDENT
ADLS_ACUITY_SCORE: 28

## 2024-04-17 ASSESSMENT — COPD QUESTIONNAIRES: COPD: 0

## 2024-04-17 ASSESSMENT — ENCOUNTER SYMPTOMS: ORTHOPNEA: 0

## 2024-04-17 NOTE — ANESTHESIA PREPROCEDURE EVALUATION
Anesthesia Pre-Procedure Evaluation    Patient: Ward Dawson   MRN: [id number] : 1990        Procedure : * No procedures listed *          Past Medical History:   Diagnosis Date    Antisocial personality disorder (H)     Benign essential hypertension     Dyslipidemia     Overweight     Schizoaffective disorder, bipolar type (H)     Vitamin D deficiency       No past surgical history on file.   Allergies   Allergen Reactions    Haloperidol Confusion and Other (See Comments)     Prone to EPSE. COnsider IM Zyprexa or be sure to give with benadryl      Social History     Tobacco Use    Smoking status: Not on file    Smokeless tobacco: Not on file   Substance Use Topics    Alcohol use: No      Wt Readings from Last 1 Encounters:   24 107.1 kg (236 lb 1.6 oz)        Anesthesia Evaluation   Pt has had prior anesthetic. Type: General.    No history of anesthetic complications       ROS/MED HX  ENT/Pulmonary:     (+)                     Intermittent, asthma  Treatment: Inhaler prn,              (-) COPD and recent URI   Neurologic:       Cardiovascular:     (+)  hypertension-range: 120/80/ -   -  - -                                   (-) LOPEZ, orthopnea/PND and syncope   METS/Exercise Tolerance: >4 METS    Hematologic:       Musculoskeletal:       GI/Hepatic:    (-) GERD   Renal/Genitourinary:       Endo:       Psychiatric/Substance Use:       Infectious Disease:       Malignancy:       Other:            Physical Exam    Airway        Mallampati: II   TM distance: > 3 FB   Neck ROM: full   Mouth opening: > 3 cm    Respiratory Devices and Support         Dental           Cardiovascular          Rhythm and rate: regular and normal     Pulmonary           breath sounds clear to auscultation           OUTSIDE LABS:  CBC:   Lab Results   Component Value Date    WBC 6.3 04/15/2024    WBC 5.7 04/10/2024    HGB 14.0 04/15/2024    HGB 13.5 2024    HCT 44.9 04/15/2024    HCT 42.7 2024      "04/15/2024     04/03/2024     BMP:   Lab Results   Component Value Date     04/15/2024     04/03/2024    POTASSIUM 4.2 04/15/2024    POTASSIUM 4.2 04/03/2024    CHLORIDE 100 04/15/2024    CHLORIDE 99 04/03/2024    CO2 29 04/15/2024    CO2 28 04/03/2024    BUN 18.6 04/15/2024    BUN 24.5 (H) 04/03/2024    CR 0.97 04/15/2024    CR 1.18 (H) 04/03/2024     (H) 04/15/2024     (H) 04/08/2024     COAGS: No results found for: \"PTT\", \"INR\", \"FIBR\"  POC: No results found for: \"BGM\", \"HCG\", \"HCGS\"  HEPATIC:   Lab Results   Component Value Date    ALBUMIN 4.5 04/15/2024    PROTTOTAL 7.2 04/15/2024    ALT 28 04/15/2024    AST 31 04/15/2024    ALKPHOS 73 04/15/2024    BILITOTAL 0.7 04/15/2024     OTHER:   Lab Results   Component Value Date    ANUP 8.9 04/15/2024       Anesthesia Plan    ASA Status:  3    NPO Status:  NPO Appropriate    Anesthesia Type: General.     - Airway: Mask Only   Induction: Intravenous.           Consents    Anesthesia Plan(s) and associated risks, benefits, and realistic alternatives discussed. Questions answered and patient/representative(s) expressed understanding.     - Discussed:     - Discussed with:  Patient            Postoperative Care            Comments:    Other Comments: Discussed risks of general with mask ventilation, including aspiration, need for intubation (sore throat/hoarse voice), damage to lips/teeth/tongue, awareness.           Celi Sterling MD    I have reviewed the pertinent notes and labs in the chart from the past 30 days and (re)examined the patient.  Any updates or changes from those notes are reflected in this note.             "

## 2024-04-17 NOTE — CONSULTS
4/17/2024  Pt completed his BRO CA today. He is interested in IP BRO tx. I informed him that based on some of his negative behaviors on the IP MH unit, he might be turned down by IP BRO tx programs. He appeared to understand this and justified the reasons for his behaviors.    MARYBETH Service Initiation Date ID: [id number]    Recommendations:   1)  Complete a residential based or similar treatment program.  2)  Abstain from all mood-altering chemicals unless prescribed by a licensed provider.   3)  Attend, at minimum, 2 weekly support group meetings, such as 12 step based (AA/NA), SM[facility] Recovery, Health Realizations, and/or [facility] meetings.     4)  Actively work with a male mentor/sponsor on a weekly basis.   5)  Follow all the recommendations of your treatment/medical providers.    Clinical Substantiation:    Pt has a long history of marijuana use. He views his marijuana use as helping him with his chronic pain and to calm him down. He does not believe his marijuana use is problematic, but he appears open to abstaining from marijuana, at least for a little while. He appears to lack healthy sober coping techniques.     Referrals/ Alternatives:    [facility]   Phone: [phone]  Fax: [phone]   [address] [place] [address] [address]          [place] [address]     [email or web address]     [facility] for Referrals- [facility]  Phone: [phone]  Fax: [phone]  [email or web address]    [facility]  Phone: [phone]  Fax: [phone]  email: [email or web address]  [email or web address]    [facility]   Phone: [phone]  Fax: [phone]  [email or web address]    [facility] [place]  Phone: [phone]  Fax: [phone]  [address]  [address]  [email or web address] [email or web address]/    [facility]  [address] [place] [address]  Phone: [phone]  Fax: [phone]  [email or web address]    BRO consult completed by: Mary Garcia [facility].  Phone Number: [phone]  E-mail Address: [email or web address] Evaluation Department  [address] [place] [id number]     *Due to regulation of Title 42 of the Code of Federal Regulations (CFR) Part 2: Confidentiality laws apply to this note and the information wherein.  Thus, this note cannot be copy and pasted into any other health care staff's note nor can it be included in general medical records sent to ANY outside agency without the patient's written consent.

## 2024-04-17 NOTE — PLAN OF CARE
Problem: Adult Inpatient Plan of Care  Goal: Optimal Comfort and Wellbeing  Intervention: Provide Person-Centered Care  Recent Flowsheet Documentation  Taken 4/17/2024 1232 by Radha Casey RN  Trust Relationship/Rapport:   care explained   choices provided   emotional support provided   empathic listening provided   questions encouraged   questions answered   reassurance provided   thoughts/feelings acknowledged   Goal Outcome Evaluation:    Plan of Care Reviewed With: patient      AM-  Pt presents as excitable but cooperative. Pt denied all mental health symptoms including SI/HI/AVH and did not appear to be responding. His speech is loud and tangential. He has used less profanity, and was more redirectable.   He had ECT without incident today. His 2:1 acuity staff went with him to ensure safety.   Pt rated 4/10 low back pain when he woke up. He denied pain the rest of the shift.   Pt listened to music, and interacted with staff most of the shift. He took a short nap at the end of the shift,   PM-  Pt had an uneventful shift. He continued to deny any mental health symptoms this shift. He was a bit louder than the previous shift, but was redirectable. He was appropriate with staff, and listened to music. He ate and drank well denied pain, was med compliant and had no other acute physical or behavioral concerns this shift.   /81 (BP Location: Left arm)   Pulse 88   Temp 97.1  F (36.2  C) (Temporal)   Resp 19   Ht 1.829 m (6')   Wt 107.1 kg (236 lb 1.6 oz)   SpO2 96%   BMI 32.02 kg/m
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
## 2024-04-17 NOTE — PROGRESS NOTES
The patient's care was discussed with the treatment team during the daily team meeting and/or staff's chart notes were reviewed. Staff report patient slept 7 hours with no behavioral concerns overnight.  Evening staff report: Continues hyperverbal, hyperactive with dancing, singing, crowing, yelling. Speech is often loud, pressured, uninterruptible with casual use of profanity. Limited redirectability. Hyperreactivity. Impaired attention. Often removing his shirt and remains flirtatious, invading the physical space of especially females. Sexualized personal comments about appearance and perceived attractiveness.   Expansive, elevated mood with some grandiosity unchanged.  Calmer staff this evening. Reduced stimuli to good effect: overall slightly calmer than previous days.  Remains comfortable, considering it better here than being in long-term. No bellicosity this evening.  No c/o side effects.     Upon interview, patient was interviewed in the lounge few hours after returning from a maintenance ECT. Patient appears very calm, cooperative with assessment with rapt/sustained attention span. Speech was normal tone and volume, no tangentiality or circumstantiality. Sober reflection, polite with minimal requests. Patient reports the ECT was quick and that his experience was positive. He reports that the CD  interviewed him over the phone, hoping they will consider him for an inpatient or outpatient treatment, but his preference is inpatient especially outside of the [facility], patient specifically mentions [facility]. Patient asks if writer has spoken with his mother, patient informed that has not happened but will call her after this assessment.  patient states his medications are working with no side effects. He states the Quetiapine helped him last night. Patient talked about his allergy, listed the causes and writer informed him that the prn Claritin he requested yesterday was ordered and could ask the nursing  staff if he needs it. Patient requests for hydrogen peroxide ear drops for ear wax. Patient denies A/VH stating last time he experienced AH was in 2012. He denies SI/HI or thoughts of self-harm.  This writer called patient's mother Alf Chirinos [phone], updated her about the progress patient has made thus far, she was very happy that patient is improving. She added some collateral information that she is the one taking care of Cristel's son and that she doesn't want Scearnestinebie to teach him bad habits. She expresses frustration over the patient drug doing habit, sited examples where Cristel refused to listen to her and the repercussions that he faced as a result, she gave examples how the patient was expelled from school due to finding drugs with him and how he travelled to [place] during the Covid-19 era because Marijuana was legalized in [place]. The mother states that he went there contrary to her objection and it was in [place] that he fought with a polis and was jailed for 2 years. The mother reports that she has encouraged him to go to chemical dependency treatment but he refused. She is hoping that he would go for treatment and get clean so he can face life anew. Patient's mother was happy with the conversation we had this afternoon.          Medications:      Inpatient Administered Meds                     Current Facility-Administered Medications   Medication Dose Route Frequency Provider Last Rate Last Admin    atorvastatin (LIPITOR) tablet 40 mg  40 mg Oral Daily Jaswinder Claros MD   40 mg at 04/10/24 0818    cloZAPine (CLOZARIL) tablet 100 mg  100 mg Oral At Bedtime Jaswinder Claros MD   100 mg at 04/09/24 1848    cloZAPine (CLOZARIL) tablet 50 mg  50 mg Oral Daily Jaswinder Claros MD   50 mg at 04/10/24 0818    divalproex sodium extended-release (DEPAKOTE ER) 24 hr tablet 500 mg  500 mg Oral BID Jaswinder Claros MD   500 mg at 04/10/24 0818    gabapentin  (NEURONTIN) tablet 600 mg  600 mg Oral BID Jaswinder Claros MD   600 mg at 04/10/24 0818    hydrochlorothiazide (HYDRODIURIL) tablet 25 mg  25 mg Oral Daily Jaswinder Claros MD   25 mg at 04/10/24 0818    nicotine (NICODERM CQ) 21 MG/24HR 24 hr patch 1 patch  1 patch Transdermal Daily Xavi Medeiros MD   1 patch at 04/10/24 0821              Allergies:      Allergies             Allergies   Allergen Reactions    Haloperidol Confusion and Other (See Comments)       Prone to EPSE. COnsider IM Zyprexa or be sure to give with benadryl              Labs:      Recent Results             Recent Results (from the past 24 hour(s))   WBC and Differential     Collection Time: 04/10/24 10:59 AM   Result Value Ref Range     WBC Count 5.7 4.0 - 11.0 10e3/uL     % Neutrophils 51 %     % Lymphocytes 37 %     % Monocytes 9 %     % Eosinophils 2 %     % Basophils 1 %     % Immature Granulocytes 0 %     NRBCs per 100 WBC 0 <1 /100     Absolute Neutrophils 3.0 1.6 - 8.3 10e3/uL     Absolute Lymphocytes 2.1 0.8 - 5.3 10e3/uL     Absolute Monocytes 0.5 0.0 - 1.3 10e3/uL     Absolute Eosinophils 0.1 0.0 - 0.7 10e3/uL     Absolute Basophils 0.0 0.0 - 0.2 10e3/uL     Absolute Immature Granulocytes 0.0 <=0.4 10e3/uL     Absolute NRBCs 0.0 10e3/uL              Psychiatric Examination:      /84 (BP Location: Left arm, Patient Position: Sitting, Cuff Size: Adult Regular)   Pulse 94   Temp 97.8  F (36.6  C) (Oral)   Resp 12   Ht 1.829 m (6')   Wt 107.1 kg (236 lb 1.6 oz)   SpO2 98%   BMI 32.02 kg/m    Weight is 236 lbs 1.6 oz  Body mass index is 32.02 kg/m .     Weight over time:          Vitals:     04/03/24 1151 04/09/24 0826   Weight: 108 kg (238 lb) 107.1 kg (236 lb 1.6 oz)         Orthostatic Vitals           Most Recent       Sitting Orthostatic /85 04/09 0826     Sitting Orthostatic Pulse (bpm) 73 04/09 0826     Standing Orthostatic /92 04/09 0826     Standing Orthostatic Pulse (bpm) 69 04/09  0826                   Cardiometabolic risk assessment. 04/10/24        Reviewed patient profile for cardiometabolic risk factors     Date taken /Value  REFERENCE RANGE   Abdominal Obesity  (Waist Circumference)   See nursing flowsheet Women ?35 in (88 cm)   Men ?40 in (102 cm)       Triglycerides                Triglycerides   Date Value Ref Range Status   02/26/2013 71 0 - 150 mg/dL Final       Comment:       Fasting specimen         ?150 mg/dL (1.7 mmol/L) or current treatment for elevated triglycerides   HDL cholesterol            HDL Cholesterol   Date Value Ref Range Status   02/26/2013 45 40 - 110 mg/dL Final   ]    Women <50 mg/dL (1.3 mmol/L) in women or current treatment for low HDL cholesterol  Men <40 mg/dL (1 mmol/L) in men or current treatment for low HDL cholesterol      Fasting plasma glucose (FPG)           Lab Results   Component Value Date      04/08/2024     GLC 83 02/26/2013        FPG ?100 mg/dL (5.6 mmol/L) or treatment for elevated blood glucose   Blood pressure        BP Readings from Last 3 Encounters:   04/08/24 127/84   02/26/13 129/74    Blood pressure ?130/85 mmHg or treatment for elevated blood pressure   Family History  See family history      Mental Status Exam:  Appearance: awake, alert, had his shirt off, only had pants on and appeared as age stated  Attitude:  cooperative  Eye Contact:  good  Mood:  Good  Affect:  Calm, Enthusiastic.  Speech:  clear, normal tone and volume  Language: fluent and intact in English  Psychomotor, Gait, Musculoskeletal:  no evidence of tardive dyskinesia, dystonia, or tics  Throught Process: Linear, Logical, goal directed  Associations:  No Loosening of associations  Thought Content:  no evidence of suicidal ideation or homicidal ideation, no auditory hallucinations present, and no visual hallucinations present  Insight: Fair  Judgement: fair  Oriented to:  time, person, and place  Attention Span and Concentration: Good  Recent and Remote  Memory:  fair  Fund of Knowledge: Appropriate             Precautions:                Behavioral Orders   Procedures    Assault precautions    Code 1 - Restrict to Unit    Elopement precautions    [facility] Extended Care       Until discharge, Extended Care to offer psychotherapeutic services to mental health patients boarding for admission or stabilization. These services are to include but are not limited to: individual psychotherapy, diagnostic assessment, case management and care planning, safety planning, etc. This may include up to 1 visit per day. If patient is physically located at [facility], [place] or [facility], group psychotherapy up to 2 time per day may be offered.     [facility] Extended Care       Until discharge, Extended Care to offer psychotherapeutic services to mental health patients boarding for admission or stabilization. These services are to include but are not limited to: individual psychotherapy, diagnostic assessment, case management and care planning, safety planning, etc. This may include up to 1 visit per day. If patient is physically located at [facility], [place] or [facility], group psychotherapy up to 2 time per day may be offered.     Routine Programming       As clinically indicated    Sexual precautions    Status 15       Every 15 minutes.    Status Individual Observation       Patient SIO status reviewed with team/RN.  Please also refer to RN/team documentation for add'l detail.     -2:1 SIO staff to monitor following which have contributed to patient being on SIO: at least 1 male staff     Assault risk     -Possible interventions SIO staff could use to support patient's treatment progress:  Verbal de-escalation/ Medication administration     -When following observed, team will review discontinuation of SIO:     When patient is not longer aggressive, sexually inappropriate or intrusive.       Order Specific Question:   CONTINUOUS 24 hours / day       Answer:   Other       Order Specific Question:   Specify distance        Answer:   10 foot       Order Specific Question:   Indications for SIO       Answer:   Assault risk       Order Specific Question:   Indications for SIO       Answer:   Severe intrusiveness    Suicide precautions: Suicide Risk: HIGH; Clinical rationale to override score: response to medication       Patients on Suicide Precautions should have a Combination Diet ordered that includes a Diet selection(s) AND a Behavioral Tray selection for Safe Tray - with utensils, or Safe Tray - NO utensils          Order Specific Question:   Suicide Risk       Answer:   HIGH       Order Specific Question:   Clinical rationale to override score:       Answer:   response to medication           Diagnoses:         Schizoaffective disorder, bipolar type (H)  Polysubstance abuse (H)  Agitation     Clinically Significant Risk Factors                          # Obesity: Estimated body mass index is 32.02 kg/m  as calculated from the following:    Height as of this encounter: 1.829 m (6').    Weight as of this encounter: 107.1 kg (236 lb 1.6 oz)., PRESENT ON ADMISSION     # Financial/Environmental Concerns: other (see comments) (lost [facility] housing)     # Housing Instability: noted in nursing assessment          Assessment & Plan:      Assessment and hospital summary:  Ward Dawson is a -34- old male with a previous diagnosis of Schizoaffective disorder bipolar type who presented to the ED for a significant behavioral change, verbal agitation, worsening psychosocial stress, in the context of substance use.  Factors that make the mental health crisis life threatening or complex are:  Patient was brought to the ED from [facility] following his ECT treatment this morning at [facility].  Patient states he does not know why he is here and also states he knows why he is here.  He presents as manic, disorganized, and confused.   He is distractable and not a clear historian at this time.  Patient states he has not slept in a long time and received  narcan last night.  Per ACT team  and [facility] staff patient has been elevated, intrusive, and disruptive.  Patient broke a window yesterday.  IRTS reports patient had turned table over and tried using them as skate board ramps.  Patient was noted for have been fairly stable prior to his pass this weekend.  CM reports patient went to a skateboarding competition in [place].  Patient returned in a manic state.  CM reports pt may not appear as manic as he has been the past few days due to sedation and ECT this morning.  She reports patient has not slept in 3 days.  IRTS reports pt received narcan twice last night.  Patient has been trespassed from the [facility] facility.  UDS is postive for cannabis..         Today's Changes:4/17/24  Had ECT done today  Hydrogen Peroxide 3% for ear wax  Collateral information from patient's mother.     Target psychiatric symptoms and interventions:  Admitted on 4/9/24 to station 12N  Clozaril, 50 mg every morning and 200 mg at bedtime  --Depakote ER, 500 mg daily and 1000 mg at bedtime for mood stabilization   --Gabapentin, 900 mg twice a day for pain  - Ibuprofen tablet 400 mg orally every 6 hours prn for pain  --Additional medications will include B52, Zyprexa, trazodone, hydroxyzine     Risks, benefits, and alternatives discussed at length with patient.      Acute Medical Problems and Treatments:  Acute medical concerns:  - No acute medical concerns     Pertinent labs/imaging:  Recent Results             Recent Results (from the past 168 hour(s))   Comprehensive metabolic panel     Collection Time: 04/03/24  2:16 PM   Result Value Ref Range     Sodium 138 135 - 145 mmol/L     Potassium 4.2 3.4 - 5.3 mmol/L     Carbon Dioxide (CO2) 28 22 - 29 mmol/L     Anion Gap 11 7 - 15 mmol/L     Urea Nitrogen 24.5 (H) 6.0 - 20.0 mg/dL     Creatinine 1.18 (H) 0.67 - 1.17 mg/dL     GFR Estimate 84 >60 mL/min/1.73m2     Calcium 9.8 8.6 - 10.0 mg/dL     Chloride 99 98 - 107 mmol/L      Glucose 91 70 - 99 mg/dL     Alkaline Phosphatase 74 40 - 150 U/L      (H) 0 - 45 U/L     ALT 34 0 - 70 U/L     Protein Total 7.8 6.4 - 8.3 g/dL     Albumin 5.0 3.5 - 5.2 g/dL     Bilirubin Total 1.5 (H) <=1.2 mg/dL   CBC with platelets and differential     Collection Time: 04/03/24  2:16 PM   Result Value Ref Range     WBC Count 11.1 (H) 4.0 - 11.0 10e3/uL     RBC Count 4.94 4.40 - 5.90 10e6/uL     Hemoglobin 13.5 13.3 - 17.7 g/dL     Hematocrit 42.7 40.0 - 53.0 %     MCV 86 78 - 100 fL     MCH 27.3 26.5 - 33.0 pg     MCHC 31.6 31.5 - 36.5 g/dL     RDW 13.4 10.0 - 15.0 %     Platelet Count 213 150 - 450 10e3/uL     % Neutrophils 63 %     % Lymphocytes 26 %     % Monocytes 9 %     % Eosinophils 1 %     % Basophils 1 %     % Immature Granulocytes 0 %     NRBCs per 100 WBC 0 <1 /100     Absolute Neutrophils 7.0 1.6 - 8.3 10e3/uL     Absolute Lymphocytes 2.9 0.8 - 5.3 10e3/uL     Absolute Monocytes 0.9 0.0 - 1.3 10e3/uL     Absolute Eosinophils 0.1 0.0 - 0.7 10e3/uL     Absolute Basophils 0.1 0.0 - 0.2 10e3/uL     Absolute Immature Granulocytes 0.0 <=0.4 10e3/uL     Absolute NRBCs 0.0 10e3/uL   Valproic acid     Collection Time: 04/05/24  9:39 PM   Result Value Ref Range     Valproic acid 68.4   ug/mL   Asymptomatic COVID-19 Virus (Coronavirus) by PCR Nasopharyngeal     Collection Time: 04/05/24  9:40 PM     Specimen: Nasopharyngeal; Swab   Result Value Ref Range     SARS CoV2 PCR Negative Negative   Glucose by meter     Collection Time: 04/08/24  8:21 PM   Result Value Ref Range     GLUCOSE BY METER POCT 117 (H) 70 - 99 mg/dL   WBC and Differential     Collection Time: 04/10/24 10:59 AM   Result Value Ref Range     WBC Count 5.7 4.0 - 11.0 10e3/uL     % Neutrophils 51 %     % Lymphocytes 37 %     % Monocytes 9 %     % Eosinophils 2 %     % Basophils 1 %     % Immature Granulocytes 0 %     NRBCs per 100 WBC 0 <1 /100     Absolute Neutrophils 3.0 1.6 - 8.3 10e3/uL     Absolute Lymphocytes 2.1 0.8 - 5.3 10e3/uL      Absolute Monocytes 0.5 0.0 - 1.3 10e3/uL     Absolute Eosinophils 0.1 0.0 - 0.7 10e3/uL     Absolute Basophils 0.0 0.0 - 0.2 10e3/uL     Absolute Immature Granulocytes 0.0 <=0.4 10e3/uL     Absolute NRBCs 0.0 10e3/uL               Behavioral/Psychological/Social:  - Encourage unit programming     Safety  Placed on the following Precautions: Suicide, Assault, Elopement and Sexual precautions  - Continue precautions as noted above  -  2:1 staff acuity for intrusive, assaultive behaviors  - Status 15 minute checks  - Legal Status: voluntary     Disposition Plan   Reason for ongoing admission: poses an imminent risk to self and poses an imminent risk to others  Discharge location: IRTS facility or CD  Discharge Medications: not ordered  Follow-up Appointments: not scheduled     Entered by: AMY Yu CNP on 04/16/2024  at 12:50 PM         Attestation:   Patient has been seen and evaluated by Phan gill, MICK, AMY CNP, PMP-BC  The patient was counseled on nature of illness and treatment plan/options  Care was coordinated with treatment team  Total time > 30 minutes including time spent collecting collateral information from patient's mother.

## 2024-04-17 NOTE — PLAN OF CARE
"\"I missing my college orientation but I don't care: I need no education to sell drugs and guns.\"  \"And then the feds finally catch on what do I care: I always beat my charges.\"  \"I need no education to make millions.\"  \"Do you feel me?\"  \"I'm already a felon, I don't care.\"  \"There's nothing fucking wrong with me.\"  \"Fucking bitches.\"  \"I have to throw all the food in my room away. Then I won't have ECT.\"  \"They treated me like a sex offender in [place].\"  \"I don't care about my open charges.\"    Continues hyperverbal, hyperactive with dancing, singing, crowing, yelling. Speech is often loud, pressured, uninterruptible with casual use of profanity. Limited redirectability. Hyperreactivity. Impaired attention. Often removing his shirt and remains flirtatious, invading the physical space of especially females. Sexualized personal comments about appearance and perceived attractiveness.   Expansive, elevated mood with some grandiosity unchanged.    Calmer staff this evening. Reduced stimuli to good effect: overall slightly calmer than previous days.    Remains comfortable, considering it better here than being in longterm. No bellicosity this evening.    No c/o side effects.    ECT tomorrow. (Depakote and Gabapentin held.) Next shift informed of clear liquids only until 2 hours prior to ECT, Depakote and Gabapentin to be held in the morning, as well. No Benzodiazepines or diuretics.  MOCA and ECT video not done.    CM will see 4/19/24, 11:00.   CD assessment due.    Appearance: appears vain but disheveled, often shirtless  Body Language:some strutting, occasional restlessness, comfortable  Attitude:manipulative, attention seeking  Mood: expansive  Affect: labile, hyper reactive, increased intensity  Thought Process: flight of ideas  Thought content:  self absorbed  Perceptions: grandiosity  Suicidal/homicidal:denies/denies  Knowledge,Insight,Judgement: " intact/impaired/impaired  Attention/Concentration:impaired/fair  Memory:Recent-impaired                Remote-intact  Speech:at times pressured, loud  Cognition: oriented x 4  Psychomotor: some restlessness  Sleep/Activity level: insomnia  Motivation: decreased      Plan: Monitor and document mood and behaviour, thought process and content. Establish and maintain therapeutic relationship. Educate about diagnoses, medications, treatment, legal status, plan of care. Address preexisting and concurrent medical concerns.      P:Unstable mood, anti social behaviour  G:Stable mood, social accomodation  0: progressing

## 2024-04-17 NOTE — PLAN OF CARE
BEH IP Unit Acuity Rating Score (UARS)  Patient is given one point for every criteria they meet.    CRITERIA SCORING   On a 72 hour hold, court hold, committed, stay of commitment, or revocation. 0    Patient LOS on BEH unit exceeds 20 days. 0  LOS: 9   Patient under guardianship, 55+, otherwise medically complex, or under age 11. 0   Suicide ideation without relief of precipitating factors. 0   Current plan for suicide. 0   Current plan for homicide. 0   Imminent risk or actual attempt to seriously harm another without relief of factors precipitating the attempt. 1   Severe dysfunction in daily living (ex: complete neglect for self care, extreme disruption in vegetative function, extreme deterioration in social interactions). 1   Recent (last 7 days) or current physical aggression in the ED or on unit. 1   Restraints or seclusion episode in past 72 hours. 0   Recent (last 7 days) or current verbal aggression, agitation, yelling, etc., while in the ED or unit. 1   Active psychosis. 0   Need for constant or near constant redirection (from leaving, from others, etc).  1   Intrusive or disruptive behaviors. 1   Patient requires 3 or more hours of individualized nursing care per 8-hour shift (i.e. for ADLs, meds, therapeutic interventions). 0   TOTAL 6

## 2024-04-17 NOTE — PLAN OF CARE
"Brief Individual Therapy Note    Attempted to meet with Pt in milieu. Pt greeted writer and shared he was doing well, listening to music and \"vibing\". Pt shared that he is looking forward to going to treatment. He wants to learn how to live better and get his life together so he can have a stable home and have his kids around. Pt was in a calm mood, has much more mood stability and tutu to have a two sided conversation. Pt shared about his painted nails and then asked about his menu to fill out b/c he's hoping to get pizza. Met with Pt for 10 minutes.    "

## 2024-04-17 NOTE — ANESTHESIA POSTPROCEDURE EVALUATION
Patient: Ward Dawson    Procedure: * No procedures listed *       Anesthesia Type:  General    Note:  Disposition: Outpatient   Postop Pain Control: Uneventful            Sign Out: Well controlled pain   PONV: No   Neuro/Psych: Uneventful            Sign Out: Acceptable/Baseline neuro status   Airway/Respiratory: Uneventful            Sign Out: Acceptable/Baseline resp. status   CV/Hemodynamics: Uneventful            Sign Out: Acceptable CV status; No obvious hypovolemia; No obvious fluid overload   Other NRE:    DID A NON-ROUTINE EVENT OCCUR?            Last vitals:  Vitals:    04/17/24 0850 04/17/24 0900 04/17/24 0927   BP:  (!) 154/96 135/87   Pulse:   82   Resp: 16  16   Temp:  36.4  C (97.5  F) 36.6  C (97.8  F)   SpO2:   97%       Electronically Signed By: Celi Sterling MD  April 17, 2024  1:36 PM

## 2024-04-17 NOTE — PLAN OF CARE
Problem: Sleep Disturbance  Goal: Adequate Sleep/Rest  Outcome: Progressing   Goal Outcome Evaluation:    Pt asleep at the beginning of the shift. Slept most of the night, woke up and asked what time he was having ECT and returned to bed immediately. Breathing quite and unlabored.Pt neither eat nor drink during the shift. No PRN administered during the shift. Appears to sleep for 7 hours.
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
## 2024-04-17 NOTE — OR NURSING
0837  Admit to pacu. Meets phase ll criteria.  Report to Mario Rivera,   Fully oriented at 0900.

## 2024-04-17 NOTE — PLAN OF CARE
Team Note Due:  Friday    Assessment/Intervention/Current Symtoms and Care Coordination:  Chart review and met with team, discussed pt progress, symptomology, and response to treatment.  Discussed the discharge plan and any potential impediments to discharge.    Cristel completed the CD assessment.     Met with Cristel to discuss treatment options. His thought process was goal-directed, speech was normal. He was calm, pleasant and polite throughout our interaction. He was listening to his music, writer asked if he would turn it down and he happily obliged. He signed ROIs for six treatment centers. He asked writer good questions about the treatment centers, he was encouraged to write down all of his questions and ask them during interviews. He agreed.     Cristel discussed his history of ECT and said that it has been beneficial for him. He stated that he finds his ACT team helpful and supportive and has enjoyed working with them.      Discharge Plan or Goal:  When patient's symptoms have reduced and a safe discharge can be facilitated, options include: Residential treatment, IRTS, shelter      Barriers to Discharge:  Discharge planning for residential treatment as patient continues to stabilize      Referral Status:  Residential treatment referrals will be sent as patient continues to stabilize     Legal Status:  Voluntary    Contacts:  [facility] IRTS: [phone] (Consent)  ACT : Heidy Pop, [phone] (WAN)  ACT : Yamila [phone] (WAN)  ACT Psychiatrist: Camron Watson MD, [phone] (WAN)  Mom: Candis, [phone] (Consent)  Friend: Nabila Peters, [phone] (WAN)     Upcoming Meetings and Dates/Important Information and next steps:  Symptom stabilization

## 2024-04-18 PROCEDURE — 250N000013 HC RX MED GY IP 250 OP 250 PS 637: Performed by: EMERGENCY MEDICINE

## 2024-04-18 PROCEDURE — 250N000013 HC RX MED GY IP 250 OP 250 PS 637: Performed by: FAMILY MEDICINE

## 2024-04-18 PROCEDURE — 250N000013 HC RX MED GY IP 250 OP 250 PS 637: Performed by: CLINICAL NURSE SPECIALIST

## 2024-04-18 PROCEDURE — 250N000013 HC RX MED GY IP 250 OP 250 PS 637: Performed by: PHYSICIAN ASSISTANT

## 2024-04-18 PROCEDURE — 36415 COLL VENOUS BLD VENIPUNCTURE: CPT | Performed by: CLINICAL NURSE SPECIALIST

## 2024-04-18 PROCEDURE — 80164 ASSAY DIPROPYLACETIC ACD TOT: CPT | Performed by: CLINICAL NURSE SPECIALIST

## 2024-04-18 PROCEDURE — 250N000013 HC RX MED GY IP 250 OP 250 PS 637: Performed by: PSYCHIATRY & NEUROLOGY

## 2024-04-18 PROCEDURE — 124N000002 HC R&B MH UMMC

## 2024-04-18 PROCEDURE — 99232 SBSQ HOSP IP/OBS MODERATE 35: CPT | Performed by: CLINICAL NURSE SPECIALIST

## 2024-04-18 RX ADMIN — FLUTICASONE PROPIONATE 1 SPRAY: 50 SPRAY, METERED NASAL at 08:33

## 2024-04-18 RX ADMIN — PANTOPRAZOLE SODIUM 40 MG: 40 TABLET, DELAYED RELEASE ORAL at 08:34

## 2024-04-18 RX ADMIN — Medication 900 MG: at 08:32

## 2024-04-18 RX ADMIN — LORATADINE 10 MG: 10 TABLET ORAL at 08:32

## 2024-04-18 RX ADMIN — POLYETHYLENE GLYCOL 3350 17 G: 17 POWDER, FOR SOLUTION ORAL at 08:33

## 2024-04-18 RX ADMIN — HYDROCHLOROTHIAZIDE 25 MG: 25 TABLET ORAL at 08:32

## 2024-04-18 RX ADMIN — DIVALPROEX SODIUM 500 MG: 500 TABLET, FILM COATED, EXTENDED RELEASE ORAL at 08:34

## 2024-04-18 RX ADMIN — ACETAMINOPHEN 650 MG: 325 TABLET, FILM COATED ORAL at 18:46

## 2024-04-18 RX ADMIN — Medication 900 MG: at 19:04

## 2024-04-18 RX ADMIN — CLOZAPINE 200 MG: 200 TABLET ORAL at 19:05

## 2024-04-18 RX ADMIN — CLOZAPINE 50 MG: 50 TABLET ORAL at 08:35

## 2024-04-18 RX ADMIN — DIVALPROEX SODIUM 1000 MG: 500 TABLET, FILM COATED, EXTENDED RELEASE ORAL at 19:05

## 2024-04-18 RX ADMIN — QUETIAPINE 100 MG: 100 TABLET ORAL at 19:04

## 2024-04-18 RX ADMIN — ATORVASTATIN CALCIUM 40 MG: 40 TABLET, FILM COATED ORAL at 08:32

## 2024-04-18 ASSESSMENT — ACTIVITIES OF DAILY LIVING (ADL)
ADLS_ACUITY_SCORE: 28
ORAL_HYGIENE: INDEPENDENT
ADLS_ACUITY_SCORE: 28
HYGIENE/GROOMING: INDEPENDENT
ADLS_ACUITY_SCORE: 28
DRESS: INDEPENDENT
ADLS_ACUITY_SCORE: 28

## 2024-04-18 NOTE — PLAN OF CARE
BEH IP Unit Acuity Rating Score (UARS)  Patient is given one point for every criteria they meet.    CRITERIA SCORING   On a 72 hour hold, court hold, committed, stay of commitment, or revocation. 0    Patient LOS on BEH unit exceeds 20 days. 0  LOS: 10   Patient under guardianship, 55+, otherwise medically complex, or under age 11. 0   Suicide ideation without relief of precipitating factors. 0   Current plan for suicide. 0   Current plan for homicide. 0   Imminent risk or actual attempt to seriously harm another without relief of factors precipitating the attempt. 1   Severe dysfunction in daily living (ex: complete neglect for self care, extreme disruption in vegetative function, extreme deterioration in social interactions). 0   Recent (last 7 days) or current physical aggression in the ED or on unit. 0   Restraints or seclusion episode in past 72 hours. 0   Recent (last 7 days) or current verbal aggression, agitation, yelling, etc., while in the ED or unit. 1   Active psychosis. 0   Need for constant or near constant redirection (from leaving, from others, etc).  1   Intrusive or disruptive behaviors. 1   Patient requires 3 or more hours of individualized nursing care per 8-hour shift (i.e. for ADLs, meds, therapeutic interventions). 0   TOTAL 4

## 2024-04-18 NOTE — PLAN OF CARE
Goal Outcome Evaluation:  Problem: Sleep Disturbance  Goal: Adequate Sleep/Rest  4/18/2024 0649 by Maryann Ruiz RN  Outcome: Progressing     Patient was sleeping when shift started. Pt slept with no c/o pain and discomfort. No PRN administered. Breathing quite and unlabored. 2:1 acuity.Slept 6.5 hrs. Plan is to monitor for safety/behavior and intervene as needed.

## 2024-04-18 NOTE — PROGRESS NOTES
"Occupational Therapy Encounter: Cognitive Screen     Reported recent cognitive functioning as \"fine\" with no concerns of effects on occupational engagement.   Patient education provided on reason for OT evaluation and results of testing today.    [employer] Mental Status Examination (UMS)    The SLUMS is a rapid screening instrument used to assess the presence of cognitive deficits, and to identify changes in cognition over time.     Total Score: 30/30    Interpretation:  Pt's score of 30/30 indicates normal cognitive functioning     Normal Mild Neurocognitive Disorder    Dementia   High School Education 27-30 21-26 1-20   Less than HS Education 25-30 20-24 1-19     Pt reports doing well, easily able to answer all questions on screen without hesitation.  Pt reports having monthly ECT tx, including first tx during this admission resuming yesterday.  Pt is hopeful for discharge soon with plans to go to either CD treatment or an IRTS, and reports he is supposed to be starting school for , though has had issues contacting his advisor due to hospitalization.        Susie Jeffrey, OT on 4/18/2024 at 5:31 PM    "

## 2024-04-18 NOTE — PLAN OF CARE
Team Note Due:  Friday    Assessment/Intervention/Current Symtoms and Care Coordination:  Chart review and met with team, discussed pt progress, symptomology, and response to treatment.  Discussed the discharge plan and any potential impediments to discharge.    Referral sent to [facility].     Referral sent to [facility] [place].     Discharge Plan or Goal:  When patient's symptoms have reduced and a safe discharge can be facilitated, options include: Residential treatment, IRTS, shelter      Barriers to Discharge:  Discharge planning for residential treatment as patient continues to stabilize      Referral Status:  [facility] 4/19  [facility] [place] 4/19     Legal Status:  Voluntary    Contacts:  [facility] IRTS: [phone] (Consent)  ACT : Heidy Pop, [phone] (WAN)  ACT : Yamila [phone] (WAN)  ACT Psychiatrist: aCmron Watson MD, [phone] (WAN)  Mom: Candis, [phone] (Consent)  Friend: Nabila Peters, [phone] (WAN)     Upcoming Meetings and Dates/Important Information and next steps:  Symptom stabilization

## 2024-04-18 NOTE — PLAN OF CARE
"Cristel continues to show improvement and had a good shift. He was out of his room in the morning, ate breakfast, was up briefly, then went back to bed after breakfast stating simply that he was tired. He slept soundly until lunch time. He was up for about an hour around lunch time, then went back to bed again.     When asked if it was normal for him to sleep so much the day after he has ECT, he stated that he did not know. He stated a big part of the reason that he was sleeping so much was that he was 'bored.\"     When he was awake, he was pleasant, cooperative. He exhibited little to no manic or hypomanic symptoms.  When I mentioned to him that I thought that he has improved significantly even since Sunday, he agreed, but he also expressed reservation about being transitioned back to the general patient population and preferred to remain by himself as being around other patients could set back his progress.      Problem: Manic Behavior Episode  Goal: Decreased Manic Symptoms  Outcome: Progressing   Goal Outcome Evaluation:                        "

## 2024-04-18 NOTE — PROGRESS NOTES
"The patient's care was discussed with the treatment team during the daily team meeting and/or staff's chart notes were reviewed. Staff report patient slept 6.75 hours with no behavioral concerns overnight. Looking forward to CD treatment, wants to be better and be able to get housing.  Evening staff report: had an uneventful shift. He continued to deny any mental health symptoms this shift. He was a bit louder than the previous shift, but was redirectable. He was appropriate with staff, and listened to music. He ate and drank well denied pain, was med compliant and had no other acute physical or behavioral concerns this shift.   /81 (BP Location: Left arm)   Pulse 88   Temp 97.1  F (36.2  C) (Temporal)   Resp 19   Ht 1.829 m (6')   Wt 107.1 kg (236 lb 1.6 oz)   SpO2 96%   BMI 32.02 kg/m       Upon interview, writer has visited with the patient twice but sleeping, now patient was in the lounge eating his lunch, Patient appears very calm, peaceful, cooperative with assessment. Patient describes his mood as \"Calm.\" Patient requests for a chiropractic referral, writer informed would inquire if we do have one otherwise it would be an outpatient referral. Patient was updated with the call I had with his mother yesterday. Patient reports that he has been sleeping since morning, appears a little tired apparently from yesterday ECT. Patient reports he had a blood drawn this morning, was informed it was to check his Depakote level. Patient reports that his medications are working with no side effects experienced. Patient reports that he's been sleeping because he feels bored here and nothing to do. States that he does enjoy his music. Patient denies A/VH, SI/HI or thoughts of self-harm            Medications:      Inpatient Administered Meds                     Current Facility-Administered Medications   Medication Dose Route Frequency Provider Last Rate Last Admin    atorvastatin (LIPITOR) tablet 40 mg  40 mg " Oral Daily Jaswinder Claros MD   40 mg at 04/10/24 0818    cloZAPine (CLOZARIL) tablet 100 mg  100 mg Oral At Bedtime Jaswinder Claros MD   100 mg at 04/09/24 1848    cloZAPine (CLOZARIL) tablet 50 mg  50 mg Oral Daily Jaswinder Claros MD   50 mg at 04/10/24 0818    divalproex sodium extended-release (DEPAKOTE ER) 24 hr tablet 500 mg  500 mg Oral BID Jaswinder Claros MD   500 mg at 04/10/24 0818    gabapentin (NEURONTIN) tablet 600 mg  600 mg Oral BID Jaswinder Claros MD   600 mg at 04/10/24 0818    hydrochlorothiazide (HYDRODIURIL) tablet 25 mg  25 mg Oral Daily Jaswinder Claros MD   25 mg at 04/10/24 0818    nicotine (NICODERM CQ) 21 MG/24HR 24 hr patch 1 patch  1 patch Transdermal Daily Xavi Medeiros MD   1 patch at 04/10/24 0821              Allergies:      Allergies             Allergies   Allergen Reactions    Haloperidol Confusion and Other (See Comments)       Prone to EPSE. COnsider IM Zyprexa or be sure to give with benadryl              Labs:      Recent Results             Recent Results (from the past 24 hour(s))   WBC and Differential     Collection Time: 04/10/24 10:59 AM   Result Value Ref Range     WBC Count 5.7 4.0 - 11.0 10e3/uL     % Neutrophils 51 %     % Lymphocytes 37 %     % Monocytes 9 %     % Eosinophils 2 %     % Basophils 1 %     % Immature Granulocytes 0 %     NRBCs per 100 WBC 0 <1 /100     Absolute Neutrophils 3.0 1.6 - 8.3 10e3/uL     Absolute Lymphocytes 2.1 0.8 - 5.3 10e3/uL     Absolute Monocytes 0.5 0.0 - 1.3 10e3/uL     Absolute Eosinophils 0.1 0.0 - 0.7 10e3/uL     Absolute Basophils 0.0 0.0 - 0.2 10e3/uL     Absolute Immature Granulocytes 0.0 <=0.4 10e3/uL     Absolute NRBCs 0.0 10e3/uL              Psychiatric Examination:      /84 (BP Location: Left arm, Patient Position: Sitting, Cuff Size: Adult Regular)   Pulse 94   Temp 97.8  F (36.6  C) (Oral)   Resp 12   Ht 1.829 m (6')   Wt 107.1 kg (236 lb 1.6  "oz)   SpO2 98%   BMI 32.02 kg/m    Weight is 236 lbs 1.6 oz  Body mass index is 32.02 kg/m .     Weight over time:          Vitals:     04/03/24 1151 04/09/24 0826   Weight: 108 kg (238 lb) 107.1 kg (236 lb 1.6 oz)         Orthostatic Vitals           Most Recent       Sitting Orthostatic /85 04/09 0826     Sitting Orthostatic Pulse (bpm) 73 04/09 0826     Standing Orthostatic /92 04/09 0826     Standing Orthostatic Pulse (bpm) 69 04/09 0826                   Cardiometabolic risk assessment. 04/10/24        Reviewed patient profile for cardiometabolic risk factors     Date taken /Value  REFERENCE RANGE   Abdominal Obesity  (Waist Circumference)   See nursing flowsheet Women ?35 in (88 cm)   Men ?40 in (102 cm)       Triglycerides                Triglycerides   Date Value Ref Range Status   02/26/2013 71 0 - 150 mg/dL Final       Comment:       Fasting specimen         ?150 mg/dL (1.7 mmol/L) or current treatment for elevated triglycerides   HDL cholesterol            HDL Cholesterol   Date Value Ref Range Status   02/26/2013 45 40 - 110 mg/dL Final   ]    Women <50 mg/dL (1.3 mmol/L) in women or current treatment for low HDL cholesterol  Men <40 mg/dL (1 mmol/L) in men or current treatment for low HDL cholesterol      Fasting plasma glucose (FPG)           Lab Results   Component Value Date      04/08/2024     GLC 83 02/26/2013        FPG ?100 mg/dL (5.6 mmol/L) or treatment for elevated blood glucose   Blood pressure        BP Readings from Last 3 Encounters:   04/08/24 127/84   02/26/13 129/74    Blood pressure ?130/85 mmHg or treatment for elevated blood pressure   Family History  See family history      Mental Status Exam:  Appearance: awake, alert, had his shirt off, only had pants on and appeared as age stated  Attitude:  cooperative  Eye Contact:  good  Mood: \"calm\"  Affect:  Calm, mood congruent  Speech:  clear, normal tone and volume  Language: fluent and intact in English  Psychomotor, " Gait, Musculoskeletal:  no evidence of tardive dyskinesia, dystonia, or tics  Throught Process: Linear, Logical, goal directed  Associations:  No Loosening of associations  Thought Content:  no evidence of suicidal ideation or homicidal ideation, no auditory hallucinations present, and no visual hallucinations present  Insight: Fair  Judgement: fair  Oriented to:  time, person, and place  Attention Span and Concentration: Good  Recent and Remote Memory:  fair  Fund of Knowledge: Appropriate             Precautions:                Behavioral Orders   Procedures    Assault precautions    Code 1 - Restrict to Unit    Elopement precautions    [facility] Extended Care       Until discharge, Extended Care to offer psychotherapeutic services to mental health patients boarding for admission or stabilization. These services are to include but are not limited to: individual psychotherapy, diagnostic assessment, case management and care planning, safety planning, etc. This may include up to 1 visit per day. If patient is physically located at [facility] or [facility], group psychotherapy up to 2 time per day may be offered.     [facility] Extended Care       Until discharge, Extended Care to offer psychotherapeutic services to mental health patients boarding for admission or stabilization. These services are to include but are not limited to: individual psychotherapy, diagnostic assessment, case management and care planning, safety planning, etc. This may include up to 1 visit per day. If patient is physically located at [facility] or [facility], group psychotherapy up to 2 time per day may be offered.     Routine Programming       As clinically indicated    Sexual precautions    Status 15       Every 15 minutes.    Status Individual Observation       Patient SIO status reviewed with team/RN.  Please also refer to RN/team documentation for add'l detail.     -2:1 SIO staff to monitor following which have contributed to patient being on SIO: at least 1 male  staff     Assault risk     -Possible interventions SIO staff could use to support patient's treatment progress:  Verbal de-escalation/ Medication administration     -When following observed, team will review discontinuation of SIO:     When patient is not longer aggressive, sexually inappropriate or intrusive.       Order Specific Question:   CONTINUOUS 24 hours / day       Answer:   Other       Order Specific Question:   Specify distance       Answer:   10 foot       Order Specific Question:   Indications for SIO       Answer:   Assault risk       Order Specific Question:   Indications for SIO       Answer:   Severe intrusiveness    Suicide precautions: Suicide Risk: HIGH; Clinical rationale to override score: response to medication       Patients on Suicide Precautions should have a Combination Diet ordered that includes a Diet selection(s) AND a Behavioral Tray selection for Safe Tray - with utensils, or Safe Tray - NO utensils          Order Specific Question:   Suicide Risk       Answer:   HIGH       Order Specific Question:   Clinical rationale to override score:       Answer:   response to medication           Diagnoses:         Schizoaffective disorder, bipolar type (H)  Polysubstance abuse (H)  Agitation     Clinically Significant Risk Factors                          # Obesity: Estimated body mass index is 32.02 kg/m  as calculated from the following:    Height as of this encounter: 1.829 m (6').    Weight as of this encounter: 107.1 kg (236 lb 1.6 oz)., PRESENT ON ADMISSION     # Financial/Environmental Concerns: other (see comments) (lost IRTS housing)     # Housing Instability: noted in nursing assessment          Assessment & Plan:      Assessment and hospital summary:  Ward Dawson is a -34- old male with a previous diagnosis of Schizoaffective disorder bipolar type who presented to the ED for a significant behavioral change, verbal agitation, worsening psychosocial stress, in the context of  substance use.  Factors that make the mental health crisis life threatening or complex are:  Patient was brought to the ED from [facility] following [place] [facility]  Patient states he does not know why he is here and also states he knows why he is here.  He presents as manic, disorganized, and confused.   He is distractable and not a clear historian at this time.  Patient states he has not slept in a long time and received narcan last night.  Per ACT team  and [facility] IRTS staff patient has been elevated, intrusive, and disruptive.  Patient broke a window yesterday.  IRTS reports patient had turned table over and tried using them as skate board ramps.  Patient was noted for have been fairly stable prior to his pass this weekend.  CM reports patient went to a skateboarding competition in [place].  Patient returned in a manic state.  CM reports pt may not appear as manic as he has been the past few days due to sedation and ECT this morning.  She reports patient has not slept in 3 days.  IRTS reports pt received narcan twice last night.  Patient has been trespassed from the IRTS facility.  S is postive for cannabis..         Today's Changes:4/18/24  None, continue with current treatment     Target psychiatric symptoms and interventions:  Admitted on 4/9/24 to station 12N  Clozaril, 50 mg every morning and 200 mg at bedtime  --Depakote ER, 500 mg daily and 1000 mg at bedtime for mood stabilization   --Gabapentin, 900 mg twice a day for pain  - Ibuprofen tablet 400 mg orally every 6 hours prn for pain  --Additional medications will include B52, Zyprexa, trazodone, hydroxyzine     Risks, benefits, and alternatives discussed at length with patient.      Acute Medical Problems and Treatments:  Acute medical concerns:  - No acute medical concerns     Pertinent labs/imaging:  Recent Results     Recent Results (from the past 336 hour(s))   Valproic acid    Collection Time: 04/05/24  9:39 PM    Result Value Ref Range    Valproic acid 68.4   ug/mL   Asymptomatic COVID-19 Virus (Coronavirus) by PCR Nasopharyngeal    Collection Time: 04/05/24  9:40 PM    Specimen: Nasopharyngeal; Swab   Result Value Ref Range    SARS CoV2 PCR Negative Negative   Glucose by meter    Collection Time: 04/08/24  8:21 PM   Result Value Ref Range    GLUCOSE BY METER POCT 117 (H) 70 - 99 mg/dL   WBC and Differential    Collection Time: 04/10/24 10:59 AM   Result Value Ref Range    WBC Count 5.7 4.0 - 11.0 10e3/uL    % Neutrophils 51 %    % Lymphocytes 37 %    % Monocytes 9 %    % Eosinophils 2 %    % Basophils 1 %    % Immature Granulocytes 0 %    NRBCs per 100 WBC 0 <1 /100    Absolute Neutrophils 3.0 1.6 - 8.3 10e3/uL    Absolute Lymphocytes 2.1 0.8 - 5.3 10e3/uL    Absolute Monocytes 0.5 0.0 - 1.3 10e3/uL    Absolute Eosinophils 0.1 0.0 - 0.7 10e3/uL    Absolute Basophils 0.0 0.0 - 0.2 10e3/uL    Absolute Immature Granulocytes 0.0 <=0.4 10e3/uL    Absolute NRBCs 0.0 10e3/uL   EKG 12-lead, complete    Collection Time: 04/15/24  9:20 AM   Result Value Ref Range    Systolic Blood Pressure  mmHg    Diastolic Blood Pressure  mmHg    Ventricular Rate 85 BPM    Atrial Rate 85 BPM    MS Interval 152 ms    QRS Duration 94 ms     ms    QTc 449 ms    P Axis 73 degrees    R AXIS 1 degrees    T Axis 29 degrees    Interpretation ECG       Sinus rhythm  No previous ECGs available  Confirmed by fellow William Chahal ([id number]) on 4/17/2024 9:31:06 AM  Confirmed by MD LILIAN, PENNY ([id number]) on 4/17/2024 10:20:26 PM     Comprehensive metabolic panel    Collection Time: 04/15/24  9:53 AM   Result Value Ref Range    Sodium 139 135 - 145 mmol/L    Potassium 4.2 3.4 - 5.3 mmol/L    Carbon Dioxide (CO2) 29 22 - 29 mmol/L    Anion Gap 10 7 - 15 mmol/L    Urea Nitrogen 18.6 6.0 - 20.0 mg/dL    Creatinine 0.97 0.67 - 1.17 mg/dL    GFR Estimate >90 >60 mL/min/1.73m2    Calcium 8.9 8.6 - 10.0 mg/dL    Chloride 100 98 - 107 mmol/L    Glucose 117 (H) 70  - 99 mg/dL    Alkaline Phosphatase 73 40 - 150 U/L    AST 31 0 - 45 U/L    ALT 28 0 - 70 U/L    Protein Total 7.2 6.4 - 8.3 g/dL    Albumin 4.5 3.5 - 5.2 g/dL    Bilirubin Total 0.7 <=1.2 mg/dL   CBC with platelets and differential    Collection Time: 04/15/24  9:53 AM   Result Value Ref Range    WBC Count 6.3 4.0 - 11.0 10e3/uL    RBC Count 5.09 4.40 - 5.90 10e6/uL    Hemoglobin 14.0 13.3 - 17.7 g/dL    Hematocrit 44.9 40.0 - 53.0 %    MCV 88 78 - 100 fL    MCH 27.5 26.5 - 33.0 pg    MCHC 31.2 (L) 31.5 - 36.5 g/dL    RDW 13.2 10.0 - 15.0 %    Platelet Count 234 150 - 450 10e3/uL    % Neutrophils 59 %    % Lymphocytes 33 %    % Monocytes 5 %    % Eosinophils 2 %    % Basophils 1 %    % Immature Granulocytes 0 %    NRBCs per 100 WBC 0 <1 /100    Absolute Neutrophils 3.8 1.6 - 8.3 10e3/uL    Absolute Lymphocytes 2.1 0.8 - 5.3 10e3/uL    Absolute Monocytes 0.3 0.0 - 1.3 10e3/uL    Absolute Eosinophils 0.1 0.0 - 0.7 10e3/uL    Absolute Basophils 0.0 0.0 - 0.2 10e3/uL    Absolute Immature Granulocytes 0.0 <=0.4 10e3/uL    Absolute NRBCs 0.0 10e3/uL   WBC and Differential    Collection Time: 04/17/24 12:48 PM   Result Value Ref Range    WBC Count 5.6 4.0 - 11.0 10e3/uL    % Neutrophils 68 %    % Lymphocytes 25 %    % Monocytes 5 %    % Eosinophils 1 %    % Basophils 1 %    % Immature Granulocytes 0 %    NRBCs per 100 WBC 0 <1 /100    Absolute Neutrophils 3.8 1.6 - 8.3 10e3/uL    Absolute Lymphocytes 1.4 0.8 - 5.3 10e3/uL    Absolute Monocytes 0.3 0.0 - 1.3 10e3/uL    Absolute Eosinophils 0.0 0.0 - 0.7 10e3/uL    Absolute Basophils 0.0 0.0 - 0.2 10e3/uL    Absolute Immature Granulocytes 0.0 <=0.4 10e3/uL    Absolute NRBCs 0.0 10e3/uL   Extra Green Top (Lithium Heparin) Tube    Collection Time: 04/17/24 12:48 PM   Result Value Ref Range    Hold Specimen [facility]              Behavioral/Psychological/Social:  - Encourage unit programming     Safety  Placed on the following Precautions: Suicide, Assault, Elopement and Sexual  precautions  - Continue precautions as noted above  -  2:1 staff acuity for intrusive, assaultive behaviors  - Status 15 minute checks  - Legal Status: voluntary     Disposition Plan   Reason for ongoing admission: poses an imminent risk to self and poses an imminent risk to others  Discharge location: IR facility or   Discharge Medications: not ordered  Follow-up Appointments: not scheduled     Entered by: AMY Yu CNP on 04/18/2024  at 1:40 PM         Attestation:   Patient has been seen and evaluated by me, Phan Sanchez, MICK, AMY CNP, PMHNP-BC  The patient was counseled on nature of illness and treatment plan/options  Care was coordinated with treatment team  Total time > 30 minutes including time spent collecting collateral information from patient's mother.

## 2024-04-19 LAB — VALPROATE SERPL-MCNC: 60.3 UG/ML

## 2024-04-19 PROCEDURE — 250N000013 HC RX MED GY IP 250 OP 250 PS 637: Performed by: FAMILY MEDICINE

## 2024-04-19 PROCEDURE — 250N000013 HC RX MED GY IP 250 OP 250 PS 637: Performed by: PHYSICIAN ASSISTANT

## 2024-04-19 PROCEDURE — 99232 SBSQ HOSP IP/OBS MODERATE 35: CPT | Performed by: CLINICAL NURSE SPECIALIST

## 2024-04-19 PROCEDURE — 250N000013 HC RX MED GY IP 250 OP 250 PS 637: Performed by: CLINICAL NURSE SPECIALIST

## 2024-04-19 PROCEDURE — 124N000002 HC R&B MH UMMC

## 2024-04-19 RX ADMIN — CLOZAPINE 50 MG: 50 TABLET ORAL at 11:26

## 2024-04-19 RX ADMIN — ATORVASTATIN CALCIUM 40 MG: 40 TABLET, FILM COATED ORAL at 11:26

## 2024-04-19 RX ADMIN — DIVALPROEX SODIUM 500 MG: 500 TABLET, FILM COATED, EXTENDED RELEASE ORAL at 11:26

## 2024-04-19 RX ADMIN — FLUTICASONE PROPIONATE 1 SPRAY: 50 SPRAY, METERED NASAL at 11:59

## 2024-04-19 RX ADMIN — Medication 900 MG: at 20:54

## 2024-04-19 RX ADMIN — HYDROCHLOROTHIAZIDE 25 MG: 25 TABLET ORAL at 11:26

## 2024-04-19 RX ADMIN — POLYETHYLENE GLYCOL 3350 17 G: 17 POWDER, FOR SOLUTION ORAL at 11:25

## 2024-04-19 RX ADMIN — CLOZAPINE 200 MG: 200 TABLET ORAL at 20:54

## 2024-04-19 RX ADMIN — QUETIAPINE 100 MG: 100 TABLET ORAL at 20:54

## 2024-04-19 RX ADMIN — Medication 900 MG: at 11:25

## 2024-04-19 RX ADMIN — DIVALPROEX SODIUM 1000 MG: 500 TABLET, FILM COATED, EXTENDED RELEASE ORAL at 20:53

## 2024-04-19 RX ADMIN — LORATADINE 10 MG: 10 TABLET ORAL at 11:26

## 2024-04-19 RX ADMIN — PANTOPRAZOLE SODIUM 40 MG: 40 TABLET, DELAYED RELEASE ORAL at 11:26

## 2024-04-19 ASSESSMENT — ACTIVITIES OF DAILY LIVING (ADL)
ADLS_ACUITY_SCORE: 28
DRESS: INDEPENDENT
ADLS_ACUITY_SCORE: 28
HYGIENE/GROOMING: INDEPENDENT
ADLS_ACUITY_SCORE: 28
HYGIENE/GROOMING: INDEPENDENT
ADLS_ACUITY_SCORE: 28
DRESS: INDEPENDENT
ADLS_ACUITY_SCORE: 28
ORAL_HYGIENE: INDEPENDENT
ADLS_ACUITY_SCORE: 28
ORAL_HYGIENE: INDEPENDENT
ADLS_ACUITY_SCORE: 28

## 2024-04-19 NOTE — PROGRESS NOTES
The patient's care was discussed with the treatment team during the daily team meeting and/or staff's chart notes were reviewed. Staff report patient slept 6.25 hours with no behavioral concerns overnight.   Evening staff report:  Pt less manic-presenting than previous shift, speech is quieter, less hyper verbal, much less laughter, and shirt appeared to be on for the entire shift. Denies anxiety and depression. Endorses positive mood. Denies A/VH. Denies SI/HI.  Calm, cooperative. Visible in milieu, listening to music. Social with staff.    Took scheduled medication without issue.    Endorsed 6/10 chronic back pain, given PRN Tylenol, with no further complaint of pain. Denied having any other acute physical concerns at this time. No adverse effects of medication observed or reported.      Upon interview, patient doing painting with the OT in the Pod 2 lounge, patient reports a good mood, states that he has been sleeping and resting.well, attributed this to his Seroquel. While talking with the patient  Yamila the ACT CM a member of the ACT team from [employer] came to see him. Patient appears very calm, speech is coherent with normal tone and volume, Patient reports that his medications are working well and that he has not experienced any side effects from the medications. Informed we have not received the result for the lab drawn yesterday for Depakote level. Patient is optimistic that the level would be normal since he has been taking his medications. Patient asks if he could get out of here by next week if there is acceptance and bed availability for CD treatment from the referrals made thus far. Patient states he did not remember the name of those who interviewed him 2 days ago. Patient informed that the CTC would tell him and reminded that it is not unusual to forget things after ECT and that the memory would come back to him. Meanwhile patient denies A/VH, SI/HI or thoughts of self-harm.               Medications:      Inpatient Administered Meds                     Current Facility-Administered Medications   Medication Dose Route Frequency Provider Last Rate Last Admin    atorvastatin (LIPITOR) tablet 40 mg  40 mg Oral Daily Jaswinder Claros MD   40 mg at 04/10/24 0818    cloZAPine (CLOZARIL) tablet 100 mg  100 mg Oral At Bedtime Jaswinder Claros MD   100 mg at 04/09/24 1848    cloZAPine (CLOZARIL) tablet 50 mg  50 mg Oral Daily Jaswinder Claros MD   50 mg at 04/10/24 0818    divalproex sodium extended-release (DEPAKOTE ER) 24 hr tablet 500 mg  500 mg Oral BID Jaswinder Claros MD   500 mg at 04/10/24 0818    gabapentin (NEURONTIN) tablet 600 mg  600 mg Oral BID Jaswinder Claros MD   600 mg at 04/10/24 0818    hydrochlorothiazide (HYDRODIURIL) tablet 25 mg  25 mg Oral Daily Jaswinder Claros MD   25 mg at 04/10/24 0818    nicotine (NICODERM CQ) 21 MG/24HR 24 hr patch 1 patch  1 patch Transdermal Daily Xavi Meediros MD   1 patch at 04/10/24 0821              Allergies:      Allergies             Allergies   Allergen Reactions    Haloperidol Confusion and Other (See Comments)       Prone to EPSE. COnsider IM Zyprexa or be sure to give with benadryl              Labs:      Recent Results             Recent Results (from the past 24 hour(s))   WBC and Differential     Collection Time: 04/10/24 10:59 AM   Result Value Ref Range     WBC Count 5.7 4.0 - 11.0 10e3/uL     % Neutrophils 51 %     % Lymphocytes 37 %     % Monocytes 9 %     % Eosinophils 2 %     % Basophils 1 %     % Immature Granulocytes 0 %     NRBCs per 100 WBC 0 <1 /100     Absolute Neutrophils 3.0 1.6 - 8.3 10e3/uL     Absolute Lymphocytes 2.1 0.8 - 5.3 10e3/uL     Absolute Monocytes 0.5 0.0 - 1.3 10e3/uL     Absolute Eosinophils 0.1 0.0 - 0.7 10e3/uL     Absolute Basophils 0.0 0.0 - 0.2 10e3/uL     Absolute Immature Granulocytes 0.0 <=0.4 10e3/uL     Absolute NRBCs 0.0 10e3/uL               Psychiatric Examination:      /84 (BP Location: Left arm, Patient Position: Sitting, Cuff Size: Adult Regular)   Pulse 94   Temp 97.8  F (36.6  C) (Oral)   Resp 12   Ht 1.829 m (6')   Wt 107.1 kg (236 lb 1.6 oz)   SpO2 98%   BMI 32.02 kg/m    Weight is 236 lbs 1.6 oz  Body mass index is 32.02 kg/m .     Weight over time:          Vitals:     04/03/24 1151 04/09/24 0826   Weight: 108 kg (238 lb) 107.1 kg (236 lb 1.6 oz)         Orthostatic Vitals           Most Recent       Sitting Orthostatic /85 04/09 0826     Sitting Orthostatic Pulse (bpm) 73 04/09 0826     Standing Orthostatic /92 04/09 0826     Standing Orthostatic Pulse (bpm) 69 04/09 0826                   Cardiometabolic risk assessment. 04/10/24        Reviewed patient profile for cardiometabolic risk factors     Date taken /Value  REFERENCE RANGE   Abdominal Obesity  (Waist Circumference)   See nursing flowsheet Women ?35 in (88 cm)   Men ?40 in (102 cm)       Triglycerides                Triglycerides   Date Value Ref Range Status   02/26/2013 71 0 - 150 mg/dL Final       Comment:       Fasting specimen         ?150 mg/dL (1.7 mmol/L) or current treatment for elevated triglycerides   HDL cholesterol            HDL Cholesterol   Date Value Ref Range Status   02/26/2013 45 40 - 110 mg/dL Final   ]    Women <50 mg/dL (1.3 mmol/L) in women or current treatment for low HDL cholesterol  Men <40 mg/dL (1 mmol/L) in men or current treatment for low HDL cholesterol      Fasting plasma glucose (FPG)           Lab Results   Component Value Date      04/08/2024     GLC 83 02/26/2013        FPG ?100 mg/dL (5.6 mmol/L) or treatment for elevated blood glucose   Blood pressure        BP Readings from Last 3 Encounters:   04/08/24 127/84   02/26/13 129/74    Blood pressure ?130/85 mmHg or treatment for elevated blood pressure   Family History  See family history      Mental Status Exam:  Appearance: awake, alert, dressed in a hospital  "scrubs, appeared as age stated  Attitude:  calm, cooperative  Eye Contact:  good  Mood: \"Good\"  Affect:  Calm, mood congruent  Speech:  clear, normal tone and volume  Language: fluent and intact in English  Psychomotor, Gait, Musculoskeletal:  no evidence of tardive dyskinesia, dystonia, or tics  Throught Process: Linear, Logical, goal directed  Associations:  No Loosening of associations  Thought Content:  no evidence of suicidal ideation or homicidal ideation, no auditory hallucinations present, and no visual hallucinations present  Insight: Fair  Judgement: fair  Oriented to:  time, person, and place  Attention Span and Concentration: Good  Recent and Remote Memory:  fair  Fund of Knowledge: Appropriate             Precautions:                Behavioral Orders   Procedures    Assault precautions    Code 1 - Restrict to Unit    Elopement precautions    [facility] Extended Care       Until discharge, Extended Care to offer psychotherapeutic services to mental health patients boarding for admission or stabilization. These services are to include but are not limited to: individual psychotherapy, diagnostic assessment, case management and care planning, safety planning, etc. This may include up to 1 visit per day. If patient is physically located at [facility] or [facility], group psychotherapy up to 2 time per day may be offered.     [facility]       Until discharge, Extended Care to offer psychotherapeutic services to mental health patients boarding for admission or stabilization. These services are to include but are not limited to: individual psychotherapy, diagnostic assessment, case management and care planning, safety planning, etc. This may include up to 1 visit per day. If patient is physically located at [facility] or [facility], group psychotherapy up to 2 time per day may be offered.     Routine Programming       As clinically indicated    Sexual precautions    Status 15       Every 15 minutes.    Status Individual " Observation       Patient SIO status reviewed with team/RN.  Please also refer to RN/team documentation for add'l detail.     -2:1 SIO staff to monitor following which have contributed to patient being on SIO: at least 1 male staff     Assault risk     -Possible interventions SIO staff could use to support patient's treatment progress:  Verbal de-escalation/ Medication administration     -When following observed, team will review discontinuation of SIO:     When patient is not longer aggressive, sexually inappropriate or intrusive.       Order Specific Question:   CONTINUOUS 24 hours / day       Answer:   Other       Order Specific Question:   Specify distance       Answer:   10 foot       Order Specific Question:   Indications for SIO       Answer:   Assault risk       Order Specific Question:   Indications for SIO       Answer:   Severe intrusiveness    Suicide precautions: Suicide Risk: HIGH; Clinical rationale to override score: response to medication       Patients on Suicide Precautions should have a Combination Diet ordered that includes a Diet selection(s) AND a Behavioral Tray selection for Safe Tray - with utensils, or Safe Tray - NO utensils          Order Specific Question:   Suicide Risk       Answer:   HIGH       Order Specific Question:   Clinical rationale to override score:       Answer:   response to medication           Diagnoses:         Schizoaffective disorder, bipolar type (H)  Polysubstance abuse (H)  Agitation     Clinically Significant Risk Factors                          # Obesity: Estimated body mass index is 32.02 kg/m  as calculated from the following:    Height as of this encounter: 1.829 m (6').    Weight as of this encounter: 107.1 kg (236 lb 1.6 oz)., PRESENT ON ADMISSION     # Financial/Environmental Concerns: other (see comments) (lost IRTS housing)     # Housing Instability: noted in nursing assessment          Assessment & Plan:      Assessment and hospital summary:  Ward SOTELO  Eunice is a -34- old male with a previous diagnosis of Schizoaffective disorder bipolar type who presented to the ED for a significant behavioral change, verbal agitation, worsening psychosocial stress, in the context of substance use.  Factors that make the mental health crisis life threatening or complex are:  Patient was brought to the ED from [facility]"" following his ECT treatment this morning at [facility].  Patient states he does not know why he is here and also states he knows why he is here.  He presents as manic, disorganized, and confused.   He is distractable and not a clear historian at this time.  Patient states he has not slept in a long time and received narcan last night.  Per ACT team  and [facility] IRTS staff patient has been elevated, intrusive, and disruptive.  Patient broke a window yesterday.  IRTS reports patient had turned table over and tried using them as skate board ramps.  Patient was noted for have been fairly stable prior to his pass this weekend.  CM reports patient went to a skateboarding competition in [place].  Patient returned in a manic state.  CM reports pt may not appear as manic as he has been the past few days due to sedation and ECT this morning.  She reports patient has not slept in 3 days.  IRTS reports pt received narcan twice last night.  Patient has been trespassed from the IRTS facility.  [facility] is postive for cannabis..         Today's Changes:4/19/24  None, continue with current treatment     Target psychiatric symptoms and interventions:  Admitted on 4/9/24 to station 12N  Clozaril, 50 mg every morning and 200 mg at bedtime  --Depakote ER, 500 mg daily and 1000 mg at bedtime for mood stabilization   --Gabapentin, 900 mg twice a day for pain  - Ibuprofen tablet 400 mg orally every 6 hours prn for pain  --Additional medications will include B52, Zyprexa, trazodone, hydroxyzine     Risks, benefits, and alternatives discussed at length with patient.      Acute Medical  Problems and Treatments:  Acute medical concerns:  - No acute medical concerns     Pertinent labs/imaging:  Recent Results      Recent Results          Recent Results (from the past 336 hour(s))   Valproic acid     Collection Time: 04/05/24  9:39 PM   Result Value Ref Range     Valproic acid 68.4   ug/mL   Asymptomatic COVID-19 Virus (Coronavirus) by PCR Nasopharyngeal     Collection Time: 04/05/24  9:40 PM     Specimen: Nasopharyngeal; Swab   Result Value Ref Range     SARS CoV2 PCR Negative Negative   Glucose by meter     Collection Time: 04/08/24  8:21 PM   Result Value Ref Range     GLUCOSE BY METER POCT 117 (H) 70 - 99 mg/dL   WBC and Differential     Collection Time: 04/10/24 10:59 AM   Result Value Ref Range     WBC Count 5.7 4.0 - 11.0 10e3/uL     % Neutrophils 51 %     % Lymphocytes 37 %     % Monocytes 9 %     % Eosinophils 2 %     % Basophils 1 %     % Immature Granulocytes 0 %     NRBCs per 100 WBC 0 <1 /100     Absolute Neutrophils 3.0 1.6 - 8.3 10e3/uL     Absolute Lymphocytes 2.1 0.8 - 5.3 10e3/uL     Absolute Monocytes 0.5 0.0 - 1.3 10e3/uL     Absolute Eosinophils 0.1 0.0 - 0.7 10e3/uL     Absolute Basophils 0.0 0.0 - 0.2 10e3/uL     Absolute Immature Granulocytes 0.0 <=0.4 10e3/uL     Absolute NRBCs 0.0 10e3/uL   EKG 12-lead, complete     Collection Time: 04/15/24  9:20 AM   Result Value Ref Range     Systolic Blood Pressure   mmHg     Diastolic Blood Pressure   mmHg     Ventricular Rate 85 BPM     Atrial Rate 85 BPM     MA Interval 152 ms     QRS Duration 94 ms      ms     QTc 449 ms     P Axis 73 degrees     R AXIS 1 degrees     T Axis 29 degrees     Interpretation ECG           Sinus rhythm  No previous ECGs available  Confirmed by fellow William Chahal ([id number]) on 4/17/2024 9:31:06 AM  Confirmed by MD LILIAN, PENNY ([id number]) on 4/17/2024 10:20:26 PM      Comprehensive metabolic panel     Collection Time: 04/15/24  9:53 AM   Result Value Ref Range     Sodium 139 135 - 145 mmol/L      Potassium 4.2 3.4 - 5.3 mmol/L     Carbon Dioxide (CO2) 29 22 - 29 mmol/L     Anion Gap 10 7 - 15 mmol/L     Urea Nitrogen 18.6 6.0 - 20.0 mg/dL     Creatinine 0.97 0.67 - 1.17 mg/dL     GFR Estimate >90 >60 mL/min/1.73m2     Calcium 8.9 8.6 - 10.0 mg/dL     Chloride 100 98 - 107 mmol/L     Glucose 117 (H) 70 - 99 mg/dL     Alkaline Phosphatase 73 40 - 150 U/L     AST 31 0 - 45 U/L     ALT 28 0 - 70 U/L     Protein Total 7.2 6.4 - 8.3 g/dL     Albumin 4.5 3.5 - 5.2 g/dL     Bilirubin Total 0.7 <=1.2 mg/dL   CBC with platelets and differential     Collection Time: 04/15/24  9:53 AM   Result Value Ref Range     WBC Count 6.3 4.0 - 11.0 10e3/uL     RBC Count 5.09 4.40 - 5.90 10e6/uL     Hemoglobin 14.0 13.3 - 17.7 g/dL     Hematocrit 44.9 40.0 - 53.0 %     MCV 88 78 - 100 fL     MCH 27.5 26.5 - 33.0 pg     MCHC 31.2 (L) 31.5 - 36.5 g/dL     RDW 13.2 10.0 - 15.0 %     Platelet Count 234 150 - 450 10e3/uL     % Neutrophils 59 %     % Lymphocytes 33 %     % Monocytes 5 %     % Eosinophils 2 %     % Basophils 1 %     % Immature Granulocytes 0 %     NRBCs per 100 WBC 0 <1 /100     Absolute Neutrophils 3.8 1.6 - 8.3 10e3/uL     Absolute Lymphocytes 2.1 0.8 - 5.3 10e3/uL     Absolute Monocytes 0.3 0.0 - 1.3 10e3/uL     Absolute Eosinophils 0.1 0.0 - 0.7 10e3/uL     Absolute Basophils 0.0 0.0 - 0.2 10e3/uL     Absolute Immature Granulocytes 0.0 <=0.4 10e3/uL     Absolute NRBCs 0.0 10e3/uL   WBC and Differential     Collection Time: 04/17/24 12:48 PM   Result Value Ref Range     WBC Count 5.6 4.0 - 11.0 10e3/uL     % Neutrophils 68 %     % Lymphocytes 25 %     % Monocytes 5 %     % Eosinophils 1 %     % Basophils 1 %     % Immature Granulocytes 0 %     NRBCs per 100 WBC 0 <1 /100     Absolute Neutrophils 3.8 1.6 - 8.3 10e3/uL     Absolute Lymphocytes 1.4 0.8 - 5.3 10e3/uL     Absolute Monocytes 0.3 0.0 - 1.3 10e3/uL     Absolute Eosinophils 0.0 0.0 - 0.7 10e3/uL     Absolute Basophils 0.0 0.0 - 0.2 10e3/uL     Absolute Immature  Granulocytes 0.0 <=0.4 10e3/uL     Absolute NRBCs 0.0 10e3/uL   Extra Green Top (Lithium Heparin) Tube     Collection Time: 04/17/24 12:48 PM   Result Value Ref Range     Hold Specimen JIC                    Behavioral/Psychological/Social:  - Encourage unit programming     Safety  Placed on the following Precautions: Suicide, Assault, Elopement and Sexual precautions  - Continue precautions as noted above  -  2:1 staff acuity for intrusive, assaultive behaviors  - Status 15 minute checks  - Legal Status: voluntary     Disposition Plan   Reason for ongoing admission: poses an imminent risk to self and poses an imminent risk to others  Discharge location: IRTS facility or CD  Discharge Medications: not ordered  Follow-up Appointments: not scheduled     Entered by: AMY Yu CNP on 04/19/2024  at 12:27         Attestation:   Patient has been seen and evaluated by Phan gill, MICK, APRN CNP, PMHNP-BC  The patient was counseled on nature of illness and treatment plan/options  Care was coordinated with treatment team  Total time > 30 minutes including time spent collecting collateral information from patient's mother.

## 2024-04-19 NOTE — PLAN OF CARE
Team Note Due:  Friday    Assessment/Intervention/Current Symtoms and Care Coordination:  Chart review and met with team, discussed pt progress, symptomology, and response to treatment.  Discussed the discharge plan and any potential impediments to discharge.    ACT , Yamila, visited this morning.     Called [facility] to verify that they received the faxed referral. Left message for , Saskia Zuluaga, requesting a call back for verification.     Discharge Plan or Goal:  When patient's symptoms have reduced and a safe discharge can be facilitated, options include: Residential treatment, IRTS, shelter      Barriers to Discharge:  Discharge planning for residential treatment as patient continues to stabilize      Referral Status:  [place] 4/19  [facility] [place] 4/19     Legal Status:  Voluntary    Contacts:  [facility] IRTS: [phone] (Consent)  ACT : Heidy Pop, [phone] (WAN)  ACT : Yamila, [phone] (WAN)  ACT Psychiatrist: Camron Watson MD, [phone] (WAN)  Mom: Candis, [phone] (Consent)  Friend: Nabila Peters, [phone] (WAN)     Upcoming Meetings and Dates/Important Information and next steps:  Symptom stabilization

## 2024-04-19 NOTE — PROGRESS NOTES
04/19/24 1529   Group Therapy Session   Group Attendance attended group session   Time Session Began 1115   Time Session Ended 1200   Total Time (minutes) 25   Total # Attendees 1   Group Type task skill   Group Topic Covered coping skills/lifestyle management   Group Session Detail OT clinic   Patient Response/Contribution cooperative with task   Patient Participation Detail Pt was woken up for group per his request, and has been in a bright mood and cooperative.  Engaged in a painting activity during OT clinic with writer, though group was cut short due to meeting with provider, and a few minutes later ACT team member arrived to meet with him.  Pt has been social with writer, comfortably engaging in appropriate back and forth conversation.

## 2024-04-19 NOTE — PLAN OF CARE
Problem: Manic Behavior Episode  Goal: Decreased Manic Symptoms  Outcome: Progressing   Goal Outcome Evaluation:    Pt less manic-presenting than previous shift, speech is quieter, less hyper verbal, much less laughter, and shirt appeared to be on for the entire shift. Denies anxiety and depression. Endorses positive mood. Denies A/VH. Denies SI/HI.  Calm, cooperative. Visible in milieu, listening to music. Social with staff.     Took scheduled medication without issue.     Endorsed 6/10 chronic back pain, given PRN Tylenol, with no further complaint of pain. Denied having any other acute physical concerns at this time. No adverse effects of medication observed or reported.     /72 (BP Location: Left arm)   Pulse 73   Temp 97.1  F (36.2  C) (Temporal)   Resp 17   Ht 1.829 m (6')   Wt 108 kg (238 lb)   SpO2 100%   BMI 32.28 kg/m

## 2024-04-19 NOTE — PLAN OF CARE
BEH IP Unit Acuity Rating Score (UARS)  Patient is given one point for every criteria they meet.    CRITERIA SCORING   On a 72 hour hold, court hold, committed, stay of commitment, or revocation. 0   Patient LOS on BEH unit exceeds 20 days. 0  LOS: 11   Patient under guardianship, 55+, otherwise medically complex, or under age 11. 0   Suicide ideation without relief of precipitating factors. 0   Current plan for suicide. 0   Current plan for homicide. 0   Imminent risk or actual attempt to seriously harm another without relief of factors precipitating the attempt. 1   Severe dysfunction in daily living (ex: complete neglect for self care, extreme disruption in vegetative function, extreme deterioration in social interactions). 0   Recent (last 7 days) or current physical aggression in the ED or on unit. 0   Restraints or seclusion episode in past 72 hours. 0   Recent (last 7 days) or current verbal aggression, agitation, yelling, etc., while in the ED or unit. 1   Active psychosis. 0   Need for constant or near constant redirection (from leaving, from others, etc).  0   Intrusive or disruptive behaviors. 0   Patient requires 3 or more hours of individualized nursing care per 8-hour shift (i.e. for ADLs, meds, therapeutic interventions). 0   TOTAL 2

## 2024-04-19 NOTE — PLAN OF CARE
Goal Outcome Evaluation:  Problem: Sleep Disturbance  Goal: Adequate Sleep/Rest  Outcome: Progressing     Pt slept most of the shift without s/s of mental illness and discomfort. Breathing was quite and unlabored. No PRN given. Pt slept 6.25 hrs. Continue to monitor for safety/behavior and intervene as needed.

## 2024-04-19 NOTE — PLAN OF CARE
Problem: Adult Behavioral Health Plan of Care  Goal: Develops/Participates in [place]rapeutic [place] to Support Successful Transition  Outcome: Progressing   Goal Outcome Evaluation:    Patient is calm and cooperative. He appears to be in a good mood. States he is happy and he feels really good. Pt is sleeping well. He has been sleeping most of the the day shift. He went to groups and participated in groups. Denies all mental health symptoms, denies SI/HI. No signs of depression, paranoia and anxiety.  Pt stated he is sleeping very well and feels very rested. He was seen listening to music most of the shift. No acute behavioral concerns this shift. No medication side effects stated or observed.

## 2024-04-20 PROCEDURE — 250N000013 HC RX MED GY IP 250 OP 250 PS 637: Performed by: FAMILY MEDICINE

## 2024-04-20 PROCEDURE — 124N000002 HC R&B MH UMMC

## 2024-04-20 PROCEDURE — 250N000013 HC RX MED GY IP 250 OP 250 PS 637: Performed by: PHYSICIAN ASSISTANT

## 2024-04-20 PROCEDURE — 250N000013 HC RX MED GY IP 250 OP 250 PS 637: Performed by: CLINICAL NURSE SPECIALIST

## 2024-04-20 RX ADMIN — QUETIAPINE 100 MG: 100 TABLET ORAL at 20:54

## 2024-04-20 RX ADMIN — FLUTICASONE PROPIONATE 1 SPRAY: 50 SPRAY, METERED NASAL at 10:07

## 2024-04-20 RX ADMIN — LORATADINE 10 MG: 10 TABLET ORAL at 10:01

## 2024-04-20 RX ADMIN — DIVALPROEX SODIUM 500 MG: 500 TABLET, FILM COATED, EXTENDED RELEASE ORAL at 10:07

## 2024-04-20 RX ADMIN — Medication 900 MG: at 20:54

## 2024-04-20 RX ADMIN — HYDROCHLOROTHIAZIDE 25 MG: 25 TABLET ORAL at 10:01

## 2024-04-20 RX ADMIN — ATORVASTATIN CALCIUM 40 MG: 40 TABLET, FILM COATED ORAL at 10:01

## 2024-04-20 RX ADMIN — CLOZAPINE 50 MG: 50 TABLET ORAL at 10:01

## 2024-04-20 RX ADMIN — DIVALPROEX SODIUM 1000 MG: 500 TABLET, FILM COATED, EXTENDED RELEASE ORAL at 20:54

## 2024-04-20 RX ADMIN — Medication 900 MG: at 10:01

## 2024-04-20 RX ADMIN — CLOZAPINE 200 MG: 200 TABLET ORAL at 20:54

## 2024-04-20 RX ADMIN — PANTOPRAZOLE SODIUM 40 MG: 40 TABLET, DELAYED RELEASE ORAL at 10:01

## 2024-04-20 RX ADMIN — POLYETHYLENE GLYCOL 3350 17 G: 17 POWDER, FOR SOLUTION ORAL at 10:01

## 2024-04-20 ASSESSMENT — ACTIVITIES OF DAILY LIVING (ADL)
ADLS_ACUITY_SCORE: 28
ORAL_HYGIENE: INDEPENDENT
DRESS: SCRUBS (BEHAVIORAL HEALTH);INDEPENDENT
ADLS_ACUITY_SCORE: 28
HYGIENE/GROOMING: INDEPENDENT
ADLS_ACUITY_SCORE: 28

## 2024-04-20 NOTE — PLAN OF CARE
"  Problem: Adult Inpatient Plan of Care  Goal: Optimal Comfort and Wellbeing  Intervention: Provide Person-Centered Care  Recent Flowsheet Documentation  Taken 4/20/2024 1014 by Radha Casey RN  Trust Relationship/Rapport:   care explained   choices provided   emotional support provided   empathic listening provided   questions answered   questions encouraged   reassurance provided   thoughts/feelings acknowledged   Goal Outcome Evaluation:    Plan of Care Reviewed With: patient        Pt had an uneventful shift. He is improving and remaining calm and cooperative throughout the shift. He denied all mental health symptoms including SI/HI/AVH and did not appear to be responding. He was mainly isolative and listening to music and resting in his room. He made the statement \"If I sleep and stay in my room, its better than coming out and being loud\".   He was appropriate with staff, and had no outbursts of aggression or agitation. He ate and drank well this shift, was med compliant with all oral meds (he declined the nicotine patch), denied pain, and had no other acute physical or behavioral concerns this shift.   /83 (BP Location: Left arm, Patient Position: Sitting, Cuff Size: Adult Large)   Pulse 82   Temp 97.6  F (36.4  C) (Temporal)   Resp 17   Ht 1.829 m (6')   Wt 108 kg (238 lb)   SpO2 98%   BMI 32.28 kg/m      "

## 2024-04-20 NOTE — PLAN OF CARE
NOC Shift Report    Pt slept 7 hours overnight.     Pt asleep at start of shift. No s/s of respiratory distress while asleep.    No c/o pain or discomfort. No concerns reported by pt.     No PRNs administered.     No behavioral issues observed by staff.     Pt safety maintained. Will continue to monitor.    Goal Outcome Evaluation:  Problem: Sleep Disturbance  Goal: Adequate Sleep/Rest  Outcome: Progressing

## 2024-04-20 NOTE — PLAN OF CARE
"\"No\".  Silence.  \"I'm not getting up.\"- But he did, only briefly.    Dismissive to almost hostile attitude. Monosyllabic and spending the evening in bed. Not social with any staff.  Restricted affect set in expression of annoyance.  Declined offer to listen to music and multiple offers to engage with staff.  Refused to respond when asked questions about thoughts and feelings    Behaviour and affect contrast with those of  3 days ago, except that his room still holds collections of linens, food, personal hygiene items, multiples of each.     Referrals on progress.    SLUMS done 4/18/24.     Appearance: indifferent   Body Language: dismissive  Attitude:dismissive  Mood:\"silence  Affect: annoyed  Thought Process: uncooperative  Thought content:uncooperative  Perceptions: no abnormal perceptions noted  Suicidal/homicidal: uncooperative  Knowledge,Insight,Judgement: uncooperative  Attention/Concentration: intact/intact  Memory:Recent- uncooperative                Remote-uncooperative  Speech: clear, brief  Cognition:appears intact  Psychomotor:no abnormal movements  Sleep/Activity level: excessive sleep/minimal activity  Motivation:none noted    Plan: Monitor and document mood and behaviour, thought process and content. Establish and maintain therapeutic relationship. Educate about diagnoses, medications, treatment, legal status, plan of care. Address preexisting and concurrent medical concerns.     P:Unstable mood  G:Stable mood  0: not progressing            "

## 2024-04-21 PROCEDURE — 250N000013 HC RX MED GY IP 250 OP 250 PS 637: Performed by: FAMILY MEDICINE

## 2024-04-21 PROCEDURE — 250N000013 HC RX MED GY IP 250 OP 250 PS 637: Performed by: PHYSICIAN ASSISTANT

## 2024-04-21 PROCEDURE — 124N000002 HC R&B MH UMMC

## 2024-04-21 PROCEDURE — 250N000013 HC RX MED GY IP 250 OP 250 PS 637: Performed by: CLINICAL NURSE SPECIALIST

## 2024-04-21 RX ADMIN — Medication 900 MG: at 20:07

## 2024-04-21 RX ADMIN — LORATADINE 10 MG: 10 TABLET ORAL at 11:35

## 2024-04-21 RX ADMIN — FLUTICASONE PROPIONATE 1 SPRAY: 50 SPRAY, METERED NASAL at 11:33

## 2024-04-21 RX ADMIN — ATORVASTATIN CALCIUM 40 MG: 40 TABLET, FILM COATED ORAL at 11:34

## 2024-04-21 RX ADMIN — POLYETHYLENE GLYCOL 3350 17 G: 17 POWDER, FOR SOLUTION ORAL at 11:34

## 2024-04-21 RX ADMIN — QUETIAPINE 100 MG: 100 TABLET ORAL at 20:08

## 2024-04-21 RX ADMIN — DIVALPROEX SODIUM 500 MG: 500 TABLET, FILM COATED, EXTENDED RELEASE ORAL at 11:34

## 2024-04-21 RX ADMIN — CLOZAPINE 50 MG: 50 TABLET ORAL at 11:35

## 2024-04-21 RX ADMIN — PANTOPRAZOLE SODIUM 40 MG: 40 TABLET, DELAYED RELEASE ORAL at 11:34

## 2024-04-21 RX ADMIN — HYDROCHLOROTHIAZIDE 25 MG: 25 TABLET ORAL at 11:35

## 2024-04-21 RX ADMIN — Medication 900 MG: at 11:33

## 2024-04-21 RX ADMIN — DIVALPROEX SODIUM 1000 MG: 500 TABLET, FILM COATED, EXTENDED RELEASE ORAL at 20:08

## 2024-04-21 RX ADMIN — CLOZAPINE 200 MG: 200 TABLET ORAL at 20:08

## 2024-04-21 ASSESSMENT — ACTIVITIES OF DAILY LIVING (ADL)
ADLS_ACUITY_SCORE: 28

## 2024-04-21 NOTE — PLAN OF CARE
"  Problem: Suicide Risk  Goal: Absence of Self-Harm  Outcome: Progressing  Intervention: Assess Risk to Self and Maintain Safety  Recent Flowsheet Documentation  Taken 4/20/2024 2200 by True Tom RN  Behavior Management: impulse control promoted  Intervention: Promote Psychosocial Wellbeing  Recent Flowsheet Documentation  Taken 4/20/2024 2200 by True Tom RN  Supportive Measures: self-responsibility promoted     Problem: Manic Behavior Episode  Goal: Decreased Manic Symptoms  Outcome: Progressing  Intervention: Promote Mood Equilibrium  Recent Flowsheet Documentation  Taken 4/20/2024 2200 by True Tom RN  Behavior Management: impulse control promoted  Sensory Stimulation Regulation: quiet environment promoted  Supportive Measures: self-responsibility promoted   Goal Outcome Evaluation:    Plan of Care Reviewed With: patient      He stayed in his room for the majority of the shift. Pt said, \"I just want to stay in my bed because I want to discharge, and you know how the shit goes here.\" Pt explained that he would stay out of trouble and earn his way out of the hospital by staying inside his room. The writer encouraged him to get out and socialize with staff, but he declined and said he was okay. Pt denied all mental health symptoms. He denied SI/AH/VH/HI/SIB. The mood was calm, and affect was bright on approach. Appetite was good. Pt was med compliant. Plan of care ongoing.   "

## 2024-04-21 NOTE — PLAN OF CARE
Problem: Adult Inpatient Plan of Care  Goal: Plan of Care Review  Outcome: Progressing     Patient is alert and oriented x3, calm and cooperative. Patient remains withdrawn and isolative to his room. Spent majority of the shift sleeping in his room; awaken by staff for medications and meal. He presents with fair affect, calm mood, normal speech and some insight into their situation. Acknowledges that he is feeling better. Patient appears neat and well-groomed. Patient voices his needs appropriately. Patient denies anxiety, depression, paranoia, auditory hallucinations, and thoughts of harming self/others. No acute behavioral concern with patient this shift.     Patient is eating poorly; only ate 25% of lunch. He hydrating, and sleeping adequately. Patient is medication compliant with reminders. He declined nicotine patch. Medication side effects were not observed or reported this shift. Patient denies pain/ physical discomforts. No acute medical concern. VS WNL /89 (Patient Position: Sitting)   Pulse 85   Temp 97  F (36.1  C) (Temporal)   Resp 16   Ht 1.829 m (6')   Wt 108 kg (238 lb)   SpO2 95%   BMI 32.28 kg/m

## 2024-04-21 NOTE — PLAN OF CARE
NOC Shift Report    Pt slept 6.75 hours overnight.     Pt asleep at start of shift. No s/s of respiratory distress while asleep.    No c/o pain or discomfort. No concerns reported by pt.     No PRNs administered.     No behavioral issues observed by staff.     Pt safety maintained. Will continue to monitor.    Goal Outcome Evaluation:  Problem: Sleep Disturbance  Goal: Adequate Sleep/Rest  Outcome: Progressing

## 2024-04-22 PROCEDURE — 250N000013 HC RX MED GY IP 250 OP 250 PS 637: Performed by: PHYSICIAN ASSISTANT

## 2024-04-22 PROCEDURE — 250N000013 HC RX MED GY IP 250 OP 250 PS 637: Performed by: FAMILY MEDICINE

## 2024-04-22 PROCEDURE — 124N000002 HC R&B MH UMMC

## 2024-04-22 PROCEDURE — 99232 SBSQ HOSP IP/OBS MODERATE 35: CPT | Performed by: CLINICAL NURSE SPECIALIST

## 2024-04-22 PROCEDURE — 250N000013 HC RX MED GY IP 250 OP 250 PS 637: Performed by: CLINICAL NURSE SPECIALIST

## 2024-04-22 PROCEDURE — G0177 OPPS/PHP; TRAIN & EDUC SERV: HCPCS

## 2024-04-22 RX ADMIN — CLOZAPINE 200 MG: 200 TABLET ORAL at 20:15

## 2024-04-22 RX ADMIN — FLUTICASONE PROPIONATE 1 SPRAY: 50 SPRAY, METERED NASAL at 12:21

## 2024-04-22 RX ADMIN — QUETIAPINE 100 MG: 100 TABLET ORAL at 20:15

## 2024-04-22 RX ADMIN — DIVALPROEX SODIUM 1000 MG: 500 TABLET, FILM COATED, EXTENDED RELEASE ORAL at 20:16

## 2024-04-22 RX ADMIN — HYDROCHLOROTHIAZIDE 25 MG: 25 TABLET ORAL at 12:06

## 2024-04-22 RX ADMIN — DIVALPROEX SODIUM 500 MG: 500 TABLET, FILM COATED, EXTENDED RELEASE ORAL at 12:06

## 2024-04-22 RX ADMIN — PANTOPRAZOLE SODIUM 40 MG: 40 TABLET, DELAYED RELEASE ORAL at 12:38

## 2024-04-22 RX ADMIN — ATORVASTATIN CALCIUM 40 MG: 40 TABLET, FILM COATED ORAL at 12:06

## 2024-04-22 RX ADMIN — LORATADINE 10 MG: 10 TABLET ORAL at 12:06

## 2024-04-22 RX ADMIN — CLOZAPINE 50 MG: 50 TABLET ORAL at 12:05

## 2024-04-22 RX ADMIN — POLYETHYLENE GLYCOL 3350 17 G: 17 POWDER, FOR SOLUTION ORAL at 12:18

## 2024-04-22 RX ADMIN — Medication 900 MG: at 20:15

## 2024-04-22 RX ADMIN — Medication 900 MG: at 12:20

## 2024-04-22 ASSESSMENT — ACTIVITIES OF DAILY LIVING (ADL)
ADLS_ACUITY_SCORE: 28

## 2024-04-22 NOTE — PLAN OF CARE
BEH IP Unit Acuity Rating Score (UARS)  Patient is given one point for every criteria they meet.    CRITERIA SCORING   On a 72 hour hold, court hold, committed, stay of commitment, or revocation. 0    Patient LOS on BEH unit exceeds 20 days. 0  LOS: 14   Patient under guardianship, 55+, otherwise medically complex, or under age 11. 0   Suicide ideation without relief of precipitating factors. 0   Current plan for suicide. 0   Current plan for homicide. 0   Imminent risk or actual attempt to seriously harm another without relief of factors precipitating the attempt. 1   Severe dysfunction in daily living (ex: complete neglect for self care, extreme disruption in vegetative function, extreme deterioration in social interactions). 0   Recent (last 7 days) or current physical aggression in the ED or on unit. 0   Restraints or seclusion episode in past 72 hours. 0   Recent (last 7 days) or current verbal aggression, agitation, yelling, etc., while in the ED or unit. 0   Active psychosis. 0   Need for constant or near constant redirection (from leaving, from others, etc).  0   Intrusive or disruptive behaviors. 0   Patient requires 3 or more hours of individualized nursing care per 8-hour shift (i.e. for ADLs, meds, therapeutic interventions). 0   TOTAL 1

## 2024-04-22 NOTE — PLAN OF CARE
Problem: Manic Behavior Episode  Goal: Decreased Manic Symptoms  Outcome: Progressing   Goal Outcome Evaluation:    Appears to have spent much of in bed, appearing to sleep for some duration of this. Noticeably calmer than previously. Speech WDL.No overt signs of evens noted this shift.  Endorses having a neutral mood. Denies depression, anxiety. Denies having any thoughts of harming self or others. Denies A/VH.    Took scheduled medications without issue.     Ate approximately 75% of dinner.     Rates pain at 4/10, chronic lower back pain. Denied having any acute physical concerns at this time.    /81 (BP Location: Left arm, Patient Position: Sitting, Cuff Size: Adult Large)   Pulse 94   Temp 97.8  F (36.6  C) (Temporal)   Resp 16   Ht 1.829 m (6')   Wt 108 kg (238 lb)   SpO2 97%   BMI 32.28 kg/m

## 2024-04-22 NOTE — PLAN OF CARE
Team Note Due:  Friday    Assessment/Intervention/Current Symtoms and Care Coordination:  Chart review and met with team, discussed pt progress, symptomology, and response to treatment.  Discussed the discharge plan and any potential impediments to discharge.    Referral sent to [facility] via fax at [phone].     Referral sent to [facility] via fax at [phone].     Referral sent to [facility] via fax at [phone].     Spoke to intake at [facility] at 12:13 PM. Verified insurance information. Application processed and is being reviewed by clinical team.     Received callback from Norma Zuluaga,  at [facility]. She asked for the referral to be emailed. Referral emailed to [email or web address]    Discharge Plan or Goal:  When patient's symptoms have reduced and a safe discharge can be facilitated, options include: Residential treatment, IRTS, shelter      Barriers to Discharge:  Discharge planning for residential treatment as patient continues to stabilize      Referral Status:  [facility] 4/19 - denied 4/19  [facility] 4/19  [facility] 4/22  [facility] 4/22  [facility] 4/22     Legal Status:  Voluntary    Contacts:  [facility] IRTS: [phone] (Consent)  [facility] : Heidy Pop, [phone] (WAN)  [facility] : Yamila [phone] (WAN)  [facility] Psychiatrist: Camron Watson MD, [phone] (WAN)  Mom: Candis, [phone] (Consent)  Friend: Nabila Peters, [phone] (WAN)  [facility] Intake: [email or web address] (WAN)     Upcoming Meetings and Dates/Important Information and next steps:  Symptom stabilization

## 2024-04-22 NOTE — PLAN OF CARE
NOC Shift Report    Pt slept 7 hours overnight.     Pt asleep at start of shift. No s/s of respiratory distress while asleep.    No c/o pain or discomfort overnight. No concerns reported by pt.     No PRNs administered.     Pt has order for 1:1 acuity staff.    No behavioral issues observed by staff.     Pt safety maintained. Will continue to monitor.    Goal Outcome Evaluation:  Problem: Sleep Disturbance  Goal: Adequate Sleep/Rest  Outcome: Progressing

## 2024-04-22 NOTE — PLAN OF CARE
Problem: Manic Behavior Episode  Goal: Decreased Manic Symptoms  Outcome: Progressing  Patient was in bed till about 12pm. He did not eat breakfast but ate lunch when he woke up. His affect this shift is flat and is mood calm. He is compliant with vitals and medication administration. His medication were given to him today when he woke up at 12pm. He did not complain of pain and he did not report any medication side effect. Patient spent most of this shift in his room resting and was not visible in the lounge area. Patent seem to be in a good mood talking and laughing with writer, with significantly less manic behaviors. Patient did not take a shower this shift.    Patient acuity staff was discontinued.     /84 (BP Location: Right arm, Patient Position: Standing, Cuff Size: Adult Large)   Pulse 90   Temp 98.1  F (36.7  C) (Oral)   Resp 16   Ht 1.829 m (6')   Wt 108 kg (238 lb)   SpO2 95%   BMI 32.28 kg/m

## 2024-04-22 NOTE — PROGRESS NOTES
"The patient's care was discussed with the treatment team during the daily team meeting and/or staff's chart notes were reviewed. Staff report patient slept 7 hours with no issues of concerns overnight.   Evening staff report:  Patient spent much time in bed mostly sleeping, observed to be calmer than previous times. Speech was WNL, no signs of evens.noted. Patient endorses having a neutral mood. Denies depression, anxiety, denies thoughts of harming self or others, denies A/VH.       Visited with patient twice this morning sleeping, but at about 12:30 patient had woken up and taken his medications. Upon interview, patient just woke up from sleep, appears very Calm and cooperative with this assessment. Patient's speech is normal volume and prosody, reports medications are working well with no side effects. Patient denies A/VH, SI/HI, thoughts of self harm. Patient reports sleep is good, describes his appetite as \"so, so.\"Patient is participating in group and taking good care of his personal hygiene. Writer asked if patient has spoken with his mother, states that the last time he called, she did not pick the phone. Writer updated patient's mother about the progress made thus far. The mother was glad and promised to call him after leaving work today. [facility] informs this writer that [facility] declined patient, they specified no reason for declining him other than the fact they only provide outpatient treatment and patient wants inpatient treatment. The [facility] has sent out more referrals, reports that [facility] is reviewing his application at this time.             Medications:      Inpatient Administered Meds                     Current Facility-Administered Medications   Medication Dose Route Frequency Provider Last Rate Last Admin    atorvastatin (LIPITOR) tablet 40 mg  40 mg Oral Daily Jaswinder Claros MD   40 mg at 04/10/24 0818    cloZAPine (CLOZARIL) tablet 100 mg  100 mg Oral At Bedtime Jaswinder Claros " MD Ten   100 mg at 04/09/24 1848    cloZAPine (CLOZARIL) tablet 50 mg  50 mg Oral Daily Jaswinder Claros MD   50 mg at 04/10/24 0818    divalproex sodium extended-release (DEPAKOTE ER) 24 hr tablet 500 mg  500 mg Oral BID Jaswinder Claros MD   500 mg at 04/10/24 0818    gabapentin (NEURONTIN) tablet 600 mg  600 mg Oral BID Jaswinder Claros MD   600 mg at 04/10/24 0818    hydrochlorothiazide (HYDRODIURIL) tablet 25 mg  25 mg Oral Daily Jaswinder Claros MD   25 mg at 04/10/24 0818    nicotine (NICODERM CQ) 21 MG/24HR 24 hr patch 1 patch  1 patch Transdermal Daily Xavi Medeiros MD   1 patch at 04/10/24 0821              Allergies:      Allergies             Allergies   Allergen Reactions    Haloperidol Confusion and Other (See Comments)       Prone to EPSE. COnsider IM Zyprexa or be sure to give with benadryl              Labs:      Recent Results             Recent Results (from the past 24 hour(s))   WBC and Differential     Collection Time: 04/10/24 10:59 AM   Result Value Ref Range     WBC Count 5.7 4.0 - 11.0 10e3/uL     % Neutrophils 51 %     % Lymphocytes 37 %     % Monocytes 9 %     % Eosinophils 2 %     % Basophils 1 %     % Immature Granulocytes 0 %     NRBCs per 100 WBC 0 <1 /100     Absolute Neutrophils 3.0 1.6 - 8.3 10e3/uL     Absolute Lymphocytes 2.1 0.8 - 5.3 10e3/uL     Absolute Monocytes 0.5 0.0 - 1.3 10e3/uL     Absolute Eosinophils 0.1 0.0 - 0.7 10e3/uL     Absolute Basophils 0.0 0.0 - 0.2 10e3/uL     Absolute Immature Granulocytes 0.0 <=0.4 10e3/uL     Absolute NRBCs 0.0 10e3/uL              Psychiatric Examination:      /84 (BP Location: Left arm, Patient Position: Sitting, Cuff Size: Adult Regular)   Pulse 94   Temp 97.8  F (36.6  C) (Oral)   Resp 12   Ht 1.829 m (6')   Wt 107.1 kg (236 lb 1.6 oz)   SpO2 98%   BMI 32.02 kg/m    Weight is 236 lbs 1.6 oz  Body mass index is 32.02 kg/m .     Weight over time:          Vitals:     04/03/24  "1151 04/09/24 0826   Weight: 108 kg (238 lb) 107.1 kg (236 lb 1.6 oz)         Orthostatic Vitals           Most Recent       Sitting Orthostatic /85 04/09 0826     Sitting Orthostatic Pulse (bpm) 73 04/09 0826     Standing Orthostatic /92 04/09 0826     Standing Orthostatic Pulse (bpm) 69 04/09 0826                   Cardiometabolic risk assessment. 04/10/24        Reviewed patient profile for cardiometabolic risk factors     Date taken /Value  REFERENCE RANGE   Abdominal Obesity  (Waist Circumference)   See nursing flowsheet Women ?35 in (88 cm)   Men ?40 in (102 cm)       Triglycerides                Triglycerides   Date Value Ref Range Status   02/26/2013 71 0 - 150 mg/dL Final       Comment:       Fasting specimen         ?150 mg/dL (1.7 mmol/L) or current treatment for elevated triglycerides   HDL cholesterol            HDL Cholesterol   Date Value Ref Range Status   02/26/2013 45 40 - 110 mg/dL Final   ]    Women <50 mg/dL (1.3 mmol/L) in women or current treatment for low HDL cholesterol  Men <40 mg/dL (1 mmol/L) in men or current treatment for low HDL cholesterol      Fasting plasma glucose (FPG)           Lab Results   Component Value Date      04/08/2024     GLC 83 02/26/2013        FPG ?100 mg/dL (5.6 mmol/L) or treatment for elevated blood glucose   Blood pressure        BP Readings from Last 3 Encounters:   04/08/24 127/84   02/26/13 129/74    Blood pressure ?130/85 mmHg or treatment for elevated blood pressure   Family History  See family history      Mental Status Exam:  Appearance: awake, alert, dressed in a hospital scrubs, appeared as age stated  Attitude:  Very calm, cooperative  Eye Contact:  good  Mood: \"Good\"  Affect:  Calm, mood congruent  Speech:  clear, normal tone and volume  Language: fluent and intact in English  Psychomotor, Gait, Musculoskeletal:  no evidence of tardive dyskinesia, dystonia, or tics  Throught Process: Linear, Logical, goal directed  Associations:  No " Loosening of associations  Thought Content:  no evidence of suicidal ideation or homicidal ideation, no auditory hallucinations present, and no visual hallucinations present  Insight: True emotional awareness  Judgement: fair  Oriented to:  time, person, and place  Attention Span and Concentration: Good  Recent and Remote Memory: Intact  Fund of Knowledge: Appropriate             Precautions:                Behavioral Orders   Procedures    Assault precautions    Code 1 - Restrict to Unit    Elopement precautions    AS Extended Care       Until discharge, Extended Care to offer psychotherapeutic services to mental health patients boarding for admission or stabilization. These services are to include but are not limited to: individual psychotherapy, diagnostic assessment, case management and care planning, safety planning, etc. This may include up to 1 visit per day. If patient is physically located at [facility] or [facility], group psychotherapy up to 2 time per day may be offered.     AS Extended Care       Until discharge, Extended Care to offer psychotherapeutic services to mental health patients boarding for admission or stabilization. These services are to include but are not limited to: individual psychotherapy, diagnostic assessment, case management and care planning, safety planning, etc. This may include up to 1 visit per day. If patient is physically located at [facility] or [facility], group psychotherapy up to 2 time per day may be offered.     Routine Programming       As clinically indicated    Sexual precautions    Status 15       Every 15 minutes.    Status Individual Observation       Patient SIO status reviewed with team/RN.  Please also refer to RN/team documentation for add'l detail.     -2:1 SIO staff to monitor following which have contributed to patient being on SIO: at least 1 male staff     Assault risk     -Possible interventions SIO staff could use to support patient's treatment progress:  Verbal  de-escalation/ Medication administration     -When following observed, team will review discontinuation of SIO:     When patient is not longer aggressive, sexually inappropriate or intrusive.       Order Specific Question:   CONTINUOUS 24 hours / day       Answer:   Other       Order Specific Question:   Specify distance       Answer:   10 foot       Order Specific Question:   Indications for SIO       Answer:   Assault risk       Order Specific Question:   Indications for SIO       Answer:   Severe intrusiveness    Suicide precautions: Suicide Risk: HIGH; Clinical rationale to override score: response to medication       Patients on Suicide Precautions should have a Combination Diet ordered that includes a Diet selection(s) AND a Behavioral Tray selection for Safe Tray - with utensils, or Safe Tray - NO utensils          Order Specific Question:   Suicide Risk       Answer:   HIGH       Order Specific Question:   Clinical rationale to override score:       Answer:   response to medication           Diagnoses:         Schizoaffective disorder, bipolar type (H)  Polysubstance abuse (H)  Agitation     Clinically Significant Risk Factors                          # Obesity: Estimated body mass index is 32.02 kg/m  as calculated from the following:    Height as of this encounter: 1.829 m (6').    Weight as of this encounter: 107.1 kg (236 lb 1.6 oz)., PRESENT ON ADMISSION     # Financial/Environmental Concerns: other (see comments) (lost IRTS housing)     # Housing Instability: noted in nursing assessment          Assessment & Plan:      Assessment and hospital summary:  Ward Dawson is a -34- old male with a previous diagnosis of Schizoaffective disorder bipolar type who presented to the ED for a significant behavioral change, verbal agitation, worsening psychosocial stress, in the context of substance use.  Factors that make the mental health crisis life threatening or complex are:  Patient was brought to the ED  from [facility] following his ECT treatment this morning at [facility].  Patient states he does not know why he is here and also states he knows why he is here.  He presents as manic, disorganized, and confused.   He is distractable and not a clear historian at this time.  Patient states he has not slept in a long time and received narcan last night.  Per ACT team  and [facility] staff patient has been elevated, intrusive, and disruptive.  Patient broke a window yesterday.  IRTS reports patient had turned table over and tried using them as skate board ramps.  Patient was noted for have been fairly stable prior to his pass this weekend.  CM reports patient went to a skateboarding competition in [place].  Patient returned in a manic state.  CM reports pt may not appear as manic as he has been the past few days due to sedation and ECT this morning.  She reports patient has not slept in 3 days.  IRTS reports pt received narcan twice last night.  Patient has been trespassed from the IRTS facility.  [facility] is postive for cannabis..         Today's Changes:4/22/24  Discontinue 1:1 Staff acuity     Target psychiatric symptoms and interventions:  Admitted on 4/9/24 to station 12N  Clozaril, 50 mg every morning and 200 mg at bedtime  --Depakote ER, 500 mg daily and 1000 mg at bedtime for mood stabilization   --Gabapentin, 900 mg twice a day for pain  - Ibuprofen tablet 400 mg orally every 6 hours prn for pain  --Additional medications will include B52, Zyprexa, trazodone, hydroxyzine     Risks, benefits, and alternatives discussed at length with patient.      Acute Medical Problems and Treatments:  Acute medical concerns:  - No acute medical concerns     Pertinent labs/imaging:  Recent Results      Recent Results               Recent Results (from the past 336 hour(s))   Valproic acid     Collection Time: 04/05/24  9:39 PM   Result Value Ref Range     Valproic acid 68.4   ug/mL   Asymptomatic COVID-19 Virus (Coronavirus) by  PCR Nasopharyngeal     Collection Time: 04/05/24  9:40 PM     Specimen: Nasopharyngeal; Swab   Result Value Ref Range     SARS CoV2 PCR Negative Negative   Glucose by meter     Collection Time: 04/08/24  8:21 PM   Result Value Ref Range     GLUCOSE BY METER POCT 117 (H) 70 - 99 mg/dL   WBC and Differential     Collection Time: 04/10/24 10:59 AM   Result Value Ref Range     WBC Count 5.7 4.0 - 11.0 10e3/uL     % Neutrophils 51 %     % Lymphocytes 37 %     % Monocytes 9 %     % Eosinophils 2 %     % Basophils 1 %     % Immature Granulocytes 0 %     NRBCs per 100 WBC 0 <1 /100     Absolute Neutrophils 3.0 1.6 - 8.3 10e3/uL     Absolute Lymphocytes 2.1 0.8 - 5.3 10e3/uL     Absolute Monocytes 0.5 0.0 - 1.3 10e3/uL     Absolute Eosinophils 0.1 0.0 - 0.7 10e3/uL     Absolute Basophils 0.0 0.0 - 0.2 10e3/uL     Absolute Immature Granulocytes 0.0 <=0.4 10e3/uL     Absolute NRBCs 0.0 10e3/uL   EKG 12-lead, complete     Collection Time: 04/15/24  9:20 AM   Result Value Ref Range     Systolic Blood Pressure   mmHg     Diastolic Blood Pressure   mmHg     Ventricular Rate 85 BPM     Atrial Rate 85 BPM     RI Interval 152 ms     QRS Duration 94 ms      ms     QTc 449 ms     P Axis 73 degrees     R AXIS 1 degrees     T Axis 29 degrees     Interpretation ECG           Sinus rhythm  No previous ECGs available  Confirmed by fellow William Chahal ([id number]) on 4/17/2024 9:31:06 AM  Confirmed by MD LILIAN, PENNY [id number]) on 4/17/2024 10:20:26 PM      Comprehensive metabolic panel     Collection Time: 04/15/24  9:53 AM   Result Value Ref Range     Sodium 139 135 - 145 mmol/L     Potassium 4.2 3.4 - 5.3 mmol/L     Carbon Dioxide (CO2) 29 22 - 29 mmol/L     Anion Gap 10 7 - 15 mmol/L     Urea Nitrogen 18.6 6.0 - 20.0 mg/dL     Creatinine 0.97 0.67 - 1.17 mg/dL     GFR Estimate >90 >60 mL/min/1.73m2     Calcium 8.9 8.6 - 10.0 mg/dL     Chloride 100 98 - 107 mmol/L     Glucose 117 (H) 70 - 99 mg/dL     Alkaline Phosphatase 73 40 - 150  U/L     AST 31 0 - 45 U/L     ALT 28 0 - 70 U/L     Protein Total 7.2 6.4 - 8.3 g/dL     Albumin 4.5 3.5 - 5.2 g/dL     Bilirubin Total 0.7 <=1.2 mg/dL   CBC with platelets and differential     Collection Time: 04/15/24  9:53 AM   Result Value Ref Range     WBC Count 6.3 4.0 - 11.0 10e3/uL     RBC Count 5.09 4.40 - 5.90 10e6/uL     Hemoglobin 14.0 13.3 - 17.7 g/dL     Hematocrit 44.9 40.0 - 53.0 %     MCV 88 78 - 100 fL     MCH 27.5 26.5 - 33.0 pg     MCHC 31.2 (L) 31.5 - 36.5 g/dL     RDW 13.2 10.0 - 15.0 %     Platelet Count 234 150 - 450 10e3/uL     % Neutrophils 59 %     % Lymphocytes 33 %     % Monocytes 5 %     % Eosinophils 2 %     % Basophils 1 %     % Immature Granulocytes 0 %     NRBCs per 100 WBC 0 <1 /100     Absolute Neutrophils 3.8 1.6 - 8.3 10e3/uL     Absolute Lymphocytes 2.1 0.8 - 5.3 10e3/uL     Absolute Monocytes 0.3 0.0 - 1.3 10e3/uL     Absolute Eosinophils 0.1 0.0 - 0.7 10e3/uL     Absolute Basophils 0.0 0.0 - 0.2 10e3/uL     Absolute Immature Granulocytes 0.0 <=0.4 10e3/uL     Absolute NRBCs 0.0 10e3/uL   WBC and Differential     Collection Time: 04/17/24 12:48 PM   Result Value Ref Range     WBC Count 5.6 4.0 - 11.0 10e3/uL     % Neutrophils 68 %     % Lymphocytes 25 %     % Monocytes 5 %     % Eosinophils 1 %     % Basophils 1 %     % Immature Granulocytes 0 %     NRBCs per 100 WBC 0 <1 /100     Absolute Neutrophils 3.8 1.6 - 8.3 10e3/uL     Absolute Lymphocytes 1.4 0.8 - 5.3 10e3/uL     Absolute Monocytes 0.3 0.0 - 1.3 10e3/uL     Absolute Eosinophils 0.0 0.0 - 0.7 10e3/uL     Absolute Basophils 0.0 0.0 - 0.2 10e3/uL     Absolute Immature Granulocytes 0.0 <=0.4 10e3/uL     Absolute NRBCs 0.0 10e3/uL   Extra Green Top (Lithium Heparin) Tube     Collection Time: 04/17/24 12:48 PM   Result Value Ref Range     Hold Specimen JI                    Behavioral/Psychological/Social:  - Encourage unit programming     Safety  Placed on the following Precautions: Suicide, Assault, Elopement and Sexual  precautions  - Continue precautions as noted above  -  2:1 staff acuity for intrusive, assaultive behaviors  - Status 15 minute checks  - Legal Status: voluntary     Disposition Plan   Reason for ongoing admission: poses an imminent risk to self and poses an imminent risk to others  Discharge location: IR facility or   Discharge Medications: not ordered  Follow-up Appointments: not scheduled     Entered by: AMY Yu CNP on 04/22/2024  at 1:28         Attestation:   Patient has been seen and evaluated by me, Phan Sanchez, MICK, AMY CNP, PMHNP-BC  The patient was counseled on nature of illness and treatment plan/options  Care was coordinated with treatment team  Total time > 30 minutes including time spent collecting collateral information from patient's mother.

## 2024-04-23 PROCEDURE — 250N000013 HC RX MED GY IP 250 OP 250 PS 637: Performed by: CLINICAL NURSE SPECIALIST

## 2024-04-23 PROCEDURE — 250N000013 HC RX MED GY IP 250 OP 250 PS 637: Performed by: FAMILY MEDICINE

## 2024-04-23 PROCEDURE — 124N000002 HC R&B MH UMMC

## 2024-04-23 PROCEDURE — 99232 SBSQ HOSP IP/OBS MODERATE 35: CPT | Performed by: CLINICAL NURSE SPECIALIST

## 2024-04-23 PROCEDURE — 250N000013 HC RX MED GY IP 250 OP 250 PS 637: Performed by: PHYSICIAN ASSISTANT

## 2024-04-23 PROCEDURE — 250N000013 HC RX MED GY IP 250 OP 250 PS 637: Performed by: EMERGENCY MEDICINE

## 2024-04-23 RX ADMIN — PANTOPRAZOLE SODIUM 40 MG: 40 TABLET, DELAYED RELEASE ORAL at 09:03

## 2024-04-23 RX ADMIN — ATORVASTATIN CALCIUM 40 MG: 40 TABLET, FILM COATED ORAL at 09:03

## 2024-04-23 RX ADMIN — Medication 900 MG: at 09:03

## 2024-04-23 RX ADMIN — CLOZAPINE 200 MG: 200 TABLET ORAL at 19:24

## 2024-04-23 RX ADMIN — DIVALPROEX SODIUM 500 MG: 500 TABLET, FILM COATED, EXTENDED RELEASE ORAL at 09:03

## 2024-04-23 RX ADMIN — Medication 1 LOZENGE: at 21:27

## 2024-04-23 RX ADMIN — QUETIAPINE 100 MG: 100 TABLET ORAL at 19:24

## 2024-04-23 RX ADMIN — LORATADINE 10 MG: 10 TABLET ORAL at 09:03

## 2024-04-23 RX ADMIN — CLOZAPINE 50 MG: 50 TABLET ORAL at 09:03

## 2024-04-23 RX ADMIN — DIVALPROEX SODIUM 1000 MG: 500 TABLET, FILM COATED, EXTENDED RELEASE ORAL at 19:24

## 2024-04-23 RX ADMIN — Medication 900 MG: at 19:23

## 2024-04-23 RX ADMIN — POLYETHYLENE GLYCOL 3350 17 G: 17 POWDER, FOR SOLUTION ORAL at 09:02

## 2024-04-23 RX ADMIN — HYDROCHLOROTHIAZIDE 25 MG: 25 TABLET ORAL at 09:03

## 2024-04-23 RX ADMIN — FLUTICASONE PROPIONATE 1 SPRAY: 50 SPRAY, METERED NASAL at 09:04

## 2024-04-23 ASSESSMENT — ACTIVITIES OF DAILY LIVING (ADL)
ADLS_ACUITY_SCORE: 28
DRESS: INDEPENDENT
ADLS_ACUITY_SCORE: 38
ADLS_ACUITY_SCORE: 38
ADLS_ACUITY_SCORE: 28
ADLS_ACUITY_SCORE: 38
ORAL_HYGIENE: INDEPENDENT;PROMPTS
ADLS_ACUITY_SCORE: 28
LAUNDRY: UNABLE TO COMPLETE
ADLS_ACUITY_SCORE: 38
HYGIENE/GROOMING: INDEPENDENT
ADLS_ACUITY_SCORE: 38
ADLS_ACUITY_SCORE: 28
ADLS_ACUITY_SCORE: 38
ADLS_ACUITY_SCORE: 38
ADLS_ACUITY_SCORE: 28
ADLS_ACUITY_SCORE: 38
ORAL_HYGIENE: INDEPENDENT
HYGIENE/GROOMING: INDEPENDENT;PROMPTS
DRESS: SCRUBS (BEHAVIORAL HEALTH);INDEPENDENT
ADLS_ACUITY_SCORE: 28
ADLS_ACUITY_SCORE: 38
ADLS_ACUITY_SCORE: 38
ADLS_ACUITY_SCORE: 28
ADLS_ACUITY_SCORE: 28

## 2024-04-23 NOTE — PROGRESS NOTES
The patient's care was discussed with the treatment team during the daily team meeting and/or staff's chart notes were reviewed. Staff report patient slept 7 hours with no issues of concerns overnight. Slept till about 0830, took his medications, wanted to be woken up for groups, remains pleasant, cooperative and med complaint.    Evening staff report:  Cristel appeared to be very calm and pleasant this evening. He spent most of his shift sleeping and woke up around 6:30 p.m. He sat quietly in the lounge area, listening to music. He did not show any signs of mental health symptoms, and no behavioral issues were observed or reported. He took his scheduled medication without any issues. His appetite is good, and his hygiene is fair. Next ECT scheduled for 5/1/2024. Vital signs stable.       Upon interview, patient was interviewed in the POD 2 lounge. Patient appears   very calm and contents, cooperative with this assessment with no distractions, no behaviors.  Patient describes his mood as great, affect was calm and mood congruent, patient's speech is normal volume and prosody, no aorta of pressure noticed in his speech. He reports medications are working well with no side effects experienced. Patient states that he is looking forward to the next level of care, asks if we heard from the facilities where referrals were sent. Patient informed about the decline by [facility] and that the reason for decline was not specified but we thought it might have to do with the fact that they only provide an outpatient services and since he is mostly interested in inpatient admission might be a reasonable reason. Patient takes it calmly stating that he lives one day at a time. He agrees that he would have his fingers crossed for now hoping that something would come up soon. Writer informed the patient that he spoke with his mother yesterday.     Patient denies A/VH, SI/HI, thoughts of self harm.            Medications:       Inpatient Administered Meds                     Current Facility-Administered Medications   Medication Dose Route Frequency Provider Last Rate Last Admin    atorvastatin (LIPITOR) tablet 40 mg  40 mg Oral Daily Jaswinder Claros MD   40 mg at 04/10/24 0818    cloZAPine (CLOZARIL) tablet 100 mg  100 mg Oral At Bedtime Jaswinder Claros MD   100 mg at 04/09/24 1848    cloZAPine (CLOZARIL) tablet 50 mg  50 mg Oral Daily Jaswinder Claros MD   50 mg at 04/10/24 0818    divalproex sodium extended-release (DEPAKOTE ER) 24 hr tablet 500 mg  500 mg Oral BID Jaswinder Claros MD   500 mg at 04/10/24 0818    gabapentin (NEURONTIN) tablet 600 mg  600 mg Oral BID Jaswinder Claros MD   600 mg at 04/10/24 0818    hydrochlorothiazide (HYDRODIURIL) tablet 25 mg  25 mg Oral Daily Jaswinder Claros MD   25 mg at 04/10/24 0818    nicotine (NICODERM CQ) 21 MG/24HR 24 hr patch 1 patch  1 patch Transdermal Daily Xavi Medeiros MD   1 patch at 04/10/24 0821              Allergies:      Allergies             Allergies   Allergen Reactions    Haloperidol Confusion and Other (See Comments)       Prone to EPSE. COnsider IM Zyprexa or be sure to give with benadryl              Labs:      Recent Results             Recent Results (from the past 24 hour(s))   WBC and Differential     Collection Time: 04/10/24 10:59 AM   Result Value Ref Range     WBC Count 5.7 4.0 - 11.0 10e3/uL     % Neutrophils 51 %     % Lymphocytes 37 %     % Monocytes 9 %     % Eosinophils 2 %     % Basophils 1 %     % Immature Granulocytes 0 %     NRBCs per 100 WBC 0 <1 /100     Absolute Neutrophils 3.0 1.6 - 8.3 10e3/uL     Absolute Lymphocytes 2.1 0.8 - 5.3 10e3/uL     Absolute Monocytes 0.5 0.0 - 1.3 10e3/uL     Absolute Eosinophils 0.1 0.0 - 0.7 10e3/uL     Absolute Basophils 0.0 0.0 - 0.2 10e3/uL     Absolute Immature Granulocytes 0.0 <=0.4 10e3/uL     Absolute NRBCs 0.0 10e3/uL              Psychiatric  Examination:      /84 (BP Location: Left arm, Patient Position: Sitting, Cuff Size: Adult Regular)   Pulse 94   Temp 97.8  F (36.6  C) (Oral)   Resp 12   Ht 1.829 m (6')   Wt 107.1 kg (236 lb 1.6 oz)   SpO2 98%   BMI 32.02 kg/m    Weight is 236 lbs 1.6 oz  Body mass index is 32.02 kg/m .     Weight over time:          Vitals:     04/03/24 1151 04/09/24 0826   Weight: 108 kg (238 lb) 107.1 kg (236 lb 1.6 oz)         Orthostatic Vitals           Most Recent       Sitting Orthostatic /85 04/09 0826     Sitting Orthostatic Pulse (bpm) 73 04/09 0826     Standing Orthostatic /92 04/09 0826     Standing Orthostatic Pulse (bpm) 69 04/09 0826                   Cardiometabolic risk assessment. 04/10/24        Reviewed patient profile for cardiometabolic risk factors     Date taken /Value  REFERENCE RANGE   Abdominal Obesity  (Waist Circumference)   See nursing flowsheet Women ?35 in (88 cm)   Men ?40 in (102 cm)       Triglycerides                Triglycerides   Date Value Ref Range Status   02/26/2013 71 0 - 150 mg/dL Final       Comment:       Fasting specimen         ?150 mg/dL (1.7 mmol/L) or current treatment for elevated triglycerides   HDL cholesterol            HDL Cholesterol   Date Value Ref Range Status   02/26/2013 45 40 - 110 mg/dL Final   ]    Women <50 mg/dL (1.3 mmol/L) in women or current treatment for low HDL cholesterol  Men <40 mg/dL (1 mmol/L) in men or current treatment for low HDL cholesterol      Fasting plasma glucose (FPG)           Lab Results   Component Value Date      04/08/2024     GLC 83 02/26/2013        FPG ?100 mg/dL (5.6 mmol/L) or treatment for elevated blood glucose   Blood pressure        BP Readings from Last 3 Encounters:   04/08/24 127/84   02/26/13 129/74    Blood pressure ?130/85 mmHg or treatment for elevated blood pressure   Family History  See family history      Mental Status Exam:  Appearance: awake, alert, dressed in a hospital scrubs, appeared  "as age stated  Attitude:  Very calm, cooperative  Eye Contact:  good  Mood: \"Good\"  Affect:  Calm, mood congruent  Speech:  clear, normal tone and volume  Language: fluent and intact in English  Psychomotor, Gait, Musculoskeletal:  no evidence of tardive dyskinesia, dystonia, or tics  Throught Process: Linear, Logical, goal directed  Associations:  No Loosening of associations  Thought Content:  no evidence of suicidal ideation or homicidal ideation, no auditory hallucinations present, and no visual hallucinations present  Insight: True emotional awareness  Judgement: fair  Oriented to:  time, person, and place  Attention Span and Concentration: Good  Recent and Remote Memory: Intact  Fund of Knowledge: Appropriate             Precautions:                Behavioral Orders   Procedures    Assault precautions    Code 1 - Restrict to Unit    Elopement precautions    [facility] Extended Care       Until discharge, Extended Care to offer psychotherapeutic services to mental health patients boarding for admission or stabilization. These services are to include but are not limited to: individual psychotherapy, diagnostic assessment, case management and care planning, safety planning, etc. This may include up to 1 visit per day. If patient is physically located at [facility] or [facility], group psychotherapy up to 2 time per day may be offered.     [facility]       Until discharge, Extended Care to offer psychotherapeutic services to mental health patients boarding for admission or stabilization. These services are to include but are not limited to: individual psychotherapy, diagnostic assessment, case management and care planning, safety planning, etc. This may include up to 1 visit per day. If patient is physically located at [facility] or [facility], group psychotherapy up to 2 time per day may be offered.     Routine Programming       As clinically indicated    Sexual precautions    Status 15       Every 15 minutes.    Status Individual " Observation       Patient SIO status reviewed with team/RN.  Please also refer to RN/team documentation for add'l detail.     -2:1 SIO staff to monitor following which have contributed to patient being on SIO: at least 1 male staff     Assault risk     -Possible interventions SIO staff could use to support patient's treatment progress:  Verbal de-escalation/ Medication administration     -When following observed, team will review discontinuation of SIO:     When patient is not longer aggressive, sexually inappropriate or intrusive.       Order Specific Question:   CONTINUOUS 24 hours / day       Answer:   Other       Order Specific Question:   Specify distance       Answer:   10 foot       Order Specific Question:   Indications for SIO       Answer:   Assault risk       Order Specific Question:   Indications for SIO       Answer:   Severe intrusiveness    Suicide precautions: Suicide Risk: HIGH; Clinical rationale to override score: response to medication       Patients on Suicide Precautions should have a Combination Diet ordered that includes a Diet selection(s) AND a Behavioral Tray selection for Safe Tray - with utensils, or Safe Tray - NO utensils          Order Specific Question:   Suicide Risk       Answer:   HIGH       Order Specific Question:   Clinical rationale to override score:       Answer:   response to medication           Diagnoses:         Schizoaffective disorder, bipolar type (H)  Polysubstance abuse (H)  Agitation     Clinically Significant Risk Factors                          # Obesity: Estimated body mass index is 32.02 kg/m  as calculated from the following:    Height as of this encounter: 1.829 m (6').    Weight as of this encounter: 107.1 kg (236 lb 1.6 oz)., PRESENT ON ADMISSION     # Financial/Environmental Concerns: other (see comments) (lost IRTS housing)     # Housing Instability: noted in nursing assessment          Assessment & Plan:      Assessment and hospital summary:  Ward SOTELO  Eunice is a -34- old male with a previous diagnosis of Schizoaffective disorder bipolar type who presented to the ED for a significant behavioral change, verbal agitation, worsening psychosocial stress, in the context of substance use.  Factors that make the mental health crisis life threatening or complex are:  Patient was brought to the ED from [facility] following his ECT treatment this morning at [facility].  Patient states he does not know why he is here and also states he knows why he is here.  He presents as manic, disorganized, and confused.   He is distractable and not a clear historian at this time.  Patient states he has not slept in a long time and received narcan last night.  Per ACT team  and [facility] IRTS staff patient has been elevated, intrusive, and disruptive.  Patient broke a window yesterday.  IRTS reports patient had turned table over and tried using them as skate board ramps.  Patient was noted for have been fairly stable prior to his pass this weekend.  CM reports patient went to a skateboarding competition in [place].  Patient returned in a manic state.  CM reports pt may not appear as manic as he has been the past few days due to sedation and ECT this morning.  She reports patient has not slept in 3 days.  IRTS reports pt received narcan twice last night.  Patient has been trespassed from the IRTS facility.  [facility] is postive for cannabis..         Today's Changes:4/22/24  Discontinue 1:1 Staff acuity     Target psychiatric symptoms and interventions:  Admitted on 4/9/24 to station 12N  Clozaril, 50 mg every morning and 200 mg at bedtime  --Depakote ER, 500 mg daily and 1000 mg at bedtime for mood stabilization   --Gabapentin, 900 mg twice a day for pain  - Ibuprofen tablet 400 mg orally every 6 hours prn for pain  --Additional medications will include B52, Zyprexa, trazodone, hydroxyzine     Risks, benefits, and alternatives discussed at length with patient.      Acute Medical Problems and  Treatments:  Acute medical concerns:  - No acute medical concerns     Pertinent labs/imaging:  Recent Results      Recent Results               Recent Results (from the past 336 hour(s))   Valproic acid     Collection Time: 04/05/24  9:39 PM   Result Value Ref Range     Valproic acid 68.4   ug/mL   Asymptomatic COVID-19 Virus (Coronavirus) by PCR Nasopharyngeal     Collection Time: 04/05/24  9:40 PM     Specimen: Nasopharyngeal; Swab   Result Value Ref Range     SARS CoV2 PCR Negative Negative   Glucose by meter     Collection Time: 04/08/24  8:21 PM   Result Value Ref Range     GLUCOSE BY METER POCT 117 (H) 70 - 99 mg/dL   WBC and Differential     Collection Time: 04/10/24 10:59 AM   Result Value Ref Range     WBC Count 5.7 4.0 - 11.0 10e3/uL     % Neutrophils 51 %     % Lymphocytes 37 %     % Monocytes 9 %     % Eosinophils 2 %     % Basophils 1 %     % Immature Granulocytes 0 %     NRBCs per 100 WBC 0 <1 /100     Absolute Neutrophils 3.0 1.6 - 8.3 10e3/uL     Absolute Lymphocytes 2.1 0.8 - 5.3 10e3/uL     Absolute Monocytes 0.5 0.0 - 1.3 10e3/uL     Absolute Eosinophils 0.1 0.0 - 0.7 10e3/uL     Absolute Basophils 0.0 0.0 - 0.2 10e3/uL     Absolute Immature Granulocytes 0.0 <=0.4 10e3/uL     Absolute NRBCs 0.0 10e3/uL   EKG 12-lead, complete     Collection Time: 04/15/24  9:20 AM   Result Value Ref Range     Systolic Blood Pressure   mmHg     Diastolic Blood Pressure   mmHg     Ventricular Rate 85 BPM     Atrial Rate 85 BPM     CO Interval 152 ms     QRS Duration 94 ms      ms     QTc 449 ms     P Axis 73 degrees     R AXIS 1 degrees     T Axis 29 degrees     Interpretation ECG           Sinus rhythm  No previous ECGs available  Confirmed by fellow William Chahal ([id number]) on 4/17/2024 9:31:06 AM  Confirmed by MD LILIAN, PENNY ([id number]) on 4/17/2024 10:20:26 PM      Comprehensive metabolic panel     Collection Time: 04/15/24  9:53 AM   Result Value Ref Range     Sodium 139 135 - 145 mmol/L     Potassium 4.2  3.4 - 5.3 mmol/L     Carbon Dioxide (CO2) 29 22 - 29 mmol/L     Anion Gap 10 7 - 15 mmol/L     Urea Nitrogen 18.6 6.0 - 20.0 mg/dL     Creatinine 0.97 0.67 - 1.17 mg/dL     GFR Estimate >90 >60 mL/min/1.73m2     Calcium 8.9 8.6 - 10.0 mg/dL     Chloride 100 98 - 107 mmol/L     Glucose 117 (H) 70 - 99 mg/dL     Alkaline Phosphatase 73 40 - 150 U/L     AST 31 0 - 45 U/L     ALT 28 0 - 70 U/L     Protein Total 7.2 6.4 - 8.3 g/dL     Albumin 4.5 3.5 - 5.2 g/dL     Bilirubin Total 0.7 <=1.2 mg/dL   CBC with platelets and differential     Collection Time: 04/15/24  9:53 AM   Result Value Ref Range     WBC Count 6.3 4.0 - 11.0 10e3/uL     RBC Count 5.09 4.40 - 5.90 10e6/uL     Hemoglobin 14.0 13.3 - 17.7 g/dL     Hematocrit 44.9 40.0 - 53.0 %     MCV 88 78 - 100 fL     MCH 27.5 26.5 - 33.0 pg     MCHC 31.2 (L) 31.5 - 36.5 g/dL     RDW 13.2 10.0 - 15.0 %     Platelet Count 234 150 - 450 10e3/uL     % Neutrophils 59 %     % Lymphocytes 33 %     % Monocytes 5 %     % Eosinophils 2 %     % Basophils 1 %     % Immature Granulocytes 0 %     NRBCs per 100 WBC 0 <1 /100     Absolute Neutrophils 3.8 1.6 - 8.3 10e3/uL     Absolute Lymphocytes 2.1 0.8 - 5.3 10e3/uL     Absolute Monocytes 0.3 0.0 - 1.3 10e3/uL     Absolute Eosinophils 0.1 0.0 - 0.7 10e3/uL     Absolute Basophils 0.0 0.0 - 0.2 10e3/uL     Absolute Immature Granulocytes 0.0 <=0.4 10e3/uL     Absolute NRBCs 0.0 10e3/uL   WBC and Differential     Collection Time: 04/17/24 12:48 PM   Result Value Ref Range     WBC Count 5.6 4.0 - 11.0 10e3/uL     % Neutrophils 68 %     % Lymphocytes 25 %     % Monocytes 5 %     % Eosinophils 1 %     % Basophils 1 %     % Immature Granulocytes 0 %     NRBCs per 100 WBC 0 <1 /100     Absolute Neutrophils 3.8 1.6 - 8.3 10e3/uL     Absolute Lymphocytes 1.4 0.8 - 5.3 10e3/uL     Absolute Monocytes 0.3 0.0 - 1.3 10e3/uL     Absolute Eosinophils 0.0 0.0 - 0.7 10e3/uL     Absolute Basophils 0.0 0.0 - 0.2 10e3/uL     Absolute Immature Granulocytes  0.0 <=0.4 10e3/uL     Absolute NRBCs 0.0 10e3/uL   Extra Green Top (Lithium Heparin) Tube     Collection Time: 04/17/24 12:48 PM   Result Value Ref Range     Hold Specimen JIC                    Behavioral/Psychological/Social:  - Encourage unit programming     Safety  Placed on the following Precautions: Suicide, Assault, Elopement and Sexual precautions  - Continue precautions as noted above  -  2:1 staff acuity for intrusive, assaultive behaviors  - Status 15 minute checks  - Legal Status: voluntary     Disposition Plan   Reason for ongoing admission: poses an imminent risk to self and poses an imminent risk to others  Discharge location: IRTS facility or CD  Discharge Medications: not ordered  Follow-up Appointments: not scheduled     Entered by: AMY Yu CNP on 04/22/2024  at 1:28         Attestation:   Patient has been seen and evaluated by Phan gill, MICK, APRN CNP, PMHNP-BC  The patient was counseled on nature of illness and treatment plan/options  Care was coordinated with treatment team  Total time > 30 minutes including time spent collecting collateral information from patient's mother.

## 2024-04-23 NOTE — PLAN OF CARE
"Occupational Therapy Group Note     04/22/24 [id number]   Group Therapy Session   Group Attendance attended group session   Group Type expressive therapy   Group Topic Covered coping skills/lifestyle management;emotions/expression;relapse prevention   Group Session Detail OT: Who I Am magazine collaging for identification of values, fostering hope, future-orientation, focusing on recovery, exercising cognitive skills and coping with stress/sxs.  (18:15-19:00; 1 participant)   Patient Response/Contribution cooperative with task;other (see comments)    Patient joined group for 45 minutes in the [facility] area.  Pt opted to bring his speaker out and listened to music loudly for the duration of the group.  Pt was polite and respectful but offered little elaboration when writer asked him open ended questions.  Pt paged through magazines for 40 minutes and selected two images that felt important to him.  Pt said that he enjoyed looking through magazines and appeared interested in having access to them again to continue working on his project.  Pt agreed that magazines and images can support \"rerouting thoughts\" when perseverating or feeling strong emotions.  Pt's affect was generally flat but did broaden a few times with conversation.                 "

## 2024-04-23 NOTE — PLAN OF CARE
Patient has been resting in room for the majority of the shift. He is calm and cooperative. The TV in his room stopped working and patient came out to the lounge and started to watch the basketball game with staff. Pt denies all mental health symptoms including anxiety, depression, paranoia and delusions. He denies SI/SIB. Pt denies hearing voices. Pt has a bright affect and and was visible in milieu. Pt is medication compliant. No acute behavioral concerns this shift. No medication side effects stated or observed. No medical concerns noted.     Lab draw tomorrow for Clozapine levels    /85 (BP Location: Left arm)   Pulse 74   Temp 97.6  F (36.4  C) (Oral)   Resp 12   Ht 1.829 m (6')   Wt 108 kg (238 lb)   SpO2 95%   BMI 32.28 kg/m

## 2024-04-23 NOTE — PLAN OF CARE
Problem: Manic Behavior Episode  Goal: Decreased Manic Symptoms  Outcome: Progressing  Patient is alert and oriented x3. He was visible in the milieu, attended group and was social with peers and staff. He is compliant with medication and vitals. His affect this shift is flat and mood is calm. He ate 100% of his breakfast and lunch without issues. He did not complain of pain and no prn given this shift. Patient is less isolative this shift. Patient did not complain of medication side effect. Patient ciro all mental health symptoms.

## 2024-04-23 NOTE — PLAN OF CARE
Problem: Adult Behavioral Health Plan of Care  Goal: Plan of Care Review  Outcome: Progressing   Goal Outcome Evaluation:         Cristel appeared to be very calm and pleasant this evening. He spent most of his shift sleeping and woke up around 6:30 p.m. He sat quietly in the lounge area, listening to music. He did not show any signs of mental health symptoms, and no behavioral issues were observed or reported. He took his scheduled medication without any issues. His appetite is good, and his hygiene is fair. Next ECT scheduled for 5/1/2024. Vital signs stable.

## 2024-04-23 NOTE — PLAN OF CARE
NOC Shift Report    Pt slept 7 hours overnight.     Pt asleep at start of shift. No s/s of respiratory distress while asleep.    No c/o pain or discomfort overnight. No concerns reported by pt.     No PRNs administered.     No behavioral issues observed by staff.     Pt safety maintained. Will continue to monitor.    Goal Outcome Evaluation:  Problem: Sleep Disturbance  Goal: Adequate Sleep/Rest  Outcome: Progressing

## 2024-04-23 NOTE — PLAN OF CARE
Team Note Due:  Friday    Assessment/Intervention/Current Symtoms and Care Coordination:  Chart review and met with team, discussed pt progress, symptomology, and response to treatment.  Discussed the discharge plan and any potential impediments to discharge.    Met with Cristel to discuss referral updates. He was sitting on his bed eating his lunch. Blunted affect, congruent mood. Polite during interaction. Had no questions for writer.     Discharge Plan or Goal:  When patient's symptoms have reduced and a safe discharge can be facilitated, options include: Residential treatment, IRTS, shelter      Barriers to Discharge:  Discharge planning for residential treatment as patient continues to stabilize      Referral Status:  [place] 4/19 - denied 4/19  [facility] 4/19  [facility] 4/22  [facility] 4/22  [facility] 4/22     Legal Status:  Voluntary    Contacts:  [facility]: [phone] (Consent)  ACT : Heidy Pop, [phone] (WAN)  ACT : [facility] [phone] (WAN)  ACT Psychiatrist: Camron Watson MD, [phone] (WAN)  Mom: Candis [phone] (Consent)  Friend: Nabila Fran, [phone] (WAN)  [facility] Intake: [email or web address] (WAN)     Upcoming Meetings and Dates/Important Information and next steps:  Symptom stabilization

## 2024-04-23 NOTE — PLAN OF CARE
BEH IP Unit Acuity Rating Score (UARS)  Patient is given one point for every criteria they meet.    CRITERIA SCORING   On a 72 hour hold, court hold, committed, stay of commitment, or revocation. 0    Patient LOS on BEH unit exceeds 20 days. 0  LOS: 15   Patient under guardianship, 55+, otherwise medically complex, or under age 11. 0   Suicide ideation without relief of precipitating factors. 0   Current plan for suicide. 0   Current plan for homicide. 0   Imminent risk or actual attempt to seriously harm another without relief of factors precipitating the attempt. 1   Severe dysfunction in daily living (ex: complete neglect for self care, extreme disruption in vegetative function, extreme deterioration in social interactions). 0   Recent (last 7 days) or current physical aggression in the ED or on unit. 0   Restraints or seclusion episode in past 72 hours. 0   Recent (last 7 days) or current verbal aggression, agitation, yelling, etc., while in the ED or unit. 0   Active psychosis. 0   Need for constant or near constant redirection (from leaving, from others, etc).  0   Intrusive or disruptive behaviors. 0   Patient requires 3 or more hours of individualized nursing care per 8-hour shift (i.e. for ADLs, meds, therapeutic interventions). 0   TOTAL 1

## 2024-04-24 LAB
BASOPHILS # BLD AUTO: 0 10E3/UL (ref 0–0.2)
BASOPHILS NFR BLD AUTO: 1 %
EOSINOPHIL # BLD AUTO: 0.1 10E3/UL (ref 0–0.7)
EOSINOPHIL NFR BLD AUTO: 1 %
HOLD SPECIMEN: NORMAL
IMM GRANULOCYTES # BLD: 0 10E3/UL
IMM GRANULOCYTES NFR BLD: 0 %
LYMPHOCYTES # BLD AUTO: 2.1 10E3/UL (ref 0.8–5.3)
LYMPHOCYTES NFR BLD AUTO: 42 %
MONOCYTES # BLD AUTO: 0.5 10E3/UL (ref 0–1.3)
MONOCYTES NFR BLD AUTO: 9 %
NEUTROPHILS # BLD AUTO: 2.4 10E3/UL (ref 1.6–8.3)
NEUTROPHILS NFR BLD AUTO: 47 %
NRBC # BLD AUTO: 0 10E3/UL
NRBC BLD AUTO-RTO: 0 /100
WBC # BLD AUTO: 5.1 10E3/UL (ref 4–11)

## 2024-04-24 PROCEDURE — 250N000013 HC RX MED GY IP 250 OP 250 PS 637: Performed by: CLINICAL NURSE SPECIALIST

## 2024-04-24 PROCEDURE — 124N000002 HC R&B MH UMMC

## 2024-04-24 PROCEDURE — 250N000013 HC RX MED GY IP 250 OP 250 PS 637: Performed by: PHYSICIAN ASSISTANT

## 2024-04-24 PROCEDURE — 250N000013 HC RX MED GY IP 250 OP 250 PS 637: Performed by: FAMILY MEDICINE

## 2024-04-24 PROCEDURE — 36415 COLL VENOUS BLD VENIPUNCTURE: CPT | Performed by: FAMILY MEDICINE

## 2024-04-24 PROCEDURE — 99232 SBSQ HOSP IP/OBS MODERATE 35: CPT | Performed by: CLINICAL NURSE SPECIALIST

## 2024-04-24 PROCEDURE — 85048 AUTOMATED LEUKOCYTE COUNT: CPT | Performed by: FAMILY MEDICINE

## 2024-04-24 RX ADMIN — Medication 900 MG: at 19:13

## 2024-04-24 RX ADMIN — DIVALPROEX SODIUM 500 MG: 500 TABLET, FILM COATED, EXTENDED RELEASE ORAL at 09:40

## 2024-04-24 RX ADMIN — DIVALPROEX SODIUM 1000 MG: 500 TABLET, FILM COATED, EXTENDED RELEASE ORAL at 19:13

## 2024-04-24 RX ADMIN — ATORVASTATIN CALCIUM 40 MG: 40 TABLET, FILM COATED ORAL at 09:40

## 2024-04-24 RX ADMIN — CLOZAPINE 50 MG: 50 TABLET ORAL at 09:40

## 2024-04-24 RX ADMIN — CLOZAPINE 200 MG: 200 TABLET ORAL at 19:13

## 2024-04-24 RX ADMIN — LORATADINE 10 MG: 10 TABLET ORAL at 09:40

## 2024-04-24 RX ADMIN — Medication 900 MG: at 09:40

## 2024-04-24 RX ADMIN — HYDROCHLOROTHIAZIDE 25 MG: 25 TABLET ORAL at 09:40

## 2024-04-24 RX ADMIN — PANTOPRAZOLE SODIUM 40 MG: 40 TABLET, DELAYED RELEASE ORAL at 09:40

## 2024-04-24 RX ADMIN — QUETIAPINE 100 MG: 100 TABLET ORAL at 19:13

## 2024-04-24 RX ADMIN — POLYETHYLENE GLYCOL 3350 17 G: 17 POWDER, FOR SOLUTION ORAL at 09:40

## 2024-04-24 ASSESSMENT — ACTIVITIES OF DAILY LIVING (ADL)
ADLS_ACUITY_SCORE: 28
ORAL_HYGIENE: INDEPENDENT
ADLS_ACUITY_SCORE: 28
DRESS: INDEPENDENT
ADLS_ACUITY_SCORE: 28
DRESS: SCRUBS (BEHAVIORAL HEALTH);INDEPENDENT
ADLS_ACUITY_SCORE: 28
HYGIENE/GROOMING: INDEPENDENT
ADLS_ACUITY_SCORE: 28
HYGIENE/GROOMING: INDEPENDENT
ADLS_ACUITY_SCORE: 28
ORAL_HYGIENE: INDEPENDENT
ADLS_ACUITY_SCORE: 28

## 2024-04-24 NOTE — PROGRESS NOTES
The patient's care was discussed with the treatment team during the daily team meeting and/or staff's chart notes were reviewed. Staff report patient slept 7 hours with no behavioral issues observed. Day staff report Patient is calm and cooperative, present in the milieu, has insight into his MH, had a normal evening and night, cooperated with and tolerated lab draw for Clozaril level this morning. Waiting for discharge.     Evening staff report: Patient has been resting in room for the majority of the shift. He is calm and cooperative. The TV in his room stopped working and patient came out to the lounge and started to watch the basketball game with staff. Pt denies all mental health symptoms including anxiety, depression, paranoia and delusions. He denies SI/SIB. Pt denies hearing voices. Pt has a bright affect and and was visible in milieu. Pt is medication compliant. No acute behavioral concerns this shift. No medication side effects stated or observed. No medical concerns noted.   Lab draw tomorrow for Clozapine levels      Upon interview, patient was sleeping in his room, but got up upon hearing writer knocked at his door. Patient states he was just resting in bed, states he's doing great, reports he has not eating breakfast not because he was sleeping, but he just doesn't want to eat. Patient is cheerful, calm and cooperative. He reports that he's waiting to hear from the referrals to treatment facilities, that the [facility] gave him an update yesterday about the referral status. Patient reports that his medications are working with no side effects. Reports the  came to draw lab for Clozapine level this morning. Result of both WBC and Clozapine level were WNL.(WBC 5.1; Absolute neutrophils 2.4). Patient reports attending groups, doing everything right to stay compliant and that his focus is to become a better person that he hopes to be. Patient states he would wait patiently till a place is secured  for his CD treatment. Patient plans to give his mother a call today. Patient denies A/VH, SI/HI, SIB/NSSIB.            Medications:      Inpatient Administered Meds                     Current Facility-Administered Medications   Medication Dose Route Frequency Provider Last Rate Last Admin    atorvastatin (LIPITOR) tablet 40 mg  40 mg Oral Daily Jaswinder Claros MD   40 mg at 04/10/24 0818    cloZAPine (CLOZARIL) tablet 100 mg  100 mg Oral At Bedtime Jaswinder Claros MD   100 mg at 04/09/24 1848    cloZAPine (CLOZARIL) tablet 50 mg  50 mg Oral Daily Jaswinder Claros MD   50 mg at 04/10/24 0818    divalproex sodium extended-release (DEPAKOTE ER) 24 hr tablet 500 mg  500 mg Oral BID Jaswinder Claros MD   500 mg at 04/10/24 0818    gabapentin (NEURONTIN) tablet 600 mg  600 mg Oral BID Jaswinder Claros MD   600 mg at 04/10/24 0818    hydrochlorothiazide (HYDRODIURIL) tablet 25 mg  25 mg Oral Daily Jaswinder Claros MD   25 mg at 04/10/24 0818    nicotine (NICODERM CQ) 21 MG/24HR 24 hr patch 1 patch  1 patch Transdermal Daily Xavi Medeiros MD   1 patch at 04/10/24 0821              Allergies:      Allergies             Allergies   Allergen Reactions    Haloperidol Confusion and Other (See Comments)       Prone to EPSE. COnsider IM Zyprexa or be sure to give with benadryl              Labs:      Recent Results (from the past 336 hour(s))   EKG 12-lead, complete    Collection Time: 04/15/24  9:20 AM   Result Value Ref Range    Systolic Blood Pressure  mmHg    Diastolic Blood Pressure  mmHg    Ventricular Rate 85 BPM    Atrial Rate 85 BPM    NM Interval 152 ms    QRS Duration 94 ms     ms    QTc 449 ms    P Axis 73 degrees    R AXIS 1 degrees    T Axis 29 degrees    Interpretation ECG       Sinus rhythm  No previous ECGs available  Confirmed by fellow William Chahal ([id number]) on 4/17/2024 9:31:06 AM  Confirmed by MD QUINTANA HENRI [id number]) on 4/17/2024  10:20:26 PM     Comprehensive metabolic panel    Collection Time: 04/15/24  9:53 AM   Result Value Ref Range    Sodium 139 135 - 145 mmol/L    Potassium 4.2 3.4 - 5.3 mmol/L    Carbon Dioxide (CO2) 29 22 - 29 mmol/L    Anion Gap 10 7 - 15 mmol/L    Urea Nitrogen 18.6 6.0 - 20.0 mg/dL    Creatinine 0.97 0.67 - 1.17 mg/dL    GFR Estimate >90 >60 mL/min/1.73m2    Calcium 8.9 8.6 - 10.0 mg/dL    Chloride 100 98 - 107 mmol/L    Glucose 117 (H) 70 - 99 mg/dL    Alkaline Phosphatase 73 40 - 150 U/L    AST 31 0 - 45 U/L    ALT 28 0 - 70 U/L    Protein Total 7.2 6.4 - 8.3 g/dL    Albumin 4.5 3.5 - 5.2 g/dL    Bilirubin Total 0.7 <=1.2 mg/dL   CBC with platelets and differential    Collection Time: 04/15/24  9:53 AM   Result Value Ref Range    WBC Count 6.3 4.0 - 11.0 10e3/uL    RBC Count 5.09 4.40 - 5.90 10e6/uL    Hemoglobin 14.0 13.3 - 17.7 g/dL    Hematocrit 44.9 40.0 - 53.0 %    MCV 88 78 - 100 fL    MCH 27.5 26.5 - 33.0 pg    MCHC 31.2 (L) 31.5 - 36.5 g/dL    RDW 13.2 10.0 - 15.0 %    Platelet Count 234 150 - 450 10e3/uL    % Neutrophils 59 %    % Lymphocytes 33 %    % Monocytes 5 %    % Eosinophils 2 %    % Basophils 1 %    % Immature Granulocytes 0 %    NRBCs per 100 WBC 0 <1 /100    Absolute Neutrophils 3.8 1.6 - 8.3 10e3/uL    Absolute Lymphocytes 2.1 0.8 - 5.3 10e3/uL    Absolute Monocytes 0.3 0.0 - 1.3 10e3/uL    Absolute Eosinophils 0.1 0.0 - 0.7 10e3/uL    Absolute Basophils 0.0 0.0 - 0.2 10e3/uL    Absolute Immature Granulocytes 0.0 <=0.4 10e3/uL    Absolute NRBCs 0.0 10e3/uL   WBC and Differential    Collection Time: 04/17/24 12:48 PM   Result Value Ref Range    WBC Count 5.6 4.0 - 11.0 10e3/uL    % Neutrophils 68 %    % Lymphocytes 25 %    % Monocytes 5 %    % Eosinophils 1 %    % Basophils 1 %    % Immature Granulocytes 0 %    NRBCs per 100 WBC 0 <1 /100    Absolute Neutrophils 3.8 1.6 - 8.3 10e3/uL    Absolute Lymphocytes 1.4 0.8 - 5.3 10e3/uL    Absolute Monocytes 0.3 0.0 - 1.3 10e3/uL    Absolute  Eosinophils 0.0 0.0 - 0.7 10e3/uL    Absolute Basophils 0.0 0.0 - 0.2 10e3/uL    Absolute Immature Granulocytes 0.0 <=0.4 10e3/uL    Absolute NRBCs 0.0 10e3/uL   Extra Green Top (Lithium Heparin) Tube    Collection Time: 04/17/24 12:48 PM   Result Value Ref Range    Hold Specimen [facility]    Valproic acid    Collection Time: 04/18/24  8:46 AM   Result Value Ref Range    Valproic acid 60.3   ug/mL   WBC and Differential    Collection Time: 04/24/24  8:12 AM   Result Value Ref Range    WBC Count 5.1 4.0 - 11.0 10e3/uL    % Neutrophils 47 %    % Lymphocytes 42 %    % Monocytes 9 %    % Eosinophils 1 %    % Basophils 1 %    % Immature Granulocytes 0 %    NRBCs per 100 WBC 0 <1 /100    Absolute Neutrophils 2.4 1.6 - 8.3 10e3/uL    Absolute Lymphocytes 2.1 0.8 - 5.3 10e3/uL    Absolute Monocytes 0.5 0.0 - 1.3 10e3/uL    Absolute Eosinophils 0.1 0.0 - 0.7 10e3/uL    Absolute Basophils 0.0 0.0 - 0.2 10e3/uL    Absolute Immature Granulocytes 0.0 <=0.4 10e3/uL    Absolute NRBCs 0.0 10e3/uL   Extra Green Top (Lithium Heparin) Tube    Collection Time: 04/24/24  8:12 AM   Result Value Ref Range    Hold Specimen [facility]            Psychiatric Examination:      /84 (BP Location: Left arm, Patient Position: Sitting, Cuff Size: Adult Regular)   Pulse 94   Temp 97.8  F (36.6  C) (Oral)   Resp 12   Ht 1.829 m (6')   Wt 107.1 kg (236 lb 1.6 oz)   SpO2 98%   BMI 32.02 kg/m    Weight is 236 lbs 1.6 oz  Body mass index is 32.02 kg/m .     Weight over time:          Vitals:     04/03/24 1151 04/09/24 0826   Weight: 108 kg (238 lb) 107.1 kg (236 lb 1.6 oz)         Orthostatic Vitals           Most Recent       Sitting Orthostatic /85 04/09 0826     Sitting Orthostatic Pulse (bpm) 73 04/09 0826     Standing Orthostatic /92 04/09 0826     Standing Orthostatic Pulse (bpm) 69 04/09 0826             Cardiometabolic risk assessment. 04/10/24     Reviewed patient profile for cardiometabolic risk factors     Date taken /Value   "REFERENCE RANGE   Abdominal Obesity  (Waist Circumference)   See nursing flowsheet Women ?35 in (88 cm)   Men ?40 in (102 cm)       Triglycerides                Triglycerides   Date Value Ref Range Status   02/26/2013 71 0 - 150 mg/dL Final       Comment:       Fasting specimen         ?150 mg/dL (1.7 mmol/L) or current treatment for elevated triglycerides   HDL cholesterol            HDL Cholesterol   Date Value Ref Range Status   02/26/2013 45 40 - 110 mg/dL Final   ]    Women <50 mg/dL (1.3 mmol/L) in women or current treatment for low HDL cholesterol  Men <40 mg/dL (1 mmol/L) in men or current treatment for low HDL cholesterol      Fasting plasma glucose (FPG)           Lab Results   Component Value Date      04/08/2024     GLC 83 02/26/2013        FPG ?100 mg/dL (5.6 mmol/L) or treatment for elevated blood glucose   Blood pressure        BP Readings from Last 3 Encounters:   04/08/24 127/84   02/26/13 129/74    Blood pressure ?130/85 mmHg or treatment for elevated blood pressure   Family History  See family history      Mental Status Exam:  Appearance: awake, alert, dressed in a hospital scrubs, appeared as age stated  Attitude:  Very calm, cooperative  Eye Contact:  good  Mood: \"Very Good\"  Affect:  Calm, mood congruent  Speech:  clear, normal tone and volume  Language: fluent and intact in English  Psychomotor, Gait, Musculoskeletal:  no evidence of tardive dyskinesia, dystonia, or tics  Throught Process: Linear, Logical, goal directed  Associations:  No Loosening of associations  Thought Content:  no evidence of suicidal ideation or homicidal ideation, no auditory hallucinations present, and no visual hallucinations present  Insight: True emotional awareness  Judgement: fair  Oriented to:  time, person, and place  Attention Span and Concentration: Good  Recent and Remote Memory: Intact  Fund of Knowledge: Appropriate             Precautions:                Behavioral Orders   Procedures    Assault " precautions    Code 1 - Restrict to Unit    Elopement precautions    AS Extended Care       Until discharge, Extended Care to offer psychotherapeutic services to mental health patients boarding for admission or stabilization. These services are to include but are not limited to: individual psychotherapy, diagnostic assessment, case management and care planning, safety planning, etc. This may include up to 1 visit per day. If patient is physically located at [facility] or [facility], group psychotherapy up to 2 time per day may be offered.     [facility] Extended Care       Until discharge, Extended Care to offer psychotherapeutic services to mental health patients boarding for admission or stabilization. These services are to include but are not limited to: individual psychotherapy, diagnostic assessment, case management and care planning, safety planning, etc. This may include up to 1 visit per day. If patient is physically located at [facility] or [facility], group psychotherapy up to 2 time per day may be offered.     Routine Programming       As clinically indicated    Sexual precautions    Status 15       Every 15 minutes.    Status Individual Observation       Patient SIO status reviewed with team/RN.  Please also refer to RN/team documentation for add'l detail.     -2:1 SIO staff to monitor following which have contributed to patient being on SIO: at least 1 male staff     Assault risk     -Possible interventions SIO staff could use to support patient's treatment progress:  Verbal de-escalation/ Medication administration     -When following observed, team will review discontinuation of SIO:     When patient is not longer aggressive, sexually inappropriate or intrusive.       Order Specific Question:   CONTINUOUS 24 hours / day       Answer:   Other       Order Specific Question:   Specify distance       Answer:   10 foot       Order Specific Question:   Indications for SIO       Answer:   Assault risk       Order Specific Question:    Indications for SIO       Answer:   Severe intrusiveness    Suicide precautions: Suicide Risk: HIGH; Clinical rationale to override score: response to medication       Patients on Suicide Precautions should have a Combination Diet ordered that includes a Diet selection(s) AND a Behavioral Tray selection for Safe Tray - with utensils, or Safe Tray - NO utensils          Order Specific Question:   Suicide Risk       Answer:   HIGH       Order Specific Question:   Clinical rationale to override score:       Answer:   response to medication           Diagnoses:         Schizoaffective disorder, bipolar type (H)  Polysubstance abuse (H)  Agitation     Clinically Significant Risk Factors                          # Obesity: Estimated body mass index is 32.02 kg/m  as calculated from the following:    Height as of this encounter: 1.829 m (6').    Weight as of this encounter: 107.1 kg (236 lb 1.6 oz)., PRESENT ON ADMISSION     # Financial/Environmental Concerns: other (see comments) (lost [facility] housing)     # Housing Instability: noted in nursing assessment          Assessment & Plan:      Assessment and hospital summary:  Ward Dawson is a -34- old male with a previous diagnosis of Schizoaffective disorder bipolar type who presented to the ED for a significant behavioral change, verbal agitation, worsening psychosocial stress, in the context of substance use.  Factors that make the mental health crisis life threatening or complex are:  Patient was brought to the ED from [facility] following his ECT treatment this morning at [facility].  Patient states he does not know why he is here and also states he knows why he is here.  He presents as manic, disorganized, and confused.   He is distractable and not a clear historian at this time.  Patient states he has not slept in a long time and received narcan last night.  Per ACT team  and [facility] staff patient has been elevated, intrusive, and disruptive.  Patient broke a  window yesterday.  IRTS reports patient had turned table over and tried using them as skate board ramps.  Patient was noted for have been fairly stable prior to his pass this weekend.  CM reports patient went to a skateboarding competition in [place].  Patient returned in a manic state.  CM reports pt may not appear as manic as he has been the past few days due to sedation and ECT this morning.  She reports patient has not slept in 3 days.  IRTS reports pt received narcan twice last night.  Patient has been trespassed from the [facility] facility.  UDS is postive for cannabis..         Today's Changes:4/24/24  No changes made to medications     Target psychiatric symptoms and interventions:  Admitted on 4/9/24 to station 12N  Clozaril, 50 mg every morning and 200 mg at bedtime  --Depakote ER, 500 mg daily and 1000 mg at bedtime for mood stabilization   --Gabapentin, 900 mg twice a day for pain  - Ibuprofen tablet 400 mg orally every 6 hours prn for pain  --Additional medications will include B52, Zyprexa, trazodone, hydroxyzine     Risks, benefits, and alternatives discussed at length with patient.      Acute Medical Problems and Treatments:  Acute medical concerns:  - No acute medical concerns     Pertinent labs/imaging:  Recent Results      Recent Results               Recent Results (from the past 336 hour(s))   Valproic acid     Collection Time: 04/05/24  9:39 PM   Result Value Ref Range     Valproic acid 68.4   ug/mL   Asymptomatic COVID-19 Virus (Coronavirus) by PCR Nasopharyngeal     Collection Time: 04/05/24  9:40 PM     Specimen: Nasopharyngeal; Swab   Result Value Ref Range     SARS CoV2 PCR Negative Negative   Glucose by meter     Collection Time: 04/08/24  8:21 PM   Result Value Ref Range     GLUCOSE BY METER POCT 117 (H) 70 - 99 mg/dL   WBC and Differential     Collection Time: 04/10/24 10:59 AM   Result Value Ref Range     WBC Count 5.7 4.0 - 11.0 10e3/uL     % Neutrophils 51 %     % Lymphocytes 37 %     %  Monocytes 9 %     % Eosinophils 2 %     % Basophils 1 %     % Immature Granulocytes 0 %     NRBCs per 100 WBC 0 <1 /100     Absolute Neutrophils 3.0 1.6 - 8.3 10e3/uL     Absolute Lymphocytes 2.1 0.8 - 5.3 10e3/uL     Absolute Monocytes 0.5 0.0 - 1.3 10e3/uL     Absolute Eosinophils 0.1 0.0 - 0.7 10e3/uL     Absolute Basophils 0.0 0.0 - 0.2 10e3/uL     Absolute Immature Granulocytes 0.0 <=0.4 10e3/uL     Absolute NRBCs 0.0 10e3/uL   EKG 12-lead, complete     Collection Time: 04/15/24  9:20 AM   Result Value Ref Range     Systolic Blood Pressure   mmHg     Diastolic Blood Pressure   mmHg     Ventricular Rate 85 BPM     Atrial Rate 85 BPM     TN Interval 152 ms     QRS Duration 94 ms      ms     QTc 449 ms     P Axis 73 degrees     R AXIS 1 degrees     T Axis 29 degrees     Interpretation ECG           Sinus rhythm  No previous ECGs available  Confirmed by fellow William Chahal ([id number]) on 4/17/2024 9:31:06 AM  Confirmed by MD LILIAN, PENNY ([id number]) on 4/17/2024 10:20:26 PM      Comprehensive metabolic panel     Collection Time: 04/15/24  9:53 AM   Result Value Ref Range     Sodium 139 135 - 145 mmol/L     Potassium 4.2 3.4 - 5.3 mmol/L     Carbon Dioxide (CO2) 29 22 - 29 mmol/L     Anion Gap 10 7 - 15 mmol/L     Urea Nitrogen 18.6 6.0 - 20.0 mg/dL     Creatinine 0.97 0.67 - 1.17 mg/dL     GFR Estimate >90 >60 mL/min/1.73m2     Calcium 8.9 8.6 - 10.0 mg/dL     Chloride 100 98 - 107 mmol/L     Glucose 117 (H) 70 - 99 mg/dL     Alkaline Phosphatase 73 40 - 150 U/L     AST 31 0 - 45 U/L     ALT 28 0 - 70 U/L     Protein Total 7.2 6.4 - 8.3 g/dL     Albumin 4.5 3.5 - 5.2 g/dL     Bilirubin Total 0.7 <=1.2 mg/dL   CBC with platelets and differential     Collection Time: 04/15/24  9:53 AM   Result Value Ref Range     WBC Count 6.3 4.0 - 11.0 10e3/uL     RBC Count 5.09 4.40 - 5.90 10e6/uL     Hemoglobin 14.0 13.3 - 17.7 g/dL     Hematocrit 44.9 40.0 - 53.0 %     MCV 88 78 - 100 fL     MCH 27.5 26.5 - 33.0 pg     MCHC  31.2 (L) 31.5 - 36.5 g/dL     RDW 13.2 10.0 - 15.0 %     Platelet Count 234 150 - 450 10e3/uL     % Neutrophils 59 %     % Lymphocytes 33 %     % Monocytes 5 %     % Eosinophils 2 %     % Basophils 1 %     % Immature Granulocytes 0 %     NRBCs per 100 WBC 0 <1 /100     Absolute Neutrophils 3.8 1.6 - 8.3 10e3/uL     Absolute Lymphocytes 2.1 0.8 - 5.3 10e3/uL     Absolute Monocytes 0.3 0.0 - 1.3 10e3/uL     Absolute Eosinophils 0.1 0.0 - 0.7 10e3/uL     Absolute Basophils 0.0 0.0 - 0.2 10e3/uL     Absolute Immature Granulocytes 0.0 <=0.4 10e3/uL     Absolute NRBCs 0.0 10e3/uL   WBC and Differential     Collection Time: 04/17/24 12:48 PM   Result Value Ref Range     WBC Count 5.6 4.0 - 11.0 10e3/uL     % Neutrophils 68 %     % Lymphocytes 25 %     % Monocytes 5 %     % Eosinophils 1 %     % Basophils 1 %     % Immature Granulocytes 0 %     NRBCs per 100 WBC 0 <1 /100     Absolute Neutrophils 3.8 1.6 - 8.3 10e3/uL     Absolute Lymphocytes 1.4 0.8 - 5.3 10e3/uL     Absolute Monocytes 0.3 0.0 - 1.3 10e3/uL     Absolute Eosinophils 0.0 0.0 - 0.7 10e3/uL     Absolute Basophils 0.0 0.0 - 0.2 10e3/uL     Absolute Immature Granulocytes 0.0 <=0.4 10e3/uL     Absolute NRBCs 0.0 10e3/uL   Extra Green Top (Lithium Heparin) Tube     Collection Time: 04/17/24 12:48 PM   Result Value Ref Range     Hold Specimen [facility]                    Behavioral/Psychological/Social:  - Encourage unit programming     Safety  Placed on the following Precautions: Suicide, Assault, Elopement and Sexual precautions  - Continue precautions as noted above  -  2:1 staff acuity for intrusive, assaultive behaviors  - Status 15 minute checks  - Legal Status: voluntary     Disposition Plan   Reason for ongoing admission: poses an imminent risk to self and poses an imminent risk to others  Discharge location: [facility] facility or CD  Discharge Medications: not ordered  Follow-up Appointments: not scheduled     Entered by: AMY Yu CNP on 04/24/2024  at  11:47         Attestation:   Patient has been seen and evaluated by me, Phan Sanchez, MICK, APRN CNP, PMHNP-BC  The patient was counseled on nature of illness and treatment plan/options  Care was coordinated with treatment team  Total time > 30 minutes

## 2024-04-24 NOTE — PLAN OF CARE
04/24/24 1505   Group Therapy Session   Group Attendance attended group session   Time Session Began 1315   Time Session Ended 1400   Total Time (minutes) 30 (No Charge)   Total # Attendees 2   Group Type Occupational therapy   Group Topic Covered balanced lifestyle;coping skills/lifestyle management;leisure exploration/use of leisure time;relaxation techniques;self-care activities   Group Session Detail OT Leisure and movement group - Laila Greenwood    Patient Participation Detail Intervention: Leisure Group with 1 peer.    Patient Response: Pt partcipated in group focused on leisure participation and exploration, active movement and socialization. Discussed how participation in leisure activities can be used as a healthy coping skill in symptom management and a strategy to reduce stress. Eager to join active leisure group for laial greenwood. Participated with a peer and another staff member during this time. Was social briefly at times, however mostly focused on the activity. After about 30 minutes stated he was tired and thanked staff member for playing and writer for offering the group.     Mood/Affect: Pleasant       Plan: Patient encouraged to maintain attendance for continued ongoing support in working towards occupational therapy goals to support overall treatment/care.

## 2024-04-24 NOTE — PLAN OF CARE
BEH IP Unit Acuity Rating Score (UARS)  Patient is given one point for every criteria they meet.    CRITERIA SCORING   On a 72 hour hold, court hold, committed, stay of commitment, or revocation. 0    Patient LOS on BEH unit exceeds 20 days. 0  LOS: 16   Patient under guardianship, 55+, otherwise medically complex, or under age 11. 0   Suicide ideation without relief of precipitating factors. 0   Current plan for suicide. 0   Current plan for homicide. 0   Imminent risk or actual attempt to seriously harm another without relief of factors precipitating the attempt. 1   Severe dysfunction in daily living (ex: complete neglect for self care, extreme disruption in vegetative function, extreme deterioration in social interactions). 0   Recent (last 7 days) or current physical aggression in the ED or on unit. 0   Restraints or seclusion episode in past 72 hours. 0   Recent (last 7 days) or current verbal aggression, agitation, yelling, etc., while in the ED or unit. 0   Active psychosis. 0   Need for constant or near constant redirection (from leaving, from others, etc).  0   Intrusive or disruptive behaviors. 0   Patient requires 3 or more hours of individualized nursing care per 8-hour shift (i.e. for ADLs, meds, therapeutic interventions). 0   TOTAL 1

## 2024-04-24 NOTE — PLAN OF CARE
Team Note Due:  Friday    Assessment/Intervention/Current Symtoms and Care Coordination:  Chart review and met with team, discussed pt progress, symptomology, and response to treatment.  Discussed the discharge plan and any potential impediments to discharge.    Declined from [facility] as they have a zero tolerance policy and he stated he will continue to use cannabis.     Received voicemail from his mom, Candis, about a letter she received from [facility].     Accepted to [facility] with [facility]. Will coordinate discharge with their . Communicated this to Cristel.     Called Candis back, gave her my email address so she can send the letter for writer to give to him.     Discharge Plan or Goal:  When patient's symptoms have reduced and a safe discharge can be facilitated, options include: Residential treatment, IRTS, shelter      Barriers to Discharge:  Discharge planning for residential treatment as patient continues to stabilize      Referral Status:  [facility] 4/19 - denied 4/19  [facility] 4/19  [facility] 4/22  [facility] 4/22  [facility] 4/22 - declined 4/24     Legal Status:  Voluntary    Contacts:  [facility] : Heidy Pop, [phone] (WAN)  [facility] : Yamila [phone] (WAN)  [facility] Psychiatrist: Camron Watson MD, [phone] (WAN)  Mom: Candis, [phone] (Consent)  Friend: Nabila AlmeidaBernabeCristobal, [phone] (WAN)   [facility] Intake: [email or web address] (WAN)     Upcoming Meetings and Dates/Important Information and next steps:  Symptom stabilization   Discharge planning

## 2024-04-24 NOTE — PLAN OF CARE
Problem: Adult Inpatient Plan of Care  Goal: Readiness for Transition of Care  Outcome: Progressing     Patient is calm and cooperative the whole shift; he is also medication compliant and did not have any behavioral problems. He keeps to himself in his room sleeping/relaxing. Did come out to the lounge and was also social with others. He also went to a group and participated. He refused breakfast and stated that he wants to fast until lunch time. Denies all mental health symptoms including anxiety, depression, paranoia and delusions. Pt also denies hearing voices. [place]ie denies SI/HI. No medication side effects stated or observed. No medical concerns this shift. Pt does look unkempt did not take a shower this shift; showers encouraged.     /83 (BP Location: Left arm)   Pulse 77   Temp 97.1  F (36.2  C) (Temporal)   Resp 16   Ht 1.829 m (6')   Wt 108 kg (238 lb)   SpO2 97%   BMI 32.28 kg/m

## 2024-04-24 NOTE — PLAN OF CARE
NOC Shift Report    Pt slept 7 hours overnight.     Pt scheduled for a lab draw in the morning for Clozapine levels.    Pt was asleep at the start of the shift. No s/s of respiratory distress while asleep.    No c/o pain or discomfort overnight. No concerns reported by pt.     No PRNs administered.    No behavioral issues observed by staff.     Pt safety maintained. Will continue to monitor.    Goal Outcome Evaluation:  Problem: Sleep Disturbance  Goal: Adequate Sleep/Rest  4/24/2024 0318 by Anitha Israel, RN  Outcome: Progressing

## 2024-04-25 PROCEDURE — 250N000013 HC RX MED GY IP 250 OP 250 PS 637: Performed by: CLINICAL NURSE SPECIALIST

## 2024-04-25 PROCEDURE — 250N000013 HC RX MED GY IP 250 OP 250 PS 637: Performed by: FAMILY MEDICINE

## 2024-04-25 PROCEDURE — 99232 SBSQ HOSP IP/OBS MODERATE 35: CPT | Performed by: CLINICAL NURSE SPECIALIST

## 2024-04-25 PROCEDURE — 250N000013 HC RX MED GY IP 250 OP 250 PS 637: Performed by: PHYSICIAN ASSISTANT

## 2024-04-25 PROCEDURE — 124N000002 HC R&B MH UMMC

## 2024-04-25 RX ADMIN — CLOZAPINE 50 MG: 50 TABLET ORAL at 08:29

## 2024-04-25 RX ADMIN — Medication 900 MG: at 21:17

## 2024-04-25 RX ADMIN — CLOZAPINE 200 MG: 200 TABLET ORAL at 21:18

## 2024-04-25 RX ADMIN — LORATADINE 10 MG: 10 TABLET ORAL at 08:30

## 2024-04-25 RX ADMIN — HYDROCHLOROTHIAZIDE 25 MG: 25 TABLET ORAL at 08:29

## 2024-04-25 RX ADMIN — POLYETHYLENE GLYCOL 3350 17 G: 17 POWDER, FOR SOLUTION ORAL at 08:30

## 2024-04-25 RX ADMIN — DIVALPROEX SODIUM 500 MG: 500 TABLET, FILM COATED, EXTENDED RELEASE ORAL at 08:30

## 2024-04-25 RX ADMIN — QUETIAPINE 100 MG: 100 TABLET ORAL at 21:17

## 2024-04-25 RX ADMIN — Medication 900 MG: at 08:29

## 2024-04-25 RX ADMIN — PANTOPRAZOLE SODIUM 40 MG: 40 TABLET, DELAYED RELEASE ORAL at 08:30

## 2024-04-25 RX ADMIN — DIVALPROEX SODIUM 1000 MG: 500 TABLET, FILM COATED, EXTENDED RELEASE ORAL at 21:17

## 2024-04-25 RX ADMIN — ATORVASTATIN CALCIUM 40 MG: 40 TABLET, FILM COATED ORAL at 08:30

## 2024-04-25 ASSESSMENT — ACTIVITIES OF DAILY LIVING (ADL)
HYGIENE/GROOMING: INDEPENDENT
ADLS_ACUITY_SCORE: 28
DRESS: INDEPENDENT
ORAL_HYGIENE: INDEPENDENT
ADLS_ACUITY_SCORE: 28
DRESS: INDEPENDENT
ADLS_ACUITY_SCORE: 28
ORAL_HYGIENE: INDEPENDENT
ADLS_ACUITY_SCORE: 28
ADLS_ACUITY_SCORE: 28
HYGIENE/GROOMING: INDEPENDENT
ADLS_ACUITY_SCORE: 28

## 2024-04-25 NOTE — PLAN OF CARE
Problem: Adult Inpatient Plan of Care  Goal: Optimal Comfort and Wellbeing  Intervention: Provide Person-Centered Care  Recent Flowsheet Documentation  Taken 4/25/2024 0956 by Radha Casey RN  Trust Relationship/Rapport:   care explained   choices provided   empathic listening provided   emotional support provided   questions answered   questions encouraged   reassurance provided   thoughts/feelings acknowledged   Goal Outcome Evaluation:    Plan of Care Reviewed With: patient      Pt presents as calm and pleasant with a blunted affect. He remained isolative and withdrawn to his room most of the shift. He denied all mental health symptoms including SI/HI/AVH and did not appear to be responding. He is looking forward to discharge and expressed that he is ready to leave. He ate and drank well, was med complaint, denied pain, and had no other acute physical or behavioral concerns this shift.   /85 (Patient Position: Sitting)   Pulse 80   Temp (!) 96.7  F (35.9  C) (Temporal)   Resp 16   Ht 1.829 m (6')   Wt 108 kg (238 lb)   SpO2 97%   BMI 32.28 kg/m

## 2024-04-25 NOTE — PLAN OF CARE
Team Note Due:  Friday    Assessment/Intervention/Current Symtoms and Care Coordination:  Chart review and met with team, discussed pt progress, symptomology, and response to treatment.  Discussed the discharge plan and any potential impediments to discharge.    Called [facility]. Waiting for clinical administration to okay admission to the facility.     Discharge Plan or Goal:  When patient's symptoms have reduced and a safe discharge can be facilitated, options include: Residential treatment, IRTS, shelter      Barriers to Discharge:  Discharge planning for residential treatment as patient continues to stabilize      Referral Status:  [facility] 4/19 - denied 4/19  [facility] 4/19  [facility] 4/22  [facility] 4/22  [facility] 4/22 - declined 4/24     Legal Status:  Voluntary    Contacts:  ACT : Heidy Pop, [phone] (WAN)  [facility] : Yamila [phone] (WAN)  [facility] Psychiatrist: Camron Watson MD, [phone] (WAN)  Mom: Candis, [phone] (Consent)  Friend: Nabila Peters, [phone] (WAN)   [facility] Intake: [email or web address] (WAN)     Upcoming Meetings and Dates/Important Information and next steps:  Symptom stabilization   Discharge planning

## 2024-04-25 NOTE — PLAN OF CARE
BEH IP Unit Acuity Rating Score (UARS)  Patient is given one point for every criteria they meet.    CRITERIA SCORING   On a 72 hour hold, court hold, committed, stay of commitment, or revocation. 0    Patient LOS on BEH unit exceeds 20 days. 0  LOS: 17   Patient under guardianship, 55+, otherwise medically complex, or under age 11. 0   Suicide ideation without relief of precipitating factors. 0   Current plan for suicide. 0   Current plan for homicide. 0   Imminent risk or actual attempt to seriously harm another without relief of factors precipitating the attempt. 1   Severe dysfunction in daily living (ex: complete neglect for self care, extreme disruption in vegetative function, extreme deterioration in social interactions). 0   Recent (last 7 days) or current physical aggression in the ED or on unit. 0   Restraints or seclusion episode in past 72 hours. 0   Recent (last 7 days) or current verbal aggression, agitation, yelling, etc., while in the ED or unit. 0   Active psychosis. 0   Need for constant or near constant redirection (from leaving, from others, etc).  0   Intrusive or disruptive behaviors. 0   Patient requires 3 or more hours of individualized nursing care per 8-hour shift (i.e. for ADLs, meds, therapeutic interventions). 0   TOTAL 1

## 2024-04-25 NOTE — PLAN OF CARE
"  Problem: Adult Inpatient Plan of Care  Goal: Optimal Comfort and Wellbeing  Intervention: Provide Person-Centered Care  Recent Flowsheet Documentation  Taken 4/24/2024 1934 by Radha Casey RN  Trust Relationship/Rapport:   care explained   choices provided   empathic listening provided   emotional support provided   questions answered   questions encouraged   reassurance provided   thoughts/feelings acknowledged   Goal Outcome Evaluation:    Plan of Care Reviewed With: patient      Pt presents as calm and cooperative with a blunted affect. He denied all mental health symptoms including SI/HI/AVH and did not appear to be responding. He slept most of the shift, only coming out to make his needs known and to eat his dinner. When writer completed the MSE he stated \"they finally found a place that accepted me\". Writer stated that was a good thing that he has a place to go. Pt then smirked and then stated \"yeah I guess\".  He then wiped his fingernail polish off and went back to his room to go to sleep.   Pt ate and drank well, was med complaint, denied pain, declined vitals,and had no other acute physical or behavioral concerns this shift.       "

## 2024-04-25 NOTE — PROGRESS NOTES
"The patient's care was discussed with the treatment team during the daily team meeting and/or staff's chart notes were reviewed. Staff report patient slept 6.5 hours with no behavioral issues observed. Staff report \"Pt presents as calm and cooperative with a blunted affect. He denied all mental health symptoms including SI/HI/AVH and did not appear to be responding. He slept most of the shift, only coming out to make his needs known and to eat his dinner. When writer completed the MSE he stated \"they finally found a place that accepted me\". Writer stated that was a good thing that he has a place to go. Pt then smirked and then stated \"yeah I guess\".  He then wiped his fingernail polish off and went back to his room to go to sleep.   Pt ate and drank well, was med complaint, denied pain, declined vitals,and had no other acute physical or behavioral concerns this shift.     Upon interview, patient was resting in bed but attended to this writer and appears in good spirit with calm, blunted affect. Patient reports \"at last they found a place for me.\" Patient was excited but could not remember the name of the place. [facility] confirmed its [facility]. Asked if patient was feeling tired from his medications, patient replies \"not really,' that he feels bored not having much to do was the reason why he prefers to rest in bed. He states there is no concerns from his medications. Patient was informed that the results of his blood draw he had yesterday were WNL. Writer asked if patient has goals for the day, reports that he would attend groups, call his mother, listen to music, eat and rest in his room. He denies A/VH, SI/HI and thoughts of self harm. Patient seems to have improved greatly, whenever [facility] is ready for the patient, the treatment team will discharge him..           Medications:      Inpatient Administered Meds                     Current Facility-Administered Medications   Medication Dose Route Frequency " Provider Last Rate Last Admin    atorvastatin (LIPITOR) tablet 40 mg  40 mg Oral Daily Jaswinder Claros MD   40 mg at 04/10/24 0818    cloZAPine (CLOZARIL) tablet 100 mg  100 mg Oral At Bedtime Jaswinder Claros MD   100 mg at 04/09/24 1848    cloZAPine (CLOZARIL) tablet 50 mg  50 mg Oral Daily Jaswinder Claros MD   50 mg at 04/10/24 0818    divalproex sodium extended-release (DEPAKOTE ER) 24 hr tablet 500 mg  500 mg Oral BID Jaswinder Claros MD   500 mg at 04/10/24 0818    gabapentin (NEURONTIN) tablet 600 mg  600 mg Oral BID Jaswinder Claros MD   600 mg at 04/10/24 0818    hydrochlorothiazide (HYDRODIURIL) tablet 25 mg  25 mg Oral Daily Jaswinder Claros MD   25 mg at 04/10/24 0818    nicotine (NICODERM CQ) 21 MG/24HR 24 hr patch 1 patch  1 patch Transdermal Daily Xavi Medeiros MD   1 patch at 04/10/24 0821              Allergies:      Allergies             Allergies   Allergen Reactions    Haloperidol Confusion and Other (See Comments)       Prone to EPSE. COnsider IM Zyprexa or be sure to give with benadryl              Labs:      Recent Results          Recent Results (from the past 336 hour(s))   EKG 12-lead, complete     Collection Time: 04/15/24  9:20 AM   Result Value Ref Range     Systolic Blood Pressure   mmHg     Diastolic Blood Pressure   mmHg     Ventricular Rate 85 BPM     Atrial Rate 85 BPM     RI Interval 152 ms     QRS Duration 94 ms      ms     QTc 449 ms     P Axis 73 degrees     R AXIS 1 degrees     T Axis 29 degrees     Interpretation ECG           Sinus rhythm  No previous ECGs available  Confirmed by fellow William Chahal ([id number]) on 4/17/2024 9:31:06 AM  Confirmed by MD QUINTANA HENRI [id number]) on 4/17/2024 10:20:26 PM      Comprehensive metabolic panel     Collection Time: 04/15/24  9:53 AM   Result Value Ref Range     Sodium 139 135 - 145 mmol/L     Potassium 4.2 3.4 - 5.3 mmol/L     Carbon Dioxide (CO2) 29 22 - 29 mmol/L      Anion Gap 10 7 - 15 mmol/L     Urea Nitrogen 18.6 6.0 - 20.0 mg/dL     Creatinine 0.97 0.67 - 1.17 mg/dL     GFR Estimate >90 >60 mL/min/1.73m2     Calcium 8.9 8.6 - 10.0 mg/dL     Chloride 100 98 - 107 mmol/L     Glucose 117 (H) 70 - 99 mg/dL     Alkaline Phosphatase 73 40 - 150 U/L     AST 31 0 - 45 U/L     ALT 28 0 - 70 U/L     Protein Total 7.2 6.4 - 8.3 g/dL     Albumin 4.5 3.5 - 5.2 g/dL     Bilirubin Total 0.7 <=1.2 mg/dL   CBC with platelets and differential     Collection Time: 04/15/24  9:53 AM   Result Value Ref Range     WBC Count 6.3 4.0 - 11.0 10e3/uL     RBC Count 5.09 4.40 - 5.90 10e6/uL     Hemoglobin 14.0 13.3 - 17.7 g/dL     Hematocrit 44.9 40.0 - 53.0 %     MCV 88 78 - 100 fL     MCH 27.5 26.5 - 33.0 pg     MCHC 31.2 (L) 31.5 - 36.5 g/dL     RDW 13.2 10.0 - 15.0 %     Platelet Count 234 150 - 450 10e3/uL     % Neutrophils 59 %     % Lymphocytes 33 %     % Monocytes 5 %     % Eosinophils 2 %     % Basophils 1 %     % Immature Granulocytes 0 %     NRBCs per 100 WBC 0 <1 /100     Absolute Neutrophils 3.8 1.6 - 8.3 10e3/uL     Absolute Lymphocytes 2.1 0.8 - 5.3 10e3/uL     Absolute Monocytes 0.3 0.0 - 1.3 10e3/uL     Absolute Eosinophils 0.1 0.0 - 0.7 10e3/uL     Absolute Basophils 0.0 0.0 - 0.2 10e3/uL     Absolute Immature Granulocytes 0.0 <=0.4 10e3/uL     Absolute NRBCs 0.0 10e3/uL   WBC and Differential     Collection Time: 04/17/24 12:48 PM   Result Value Ref Range     WBC Count 5.6 4.0 - 11.0 10e3/uL     % Neutrophils 68 %     % Lymphocytes 25 %     % Monocytes 5 %     % Eosinophils 1 %     % Basophils 1 %     % Immature Granulocytes 0 %     NRBCs per 100 WBC 0 <1 /100     Absolute Neutrophils 3.8 1.6 - 8.3 10e3/uL     Absolute Lymphocytes 1.4 0.8 - 5.3 10e3/uL     Absolute Monocytes 0.3 0.0 - 1.3 10e3/uL     Absolute Eosinophils 0.0 0.0 - 0.7 10e3/uL     Absolute Basophils 0.0 0.0 - 0.2 10e3/uL     Absolute Immature Granulocytes 0.0 <=0.4 10e3/uL     Absolute NRBCs 0.0 10e3/uL   Extra Green  Top (Lithium Heparin) Tube     Collection Time: 04/17/24 12:48 PM   Result Value Ref Range     Hold Specimen [facility]     Valproic acid     Collection Time: 04/18/24  8:46 AM   Result Value Ref Range     Valproic acid 60.3   ug/mL   WBC and Differential     Collection Time: 04/24/24  8:12 AM   Result Value Ref Range     WBC Count 5.1 4.0 - 11.0 10e3/uL     % Neutrophils 47 %     % Lymphocytes 42 %     % Monocytes 9 %     % Eosinophils 1 %     % Basophils 1 %     % Immature Granulocytes 0 %     NRBCs per 100 WBC 0 <1 /100     Absolute Neutrophils 2.4 1.6 - 8.3 10e3/uL     Absolute Lymphocytes 2.1 0.8 - 5.3 10e3/uL     Absolute Monocytes 0.5 0.0 - 1.3 10e3/uL     Absolute Eosinophils 0.1 0.0 - 0.7 10e3/uL     Absolute Basophils 0.0 0.0 - 0.2 10e3/uL     Absolute Immature Granulocytes 0.0 <=0.4 10e3/uL     Absolute NRBCs 0.0 10e3/uL   Extra Green Top (Lithium Heparin) Tube     Collection Time: 04/24/24  8:12 AM   Result Value Ref Range     Hold Specimen [facility]                Psychiatric Examination:      /84 (BP Location: Left arm, Patient Position: Sitting, Cuff Size: Adult Regular)   Pulse 94   Temp 97.8  F (36.6  C) (Oral)   Resp 12   Ht 1.829 m (6')   Wt 107.1 kg (236 lb 1.6 oz)   SpO2 98%   BMI 32.02 kg/m    Weight is 236 lbs 1.6 oz  Body mass index is 32.02 kg/m .     Weight over time:          Vitals:     04/03/24 1151 04/09/24 0826   Weight: 108 kg (238 lb) 107.1 kg (236 lb 1.6 oz)         Orthostatic Vitals           Most Recent       Sitting Orthostatic /85 04/09 0826     Sitting Orthostatic Pulse (bpm) 73 04/09 0826     Standing Orthostatic /92 04/09 0826     Standing Orthostatic Pulse (bpm) 69 04/09 0826             Cardiometabolic risk assessment. 04/10/24     Reviewed patient profile for cardiometabolic risk factors     Date taken /Value  REFERENCE RANGE   Abdominal Obesity  (Waist Circumference)   See nursing flowsheet Women ?35 in (88 cm)   Men ?40 in (102 cm)       Triglycerides         "        Triglycerides   Date Value Ref Range Status   02/26/2013 71 0 - 150 mg/dL Final       Comment:       Fasting specimen         ?150 mg/dL (1.7 mmol/L) or current treatment for elevated triglycerides   HDL cholesterol            HDL Cholesterol   Date Value Ref Range Status   02/26/2013 45 40 - 110 mg/dL Final   ]    Women <50 mg/dL (1.3 mmol/L) in women or current treatment for low HDL cholesterol  Men <40 mg/dL (1 mmol/L) in men or current treatment for low HDL cholesterol      Fasting plasma glucose (FPG)           Lab Results   Component Value Date      04/08/2024     GLC 83 02/26/2013        FPG ?100 mg/dL (5.6 mmol/L) or treatment for elevated blood glucose   Blood pressure        BP Readings from Last 3 Encounters:   04/08/24 127/84   02/26/13 129/74    Blood pressure ?130/85 mmHg or treatment for elevated blood pressure   Family History  See family history      Mental Status Exam:  Appearance: awake, alert, dressed in a hospital scrubs, appeared as age stated  Attitude:  Very calm, cooperative  Eye Contact:  good  Mood: \"Very Good\"  Affect:  Calm, mood congruent  Speech:  clear, normal tone and volume  Language: fluent and intact in English  Psychomotor, Gait, Musculoskeletal:  no evidence of tardive dyskinesia, dystonia, or tics  Throught Process: Linear, Logical, goal directed  Associations:  No Loosening of associations  Thought Content:  no evidence of suicidal ideation or homicidal ideation, no auditory hallucinations present, and no visual hallucinations present  Insight: True emotional awareness  Judgement: fair  Oriented to:  time, person, and place  Attention Span and Concentration: Good  Recent and Remote Memory: Intact  Fund of Knowledge: Appropriate             Precautions:                Behavioral Orders   Procedures    Assault precautions    Code 1 - Restrict to Unit    Elopement precautions    [facility] Extended Care       Until discharge, Extended Care to offer psychotherapeutic " services to mental health patients boarding for admission or stabilization. These services are to include but are not limited to: individual psychotherapy, diagnostic assessment, case management and care planning, safety planning, etc. This may include up to 1 visit per day. If patient is physically located at [facility] or [facility], group psychotherapy up to 2 time per day may be offered.     [facility] Extended Care       Until discharge, Extended Care to offer psychotherapeutic services to mental health patients boarding for admission or stabilization. These services are to include but are not limited to: individual psychotherapy, diagnostic assessment, case management and care planning, safety planning, etc. This may include up to 1 visit per day. If patient is physically located at [facility] or [facility], group psychotherapy up to 2 time per day may be offered.     Routine Programming       As clinically indicated    Sexual precautions    Status 15       Every 15 minutes.    Status Individual Observation       Patient SIO status reviewed with team/RN.  Please also refer to RN/team documentation for add'l detail.     -2:1 SIO staff to monitor following which have contributed to patient being on SIO: at least 1 male staff     Assault risk     -Possible interventions SIO staff could use to support patient's treatment progress:  Verbal de-escalation/ Medication administration     -When following observed, team will review discontinuation of SIO:     When patient is not longer aggressive, sexually inappropriate or intrusive.       Order Specific Question:   CONTINUOUS 24 hours / day       Answer:   Other       Order Specific Question:   Specify distance       Answer:   10 foot       Order Specific Question:   Indications for SIO       Answer:   Assault risk       Order Specific Question:   Indications for SIO       Answer:   Severe intrusiveness    Suicide precautions: Suicide Risk: HIGH; Clinical rationale to override score: response to  medication       Patients on Suicide Precautions should have a Combination Diet ordered that includes a Diet selection(s) AND a Behavioral Tray selection for Safe Tray - with utensils, or Safe Tray - NO utensils          Order Specific Question:   Suicide Risk       Answer:   HIGH       Order Specific Question:   Clinical rationale to override score:       Answer:   response to medication           Diagnoses:         Schizoaffective disorder, bipolar type (H)  Polysubstance abuse (H)  Agitation     Clinically Significant Risk Factors                          # Obesity: Estimated body mass index is 32.02 kg/m  as calculated from the following:    Height as of this encounter: 1.829 m (6').    Weight as of this encounter: 107.1 kg (236 lb 1.6 oz)., PRESENT ON ADMISSION     # Financial/Environmental Concerns: other (see comments) (lost [facility] housing)     # Housing Instability: noted in nursing assessment          Assessment & Plan:      Assessment and hospital summary:  Ward Dawson is a -34- old male with a previous diagnosis of Schizoaffective disorder bipolar type who presented to the ED for a significant behavioral change, verbal agitation, worsening psychosocial stress, in the context of substance use.  Factors that make the mental health crisis life threatening or complex are:  Patient was brought to the ED from [facility] following his ECT treatment this morning at [facility].  Patient states he does not know why he is here and also states he knows why he is here.  He presents as manic, disorganized, and confused.   He is distractable and not a clear historian at this time.  Patient states he has not slept in a long time and received narcan last night.  Per ACT team  and [facility] staff patient has been elevated, intrusive, and disruptive.  Patient broke a window yesterday.  IRTS reports patient had turned table over and tried using them as skate board ramps.  Patient was noted for have been fairly  stable prior to his pass this weekend.  CM reports patient went to a skateboarding competition in [place].  Patient returned in a manic state.  CM reports pt may not appear as manic as he has been the past few days due to sedation and ECT this morning.  She reports patient has not slept in 3 days.  IRTS reports pt received narcan twice last night.  Patient has been trespassed from the IRTS facility.  UDS is postive for cannabis..         Today's Changes:4/25/24  No changes made to medications     Target psychiatric symptoms and interventions:  Admitted on 4/9/24 to station 12N  Clozaril, 50 mg every morning and 200 mg at bedtime  --Depakote ER, 500 mg daily and 1000 mg at bedtime for mood stabilization   --Gabapentin, 900 mg twice a day for pain  - Ibuprofen tablet 400 mg orally every 6 hours prn for pain  --Additional medications will include B52, Zyprexa, trazodone, hydroxyzine     Risks, benefits, and alternatives discussed at length with patient.      Acute Medical Problems and Treatments:  Acute medical concerns:  - No acute medical concerns     Pertinent labs/imaging:  Recent Results      Recent Results               Recent Results (from the past 336 hour(s))   Valproic acid     Collection Time: 04/05/24  9:39 PM   Result Value Ref Range     Valproic acid 68.4   ug/mL   Asymptomatic COVID-19 Virus (Coronavirus) by PCR Nasopharyngeal     Collection Time: 04/05/24  9:40 PM     Specimen: Nasopharyngeal; Swab   Result Value Ref Range     SARS CoV2 PCR Negative Negative   Glucose by meter     Collection Time: 04/08/24  8:21 PM   Result Value Ref Range     GLUCOSE BY METER POCT 117 (H) 70 - 99 mg/dL   WBC and Differential     Collection Time: 04/10/24 10:59 AM   Result Value Ref Range     WBC Count 5.7 4.0 - 11.0 10e3/uL     % Neutrophils 51 %     % Lymphocytes 37 %     % Monocytes 9 %     % Eosinophils 2 %     % Basophils 1 %     % Immature Granulocytes 0 %     NRBCs per 100 WBC 0 <1 /100     Absolute Neutrophils  3.0 1.6 - 8.3 10e3/uL     Absolute Lymphocytes 2.1 0.8 - 5.3 10e3/uL     Absolute Monocytes 0.5 0.0 - 1.3 10e3/uL     Absolute Eosinophils 0.1 0.0 - 0.7 10e3/uL     Absolute Basophils 0.0 0.0 - 0.2 10e3/uL     Absolute Immature Granulocytes 0.0 <=0.4 10e3/uL     Absolute NRBCs 0.0 10e3/uL   EKG 12-lead, complete     Collection Time: 04/15/24  9:20 AM   Result Value Ref Range     Systolic Blood Pressure   mmHg     Diastolic Blood Pressure   mmHg     Ventricular Rate 85 BPM     Atrial Rate 85 BPM     MA Interval 152 ms     QRS Duration 94 ms      ms     QTc 449 ms     P Axis 73 degrees     R AXIS 1 degrees     T Axis 29 degrees     Interpretation ECG           Sinus rhythm  No previous ECGs available  Confirmed by fellow William Chahal ([id number]) on 4/17/2024 9:31:06 AM  Confirmed by MD QUINTANA HENRI [id number]) on 4/17/2024 10:20:26 PM      Comprehensive metabolic panel     Collection Time: 04/15/24  9:53 AM   Result Value Ref Range     Sodium 139 135 - 145 mmol/L     Potassium 4.2 3.4 - 5.3 mmol/L     Carbon Dioxide (CO2) 29 22 - 29 mmol/L     Anion Gap 10 7 - 15 mmol/L     Urea Nitrogen 18.6 6.0 - 20.0 mg/dL     Creatinine 0.97 0.67 - 1.17 mg/dL     GFR Estimate >90 >60 mL/min/1.73m2     Calcium 8.9 8.6 - 10.0 mg/dL     Chloride 100 98 - 107 mmol/L     Glucose 117 (H) 70 - 99 mg/dL     Alkaline Phosphatase 73 40 - 150 U/L     AST 31 0 - 45 U/L     ALT 28 0 - 70 U/L     Protein Total 7.2 6.4 - 8.3 g/dL     Albumin 4.5 3.5 - 5.2 g/dL     Bilirubin Total 0.7 <=1.2 mg/dL   CBC with platelets and differential     Collection Time: 04/15/24  9:53 AM   Result Value Ref Range     WBC Count 6.3 4.0 - 11.0 10e3/uL     RBC Count 5.09 4.40 - 5.90 10e6/uL     Hemoglobin 14.0 13.3 - 17.7 g/dL     Hematocrit 44.9 40.0 - 53.0 %     MCV 88 78 - 100 fL     MCH 27.5 26.5 - 33.0 pg     MCHC 31.2 (L) 31.5 - 36.5 g/dL     RDW 13.2 10.0 - 15.0 %     Platelet Count 234 150 - 450 10e3/uL     % Neutrophils 59 %     % Lymphocytes 33 %     %  Monocytes 5 %     % Eosinophils 2 %     % Basophils 1 %     % Immature Granulocytes 0 %     NRBCs per 100 WBC 0 <1 /100     Absolute Neutrophils 3.8 1.6 - 8.3 10e3/uL     Absolute Lymphocytes 2.1 0.8 - 5.3 10e3/uL     Absolute Monocytes 0.3 0.0 - 1.3 10e3/uL     Absolute Eosinophils 0.1 0.0 - 0.7 10e3/uL     Absolute Basophils 0.0 0.0 - 0.2 10e3/uL     Absolute Immature Granulocytes 0.0 <=0.4 10e3/uL     Absolute NRBCs 0.0 10e3/uL   WBC and Differential     Collection Time: 04/17/24 12:48 PM   Result Value Ref Range     WBC Count 5.6 4.0 - 11.0 10e3/uL     % Neutrophils 68 %     % Lymphocytes 25 %     % Monocytes 5 %     % Eosinophils 1 %     % Basophils 1 %     % Immature Granulocytes 0 %     NRBCs per 100 WBC 0 <1 /100     Absolute Neutrophils 3.8 1.6 - 8.3 10e3/uL     Absolute Lymphocytes 1.4 0.8 - 5.3 10e3/uL     Absolute Monocytes 0.3 0.0 - 1.3 10e3/uL     Absolute Eosinophils 0.0 0.0 - 0.7 10e3/uL     Absolute Basophils 0.0 0.0 - 0.2 10e3/uL     Absolute Immature Granulocytes 0.0 <=0.4 10e3/uL     Absolute NRBCs 0.0 10e3/uL   Extra Green Top (Lithium Heparin) Tube     Collection Time: 04/17/24 12:48 PM   Result Value Ref Range     Hold Specimen [facility]                    Behavioral/Psychological/Social:  - Encourage unit programming     Safety  Placed on the following Precautions: Suicide, Assault, Elopement and Sexual precautions  - Continue precautions as noted above  -  2:1 staff acuity for intrusive, assaultive behaviors  - Status 15 minute checks  - Legal Status: voluntary     Disposition Plan   Reason for ongoing admission: poses an imminent risk to self and poses an imminent risk to others  Discharge location: [facility]RTS facility or CD  Discharge Medications: not ordered  Follow-up Appointments: not scheduled     Entered by: AMY Yu CNP on 04/25/2024  at 11:30         Attestation:   Patient has been seen and evaluated by me, Phan Sanchez, MICK, APRN CNP, PMVAP-BC  The patient was counseled on  nature of illness and treatment plan/options  Care was coordinated with treatment team  Total time > 30 minutes

## 2024-04-26 PROCEDURE — 124N000002 HC R&B MH UMMC

## 2024-04-26 PROCEDURE — 250N000013 HC RX MED GY IP 250 OP 250 PS 637: Performed by: PHYSICIAN ASSISTANT

## 2024-04-26 PROCEDURE — 250N000013 HC RX MED GY IP 250 OP 250 PS 637: Performed by: FAMILY MEDICINE

## 2024-04-26 PROCEDURE — 250N000013 HC RX MED GY IP 250 OP 250 PS 637: Performed by: CLINICAL NURSE SPECIALIST

## 2024-04-26 PROCEDURE — 250N000013 HC RX MED GY IP 250 OP 250 PS 637: Performed by: PSYCHIATRY & NEUROLOGY

## 2024-04-26 PROCEDURE — 99232 SBSQ HOSP IP/OBS MODERATE 35: CPT | Performed by: CLINICAL NURSE SPECIALIST

## 2024-04-26 RX ADMIN — CLOZAPINE 200 MG: 200 TABLET ORAL at 21:03

## 2024-04-26 RX ADMIN — PANTOPRAZOLE SODIUM 40 MG: 40 TABLET, DELAYED RELEASE ORAL at 08:53

## 2024-04-26 RX ADMIN — ATORVASTATIN CALCIUM 40 MG: 40 TABLET, FILM COATED ORAL at 08:53

## 2024-04-26 RX ADMIN — LORATADINE 10 MG: 10 TABLET ORAL at 08:53

## 2024-04-26 RX ADMIN — DIVALPROEX SODIUM 1000 MG: 500 TABLET, FILM COATED, EXTENDED RELEASE ORAL at 21:03

## 2024-04-26 RX ADMIN — CLOZAPINE 50 MG: 50 TABLET ORAL at 08:53

## 2024-04-26 RX ADMIN — HYDROCHLOROTHIAZIDE 25 MG: 25 TABLET ORAL at 08:53

## 2024-04-26 RX ADMIN — QUETIAPINE 100 MG: 100 TABLET ORAL at 21:03

## 2024-04-26 RX ADMIN — POLYETHYLENE GLYCOL 3350 17 G: 17 POWDER, FOR SOLUTION ORAL at 08:53

## 2024-04-26 RX ADMIN — Medication 900 MG: at 08:53

## 2024-04-26 RX ADMIN — Medication 900 MG: at 21:02

## 2024-04-26 RX ADMIN — ACETAMINOPHEN 650 MG: 325 TABLET, FILM COATED ORAL at 08:53

## 2024-04-26 RX ADMIN — DIVALPROEX SODIUM 500 MG: 500 TABLET, FILM COATED, EXTENDED RELEASE ORAL at 08:53

## 2024-04-26 ASSESSMENT — ACTIVITIES OF DAILY LIVING (ADL)
ADLS_ACUITY_SCORE: 28
HYGIENE/GROOMING: INDEPENDENT
ADLS_ACUITY_SCORE: 28
ORAL_HYGIENE: INDEPENDENT
ADLS_ACUITY_SCORE: 28
ADLS_ACUITY_SCORE: 28
DRESS: INDEPENDENT
ADLS_ACUITY_SCORE: 28

## 2024-04-26 NOTE — PROGRESS NOTES
"The patient's care was discussed with the treatment team during the daily team meeting and/or staff's chart notes were reviewed. Staff report patient slept 7 hours with no behavioral issues observed. Staff report It has been uneventful shift for Cristel; no changes from previous shift. He has been calm and cooperative all shift. Watched television in the lounge for most of the evening. He is quiet/observant. He took all of his medications without any problems. He denies all mental health symptoms. No physical aggression seen this shift. No acute behavioral concerns this shift. No medication side effects stated or observed.      Upon interview, patient was patient was interviewed in his room this morning, he appears calm, and content.  Patient reports that he is doing fine and that his mood is happy.  Patient informed this writer \"I was wondering if you go take haldol out of my medications.\"  Patient explains that Haldol makes him forgetful, he reported an instance where he received haldol and he blacks out.  Writer informed the patient that he is on prn Haldol and there has not been any evidence of recent use (the last and only use was on 4/15). Patient was educated that the possible cause of his amnesia is the ECT and that it would resolve with time. Patient states that other than this, he feels his medications are working well with no side effects. Patient d patient denies auditory and visual hallucinations he denies suicidal or homicidal ideation and thoughts of self-harm.  Writer discussed placement update with the patient we are likely to hear from the [facility] today or by Monday.  Patient responds that it is okay as he is not hurried to leave. Patient seems to have no more questions at this time.          Medications:      Inpatient Administered Meds                     Current Facility-Administered Medications   Medication Dose Route Frequency Provider Last Rate Last Admin    atorvastatin (LIPITOR) tablet 40 " mg  40 mg Oral Daily Jaswinder Claros MD   40 mg at 04/10/24 0818    cloZAPine (CLOZARIL) tablet 100 mg  100 mg Oral At Bedtime Jaswinder Claros MD   100 mg at 04/09/24 1848    cloZAPine (CLOZARIL) tablet 50 mg  50 mg Oral Daily Jaswinder Claros MD   50 mg at 04/10/24 0818    divalproex sodium extended-release (DEPAKOTE ER) 24 hr tablet 500 mg  500 mg Oral BID Jaswinder Claros MD   500 mg at 04/10/24 0818    gabapentin (NEURONTIN) tablet 600 mg  600 mg Oral BID Jaswinder Claros MD   600 mg at 04/10/24 0818    hydrochlorothiazide (HYDRODIURIL) tablet 25 mg  25 mg Oral Daily Jaswinder Claros MD   25 mg at 04/10/24 0818    nicotine (NICODERM CQ) 21 MG/24HR 24 hr patch 1 patch  1 patch Transdermal Daily Xavi Medeiros MD   1 patch at 04/10/24 0821              Allergies:      Allergies             Allergies   Allergen Reactions    Haloperidol Confusion and Other (See Comments)       Prone to EPSE. COnsider IM Zyprexa or be sure to give with benadryl              Labs:      Recent Results               Recent Results (from the past 336 hour(s))   EKG 12-lead, complete     Collection Time: 04/15/24  9:20 AM   Result Value Ref Range     Systolic Blood Pressure   mmHg     Diastolic Blood Pressure   mmHg     Ventricular Rate 85 BPM     Atrial Rate 85 BPM     CT Interval 152 ms     QRS Duration 94 ms      ms     QTc 449 ms     P Axis 73 degrees     R AXIS 1 degrees     T Axis 29 degrees     Interpretation ECG           Sinus rhythm  No previous ECGs available  Confirmed by fellow William Chahal ([id number]) on 4/17/2024 9:31:06 AM  Confirmed by MD LILIAN, PENNY [id number]) on 4/17/2024 10:20:26 PM      Comprehensive metabolic panel     Collection Time: 04/15/24  9:53 AM   Result Value Ref Range     Sodium 139 135 - 145 mmol/L     Potassium 4.2 3.4 - 5.3 mmol/L     Carbon Dioxide (CO2) 29 22 - 29 mmol/L     Anion Gap 10 7 - 15 mmol/L     Urea Nitrogen 18.6 6.0 - 20.0  mg/dL     Creatinine 0.97 0.67 - 1.17 mg/dL     GFR Estimate >90 >60 mL/min/1.73m2     Calcium 8.9 8.6 - 10.0 mg/dL     Chloride 100 98 - 107 mmol/L     Glucose 117 (H) 70 - 99 mg/dL     Alkaline Phosphatase 73 40 - 150 U/L     AST 31 0 - 45 U/L     ALT 28 0 - 70 U/L     Protein Total 7.2 6.4 - 8.3 g/dL     Albumin 4.5 3.5 - 5.2 g/dL     Bilirubin Total 0.7 <=1.2 mg/dL   CBC with platelets and differential     Collection Time: 04/15/24  9:53 AM   Result Value Ref Range     WBC Count 6.3 4.0 - 11.0 10e3/uL     RBC Count 5.09 4.40 - 5.90 10e6/uL     Hemoglobin 14.0 13.3 - 17.7 g/dL     Hematocrit 44.9 40.0 - 53.0 %     MCV 88 78 - 100 fL     MCH 27.5 26.5 - 33.0 pg     MCHC 31.2 (L) 31.5 - 36.5 g/dL     RDW 13.2 10.0 - 15.0 %     Platelet Count 234 150 - 450 10e3/uL     % Neutrophils 59 %     % Lymphocytes 33 %     % Monocytes 5 %     % Eosinophils 2 %     % Basophils 1 %     % Immature Granulocytes 0 %     NRBCs per 100 WBC 0 <1 /100     Absolute Neutrophils 3.8 1.6 - 8.3 10e3/uL     Absolute Lymphocytes 2.1 0.8 - 5.3 10e3/uL     Absolute Monocytes 0.3 0.0 - 1.3 10e3/uL     Absolute Eosinophils 0.1 0.0 - 0.7 10e3/uL     Absolute Basophils 0.0 0.0 - 0.2 10e3/uL     Absolute Immature Granulocytes 0.0 <=0.4 10e3/uL     Absolute NRBCs 0.0 10e3/uL   WBC and Differential     Collection Time: 04/17/24 12:48 PM   Result Value Ref Range     WBC Count 5.6 4.0 - 11.0 10e3/uL     % Neutrophils 68 %     % Lymphocytes 25 %     % Monocytes 5 %     % Eosinophils 1 %     % Basophils 1 %     % Immature Granulocytes 0 %     NRBCs per 100 WBC 0 <1 /100     Absolute Neutrophils 3.8 1.6 - 8.3 10e3/uL     Absolute Lymphocytes 1.4 0.8 - 5.3 10e3/uL     Absolute Monocytes 0.3 0.0 - 1.3 10e3/uL     Absolute Eosinophils 0.0 0.0 - 0.7 10e3/uL     Absolute Basophils 0.0 0.0 - 0.2 10e3/uL     Absolute Immature Granulocytes 0.0 <=0.4 10e3/uL     Absolute NRBCs 0.0 10e3/uL   Extra Green Top (Lithium Heparin) Tube     Collection Time: 04/17/24 12:48  PM   Result Value Ref Range     Hold Specimen [facility]     Valproic acid     Collection Time: 04/18/24  8:46 AM   Result Value Ref Range     Valproic acid 60.3   ug/mL   WBC and Differential     Collection Time: 04/24/24  8:12 AM   Result Value Ref Range     WBC Count 5.1 4.0 - 11.0 10e3/uL     % Neutrophils 47 %     % Lymphocytes 42 %     % Monocytes 9 %     % Eosinophils 1 %     % Basophils 1 %     % Immature Granulocytes 0 %     NRBCs per 100 WBC 0 <1 /100     Absolute Neutrophils 2.4 1.6 - 8.3 10e3/uL     Absolute Lymphocytes 2.1 0.8 - 5.3 10e3/uL     Absolute Monocytes 0.5 0.0 - 1.3 10e3/uL     Absolute Eosinophils 0.1 0.0 - 0.7 10e3/uL     Absolute Basophils 0.0 0.0 - 0.2 10e3/uL     Absolute Immature Granulocytes 0.0 <=0.4 10e3/uL     Absolute NRBCs 0.0 10e3/uL   Extra Green Top (Lithium Heparin) Tube     Collection Time: 04/24/24  8:12 AM   Result Value Ref Range     Hold Specimen [facility]                Psychiatric Examination:      /84 (BP Location: Left arm, Patient Position: Sitting, Cuff Size: Adult Regular)   Pulse 94   Temp 97.8  F (36.6  C) (Oral)   Resp 12   Ht 1.829 m (6')   Wt 107.1 kg (236 lb 1.6 oz)   SpO2 98%   BMI 32.02 kg/m    Weight is 236 lbs 1.6 oz  Body mass index is 32.02 kg/m .     Weight over time:          Vitals:     04/03/24 1151 04/09/24 0826   Weight: 108 kg (238 lb) 107.1 kg (236 lb 1.6 oz)         Orthostatic Vitals           Most Recent       Sitting Orthostatic /85 04/09 0826     Sitting Orthostatic Pulse (bpm) 73 04/09 0826     Standing Orthostatic /92 04/09 0826     Standing Orthostatic Pulse (bpm) 69 04/09 0826             Cardiometabolic risk assessment. 04/10/24     Reviewed patient profile for cardiometabolic risk factors     Date taken /Value  REFERENCE RANGE   Abdominal Obesity  (Waist Circumference)   See nursing flowsheet Women ?35 in (88 cm)   Men ?40 in (102 cm)       Triglycerides                Triglycerides   Date Value Ref Range Status  "  02/26/2013 71 0 - 150 mg/dL Final       Comment:       Fasting specimen         ?150 mg/dL (1.7 mmol/L) or current treatment for elevated triglycerides   HDL cholesterol            HDL Cholesterol   Date Value Ref Range Status   02/26/2013 45 40 - 110 mg/dL Final   ]    Women <50 mg/dL (1.3 mmol/L) in women or current treatment for low HDL cholesterol  Men <40 mg/dL (1 mmol/L) in men or current treatment for low HDL cholesterol      Fasting plasma glucose (FPG)           Lab Results   Component Value Date      04/08/2024     GLC 83 02/26/2013        FPG ?100 mg/dL (5.6 mmol/L) or treatment for elevated blood glucose   Blood pressure        BP Readings from Last 3 Encounters:   04/08/24 127/84   02/26/13 129/74    Blood pressure ?130/85 mmHg or treatment for elevated blood pressure   Family History  See family history      Mental Status Exam:  Appearance: awake, alert, dressed in a hospital scrubs, appeared as age stated  Attitude:  Very calm, cooperative  Eye Contact:  good  Mood: \" happy\"  Affect:  Calm, mood congruent  Speech:  clear, normal tone and volume  Language: fluent and intact in English  Psychomotor, Gait, Musculoskeletal:  no evidence of tardive dyskinesia, dystonia, or tics  Throught Process: Linear, Logical, goal directed  Associations:  No Loosening of associations  Thought Content:  no evidence of suicidal ideation or homicidal ideation, no auditory hallucinations present, and no visual hallucinations present  Insight: True emotional awareness  Judgement: fair  Oriented to:  time, person, and place  Attention Span and Concentration: Good  Recent and Remote Memory: Intact  Fund of Knowledge: Appropriate             Precautions:                Behavioral Orders   Procedures    Assault precautions    Code 1 - Restrict to Unit    Elopement precautions    [facility] Extended Care       Until discharge, Extended Care to offer psychotherapeutic services to mental health patients boarding for admission " or stabilization. These services are to include but are not limited to: individual psychotherapy, diagnostic assessment, case management and care planning, safety planning, etc. This may include up to 1 visit per day. If patient is physically located at [facility] or [facility], group psychotherapy up to 2 time per day may be offered.     [facility] Extended [facility]       Until discharge, Extended Care to offer psychotherapeutic services to mental health patients boarding for admission or stabilization. These services are to include but are not limited to: individual psychotherapy, diagnostic assessment, case management and care planning, safety planning, etc. This may include up to 1 visit per day. If patient is physically located at [facility] or [facility], group psychotherapy up to 2 time per day may be offered.     Routine Programming       As clinically indicated    Sexual precautions    Status 15       Every 15 minutes.    Status Individual Observation       Patient SIO status reviewed with team/RN.  Please also refer to RN/team documentation for add'l detail.     -2:1 SIO staff to monitor following which have contributed to patient being on SIO: at least 1 male staff     Assault risk     -Possible interventions SIO staff could use to support patient's treatment progress:  Verbal de-escalation/ Medication administration     -When following observed, team will review discontinuation of SIO:     When patient is not longer aggressive, sexually inappropriate or intrusive.       Order Specific Question:   CONTINUOUS 24 hours / day       Answer:   Other       Order Specific Question:   Specify distance       Answer:   10 foot       Order Specific Question:   Indications for SIO       Answer:   Assault risk       Order Specific Question:   Indications for SIO       Answer:   Severe intrusiveness    Suicide precautions: Suicide Risk: HIGH; Clinical rationale to override score: response to medication       Patients on Suicide Precautions should  have a Combination Diet ordered that includes a Diet selection(s) AND a Behavioral Tray selection for Safe Tray - with utensils, or Safe Tray - NO utensils          Order Specific Question:   Suicide Risk       Answer:   HIGH       Order Specific Question:   Clinical rationale to override score:       Answer:   response to medication           Diagnoses:         Schizoaffective disorder, bipolar type (H)  Polysubstance abuse (H)  Agitation     Clinically Significant Risk Factors                          # Obesity: Estimated body mass index is 32.02 kg/m  as calculated from the following:    Height as of this encounter: 1.829 m (6').    Weight as of this encounter: 107.1 kg (236 lb 1.6 oz)., PRESENT ON ADMISSION     # Financial/Environmental Concerns: other (see comments) (lost IRTS housing)     # Housing Instability: noted in nursing assessment          Assessment & Plan:      Assessment and hospital summary:  Ward Dawson is a -34- old male with a previous diagnosis of Schizoaffective disorder bipolar type who presented to the ED for a significant behavioral change, verbal agitation, worsening psychosocial stress, in the context of substance use.  Factors that make the mental health crisis life threatening or complex are:  Patient was brought to the ED from [facility]"" following his ECT treatment this morning at [facility].  Patient states he does not know why he is here and also states he knows why he is here.  He presents as manic, disorganized, and confused.   He is distractable and not a clear historian at this time.  Patient states he has not slept in a long time and received narcan last night.  Per ACT team  and [facility] staff patient has been elevated, intrusive, and disruptive.  Patient broke a window yesterday.  IRTS reports patient had turned table over and tried using them as skate board ramps.  Patient was noted for have been fairly stable prior to his pass this weekend.  CM reports patient  went to a skateboarding competition in [place].  Patient returned in a manic state.  CM reports pt may not appear as manic as he has been the past few days due to sedation and ECT this morning.  She reports patient has not slept in 3 days.  IRTS reports pt received narcan twice last night.  Patient has been trespassed from the IRTS facility.  UDS is postive for cannabis..         Today's Changes:4/26/24  No changes made to medications     Target psychiatric symptoms and interventions:  Admitted on 4/9/24 to station 12N  Clozaril, 50 mg every morning and 200 mg at bedtime  --Depakote ER, 500 mg daily and 1000 mg at bedtime for mood stabilization   --Gabapentin, 900 mg twice a day for pain  - Ibuprofen tablet 400 mg orally every 6 hours prn for pain  --Additional medications will include B52, Zyprexa, trazodone, hydroxyzine     Risks, benefits, and alternatives discussed at length with patient.      Acute Medical Problems and Treatments:  Acute medical concerns:  - No acute medical concerns     Pertinent labs/imaging:  Recent Results      Recent Results               Recent Results (from the past 336 hour(s))   Valproic acid     Collection Time: 04/05/24  9:39 PM   Result Value Ref Range     Valproic acid 68.4   ug/mL   Asymptomatic COVID-19 Virus (Coronavirus) by PCR Nasopharyngeal     Collection Time: 04/05/24  9:40 PM     Specimen: Nasopharyngeal; Swab   Result Value Ref Range     SARS CoV2 PCR Negative Negative   Glucose by meter     Collection Time: 04/08/24  8:21 PM   Result Value Ref Range     GLUCOSE BY METER POCT 117 (H) 70 - 99 mg/dL   WBC and Differential     Collection Time: 04/10/24 10:59 AM   Result Value Ref Range     WBC Count 5.7 4.0 - 11.0 10e3/uL     % Neutrophils 51 %     % Lymphocytes 37 %     % Monocytes 9 %     % Eosinophils 2 %     % Basophils 1 %     % Immature Granulocytes 0 %     NRBCs per 100 WBC 0 <1 /100     Absolute Neutrophils 3.0 1.6 - 8.3 10e3/uL     Absolute Lymphocytes 2.1 0.8 - 5.3  10e3/uL     Absolute Monocytes 0.5 0.0 - 1.3 10e3/uL     Absolute Eosinophils 0.1 0.0 - 0.7 10e3/uL     Absolute Basophils 0.0 0.0 - 0.2 10e3/uL     Absolute Immature Granulocytes 0.0 <=0.4 10e3/uL     Absolute NRBCs 0.0 10e3/uL   EKG 12-lead, complete     Collection Time: 04/15/24  9:20 AM   Result Value Ref Range     Systolic Blood Pressure   mmHg     Diastolic Blood Pressure   mmHg     Ventricular Rate 85 BPM     Atrial Rate 85 BPM     NE Interval 152 ms     QRS Duration 94 ms      ms     QTc 449 ms     P Axis 73 degrees     R AXIS 1 degrees     T Axis 29 degrees     Interpretation ECG           Sinus rhythm  No previous ECGs available  Confirmed by fellow William Chahal ([id number]) on 4/17/2024 9:31:06 AM  Confirmed by MD LILIAN, PENNY [id number]) on 4/17/2024 10:20:26 PM      Comprehensive metabolic panel     Collection Time: 04/15/24  9:53 AM   Result Value Ref Range     Sodium 139 135 - 145 mmol/L     Potassium 4.2 3.4 - 5.3 mmol/L     Carbon Dioxide (CO2) 29 22 - 29 mmol/L     Anion Gap 10 7 - 15 mmol/L     Urea Nitrogen 18.6 6.0 - 20.0 mg/dL     Creatinine 0.97 0.67 - 1.17 mg/dL     GFR Estimate >90 >60 mL/min/1.73m2     Calcium 8.9 8.6 - 10.0 mg/dL     Chloride 100 98 - 107 mmol/L     Glucose 117 (H) 70 - 99 mg/dL     Alkaline Phosphatase 73 40 - 150 U/L     AST 31 0 - 45 U/L     ALT 28 0 - 70 U/L     Protein Total 7.2 6.4 - 8.3 g/dL     Albumin 4.5 3.5 - 5.2 g/dL     Bilirubin Total 0.7 <=1.2 mg/dL   CBC with platelets and differential     Collection Time: 04/15/24  9:53 AM   Result Value Ref Range     WBC Count 6.3 4.0 - 11.0 10e3/uL     RBC Count 5.09 4.40 - 5.90 10e6/uL     Hemoglobin 14.0 13.3 - 17.7 g/dL     Hematocrit 44.9 40.0 - 53.0 %     MCV 88 78 - 100 fL     MCH 27.5 26.5 - 33.0 pg     MCHC 31.2 (L) 31.5 - 36.5 g/dL     RDW 13.2 10.0 - 15.0 %     Platelet Count 234 150 - 450 10e3/uL     % Neutrophils 59 %     % Lymphocytes 33 %     % Monocytes 5 %     % Eosinophils 2 %     % Basophils 1 %     %  Immature Granulocytes 0 %     NRBCs per 100 WBC 0 <1 /100     Absolute Neutrophils 3.8 1.6 - 8.3 10e3/uL     Absolute Lymphocytes 2.1 0.8 - 5.3 10e3/uL     Absolute Monocytes 0.3 0.0 - 1.3 10e3/uL     Absolute Eosinophils 0.1 0.0 - 0.7 10e3/uL     Absolute Basophils 0.0 0.0 - 0.2 10e3/uL     Absolute Immature Granulocytes 0.0 <=0.4 10e3/uL     Absolute NRBCs 0.0 10e3/uL   WBC and Differential     Collection Time: 04/17/24 12:48 PM   Result Value Ref Range     WBC Count 5.6 4.0 - 11.0 10e3/uL     % Neutrophils 68 %     % Lymphocytes 25 %     % Monocytes 5 %     % Eosinophils 1 %     % Basophils 1 %     % Immature Granulocytes 0 %     NRBCs per 100 WBC 0 <1 /100     Absolute Neutrophils 3.8 1.6 - 8.3 10e3/uL     Absolute Lymphocytes 1.4 0.8 - 5.3 10e3/uL     Absolute Monocytes 0.3 0.0 - 1.3 10e3/uL     Absolute Eosinophils 0.0 0.0 - 0.7 10e3/uL     Absolute Basophils 0.0 0.0 - 0.2 10e3/uL     Absolute Immature Granulocytes 0.0 <=0.4 10e3/uL     Absolute NRBCs 0.0 10e3/uL   Extra Green Top (Lithium Heparin) Tube     Collection Time: 04/17/24 12:48 PM   Result Value Ref Range     Hold Specimen [facility]                    Behavioral/Psychological/Social:  - Encourage unit programming     Safety  Placed on the following Precautions: Suicide, Assault, Elopement and Sexual precautions  - Continue precautions as noted above  -  2:1 staff acuity for intrusive, assaultive behaviors  - Status 15 minute checks  - Legal Status: voluntary     Disposition Plan   Reason for ongoing admission: poses an imminent risk to self and poses an imminent risk to others  Discharge location: IRTS facility or CD  Discharge Medications: not ordered  Follow-up Appointments: not scheduled     Entered by: AMY Yu CNP on 04/26/2024  at 10:45         Attestation:   Patient has been seen and evaluated by Phan gill, MICK, APRN CNP, PMHNP-BC  The patient was counseled on nature of illness and treatment plan/options  Care was  coordinated with treatment team  Total time > 30 minutes

## 2024-04-26 NOTE — PLAN OF CARE
It has been uneventful shift for [facility]; no changes from previous shift. He has been calm and cooperative all shift. Watched television in the lounge for most of the evening. He is quiet/observant. He took all of his medications without any problems. He denies all mental health symptoms. No physical aggression seen this shift. No acute behavioral concerns this shift. No medication side effects stated or observed.     /88 (BP Location: Right arm)   Pulse 78   Temp 97.2  F (36.2  C) (Temporal)   Resp 19   Ht 1.829 m (6')   Wt 108 kg (238 lb)   SpO2 96%   BMI 32.28 kg/m

## 2024-04-26 NOTE — PLAN OF CARE
Team Note Due:  Friday    Assessment/Intervention/Current Symtoms and Care Coordination:  Chart review and met with team, discussed pt progress, symptomology, and response to treatment.  Discussed the discharge plan and any potential impediments to discharge.    Called Herlinda at [facility] to get an update on status. She is still awaiting clinical approval from director.     Updated progress notes sent to [facility].     Discharge Plan or Goal:  When patient's symptoms have reduced and a safe discharge can be facilitated, options include: Residential treatment, IRTS, shelter      Barriers to Discharge:  Discharge planning for residential treatment as patient continues to stabilize      Referral Status:  [facility] 4/19 - denied 4/19  [facility] 4/19  [facility] 4/22  [facility] 4/22  [facility] 4/22 - declined 4/24     Legal Status:  Voluntary    Contacts:  ACT : Heidy Pop, [phone] (WAN)  ACT : [place] [phone] (WAN)  [facility] Psychiatrist: Camron Watson MD, [phone] (WAN)  Mom: Candis, [phone] (Consent)  Friend: Nabila Fran, [phone] (WAN)   [facility] Intake: [email or web address] (WAN)     Upcoming Meetings and Dates/Important Information and next steps:  Symptom stabilization   Discharge planning

## 2024-04-26 NOTE — PLAN OF CARE
BEH IP Unit Acuity Rating Score (UARS)  Patient is given one point for every criteria they meet.    CRITERIA SCORING   On a 72 hour hold, court hold, committed, stay of commitment, or revocation. 0    Patient LOS on BEH unit exceeds 20 days. 0  LOS: 18   Patient under guardianship, 55+, otherwise medically complex, or under age 11. 0   Suicide ideation without relief of precipitating factors. 0   Current plan for suicide. 0   Current plan for homicide. 0   Imminent risk or actual attempt to seriously harm another without relief of factors precipitating the attempt. 1   Severe dysfunction in daily living (ex: complete neglect for self care, extreme disruption in vegetative function, extreme deterioration in social interactions). 0   Recent (last 7 days) or current physical aggression in the ED or on unit. 0   Restraints or seclusion episode in past 72 hours. 0   Recent (last 7 days) or current verbal aggression, agitation, yelling, etc., while in the ED or unit. 0   Active psychosis. 0   Need for constant or near constant redirection (from leaving, from others, etc).  0   Intrusive or disruptive behaviors. 0   Patient requires 3 or more hours of individualized nursing care per 8-hour shift (i.e. for ADLs, meds, therapeutic interventions). 0   TOTAL 1

## 2024-04-26 NOTE — PLAN OF CARE
Problem: Sleep Disturbance  Goal: Adequate Sleep/Rest  Outcome: Adequate for Care Transition   Goal Outcome Evaluation:    The patient appeared asleep throughout the night without safety, behavioral, or medical concerns. He slept for about 7 hours.

## 2024-04-27 PROCEDURE — 250N000013 HC RX MED GY IP 250 OP 250 PS 637: Performed by: PHYSICIAN ASSISTANT

## 2024-04-27 PROCEDURE — 250N000013 HC RX MED GY IP 250 OP 250 PS 637: Performed by: FAMILY MEDICINE

## 2024-04-27 PROCEDURE — 250N000013 HC RX MED GY IP 250 OP 250 PS 637: Performed by: CLINICAL NURSE SPECIALIST

## 2024-04-27 PROCEDURE — 124N000002 HC R&B MH UMMC

## 2024-04-27 RX ADMIN — DIVALPROEX SODIUM 1000 MG: 500 TABLET, FILM COATED, EXTENDED RELEASE ORAL at 20:06

## 2024-04-27 RX ADMIN — ATORVASTATIN CALCIUM 40 MG: 40 TABLET, FILM COATED ORAL at 10:57

## 2024-04-27 RX ADMIN — FLUTICASONE PROPIONATE 1 SPRAY: 50 SPRAY, METERED NASAL at 11:27

## 2024-04-27 RX ADMIN — QUETIAPINE 100 MG: 100 TABLET ORAL at 20:06

## 2024-04-27 RX ADMIN — LORATADINE 10 MG: 10 TABLET ORAL at 10:57

## 2024-04-27 RX ADMIN — HYDROCHLOROTHIAZIDE 25 MG: 25 TABLET ORAL at 10:57

## 2024-04-27 RX ADMIN — Medication 900 MG: at 20:06

## 2024-04-27 RX ADMIN — PANTOPRAZOLE SODIUM 40 MG: 40 TABLET, DELAYED RELEASE ORAL at 10:58

## 2024-04-27 RX ADMIN — DIVALPROEX SODIUM 500 MG: 500 TABLET, FILM COATED, EXTENDED RELEASE ORAL at 10:57

## 2024-04-27 RX ADMIN — Medication 900 MG: at 10:56

## 2024-04-27 RX ADMIN — CLOZAPINE 50 MG: 50 TABLET ORAL at 10:57

## 2024-04-27 RX ADMIN — CLOZAPINE 200 MG: 200 TABLET ORAL at 20:06

## 2024-04-27 RX ADMIN — POLYETHYLENE GLYCOL 3350 17 G: 17 POWDER, FOR SOLUTION ORAL at 10:56

## 2024-04-27 ASSESSMENT — ACTIVITIES OF DAILY LIVING (ADL)
ADLS_ACUITY_SCORE: 28
ADLS_ACUITY_SCORE: 28
HYGIENE/GROOMING: INDEPENDENT
DRESS: INDEPENDENT;SCRUBS (BEHAVIORAL HEALTH)
ADLS_ACUITY_SCORE: 28
ORAL_HYGIENE: INDEPENDENT
ADLS_ACUITY_SCORE: 28

## 2024-04-27 NOTE — PLAN OF CARE
Problem: Adult Inpatient Plan of Care  Goal: Plan of Care Review  Outcome: Adequate for Care Transition     Patient is alert and oriented x3, calm and cooperative. Patient was isolative in his room for the first part of shift. He was later present in the lounge appearing interactive and social with staff while watching TV. He presents with fair affect, calm mood, normal speech and some insight into his situation. Acknowledges that he is ready to transition. Patient appears neat and well-groomed. Patient voices his needs appropriately. Patient denies anxiety, depression, paranoia, auditory hallucinations, and thoughts of harming self/others. No acute behavioral concern with patient this shift.     Patient is eating, hydrating, and sleeping adequately. Voiding freely and denies concern with constipation. Patient is medication compliant with reminders. Medication side effects were not observed or reported this shift. Patient denies pain/ physical discomforts. No acute medical concern. VS WNL /86 (BP Location: Left arm)   Pulse 83   Temp 97  F (36.1  C) (Temporal)   Resp 18   Ht 1.829 m (6')   Wt 108 kg (238 lb)   SpO2 96%   BMI 32.28 kg/m

## 2024-04-27 NOTE — PLAN OF CARE
Problem: Manic Behavior Episode  Goal: Decreased Manic Symptoms  Outcome: Adequate for Care Transition   Goal Outcome Evaluation:    Pt denies all psychological symptoms. Alert and oriented x4. Speech is logical and organized. Pleasant. Calm, cooperative. No overt signs of evens noted. Visible in milieu, watching TV and interacting with staff.     Took scheduled medication without issue.     Continues to endorse chronic back pain, rated 4/10. Denies having any acute physical concerns at this time. No adverse effects of medication observed or reported.     /78 (BP Location: Left arm)   Pulse 78   Temp 96.8  F (36  C) (Temporal)   Resp 18   Ht 1.829 m (6')   Wt 108 kg (238 lb)   SpO2 98%   BMI 32.28 kg/m

## 2024-04-27 NOTE — PLAN OF CARE
Problem: Adult Inpatient Plan of Care  Goal: Plan of Care Review  Outcome: Adequate for Care Transition     Patient is alert and oriented x3, calm and cooperative. Patient remains isolative, withdrawn and minimally social and interactive with peers/staff. Presents with a fair affect, normal speech and some insight into their situation. Feels he is ready to transition. Patient appears unkempt and neglected ADLs. He is encouraged to shower, but declined. Patient voices his needs appropriately. Patient denies anxiety, depression, auditory hallucinations and thoughts of harming self/others. No acute behavioral concern with patient this shift.     Patient is eating, hydrating, and sleeping adequately. Patient is medication compliant with reminders. Medication side effects were not observed or reported this shift. Patient denies pain/ physical discomforts. No acute medical concern. VS WNL /84 (BP Location: Right arm)   Pulse 81   Temp 96.8  F (36  C) (Temporal)   Resp 18   Ht 1.829 m (6')   Wt 108 kg (238 lb)   SpO2 98%   BMI 32.28 kg/m

## 2024-04-27 NOTE — PLAN OF CARE
Goal Outcome Evaluation:    Pt slept for 7 hrs and ended the shift without safety, behavioral, or medical concerns. No use of PRNs this shift.

## 2024-04-28 PROCEDURE — 250N000013 HC RX MED GY IP 250 OP 250 PS 637: Performed by: PHYSICIAN ASSISTANT

## 2024-04-28 PROCEDURE — 250N000013 HC RX MED GY IP 250 OP 250 PS 637: Performed by: CLINICAL NURSE SPECIALIST

## 2024-04-28 PROCEDURE — 124N000002 HC R&B MH UMMC

## 2024-04-28 PROCEDURE — 250N000013 HC RX MED GY IP 250 OP 250 PS 637: Performed by: FAMILY MEDICINE

## 2024-04-28 RX ADMIN — FLUTICASONE PROPIONATE 1 SPRAY: 50 SPRAY, METERED NASAL at 11:19

## 2024-04-28 RX ADMIN — CLOZAPINE 200 MG: 200 TABLET ORAL at 20:50

## 2024-04-28 RX ADMIN — PANTOPRAZOLE SODIUM 40 MG: 40 TABLET, DELAYED RELEASE ORAL at 11:17

## 2024-04-28 RX ADMIN — DIVALPROEX SODIUM 1000 MG: 500 TABLET, FILM COATED, EXTENDED RELEASE ORAL at 20:50

## 2024-04-28 RX ADMIN — CLOZAPINE 50 MG: 50 TABLET ORAL at 11:18

## 2024-04-28 RX ADMIN — LORATADINE 10 MG: 10 TABLET ORAL at 11:19

## 2024-04-28 RX ADMIN — ATORVASTATIN CALCIUM 40 MG: 40 TABLET, FILM COATED ORAL at 11:18

## 2024-04-28 RX ADMIN — DIVALPROEX SODIUM 500 MG: 500 TABLET, FILM COATED, EXTENDED RELEASE ORAL at 11:17

## 2024-04-28 RX ADMIN — POLYETHYLENE GLYCOL 3350 17 G: 17 POWDER, FOR SOLUTION ORAL at 11:17

## 2024-04-28 RX ADMIN — QUETIAPINE 100 MG: 100 TABLET ORAL at 20:50

## 2024-04-28 RX ADMIN — Medication 900 MG: at 11:17

## 2024-04-28 RX ADMIN — HYDROCHLOROTHIAZIDE 25 MG: 25 TABLET ORAL at 11:18

## 2024-04-28 RX ADMIN — Medication 900 MG: at 20:50

## 2024-04-28 ASSESSMENT — ACTIVITIES OF DAILY LIVING (ADL)
ORAL_HYGIENE: INDEPENDENT
ADLS_ACUITY_SCORE: 28
HYGIENE/GROOMING: INDEPENDENT
ADLS_ACUITY_SCORE: 28
ADLS_ACUITY_SCORE: 28
DRESS: SCRUBS (BEHAVIORAL HEALTH);INDEPENDENT
ADLS_ACUITY_SCORE: 28

## 2024-04-28 NOTE — PLAN OF CARE
Goal Outcome Evaluation  Problem: Sleep Disturbance  Goal: Adequate Sleep/Rest  Outcome: Progressing    Patient slept most part of the shift without any concerns. No pain was reported and patient did not seem to be in distress. Patient slept for 7 hrs. Continue to monitor for safety/behavior and intervene as needed

## 2024-04-28 NOTE — PLAN OF CARE
RN Assessment:    Problem: Adult Inpatient Plan of Care  Goal: Plan of Care Review  Outcome: Progressing     Patient is alert and oriented x3, calm and cooperative. Patient remains withdrawn and isolative to his room. Spent majority of the shift sleeping in his room; awaken by staff for medications and meal. He presents with fair affect, calm mood, normal speech and some insight into their situation.Patient denies anxiety, depression, paranoia, auditory hallucinations, and thoughts of harming self/others. No acute behavioral concern with patient this shift.      Patient is medication compliant with reminders. Medication side effects were not observed or reported this shift. Patient denies pain/ physical discomforts. No acute medical concern. VS WNL /82   Pulse 90   Temp 98  F (36.7  C) (Temporal)   Resp 16   Ht 1.829 m (6')   Wt 108 kg (238 lb)   SpO2 95%   BMI 32.28 kg/m

## 2024-04-29 PROCEDURE — 250N000013 HC RX MED GY IP 250 OP 250 PS 637: Performed by: FAMILY MEDICINE

## 2024-04-29 PROCEDURE — 250N000013 HC RX MED GY IP 250 OP 250 PS 637: Performed by: CLINICAL NURSE SPECIALIST

## 2024-04-29 PROCEDURE — 99232 SBSQ HOSP IP/OBS MODERATE 35: CPT | Performed by: CLINICAL NURSE SPECIALIST

## 2024-04-29 PROCEDURE — 250N000013 HC RX MED GY IP 250 OP 250 PS 637: Performed by: PHYSICIAN ASSISTANT

## 2024-04-29 PROCEDURE — 124N000002 HC R&B MH UMMC

## 2024-04-29 RX ADMIN — CLOZAPINE 50 MG: 50 TABLET ORAL at 11:19

## 2024-04-29 RX ADMIN — Medication 900 MG: at 19:58

## 2024-04-29 RX ADMIN — CLOZAPINE 200 MG: 200 TABLET ORAL at 19:59

## 2024-04-29 RX ADMIN — LORATADINE 10 MG: 10 TABLET ORAL at 11:19

## 2024-04-29 RX ADMIN — DIVALPROEX SODIUM 1000 MG: 500 TABLET, FILM COATED, EXTENDED RELEASE ORAL at 19:59

## 2024-04-29 RX ADMIN — DIVALPROEX SODIUM 500 MG: 500 TABLET, FILM COATED, EXTENDED RELEASE ORAL at 11:19

## 2024-04-29 RX ADMIN — POLYETHYLENE GLYCOL 3350 17 G: 17 POWDER, FOR SOLUTION ORAL at 11:19

## 2024-04-29 RX ADMIN — Medication 900 MG: at 11:19

## 2024-04-29 RX ADMIN — PANTOPRAZOLE SODIUM 40 MG: 40 TABLET, DELAYED RELEASE ORAL at 11:19

## 2024-04-29 RX ADMIN — HYDROCHLOROTHIAZIDE 25 MG: 25 TABLET ORAL at 11:19

## 2024-04-29 RX ADMIN — QUETIAPINE 100 MG: 100 TABLET ORAL at 19:59

## 2024-04-29 RX ADMIN — ATORVASTATIN CALCIUM 40 MG: 40 TABLET, FILM COATED ORAL at 11:19

## 2024-04-29 ASSESSMENT — ACTIVITIES OF DAILY LIVING (ADL)
ADLS_ACUITY_SCORE: 28
LAUNDRY: UNABLE TO COMPLETE
DRESS: INDEPENDENT
ADLS_ACUITY_SCORE: 28
ORAL_HYGIENE: INDEPENDENT
HYGIENE/GROOMING: INDEPENDENT
ADLS_ACUITY_SCORE: 28
ADLS_ACUITY_SCORE: 28
HYGIENE/GROOMING: INDEPENDENT
ADLS_ACUITY_SCORE: 28
DRESS: INDEPENDENT
ADLS_ACUITY_SCORE: 28
ORAL_HYGIENE: INDEPENDENT
ADLS_ACUITY_SCORE: 28
ADLS_ACUITY_SCORE: 28

## 2024-04-29 NOTE — PLAN OF CARE
Problem: Adult Behavioral Health Plan of Care  Goal: Plan of Care Review  Outcome: Progressing   Goal Outcome Evaluation:    Pt calm, cooperative. Polite. Alert, good concentration. Denies all psychological symptoms. No signs of evens noted. Speech is linear, logical. Visible in milieu, watching TV. Took scheduled medication without issue.     Continues to endorse chronic back pain, rated 4/10. Denied intervention to address pain. Denies having any acute physical concerns at this time. No adverse effects of medication observed or reported.       /87 (BP Location: Right arm, Patient Position: Sitting, Cuff Size: Adult Regular)   Pulse 81   Temp 98  F (36.7  C) (Oral)   Resp 17   Ht 1.829 m (6')   Wt 108 kg (238 lb)   SpO2 95%   BMI 32.28 kg/m

## 2024-04-29 NOTE — PLAN OF CARE
Problem: Psychotic Signs/Symptoms  Goal: Improved Sleep (Psychotic Signs/Symptoms)  Intervention: Promote Healthy Sleep Hygiene  Recent Flowsheet Documentation  Taken 4/29/2024 2721 by Peyton Vuong RN  Sleep Hygiene Promotion:   noise level reduced   regular sleep pattern promoted   room lighting adjusted   Goal Outcome Evaluation:    Patient appeared to sleep 6.5 hours this night shift.  No prns or snacks given or requested.  No concerns were reported or noted.

## 2024-04-29 NOTE — PLAN OF CARE
Team Note Due:  Friday    Assessment/Intervention/Current Symtoms and Care Coordination:  Chart review and met with team, discussed pt progress, symptomology, and response to treatment.  Discussed the discharge plan and any potential impediments to discharge.    Declined from [facility].     [facility] no longer willing to accept him into [facility]. Communicated this to Cristel, he was upset but maintained good behavioral control and was motivated for different placement options near the [facility].     Referral sent to [facility] via secure [email or web address]    Discharge Plan or Goal:  When patient's symptoms have reduced and a safe discharge can be facilitated, options include: Residential treatment, IRTS, shelter      Barriers to Discharge:  Discharge planning for residential treatment as patient continues to stabilize      Referral Status:  [facility] 4/19 - declined 4/19  [facility] 4/19 - declined 4/29  [facility] 4/22 - declined 4/29  [facility] 4/22  [facility] 4/22 - declined 4/24  [facility] 4/29     Legal Status:  Voluntary    Contacts:  [facility] : Heidy Pop, [phone] (WAN)  [facility] : [facility] [phone] (WAN)  [facility] Psychiatrist: Camron Watson MD, [phone] (WAN)  Mom: Candis, [phone] (Consent)  Friend: Nabila Fran, [phone] (WAN)  [facility] Intake: [email or web address] (WAN)     Upcoming Meetings and Dates/Important Information and next steps:  Discharge planning

## 2024-04-29 NOTE — PLAN OF CARE
BEH IP Unit Acuity Rating Score (UARS)  Patient is given one point for every criteria they meet.    CRITERIA SCORING   On a 72 hour hold, court hold, committed, stay of commitment, or revocation. 0    Patient LOS on BEH unit exceeds 20 days. 1  LOS: 21   Patient under guardianship, 55+, otherwise medically complex, or under age 11. 0   Suicide ideation without relief of precipitating factors. 0   Current plan for suicide. 0   Current plan for homicide. 0   Imminent risk or actual attempt to seriously harm another without relief of factors precipitating the attempt. 1   Severe dysfunction in daily living (ex: complete neglect for self care, extreme disruption in vegetative function, extreme deterioration in social interactions). 0   Recent (last 7 days) or current physical aggression in the ED or on unit. 0   Restraints or seclusion episode in past 72 hours. 0   Recent (last 7 days) or current verbal aggression, agitation, yelling, etc., while in the ED or unit. 0   Active psychosis. 0   Need for constant or near constant redirection (from leaving, from others, etc).  0   Intrusive or disruptive behaviors. 0   Patient requires 3 or more hours of individualized nursing care per 8-hour shift (i.e. for ADLs, meds, therapeutic interventions). 0   TOTAL 2

## 2024-04-29 NOTE — PLAN OF CARE
Problem: Adult Behavioral Health Plan of Care  Goal: Adheres to Safety Considerations for Self and Others  Outcome: Progressing  Patent is alert and oriented x 4, calm and compliant with assessment. Patient was sleeping at the start of shift and when he woke up at about 11 am, he met with the doctor. He is compliant with medication and vitals check. His affect this shift is flat and mood is calm. He spent most of this shift in his room. He expressed to writer that he is doing fine. He did not complain of pain and no prn was given. He denies all psych symptoms. Patient is waiting for housing placement.     /88 (BP Location: Left arm, Patient Position: Sitting, Cuff Size: Adult Large)   Pulse 82   Temp 98.6  F (37  C) (Temporal)   Resp 16   Ht 1.829 m (6')   Wt 108 kg (238 lb)   SpO2 96%   BMI 32.28 kg/m

## 2024-04-29 NOTE — PROGRESS NOTES
The patient's care was discussed with the treatment team during the daily team meeting and/or staff's chart notes were reviewed. Staff report patient slept 7 hours with no behavioral issues observed. Staff report: Pt calm, cooperative. Polite. Alert, good concentration. Denies all psychological symptoms. No signs of evens noted. Speech is linear, logical. Visible in milieu, watching TV.      Continues to endorse chronic back pain, rated 4/10. Denied intervention to address pain. Denies having any acute physical concerns at this time. No adverse effects of medication observed or reported.     Upon interview, patient was patient was interviewed in his room, not sleeping just resting in bed, patient appears calm, quiet, and content. Patient reports a smoothly, mellow attitude saying that he is just passing time till when [facility] would be ready for him. Patient reports that he is optimistic that things would go well for him. He states that his medications are working with no side effects. Patient is appreciative of the cares he is receiving here, states that he could sense that he is recovering well (patient had earlier reported he wanted to be clean from drugs). Patient denies A/VH, SI/HI and thoughts of self-harm.          Medications:      Inpatient Administered Meds                     Current Facility-Administered Medications   Medication Dose Route Frequency Provider Last Rate Last Admin    atorvastatin (LIPITOR) tablet 40 mg  40 mg Oral Daily Jaswinder Claros MD   40 mg at 04/10/24 0818    cloZAPine (CLOZARIL) tablet 100 mg  100 mg Oral At Bedtime Jaswinder Claros MD   100 mg at 04/09/24 1848    cloZAPine (CLOZARIL) tablet 50 mg  50 mg Oral Daily Jaswinder Claros MD   50 mg at 04/10/24 0818    divalproex sodium extended-release (DEPAKOTE ER) 24 hr tablet 500 mg  500 mg Oral BID Jaswinder Claros MD   500 mg at 04/10/24 0818    gabapentin (NEURONTIN) tablet 600 mg   600 mg Oral BID Jaswinder Claros MD   600 mg at 04/10/24 0818    hydrochlorothiazide (HYDRODIURIL) tablet 25 mg  25 mg Oral Daily Jaswinder Claros MD   25 mg at 04/10/24 0818    nicotine (NICODERM CQ) 21 MG/24HR 24 hr patch 1 patch  1 patch Transdermal Daily Xavi Medeiros MD   1 patch at 04/10/24 0821              Allergies:      Allergies             Allergies   Allergen Reactions    Haloperidol Confusion and Other (See Comments)       Prone to EPSE. COnsider IM Zyprexa or be sure to give with benadryl              Labs:      Recent Results               Recent Results (from the past 336 hour(s))   EKG 12-lead, complete     Collection Time: 04/15/24  9:20 AM   Result Value Ref Range     Systolic Blood Pressure   mmHg     Diastolic Blood Pressure   mmHg     Ventricular Rate 85 BPM     Atrial Rate 85 BPM     FL Interval 152 ms     QRS Duration 94 ms      ms     QTc 449 ms     P Axis 73 degrees     R AXIS 1 degrees     T Axis 29 degrees     Interpretation ECG           Sinus rhythm  No previous ECGs available  Confirmed by fellow William Chahal ([id number]) on 4/17/2024 9:31:06 AM  Confirmed by MD LILIAN, PENNY [id number]) on 4/17/2024 10:20:26 PM      Comprehensive metabolic panel     Collection Time: 04/15/24  9:53 AM   Result Value Ref Range     Sodium 139 135 - 145 mmol/L     Potassium 4.2 3.4 - 5.3 mmol/L     Carbon Dioxide (CO2) 29 22 - 29 mmol/L     Anion Gap 10 7 - 15 mmol/L     Urea Nitrogen 18.6 6.0 - 20.0 mg/dL     Creatinine 0.97 0.67 - 1.17 mg/dL     GFR Estimate >90 >60 mL/min/1.73m2     Calcium 8.9 8.6 - 10.0 mg/dL     Chloride 100 98 - 107 mmol/L     Glucose 117 (H) 70 - 99 mg/dL     Alkaline Phosphatase 73 40 - 150 U/L     AST 31 0 - 45 U/L     ALT 28 0 - 70 U/L     Protein Total 7.2 6.4 - 8.3 g/dL     Albumin 4.5 3.5 - 5.2 g/dL     Bilirubin Total 0.7 <=1.2 mg/dL   CBC with platelets and differential     Collection Time: 04/15/24  9:53 AM   Result Value Ref Range     WBC Count  6.3 4.0 - 11.0 10e3/uL     RBC Count 5.09 4.40 - 5.90 10e6/uL     Hemoglobin 14.0 13.3 - 17.7 g/dL     Hematocrit 44.9 40.0 - 53.0 %     MCV 88 78 - 100 fL     MCH 27.5 26.5 - 33.0 pg     MCHC 31.2 (L) 31.5 - 36.5 g/dL     RDW 13.2 10.0 - 15.0 %     Platelet Count 234 150 - 450 10e3/uL     % Neutrophils 59 %     % Lymphocytes 33 %     % Monocytes 5 %     % Eosinophils 2 %     % Basophils 1 %     % Immature Granulocytes 0 %     NRBCs per 100 WBC 0 <1 /100     Absolute Neutrophils 3.8 1.6 - 8.3 10e3/uL     Absolute Lymphocytes 2.1 0.8 - 5.3 10e3/uL     Absolute Monocytes 0.3 0.0 - 1.3 10e3/uL     Absolute Eosinophils 0.1 0.0 - 0.7 10e3/uL     Absolute Basophils 0.0 0.0 - 0.2 10e3/uL     Absolute Immature Granulocytes 0.0 <=0.4 10e3/uL     Absolute NRBCs 0.0 10e3/uL   WBC and Differential     Collection Time: 04/17/24 12:48 PM   Result Value Ref Range     WBC Count 5.6 4.0 - 11.0 10e3/uL     % Neutrophils 68 %     % Lymphocytes 25 %     % Monocytes 5 %     % Eosinophils 1 %     % Basophils 1 %     % Immature Granulocytes 0 %     NRBCs per 100 WBC 0 <1 /100     Absolute Neutrophils 3.8 1.6 - 8.3 10e3/uL     Absolute Lymphocytes 1.4 0.8 - 5.3 10e3/uL     Absolute Monocytes 0.3 0.0 - 1.3 10e3/uL     Absolute Eosinophils 0.0 0.0 - 0.7 10e3/uL     Absolute Basophils 0.0 0.0 - 0.2 10e3/uL     Absolute Immature Granulocytes 0.0 <=0.4 10e3/uL     Absolute NRBCs 0.0 10e3/uL   Extra Green Top (Lithium Heparin) Tube     Collection Time: 04/17/24 12:48 PM   Result Value Ref Range     Hold Specimen [facility]     Valproic acid     Collection Time: 04/18/24  8:46 AM   Result Value Ref Range     Valproic acid 60.3   ug/mL   WBC and Differential     Collection Time: 04/24/24  8:12 AM   Result Value Ref Range     WBC Count 5.1 4.0 - 11.0 10e3/uL     % Neutrophils 47 %     % Lymphocytes 42 %     % Monocytes 9 %     % Eosinophils 1 %     % Basophils 1 %     % Immature Granulocytes 0 %     NRBCs per 100 WBC 0 <1 /100     Absolute Neutrophils 2.4  1.6 - 8.3 10e3/uL     Absolute Lymphocytes 2.1 0.8 - 5.3 10e3/uL     Absolute Monocytes 0.5 0.0 - 1.3 10e3/uL     Absolute Eosinophils 0.1 0.0 - 0.7 10e3/uL     Absolute Basophils 0.0 0.0 - 0.2 10e3/uL     Absolute Immature Granulocytes 0.0 <=0.4 10e3/uL     Absolute NRBCs 0.0 10e3/uL   Extra Green Top (Lithium Heparin) Tube     Collection Time: 04/24/24  8:12 AM   Result Value Ref Range     Hold Specimen JI                Psychiatric Examination:      /84 (BP Location: Left arm, Patient Position: Sitting, Cuff Size: Adult Regular)   Pulse 94   Temp 97.8  F (36.6  C) (Oral)   Resp 12   Ht 1.829 m (6')   Wt 107.1 kg (236 lb 1.6 oz)   SpO2 98%   BMI 32.02 kg/m    Weight is 236 lbs 1.6 oz  Body mass index is 32.02 kg/m .     Weight over time:          Vitals:     04/03/24 1151 04/09/24 0826   Weight: 108 kg (238 lb) 107.1 kg (236 lb 1.6 oz)         Orthostatic Vitals           Most Recent       Sitting Orthostatic /85 04/09 0826     Sitting Orthostatic Pulse (bpm) 73 04/09 0826     Standing Orthostatic /92 04/09 0826     Standing Orthostatic Pulse (bpm) 69 04/09 0826             Cardiometabolic risk assessment. 04/10/24     Reviewed patient profile for cardiometabolic risk factors     Date taken /Value  REFERENCE RANGE   Abdominal Obesity  (Waist Circumference)   See nursing flowsheet Women ?35 in (88 cm)   Men ?40 in (102 cm)       Triglycerides                Triglycerides   Date Value Ref Range Status   02/26/2013 71 0 - 150 mg/dL Final       Comment:       Fasting specimen         ?150 mg/dL (1.7 mmol/L) or current treatment for elevated triglycerides   HDL cholesterol            HDL Cholesterol   Date Value Ref Range Status   02/26/2013 45 40 - 110 mg/dL Final   ]    Women <50 mg/dL (1.3 mmol/L) in women or current treatment for low HDL cholesterol  Men <40 mg/dL (1 mmol/L) in men or current treatment for low HDL cholesterol      Fasting plasma glucose (FPG)           Lab Results  "  Component Value Date      04/08/2024     GLC 83 02/26/2013        FPG ?100 mg/dL (5.6 mmol/L) or treatment for elevated blood glucose   Blood pressure        BP Readings from Last 3 Encounters:   04/08/24 127/84   02/26/13 129/74    Blood pressure ?130/85 mmHg or treatment for elevated blood pressure   Family History  See family history      Mental Status Exam:  Appearance: awake, alert, dressed in a hospital scrubs, appeared as age stated  Attitude:  Very calm, cooperative  Eye Contact:  good  Mood: \" Mellow\"  Affect:  Calm, mood congruent  Speech:  clear, normal tone and volume  Language: fluent and intact in English  Psychomotor, Gait, Musculoskeletal:  no evidence of tardive dyskinesia, dystonia, or tics  Throught Process: Linear, Logical, goal directed  Associations:  No Loosening of associations  Thought Content:  no evidence of suicidal ideation or homicidal ideation, no auditory hallucinations present, and no visual hallucinations present  Insight: True emotional awareness  Judgement: fair  Oriented to:  time, person, and place  Attention Span and Concentration: Good  Recent and Remote Memory: Intact  Fund of Knowledge: Appropriate             Precautions:                Behavioral Orders   Procedures    Assault precautions    Code 1 - Restrict to Unit    Elopement precautions    AS Extended Care       Until discharge, Extended Care to offer psychotherapeutic services to mental health patients boarding for admission or stabilization. These services are to include but are not limited to: individual psychotherapy, diagnostic assessment, case management and care planning, safety planning, etc. This may include up to 1 visit per day. If patient is physically located at [facility] or [facility], group psychotherapy up to 2 time per day may be offered.     [facility] Extended Care       Until discharge, Extended Care to offer psychotherapeutic services to mental health patients boarding for admission or stabilization. " These services are to include but are not limited to: individual psychotherapy, diagnostic assessment, case management and care planning, safety planning, etc. This may include up to 1 visit per day. If patient is physically located at [facility] or [facility], group psychotherapy up to 2 time per day may be offered.     Routine Programming       As clinically indicated    Sexual precautions    Status 15       Every 15 minutes.    Status Individual Observation       Patient SIO status reviewed with team/RN.  Please also refer to RN/team documentation for add'l detail.     -2:1 SIO staff to monitor following which have contributed to patient being on SIO: at least 1 male staff     Assault risk     -Possible interventions SIO staff could use to support patient's treatment progress:  Verbal de-escalation/ Medication administration     -When following observed, team will review discontinuation of SIO:     When patient is not longer aggressive, sexually inappropriate or intrusive.       Order Specific Question:   CONTINUOUS 24 hours / day       Answer:   Other       Order Specific Question:   Specify distance       Answer:   10 foot       Order Specific Question:   Indications for SIO       Answer:   Assault risk       Order Specific Question:   Indications for SIO       Answer:   Severe intrusiveness    Suicide precautions: Suicide Risk: HIGH; Clinical rationale to override score: response to medication       Patients on Suicide Precautions should have a Combination Diet ordered that includes a Diet selection(s) AND a Behavioral Tray selection for Safe Tray - with utensils, or Safe Tray - NO utensils          Order Specific Question:   Suicide Risk       Answer:   HIGH       Order Specific Question:   Clinical rationale to override score:       Answer:   response to medication           Diagnoses:         Schizoaffective disorder, bipolar type (H)  Polysubstance abuse (H)  Agitation     Clinically Significant Risk Factors                           # Obesity: Estimated body mass index is 32.02 kg/m  as calculated from the following:    Height as of this encounter: 1.829 m (6').    Weight as of this encounter: 107.1 kg (236 lb 1.6 oz)., PRESENT ON ADMISSION     # Financial/Environmental Concerns: other (see comments) (lost [facility] housing)     # Housing Instability: noted in nursing assessment          Assessment & Plan:      Assessment and hospital summary:  Ward Dawson is a -34- old male with a previous diagnosis of Schizoaffective disorder bipolar type who presented to the ED for a significant behavioral change, verbal agitation, worsening psychosocial stress, in the context of substance use.  Factors that make the mental health crisis life threatening or complex are:  Patient was brought to the ED from [facility] following his ECT treatment this morning at [facility].  Patient states he does not know why he is here and also states he knows why he is here.  He presents as manic, disorganized, and confused.   He is distractable and not a clear historian at this time.  Patient states he has not slept in a long time and received narcan last night.  Per ACT team  and [facility] staff patient has been elevated, intrusive, and disruptive.  Patient broke a window yesterday.  IRTS reports patient had turned table over and tried using them as skate board ramps.  Patient was noted for have been fairly stable prior to his pass this weekend.  CM reports patient went to a skateboarding competition in [place].  Patient returned in a manic state.  CM reports pt may not appear as manic as he has been the past few days due to sedation and ECT this morning.  She reports patient has not slept in 3 days.  IRTS reports pt received narcan twice last night.  Patient has been trespassed from the IRTS facility.  UDS is postive for cannabis..         Today's Changes:4/26/24  No changes made to medications     Target psychiatric symptoms and  interventions:  Admitted on 4/9/24 to station 12N  Clozaril, 50 mg every morning and 200 mg at bedtime  --Depakote ER, 500 mg daily and 1000 mg at bedtime for mood stabilization   --Gabapentin, 900 mg twice a day for pain  - Ibuprofen tablet 400 mg orally every 6 hours prn for pain  --Additional medications will include B52, Zyprexa, trazodone, hydroxyzine     Risks, benefits, and alternatives discussed at length with patient.      Acute Medical Problems and Treatments:  Acute medical concerns:  - No acute medical concerns     Pertinent labs/imaging:  Recent Results      Recent Results               Recent Results (from the past 336 hour(s))   Valproic acid     Collection Time: 04/05/24  9:39 PM   Result Value Ref Range     Valproic acid 68.4   ug/mL   Asymptomatic COVID-19 Virus (Coronavirus) by PCR Nasopharyngeal     Collection Time: 04/05/24  9:40 PM     Specimen: Nasopharyngeal; Swab   Result Value Ref Range     SARS CoV2 PCR Negative Negative   Glucose by meter     Collection Time: 04/08/24  8:21 PM   Result Value Ref Range     GLUCOSE BY METER POCT 117 (H) 70 - 99 mg/dL   WBC and Differential     Collection Time: 04/10/24 10:59 AM   Result Value Ref Range     WBC Count 5.7 4.0 - 11.0 10e3/uL     % Neutrophils 51 %     % Lymphocytes 37 %     % Monocytes 9 %     % Eosinophils 2 %     % Basophils 1 %     % Immature Granulocytes 0 %     NRBCs per 100 WBC 0 <1 /100     Absolute Neutrophils 3.0 1.6 - 8.3 10e3/uL     Absolute Lymphocytes 2.1 0.8 - 5.3 10e3/uL     Absolute Monocytes 0.5 0.0 - 1.3 10e3/uL     Absolute Eosinophils 0.1 0.0 - 0.7 10e3/uL     Absolute Basophils 0.0 0.0 - 0.2 10e3/uL     Absolute Immature Granulocytes 0.0 <=0.4 10e3/uL     Absolute NRBCs 0.0 10e3/uL   EKG 12-lead, complete     Collection Time: 04/15/24  9:20 AM   Result Value Ref Range     Systolic Blood Pressure   mmHg     Diastolic Blood Pressure   mmHg     Ventricular Rate 85 BPM     Atrial Rate 85 BPM     MO Interval 152 ms     QRS  Duration 94 ms      ms     QTc 449 ms     P Axis 73 degrees     R AXIS 1 degrees     T Axis 29 degrees     Interpretation ECG           Sinus rhythm  No previous ECGs available  Confirmed by fellow William Chahal ([id number]) on 4/17/2024 9:31:06 AM  Confirmed by MD QUINTANA HENRI ([id number]) on 4/17/2024 10:20:26 PM      Comprehensive metabolic panel     Collection Time: 04/15/24  9:53 AM   Result Value Ref Range     Sodium 139 135 - 145 mmol/L     Potassium 4.2 3.4 - 5.3 mmol/L     Carbon Dioxide (CO2) 29 22 - 29 mmol/L     Anion Gap 10 7 - 15 mmol/L     Urea Nitrogen 18.6 6.0 - 20.0 mg/dL     Creatinine 0.97 0.67 - 1.17 mg/dL     GFR Estimate >90 >60 mL/min/1.73m2     Calcium 8.9 8.6 - 10.0 mg/dL     Chloride 100 98 - 107 mmol/L     Glucose 117 (H) 70 - 99 mg/dL     Alkaline Phosphatase 73 40 - 150 U/L     AST 31 0 - 45 U/L     ALT 28 0 - 70 U/L     Protein Total 7.2 6.4 - 8.3 g/dL     Albumin 4.5 3.5 - 5.2 g/dL     Bilirubin Total 0.7 <=1.2 mg/dL   CBC with platelets and differential     Collection Time: 04/15/24  9:53 AM   Result Value Ref Range     WBC Count 6.3 4.0 - 11.0 10e3/uL     RBC Count 5.09 4.40 - 5.90 10e6/uL     Hemoglobin 14.0 13.3 - 17.7 g/dL     Hematocrit 44.9 40.0 - 53.0 %     MCV 88 78 - 100 fL     MCH 27.5 26.5 - 33.0 pg     MCHC 31.2 (L) 31.5 - 36.5 g/dL     RDW 13.2 10.0 - 15.0 %     Platelet Count 234 150 - 450 10e3/uL     % Neutrophils 59 %     % Lymphocytes 33 %     % Monocytes 5 %     % Eosinophils 2 %     % Basophils 1 %     % Immature Granulocytes 0 %     NRBCs per 100 WBC 0 <1 /100     Absolute Neutrophils 3.8 1.6 - 8.3 10e3/uL     Absolute Lymphocytes 2.1 0.8 - 5.3 10e3/uL     Absolute Monocytes 0.3 0.0 - 1.3 10e3/uL     Absolute Eosinophils 0.1 0.0 - 0.7 10e3/uL     Absolute Basophils 0.0 0.0 - 0.2 10e3/uL     Absolute Immature Granulocytes 0.0 <=0.4 10e3/uL     Absolute NRBCs 0.0 10e3/uL   WBC and Differential     Collection Time: 04/17/24 12:48 PM   Result Value Ref Range     WBC  Count 5.6 4.0 - 11.0 10e3/uL     % Neutrophils 68 %     % Lymphocytes 25 %     % Monocytes 5 %     % Eosinophils 1 %     % Basophils 1 %     % Immature Granulocytes 0 %     NRBCs per 100 WBC 0 <1 /100     Absolute Neutrophils 3.8 1.6 - 8.3 10e3/uL     Absolute Lymphocytes 1.4 0.8 - 5.3 10e3/uL     Absolute Monocytes 0.3 0.0 - 1.3 10e3/uL     Absolute Eosinophils 0.0 0.0 - 0.7 10e3/uL     Absolute Basophils 0.0 0.0 - 0.2 10e3/uL     Absolute Immature Granulocytes 0.0 <=0.4 10e3/uL     Absolute NRBCs 0.0 10e3/uL   Extra Green Top (Lithium Heparin) Tube     Collection Time: 04/17/24 12:48 PM   Result Value Ref Range     Hold Specimen JI                    Behavioral/Psychological/Social:  - Encourage unit programming     Safety  Placed on the following Precautions: Suicide, Assault, Elopement and Sexual precautions  - Continue precautions as noted above  -  2:1 staff acuity for intrusive, assaultive behaviors  - Status 15 minute checks  - Legal Status: voluntary     Disposition Plan   Reason for ongoing admission: poses an imminent risk to self and poses an imminent risk to others  Discharge location: IRTS facility or CD  Discharge Medications: not ordered  Follow-up Appointments: not scheduled     Entered by: AMY Yu CNP on 04/292024  at 11:00 am         Attestation:   Patient has been seen and evaluated by Phan gill, MICK, AMY CNP, PMP-BC  The patient was counseled on nature of illness and treatment plan/options  Care was coordinated with treatment team  Total time > 30 minutes

## 2024-04-30 ENCOUNTER — ANESTHESIA EVENT (OUTPATIENT)
Dept: BEHAVIORAL HEALTH | Facility: CLINIC | Age: 34
DRG: 885 | End: 2024-04-30
Payer: MEDICARE

## 2024-04-30 PROCEDURE — 250N000013 HC RX MED GY IP 250 OP 250 PS 637: Performed by: CLINICAL NURSE SPECIALIST

## 2024-04-30 PROCEDURE — 99232 SBSQ HOSP IP/OBS MODERATE 35: CPT | Performed by: CLINICAL NURSE SPECIALIST

## 2024-04-30 PROCEDURE — 250N000013 HC RX MED GY IP 250 OP 250 PS 637: Performed by: PHYSICIAN ASSISTANT

## 2024-04-30 PROCEDURE — 124N000002 HC R&B MH UMMC

## 2024-04-30 PROCEDURE — 250N000013 HC RX MED GY IP 250 OP 250 PS 637: Performed by: FAMILY MEDICINE

## 2024-04-30 RX ORDER — ONDANSETRON 4 MG/1
4 TABLET, ORALLY DISINTEGRATING ORAL EVERY 30 MIN PRN
Status: CANCELLED | OUTPATIENT
Start: 2024-04-30

## 2024-04-30 RX ORDER — NALOXONE HYDROCHLORIDE 0.4 MG/ML
0.1 INJECTION, SOLUTION INTRAMUSCULAR; INTRAVENOUS; SUBCUTANEOUS
Status: CANCELLED | OUTPATIENT
Start: 2024-04-30

## 2024-04-30 RX ORDER — ONDANSETRON 2 MG/ML
4 INJECTION INTRAMUSCULAR; INTRAVENOUS EVERY 30 MIN PRN
Status: CANCELLED | OUTPATIENT
Start: 2024-04-30

## 2024-04-30 RX ADMIN — CLOZAPINE 50 MG: 50 TABLET ORAL at 09:38

## 2024-04-30 RX ADMIN — HYDROCHLOROTHIAZIDE 25 MG: 25 TABLET ORAL at 09:38

## 2024-04-30 RX ADMIN — FLUTICASONE PROPIONATE 1 SPRAY: 50 SPRAY, METERED NASAL at 09:48

## 2024-04-30 RX ADMIN — Medication 900 MG: at 09:38

## 2024-04-30 RX ADMIN — DIVALPROEX SODIUM 500 MG: 500 TABLET, FILM COATED, EXTENDED RELEASE ORAL at 09:38

## 2024-04-30 RX ADMIN — POLYETHYLENE GLYCOL 3350 17 G: 17 POWDER, FOR SOLUTION ORAL at 09:38

## 2024-04-30 RX ADMIN — CLOZAPINE 200 MG: 200 TABLET ORAL at 19:34

## 2024-04-30 RX ADMIN — LORATADINE 10 MG: 10 TABLET ORAL at 09:38

## 2024-04-30 RX ADMIN — ATORVASTATIN CALCIUM 40 MG: 40 TABLET, FILM COATED ORAL at 09:38

## 2024-04-30 RX ADMIN — QUETIAPINE 100 MG: 100 TABLET ORAL at 19:34

## 2024-04-30 RX ADMIN — PANTOPRAZOLE SODIUM 40 MG: 40 TABLET, DELAYED RELEASE ORAL at 09:38

## 2024-04-30 ASSESSMENT — ACTIVITIES OF DAILY LIVING (ADL)
ADLS_ACUITY_SCORE: 28
ADLS_ACUITY_SCORE: 28
HYGIENE/GROOMING: INDEPENDENT
ADLS_ACUITY_SCORE: 28
ORAL_HYGIENE: INDEPENDENT
ADLS_ACUITY_SCORE: 28
ORAL_HYGIENE: INDEPENDENT
ADLS_ACUITY_SCORE: 28
DRESS: INDEPENDENT;SCRUBS (BEHAVIORAL HEALTH)
HYGIENE/GROOMING: INDEPENDENT
ADLS_ACUITY_SCORE: 28
DRESS: SCRUBS (BEHAVIORAL HEALTH)
ADLS_ACUITY_SCORE: 28
ADLS_ACUITY_SCORE: 28

## 2024-04-30 ASSESSMENT — COPD QUESTIONNAIRES: COPD: 0

## 2024-04-30 ASSESSMENT — ENCOUNTER SYMPTOMS: ORTHOPNEA: 0

## 2024-04-30 NOTE — PLAN OF CARE
Problem: Adult Inpatient Plan of Care  Goal: Optimal Comfort and Wellbeing  Intervention: Provide Person-Centered Care  Recent Flowsheet Documentation  Taken 4/30/2024 [id number] by Radha Casey RN  Trust Relationship/Rapport:   care explained   choices provided   emotional support provided   empathic listening provided   questions answered   questions encouraged   reassurance provided   thoughts/feelings acknowledged   Goal Outcome Evaluation:    Plan of Care Reviewed With: patient    Pt presents as calm and cooperative with a consistent affect. He denied all mental health symptoms including SI/HI/AVH and did not appear to be responding. He was withdrawn and isolative to his room most of the shift. He came out to make his needs known and get food.   He is aware of his ECT tomorrow am, and writer reminded him to not eat after midnight.  Pt ate and drank well, denied pain, and had no other acute physical or behavioral concerns this shift.    /82 (BP Location: Left arm, Patient Position: Sitting, Cuff Size: Adult Large)   Pulse 89   Temp 97.4  F (36.3  C) (Temporal)   Resp 16   Ht 1.829 m (6')   Wt 108 kg (238 lb)   SpO2 98%   BMI 32.28 kg/m

## 2024-04-30 NOTE — ANESTHESIA PREPROCEDURE EVALUATION
Anesthesia Pre-Procedure Evaluation    Patient: Ward Dawson   MRN: [id number] : 1990        Procedure :   Electroconvulsive Therapy       Past Medical History:   Diagnosis Date     Antisocial personality disorder (H)      Benign essential hypertension      Dyslipidemia      Overweight      Schizoaffective disorder, bipolar type (H)      Vitamin D deficiency       No past surgical history on file.   Allergies   Allergen Reactions     Haloperidol Confusion and Other (See Comments)     Prone to EPSE. COnsider IM Zyprexa or be sure to give with benadryl      Social History     Tobacco Use     Smoking status: Not on file     Smokeless tobacco: Not on file   Substance Use Topics     Alcohol use: No      Wt Readings from Last 1 Encounters:   24 108 kg (238 lb)        Anesthesia Evaluation   Pt has had prior anesthetic. Type: General.    No history of anesthetic complications       ROS/MED HX  ENT/Pulmonary:     (+)                     Intermittent, asthma  Treatment: Inhaler prn,              (-) COPD and recent URI   Neurologic:       Cardiovascular:     (+)  hypertension-range: 120/80/ -   -  - -                                   (-) LOPEZ, orthopnea/PND and syncope   METS/Exercise Tolerance: >4 METS    Hematologic:       Musculoskeletal:       GI/Hepatic:    (-) GERD   Renal/Genitourinary:       Endo:     (+)               Obesity,       Psychiatric/Substance Use:       Infectious Disease:       Malignancy:       Other:            Physical Exam    Airway        Mallampati: II   TM distance: > 3 FB   Neck ROM: full   Mouth opening: > 3 cm    Respiratory Devices and Support         Dental           Cardiovascular          Rhythm and rate: regular and normal     Pulmonary           breath sounds clear to auscultation       OUTSIDE LABS:  CBC:   Lab Results   Component Value Date    WBC 5.1 2024    WBC 5.6 2024    HGB 14.0 04/15/2024    HGB 13.5 2024    HCT 44.9 04/15/2024    HCT 42.7  "04/03/2024     04/15/2024     04/03/2024     BMP:   Lab Results   Component Value Date     04/15/2024     04/03/2024    POTASSIUM 4.2 04/15/2024    POTASSIUM 4.2 04/03/2024    CHLORIDE 100 04/15/2024    CHLORIDE 99 04/03/2024    CO2 29 04/15/2024    CO2 28 04/03/2024    BUN 18.6 04/15/2024    BUN 24.5 (H) 04/03/2024    CR 0.97 04/15/2024    CR 1.18 (H) 04/03/2024     (H) 04/15/2024     (H) 04/08/2024     COAGS: No results found for: \"PTT\", \"INR\", \"FIBR\"  POC: No results found for: \"BGM\", \"HCG\", \"HCGS\"  HEPATIC:   Lab Results   Component Value Date    ALBUMIN 4.5 04/15/2024    PROTTOTAL 7.2 04/15/2024    ALT 28 04/15/2024    AST 31 04/15/2024    ALKPHOS 73 04/15/2024    BILITOTAL 0.7 04/15/2024     OTHER:   Lab Results   Component Value Date    ANUP 8.9 04/15/2024       Anesthesia Plan    ASA Status:  3    NPO Status:  NPO Appropriate    Anesthesia Type: General.     - Airway: Mask Only   Induction: Intravenous.           Consents    Anesthesia Plan(s) and associated risks, benefits, and realistic alternatives discussed. Questions answered and patient/representative(s) expressed understanding.     - Discussed:     - Discussed with:  Patient            Postoperative Care            Comments:    Other Comments: Discussed risks of general with mask ventilation, including aspiration, need for intubation (sore throat/hoarse voice), damage to lips/teeth/tongue, awareness.           Evelia Alvarado MD    I have reviewed the pertinent notes and labs in the chart from the past 30 days and (re)examined the patient.  Any updates or changes from those notes are reflected in this note.              # Obesity: Estimated body mass index is 32.28 kg/m  as calculated from the following:    Height as of 4/3/24: 1.829 m (6').    Weight as of 4/18/24: 108 kg (238 lb).      "

## 2024-04-30 NOTE — PLAN OF CARE
"  Problem: Adult Behavioral Health Plan of Care  Goal: Adheres to Safety Considerations for Self and Others  Outcome: Progressing  Intervention: Develop and Maintain Individualized Safety Plan  Recent Flowsheet Documentation  Taken 4/29/2024 1700 by True Tom RN  Safety Measures: suicide assessment completed  Goal: Optimized Coping Skills in Response to Life Stressors  Outcome: Progressing  Intervention: Promote Effective Coping Strategies  Recent Flowsheet Documentation  Taken 4/29/2024 2017 by True Tom RN  Supportive Measures:   active listening utilized   self-reflection promoted   self-responsibility promoted  Taken 4/29/2024 1700 by True Tom RN  Supportive Measures:   self-responsibility promoted   active listening utilized   decision-making supported   positive reinforcement provided   Goal Outcome Evaluation:    Plan of Care Reviewed With: patient      Pt was relaxing in his room at the beginning of the shift and later was observed relaxing in the milieu. Pt stated he wanted to keep it \"quiet\" so as not to sabotage his discharge. Pt's mood was calm, and he presented with flat neutral affect. He denied SI, SIB, AVH, and HI. Pt said he was frustrated because he could not leave, first because he got rejected at the place where he was before coming to the hospital and also because the possibility he will leave 3 hrs from the cities where he will need to go and deal with his legal issues are high. Writer discussed with the patient the positives he has made while he is here, from getting dysregulated quickly to maintaining his calm mood. This feedback made him very happy. Pt states he has classes to take and can't now since he is in the hospital, but still was grateful that his medication adjustments are working. Pt was med compliant. Appetite was good. The plan of care is ongoing.           "

## 2024-04-30 NOTE — PLAN OF CARE
BEH IP Unit Acuity Rating Score (UARS)  Patient is given one point for every criteria they meet.    CRITERIA SCORING   On a 72 hour hold, court hold, committed, stay of commitment, or revocation. 0    Patient LOS on BEH unit exceeds 20 days. 1  LOS: 22   Patient under guardianship, 55+, otherwise medically complex, or under age 11. 0   Suicide ideation without relief of precipitating factors. 0   Current plan for suicide. 0   Current plan for homicide. 0   Imminent risk or actual attempt to seriously harm another without relief of factors precipitating the attempt. 1   Severe dysfunction in daily living (ex: complete neglect for self care, extreme disruption in vegetative function, extreme deterioration in social interactions). 0   Recent (last 7 days) or current physical aggression in the ED or on unit. 0   Restraints or seclusion episode in past 72 hours. 0   Recent (last 7 days) or current verbal aggression, agitation, yelling, etc., while in the ED or unit. 0   Active psychosis. 0   Need for constant or near constant redirection (from leaving, from others, etc).  0   Intrusive or disruptive behaviors. 0   Patient requires 3 or more hours of individualized nursing care per 8-hour shift (i.e. for ADLs, meds, therapeutic interventions). 0   TOTAL 2

## 2024-04-30 NOTE — PLAN OF CARE
Problem: Psychotic Signs/Symptoms  Goal: Improved Sleep (Psychotic Signs/Symptoms)  Intervention: Promote Healthy Sleep Hygiene  Recent Flowsheet Documentation  Taken 4/30/2024 [id number] by Peyton Vuong RN  Sleep Hygiene Promotion:   noise level reduced   regular sleep pattern promoted   room lighting adjusted   Goal Outcome Evaluation:    Patient appeared to sleep 7 hours this night shift.  No prns or snacks given or requested.  No concerns were reported or noted.

## 2024-04-30 NOTE — PLAN OF CARE
Team Note Due:  Friday    Assessment/Intervention/Current Symtoms and Care Coordination:  Chart review and met with team, discussed pt progress, symptomology, and response to treatment.  Discussed the discharge plan and any potential impediments to discharge.    ROIs signed for [facility], [facility], [facility], [facility], and [facility].     Referral securely emailed to [facility], [facility], [facility] and [facility].     Referral faxed to [facility].     Discharge Plan or Goal:  When patient's symptoms have reduced and a safe discharge can be facilitated, options include: Residential treatment, IRTS, shelter      Barriers to Discharge:  Discharge planning for residential treatment as patient continues to stabilize      Referral Status:  [place] 4/19 - declined 4/19  [facility] 4/19 - declined 4/29  [facility] 4/22 - declined 4/29  [facility] 4/22  [place] 4/22 - declined 4/24  [facility] 4/29  [facility] 4/30  [facility] 4/30  [facility] 4/30  [facility] 4/30  [facility] 4/30  [facility] 4/30     Legal Status:  Voluntary    Contacts:  [facility] : Heidy Pop, [phone] (WAN)  [facility] : [place] [phone] (WAN)  [facility] Psychiatrist: Camron Watson MD, [phone] (WAN)  Mom: Candis [phone] (Consent)  Friend: Nabila AlmeidaJakobCristobal, [phone] (WAN)  [facility] Intake: [email or web address] (WAN)     Upcoming Meetings and Dates/Important Information and next steps:  Discharge planning

## 2024-04-30 NOTE — PROGRESS NOTES
"The patient's care was discussed with the treatment team during the daily team meeting and/or staff's chart notes were reviewed. Staff report patient slept 7 hours with no issues of concerns overnight. Staff report: Pt was relaxing in his room at the beginning of the shift and later was observed relaxing in the milieu. Pt stated he wanted to keep it \"quiet\" so as not to sabotage his discharge. Pt's mood was calm, and he presented with flat neutral affect. He denied SI, SIB, AVH, and HI. Pt said he was frustrated because he could not leave, first because he got rejected at the place where he was before coming to the hospital and also because the possibility he will leave 3 hrs from the cities where he will need to go and deal with his legal issues are high. Writer discussed with the patient the positives he has made while he is here, from getting dysregulated quickly to maintaining his calm mood. This feedback made him very happy. Pt states he has classes to take and can't now since he is in the hospital, but still was grateful that his medication adjustments are working. Pt was med compliant. Appetite was good. The plan of care is ongoing.       Upon interview, patient was patient was interviewed in his room, seated on the edge of the bed, appears calm, quiet, and cooperative. Patient reports he is doing good, states he is living the life one day at a time. Patient seems to absorb [place] decline with calmness, states it is frustrating, but it is what it is believing that when the  right time and the right agency that would accept him comes, nothing can stop it. Patient is optimistic that things would go well with him. Informed the patient about the [facility] referral, states only he doesn't want a place too far from home. Patient reports his medications are working with no side effects, denies A/VH, SI/HI and thoughts of self harm. .           Medications:      Inpatient Administered Meds               "       Current Facility-Administered Medications   Medication Dose Route Frequency Provider Last Rate Last Admin    atorvastatin (LIPITOR) tablet 40 mg  40 mg Oral Daily Jaswinder Claros MD   40 mg at 04/10/24 0818    cloZAPine (CLOZARIL) tablet 100 mg  100 mg Oral At Bedtime Jaswinder Claros MD   100 mg at 04/09/24 1848    cloZAPine (CLOZARIL) tablet 50 mg  50 mg Oral Daily Jaswinder Claros MD   50 mg at 04/10/24 0818    divalproex sodium extended-release (DEPAKOTE ER) 24 hr tablet 500 mg  500 mg Oral BID Jaswinder Claros MD   500 mg at 04/10/24 0818    gabapentin (NEURONTIN) tablet 600 mg  600 mg Oral BID Jaswinder Claros MD   600 mg at 04/10/24 0818    hydrochlorothiazide (HYDRODIURIL) tablet 25 mg  25 mg Oral Daily Jaswinder Claros MD   25 mg at 04/10/24 0818    nicotine (NICODERM CQ) 21 MG/24HR 24 hr patch 1 patch  1 patch Transdermal Daily Xavi Medeiros MD   1 patch at 04/10/24 0821              Allergies:      Allergies             Allergies   Allergen Reactions    Haloperidol Confusion and Other (See Comments)       Prone to EPSE. COnsider IM Zyprexa or be sure to give with benadryl              Labs:      Recent Results               Recent Results (from the past 336 hour(s))   EKG 12-lead, complete     Collection Time: 04/15/24  9:20 AM   Result Value Ref Range     Systolic Blood Pressure   mmHg     Diastolic Blood Pressure   mmHg     Ventricular Rate 85 BPM     Atrial Rate 85 BPM     NE Interval 152 ms     QRS Duration 94 ms      ms     QTc 449 ms     P Axis 73 degrees     R AXIS 1 degrees     T Axis 29 degrees     Interpretation ECG           Sinus rhythm  No previous ECGs available  Confirmed by fellow William Chahal ([id number]) on 4/17/2024 9:31:06 AM  Confirmed by MD QUINTANA HENRI [id number]) on 4/17/2024 10:20:26 PM      Comprehensive metabolic panel     Collection Time: 04/15/24  9:53 AM   Result Value Ref Range     Sodium 139 135 - 145  mmol/L     Potassium 4.2 3.4 - 5.3 mmol/L     Carbon Dioxide (CO2) 29 22 - 29 mmol/L     Anion Gap 10 7 - 15 mmol/L     Urea Nitrogen 18.6 6.0 - 20.0 mg/dL     Creatinine 0.97 0.67 - 1.17 mg/dL     GFR Estimate >90 >60 mL/min/1.73m2     Calcium 8.9 8.6 - 10.0 mg/dL     Chloride 100 98 - 107 mmol/L     Glucose 117 (H) 70 - 99 mg/dL     Alkaline Phosphatase 73 40 - 150 U/L     AST 31 0 - 45 U/L     ALT 28 0 - 70 U/L     Protein Total 7.2 6.4 - 8.3 g/dL     Albumin 4.5 3.5 - 5.2 g/dL     Bilirubin Total 0.7 <=1.2 mg/dL   CBC with platelets and differential     Collection Time: 04/15/24  9:53 AM   Result Value Ref Range     WBC Count 6.3 4.0 - 11.0 10e3/uL     RBC Count 5.09 4.40 - 5.90 10e6/uL     Hemoglobin 14.0 13.3 - 17.7 g/dL     Hematocrit 44.9 40.0 - 53.0 %     MCV 88 78 - 100 fL     MCH 27.5 26.5 - 33.0 pg     MCHC 31.2 (L) 31.5 - 36.5 g/dL     RDW 13.2 10.0 - 15.0 %     Platelet Count 234 150 - 450 10e3/uL     % Neutrophils 59 %     % Lymphocytes 33 %     % Monocytes 5 %     % Eosinophils 2 %     % Basophils 1 %     % Immature Granulocytes 0 %     NRBCs per 100 WBC 0 <1 /100     Absolute Neutrophils 3.8 1.6 - 8.3 10e3/uL     Absolute Lymphocytes 2.1 0.8 - 5.3 10e3/uL     Absolute Monocytes 0.3 0.0 - 1.3 10e3/uL     Absolute Eosinophils 0.1 0.0 - 0.7 10e3/uL     Absolute Basophils 0.0 0.0 - 0.2 10e3/uL     Absolute Immature Granulocytes 0.0 <=0.4 10e3/uL     Absolute NRBCs 0.0 10e3/uL   WBC and Differential     Collection Time: 04/17/24 12:48 PM   Result Value Ref Range     WBC Count 5.6 4.0 - 11.0 10e3/uL     % Neutrophils 68 %     % Lymphocytes 25 %     % Monocytes 5 %     % Eosinophils 1 %     % Basophils 1 %     % Immature Granulocytes 0 %     NRBCs per 100 WBC 0 <1 /100     Absolute Neutrophils 3.8 1.6 - 8.3 10e3/uL     Absolute Lymphocytes 1.4 0.8 - 5.3 10e3/uL     Absolute Monocytes 0.3 0.0 - 1.3 10e3/uL     Absolute Eosinophils 0.0 0.0 - 0.7 10e3/uL     Absolute Basophils 0.0 0.0 - 0.2 10e3/uL      Absolute Immature Granulocytes 0.0 <=0.4 10e3/uL     Absolute NRBCs 0.0 10e3/uL   Extra Green Top (Lithium Heparin) Tube     Collection Time: 04/17/24 12:48 PM   Result Value Ref Range     Hold Specimen [facility]     Valproic acid     Collection Time: 04/18/24  8:46 AM   Result Value Ref Range     Valproic acid 60.3   ug/mL   WBC and Differential     Collection Time: 04/24/24  8:12 AM   Result Value Ref Range     WBC Count 5.1 4.0 - 11.0 10e3/uL     % Neutrophils 47 %     % Lymphocytes 42 %     % Monocytes 9 %     % Eosinophils 1 %     % Basophils 1 %     % Immature Granulocytes 0 %     NRBCs per 100 WBC 0 <1 /100     Absolute Neutrophils 2.4 1.6 - 8.3 10e3/uL     Absolute Lymphocytes 2.1 0.8 - 5.3 10e3/uL     Absolute Monocytes 0.5 0.0 - 1.3 10e3/uL     Absolute Eosinophils 0.1 0.0 - 0.7 10e3/uL     Absolute Basophils 0.0 0.0 - 0.2 10e3/uL     Absolute Immature Granulocytes 0.0 <=0.4 10e3/uL     Absolute NRBCs 0.0 10e3/uL   Extra Green Top (Lithium Heparin) Tube     Collection Time: 04/24/24  8:12 AM   Result Value Ref Range     Hold Specimen [facility]                Psychiatric Examination:      /84 (BP Location: Left arm, Patient Position: Sitting, Cuff Size: Adult Regular)   Pulse 94   Temp 97.8  F (36.6  C) (Oral)   Resp 12   Ht 1.829 m (6')   Wt 107.1 kg (236 lb 1.6 oz)   SpO2 98%   BMI 32.02 kg/m    Weight is 236 lbs 1.6 oz  Body mass index is 32.02 kg/m .     Weight over time:          Vitals:     04/03/24 1151 04/09/24 0826   Weight: 108 kg (238 lb) 107.1 kg (236 lb 1.6 oz)         Orthostatic Vitals           Most Recent       Sitting Orthostatic /85 04/09 0826     Sitting Orthostatic Pulse (bpm) 73 04/09 0826     Standing Orthostatic /92 04/09 0826     Standing Orthostatic Pulse (bpm) 69 04/09 0826             Cardiometabolic risk assessment. 04/10/24     Reviewed patient profile for cardiometabolic risk factors     Date taken /Value  REFERENCE RANGE   Abdominal Obesity  (Waist Circumference)  "  See nursing flowsheet Women ?35 in (88 cm)   Men ?40 in (102 cm)       Triglycerides                Triglycerides   Date Value Ref Range Status   02/26/2013 71 0 - 150 mg/dL Final       Comment:       Fasting specimen         ?150 mg/dL (1.7 mmol/L) or current treatment for elevated triglycerides   HDL cholesterol            HDL Cholesterol   Date Value Ref Range Status   02/26/2013 45 40 - 110 mg/dL Final   ]    Women <50 mg/dL (1.3 mmol/L) in women or current treatment for low HDL cholesterol  Men <40 mg/dL (1 mmol/L) in men or current treatment for low HDL cholesterol      Fasting plasma glucose (FPG)           Lab Results   Component Value Date      04/08/2024     GLC 83 02/26/2013        FPG ?100 mg/dL (5.6 mmol/L) or treatment for elevated blood glucose   Blood pressure        BP Readings from Last 3 Encounters:   04/08/24 127/84   02/26/13 129/74    Blood pressure ?130/85 mmHg or treatment for elevated blood pressure   Family History  See family history      Mental Status Exam:  Appearance: awake, alert, dressed in a hospital scrubs, appeared as age stated  Attitude:  Very calm, cooperative  Eye Contact:  good  Mood: \" Good\"  Affect:  Calm, mood congruent  Speech:  clear, normal tone and volume  Language: fluent and intact in English  Psychomotor, Gait, Musculoskeletal:  no evidence of tardive dyskinesia, dystonia, or tics  Throught Process: Linear, Logical, goal directed  Associations:  No Loosening of associations  Thought Content:  no evidence of suicidal ideation or homicidal ideation, no auditory hallucinations present, and no visual hallucinations present  Insight: True emotional awareness  Judgement: fair  Oriented to:  time, person, and place  Attention Span and Concentration: Good  Recent and Remote Memory: Intact  Fund of Knowledge: Appropriate             Precautions:                Behavioral Orders   Procedures    Assault precautions    Code 1 - Restrict to Unit    Elopement precautions "    [facility]       Until discharge, Extended Care to offer psychotherapeutic services to mental health patients boarding for admission or stabilization. These services are to include but are not limited to: individual psychotherapy, diagnostic assessment, case management and care planning, safety planning, etc. This may include up to 1 visit per day. If patient is physically located at [facility] or [facility], group psychotherapy up to 2 time per day may be offered.     [facility]       Until discharge, Extended Care to offer psychotherapeutic services to mental health patients boarding for admission or stabilization. These services are to include but are not limited to: individual psychotherapy, diagnostic assessment, case management and care planning, safety planning, etc. This may include up to 1 visit per day. If patient is physically located at [facility] or [facility], group psychotherapy up to 2 time per day may be offered.     Routine Programming       As clinically indicated    Sexual precautions    Status 15       Every 15 minutes.    Status Individual Observation       Patient SIO status reviewed with team/RN.  Please also refer to RN/team documentation for add'l detail.     -2:1 SIO staff to monitor following which have contributed to patient being on SIO: at least 1 male staff     Assault risk     -Possible interventions SIO staff could use to support patient's treatment progress:  Verbal de-escalation/ Medication administration     -When following observed, team will review discontinuation of SIO:     When patient is not longer aggressive, sexually inappropriate or intrusive.       Order Specific Question:   CONTINUOUS 24 hours / day       Answer:   Other       Order Specific Question:   Specify distance       Answer:   10 foot       Order Specific Question:   Indications for SIO       Answer:   Assault risk       Order Specific Question:   Indications for SIO       Answer:   Severe intrusiveness     Suicide precautions: Suicide Risk: HIGH; Clinical rationale to override score: response to medication       Patients on Suicide Precautions should have a Combination Diet ordered that includes a Diet selection(s) AND a Behavioral Tray selection for Safe Tray - with utensils, or Safe Tray - NO utensils          Order Specific Question:   Suicide Risk       Answer:   HIGH       Order Specific Question:   Clinical rationale to override score:       Answer:   response to medication           Diagnoses:         Schizoaffective disorder, bipolar type (H)  Polysubstance abuse (H)  Agitation     Clinically Significant Risk Factors                          # Obesity: Estimated body mass index is 32.02 kg/m  as calculated from the following:    Height as of this encounter: 1.829 m (6').    Weight as of this encounter: 107.1 kg (236 lb 1.6 oz)., PRESENT ON ADMISSION     # Financial/Environmental Concerns: other (see comments) (lost IRTS housing)     # Housing Instability: noted in nursing assessment          Assessment & Plan:      Assessment and hospital summary:  Ward Dawson is a -34- old male with a previous diagnosis of Schizoaffective disorder bipolar type who presented to the ED for a significant behavioral change, verbal agitation, worsening psychosocial stress, in the context of substance use.  Factors that make the mental health crisis life threatening or complex are:  Patient was brought to the ED from [facility] [place] [facility] [place]  Patient states he does not know why he is here and also states he knows why he is here.  He presents as manic, disorganized, and confused.   He is distractable and not a clear historian at this time.  Patient states he has not slept in a long time and received narcan last night.  Per ACT team  and [facility] IRTS staff patient has been elevated, intrusive, and disruptive.  Patient broke a window yesterday.  IRTS reports patient had turned table  over and tried using them as skate board ramps.  Patient was noted for have been fairly stable prior to his pass this weekend.  CM reports patient went to a skateboarding competition in [place].  Patient returned in a manic state.  CM reports pt may not appear as manic as he has been the past few days due to sedation and ECT this morning.  She reports patient has not slept in 3 days.  IRTS reports pt received narcan twice last night.  Patient has been trespassed from the IRTS facility.  S is postive for cannabis..         Today's Changes:4/30/24  No changes made to medications     Target psychiatric symptoms and interventions:  Admitted on 4/9/24 to station 12N  Clozaril, 50 mg every morning and 200 mg at bedtime  --Depakote ER, 500 mg daily and 1000 mg at bedtime for mood stabilization   --Gabapentin, 900 mg twice a day for pain  - Ibuprofen tablet 400 mg orally every 6 hours prn for pain  --Additional medications will include B52, Zyprexa, trazodone, hydroxyzine     Risks, benefits, and alternatives discussed at length with patient.      Acute Medical Problems and Treatments:  Acute medical concerns:  - No acute medical concerns     Pertinent labs/imaging:  Recent Results      Recent Results               Recent Results (from the past 336 hour(s))   Valproic acid     Collection Time: 04/05/24  9:39 PM   Result Value Ref Range     Valproic acid 68.4   ug/mL   Asymptomatic COVID-19 Virus (Coronavirus) by PCR Nasopharyngeal     Collection Time: 04/05/24  9:40 PM     Specimen: Nasopharyngeal; Swab   Result Value Ref Range     SARS CoV2 PCR Negative Negative   Glucose by meter     Collection Time: 04/08/24  8:21 PM   Result Value Ref Range     GLUCOSE BY METER POCT 117 (H) 70 - 99 mg/dL   WBC and Differential     Collection Time: 04/10/24 10:59 AM   Result Value Ref Range     WBC Count 5.7 4.0 - 11.0 10e3/uL     % Neutrophils 51 %     % Lymphocytes 37 %     % Monocytes 9 %     % Eosinophils 2 %     % Basophils 1 %      % Immature Granulocytes 0 %     NRBCs per 100 WBC 0 <1 /100     Absolute Neutrophils 3.0 1.6 - 8.3 10e3/uL     Absolute Lymphocytes 2.1 0.8 - 5.3 10e3/uL     Absolute Monocytes 0.5 0.0 - 1.3 10e3/uL     Absolute Eosinophils 0.1 0.0 - 0.7 10e3/uL     Absolute Basophils 0.0 0.0 - 0.2 10e3/uL     Absolute Immature Granulocytes 0.0 <=0.4 10e3/uL     Absolute NRBCs 0.0 10e3/uL   EKG 12-lead, complete     Collection Time: 04/15/24  9:20 AM   Result Value Ref Range     Systolic Blood Pressure   mmHg     Diastolic Blood Pressure   mmHg     Ventricular Rate 85 BPM     Atrial Rate 85 BPM     MA Interval 152 ms     QRS Duration 94 ms      ms     QTc 449 ms     P Axis 73 degrees     R AXIS 1 degrees     T Axis 29 degrees     Interpretation ECG           Sinus rhythm  No previous ECGs available  Confirmed by fellow William Chahal ([id number]) on 4/17/2024 9:31:06 AM  Confirmed by MD QUINTANA HENRI [id number]) on 4/17/2024 10:20:26 PM      Comprehensive metabolic panel     Collection Time: 04/15/24  9:53 AM   Result Value Ref Range     Sodium 139 135 - 145 mmol/L     Potassium 4.2 3.4 - 5.3 mmol/L     Carbon Dioxide (CO2) 29 22 - 29 mmol/L     Anion Gap 10 7 - 15 mmol/L     Urea Nitrogen 18.6 6.0 - 20.0 mg/dL     Creatinine 0.97 0.67 - 1.17 mg/dL     GFR Estimate >90 >60 mL/min/1.73m2     Calcium 8.9 8.6 - 10.0 mg/dL     Chloride 100 98 - 107 mmol/L     Glucose 117 (H) 70 - 99 mg/dL     Alkaline Phosphatase 73 40 - 150 U/L     AST 31 0 - 45 U/L     ALT 28 0 - 70 U/L     Protein Total 7.2 6.4 - 8.3 g/dL     Albumin 4.5 3.5 - 5.2 g/dL     Bilirubin Total 0.7 <=1.2 mg/dL   CBC with platelets and differential     Collection Time: 04/15/24  9:53 AM   Result Value Ref Range     WBC Count 6.3 4.0 - 11.0 10e3/uL     RBC Count 5.09 4.40 - 5.90 10e6/uL     Hemoglobin 14.0 13.3 - 17.7 g/dL     Hematocrit 44.9 40.0 - 53.0 %     MCV 88 78 - 100 fL     MCH 27.5 26.5 - 33.0 pg     MCHC 31.2 (L) 31.5 - 36.5 g/dL     RDW 13.2 10.0 - 15.0 %      Platelet Count 234 150 - 450 10e3/uL     % Neutrophils 59 %     % Lymphocytes 33 %     % Monocytes 5 %     % Eosinophils 2 %     % Basophils 1 %     % Immature Granulocytes 0 %     NRBCs per 100 WBC 0 <1 /100     Absolute Neutrophils 3.8 1.6 - 8.3 10e3/uL     Absolute Lymphocytes 2.1 0.8 - 5.3 10e3/uL     Absolute Monocytes 0.3 0.0 - 1.3 10e3/uL     Absolute Eosinophils 0.1 0.0 - 0.7 10e3/uL     Absolute Basophils 0.0 0.0 - 0.2 10e3/uL     Absolute Immature Granulocytes 0.0 <=0.4 10e3/uL     Absolute NRBCs 0.0 10e3/uL   WBC and Differential     Collection Time: 04/17/24 12:48 PM   Result Value Ref Range     WBC Count 5.6 4.0 - 11.0 10e3/uL     % Neutrophils 68 %     % Lymphocytes 25 %     % Monocytes 5 %     % Eosinophils 1 %     % Basophils 1 %     % Immature Granulocytes 0 %     NRBCs per 100 WBC 0 <1 /100     Absolute Neutrophils 3.8 1.6 - 8.3 10e3/uL     Absolute Lymphocytes 1.4 0.8 - 5.3 10e3/uL     Absolute Monocytes 0.3 0.0 - 1.3 10e3/uL     Absolute Eosinophils 0.0 0.0 - 0.7 10e3/uL     Absolute Basophils 0.0 0.0 - 0.2 10e3/uL     Absolute Immature Granulocytes 0.0 <=0.4 10e3/uL     Absolute NRBCs 0.0 10e3/uL   Extra Green Top (Lithium Heparin) Tube     Collection Time: 04/17/24 12:48 PM   Result Value Ref Range     Hold Specimen [facility]                    Behavioral/Psychological/Social:  - Encourage unit programming     Safety  Placed on the following Precautions: Suicide, Assault, Elopement and Sexual precautions  - Continue precautions as noted above  -  2:1 staff acuity for intrusive, assaultive behaviors  - Status 15 minute checks  - Legal Status: voluntary     Disposition Plan   Reason for ongoing admission: poses an imminent risk to self and poses an imminent risk to others  Discharge location: IRTS facility or CD  Discharge Medications: not ordered  Follow-up Appointments: not scheduled     Entered by: AMY Yu CNP on 04/302024  at 10:51 am         Attestation:   Patient has been seen and  evaluated by me, Phan Sanchez, DNP, APRN CNP, PMHNP-BC  The patient was counseled on nature of illness and treatment plan/options  Care was coordinated with treatment team  Total time > 30 minutes

## 2024-05-01 ENCOUNTER — ANESTHESIA (OUTPATIENT)
Dept: BEHAVIORAL HEALTH | Facility: CLINIC | Age: 34
DRG: 885 | End: 2024-05-01
Payer: MEDICARE

## 2024-05-01 ENCOUNTER — APPOINTMENT (OUTPATIENT)
Dept: BEHAVIORAL HEALTH | Facility: CLINIC | Age: 34
DRG: 885 | End: 2024-05-01
Payer: MEDICARE

## 2024-05-01 LAB
BASOPHILS # BLD AUTO: 0 10E3/UL (ref 0–0.2)
BASOPHILS NFR BLD AUTO: 1 %
EOSINOPHIL # BLD AUTO: 0.1 10E3/UL (ref 0–0.7)
EOSINOPHIL NFR BLD AUTO: 2 %
IMM GRANULOCYTES # BLD: 0 10E3/UL
IMM GRANULOCYTES NFR BLD: 0 %
LYMPHOCYTES # BLD AUTO: 2.1 10E3/UL (ref 0.8–5.3)
LYMPHOCYTES NFR BLD AUTO: 38 %
MONOCYTES # BLD AUTO: 0.4 10E3/UL (ref 0–1.3)
MONOCYTES NFR BLD AUTO: 8 %
NEUTROPHILS # BLD AUTO: 2.9 10E3/UL (ref 1.6–8.3)
NEUTROPHILS NFR BLD AUTO: 51 %
NRBC # BLD AUTO: 0 10E3/UL
NRBC BLD AUTO-RTO: 0 /100
WBC # BLD AUTO: 5.7 10E3/UL (ref 4–11)

## 2024-05-01 PROCEDURE — GZB2ZZZ ELECTROCONVULSIVE THERAPY, BILATERAL-SINGLE SEIZURE: ICD-10-PCS | Performed by: PSYCHIATRY & NEUROLOGY

## 2024-05-01 PROCEDURE — 90870 ELECTROCONVULSIVE THERAPY: CPT

## 2024-05-01 PROCEDURE — 250N000011 HC RX IP 250 OP 636: Mod: JZ | Performed by: ANESTHESIOLOGY

## 2024-05-01 PROCEDURE — 99232 SBSQ HOSP IP/OBS MODERATE 35: CPT | Performed by: CLINICAL NURSE SPECIALIST

## 2024-05-01 PROCEDURE — 85048 AUTOMATED LEUKOCYTE COUNT: CPT | Performed by: FAMILY MEDICINE

## 2024-05-01 PROCEDURE — 36415 COLL VENOUS BLD VENIPUNCTURE: CPT | Performed by: FAMILY MEDICINE

## 2024-05-01 PROCEDURE — 90870 ELECTROCONVULSIVE THERAPY: CPT | Performed by: PSYCHIATRY & NEUROLOGY

## 2024-05-01 PROCEDURE — 250N000011 HC RX IP 250 OP 636: Performed by: ANESTHESIOLOGY

## 2024-05-01 PROCEDURE — 370N000017 HC ANESTHESIA TECHNICAL FEE, PER MIN: Performed by: ANESTHESIOLOGY

## 2024-05-01 PROCEDURE — 90870 ELECTROCONVULSIVE THERAPY: CPT | Performed by: ANESTHESIOLOGY

## 2024-05-01 PROCEDURE — 250N000009 HC RX 250: Performed by: ANESTHESIOLOGY

## 2024-05-01 PROCEDURE — 250N000013 HC RX MED GY IP 250 OP 250 PS 637: Performed by: CLINICAL NURSE SPECIALIST

## 2024-05-01 PROCEDURE — 90870 ELECTROCONVULSIVE THERAPY: CPT | Performed by: NURSE ANESTHETIST, CERTIFIED REGISTERED

## 2024-05-01 PROCEDURE — 124N000002 HC R&B MH UMMC

## 2024-05-01 RX ORDER — LABETALOL HYDROCHLORIDE 5 MG/ML
10 INJECTION, SOLUTION INTRAVENOUS ONCE
Status: DISCONTINUED | OUTPATIENT
Start: 2024-05-01 | End: 2024-05-01

## 2024-05-01 RX ORDER — METHOHEXITAL IN WATER/PF 100MG/10ML
SYRINGE (ML) INTRAVENOUS PRN
Status: DISCONTINUED | OUTPATIENT
Start: 2024-05-01 | End: 2024-05-01

## 2024-05-01 RX ADMIN — CLOZAPINE 200 MG: 200 TABLET ORAL at 19:55

## 2024-05-01 RX ADMIN — Medication 120 MG: at 10:16

## 2024-05-01 RX ADMIN — LABETALOL HYDROCHLORIDE 10 MG: 5 INJECTION, SOLUTION INTRAVENOUS at 10:56

## 2024-05-01 RX ADMIN — SUCCINYLCHOLINE CHLORIDE 60 MG: 20 INJECTION, SOLUTION INTRAMUSCULAR; INTRAVENOUS; PARENTERAL at 10:16

## 2024-05-01 RX ADMIN — QUETIAPINE 100 MG: 100 TABLET ORAL at 19:55

## 2024-05-01 RX ADMIN — Medication 900 MG: at 19:55

## 2024-05-01 RX ADMIN — DIVALPROEX SODIUM 1000 MG: 500 TABLET, FILM COATED, EXTENDED RELEASE ORAL at 19:55

## 2024-05-01 ASSESSMENT — ACTIVITIES OF DAILY LIVING (ADL)
ADLS_ACUITY_SCORE: 28
ORAL_HYGIENE: INDEPENDENT
ADLS_ACUITY_SCORE: 28
DRESS: SCRUBS (BEHAVIORAL HEALTH)
HYGIENE/GROOMING: INDEPENDENT
ADLS_ACUITY_SCORE: 28

## 2024-05-01 NOTE — PROGRESS NOTES
Patients continues to be HTN I'm recovery. [facility] notified and one time labetolol 10mg IV ordered and administered.   Report given to station 12 unit RN  Patient transported with staff  VSS, A/O, IV removed, meets phase 2 criteria and is able to discharge at this time.

## 2024-05-01 NOTE — ANESTHESIA POSTPROCEDURE EVALUATION
Patient: Ward Dawson    Procedure: * No procedures listed *       Anesthesia Type:  General    Note:  Disposition: Inpatient   Postop Pain Control: Uneventful            Sign Out: Well controlled pain   PONV: No   Neuro/Psych: Uneventful            Sign Out: Acceptable/Baseline neuro status   Airway/Respiratory: Uneventful            Sign Out: Acceptable/Baseline resp. status   CV/Hemodynamics: Uneventful            Sign Out: Acceptable CV status; No obvious hypovolemia; No obvious fluid overload   Other NRE: NONE   DID A NON-ROUTINE EVENT OCCUR? No       Last vitals:  Vitals:    05/01/24 1100 05/01/24 1105 05/01/24 1111   BP: (!) 161/89 (!) 147/85 (!) 145/91   Pulse: 86 80 77   Resp: 15     Temp: 36.4  C (97.6  F)     SpO2: 98% 98%        Electronically Signed By: Evelia Alvarado MD  May 1, 2024  1:00 PM

## 2024-05-01 NOTE — PLAN OF CARE
Team Note Due:  Friday    Assessment/Intervention/Current Symtoms and Care Coordination:  Chart review and met with team, discussed pt progress, symptomology, and response to treatment.  Discussed the discharge plan and any potential impediments to discharge.    Called his , Yamila. Updated her on referral status.     Discharge Plan or Goal:  When patient's symptoms have reduced and a safe discharge can be facilitated, options include: Residential treatment, IRTS, shelter      Barriers to Discharge:  Discharge planning for residential treatment as patient continues to stabilize      Referral Status:  [facility] 4/19 - declined 4/19  [facility] 4/19 - declined 4/29  [facility] 4/22 - declined 4/29  [facility] 4/22  [facility] 4/22 - declined 4/24  [facility] 4/29  [facility] 4/30  [facility] 4/30  [facility] 4/30  [place] 4/30  [place] 4/30  [facility] 4/30     Legal Status:  Voluntary    Contacts:  ACT : Heidy Pop, [phone] (WAN)  ACT : Yamila [phone] (WAN)  ACT Psychiatrist: Camron Watson MD, [phone] (WAN)  Mom: Candis, [phone] (Consent)  Friend: Nabila Peters, [phone] (WAN)  KIMBERLEY [facility] Intake: [email or web address] (WAN)     Upcoming Meetings and Dates/Important Information and next steps:  Discharge planning

## 2024-05-01 NOTE — PLAN OF CARE
BEH IP Unit Acuity Rating Score (UARS)  Patient is given one point for every criteria they meet.    CRITERIA SCORING   On a 72 hour hold, court hold, committed, stay of commitment, or revocation. 0    Patient LOS on BEH unit exceeds 20 days. 1  LOS: 23   Patient under guardianship, 55+, otherwise medically complex, or under age 11. 0   Suicide ideation without relief of precipitating factors. 0   Current plan for suicide. 0   Current plan for homicide. 0   Imminent risk or actual attempt to seriously harm another without relief of factors precipitating the attempt. 1   Severe dysfunction in daily living (ex: complete neglect for self care, extreme disruption in vegetative function, extreme deterioration in social interactions). 0   Recent (last 7 days) or current physical aggression in the ED or on unit. 0   Restraints or seclusion episode in past 72 hours. 0   Recent (last 7 days) or current verbal aggression, agitation, yelling, etc., while in the ED or unit. 0   Active psychosis. 0   Need for constant or near constant redirection (from leaving, from others, etc).  0   Intrusive or disruptive behaviors. 0   Patient requires 3 or more hours of individualized nursing care per 8-hour shift (i.e. for ADLs, meds, therapeutic interventions). 0   TOTAL 2

## 2024-05-01 NOTE — PLAN OF CARE
Problem: Psychotic Signs/Symptoms  Goal: Improved Sleep (Psychotic Signs/Symptoms)  Intervention: Promote Healthy Sleep Hygiene  Recent Flowsheet Documentation  Taken 5/1/2024 4415 by Peyton Vuong RN  Sleep Hygiene Promotion:   noise level reduced   regular sleep pattern promoted   room lighting adjusted   Goal Outcome Evaluation:    Patient appeared to sleep 7 hours this night shift.  No prns or snacks given or requested.  No concerns were reported or noted.  ECT this morning at 1020 as well as weekly WBC lab.

## 2024-05-01 NOTE — ANESTHESIA CARE TRANSFER NOTE
Patient: Ward Dawson    Procedure: * No procedures listed *       Diagnosis: * No pre-op diagnosis entered *  Diagnosis Additional Information: No value filed.    Anesthesia Type:   General     Note:    Oropharynx: oropharynx clear of all foreign objects  Level of Consciousness: drowsy  Oxygen Supplementation: face mask  Level of Supplemental Oxygen (L/min / FiO2): 8  Independent Airway: airway patency satisfactory and stable  Dentition: dentition unchanged  Vital Signs Stable: post-procedure vital signs reviewed and stable  Report to RN Given: handoff report given  Patient transferred to: Phase II    Handoff Report: Identifed the Patient, Identified the Reponsible Provider, Reviewed the pertinent medical history, Discussed the surgical course, Reviewed Intra-OP anesthesia mangement and issues during anesthesia, Set expectations for post-procedure period and Allowed opportunity for questions and acknowledgement of understanding  Vitals:  Vitals Value Taken Time   BP     Temp     Pulse     Resp     SpO2         Electronically Signed By: Evelia Alvarado MD  May 1, 2024  10:27 AM

## 2024-05-01 NOTE — PLAN OF CARE
Problem: Adult Behavioral Health Plan of Care  Goal: Plan of Care Review  Outcome: Progressing      Pt continued to remain withdrawn to his room today. Pt had ECT and left the unit around 0920. Pt reported no issues after returning from ECT. Pt continued to remain isolated to his room after ECT. No physical distress noted.   Pt denied all mental health symptoms, including SI/SIB/HI. Pt's affect was flat and responses minimal. Pt was still cooperative with cares. Pt's lab was drawn in AM and all signs were WNL.     BP (!) 145/91   Pulse 77   Temp 97.6  F (36.4  C) (Temporal)   Resp 15   Ht 1.829 m (6')   Wt 108 kg (238 lb)   SpO2 98%   BMI 32.28 kg/m

## 2024-05-01 NOTE — PLAN OF CARE
"Problem: Adult Behavioral Health Plan of Care  Goal: Adheres to Safety Considerations for Self and Others  Outcome: Progressing    Problem: Adult Behavioral Health Plan of Care  Goal: Optimized Coping Skills in Response to Life Stressors  Outcome: Progressing     Goal Outcome Evaluation:    Plan of Care Reviewed With: patient       Patient observed sleeping at the first part of the shift. He came out of his room to have dinner and was watching television with peers in the lounge. He was pleasant and cooperative upon approach. Mood was calm. Described his day as \"good\". He denies having anxiety and depression. Patient denies SI/SIB/HI. He contracted for safety. He denies experiencing any type of hallucinations.     Patient was reminded regarding ECT tomorrow morning and that he would have to be on NPO after midnight. He verbalized understanding. All cups and snacks removed from his room. NPO after midnight sign placed on his door. Depakote and Gabapentin was held due to ECT tomorrow per order. Other due medications given. Denies experiencing side effects.    Medical Concerns: pt denies pain.   Appetite: Consumed 100% of share of dinner and took approximately 500 ml of fluids.   Sleep: pt denies concerns.  LBM: \"today\"  ADLs: Independent.   PRNs given: No PRN medications given this shift.     On suicide, assault, elopement, sexual and fall precautions. Needs attended to. No further concerns noted as of this time.           "

## 2024-05-01 NOTE — PROGRESS NOTES
"The patient's care was discussed with the treatment team during the daily team meeting and/or staff's chart notes were reviewed. Staff report patient slept 7 hours with no issues of concerns overnight. Staff report: Patient observed sleeping at the first part of the shift. He came out of his room to have dinner and was watching television with peers in the lounge. He was pleasant and cooperative upon approach. Mood was calm. Described his day as \"good\". He denies having anxiety and depression. Patient denies SI/SIB/HI. He contracted for safety. He denies experiencing any type of hallucinations.      Patient was reminded regarding ECT tomorrow morning and that he would have to be on NPO after midnight. He verbalized understanding. All cups and snacks removed from his room. NPO after midnight sign placed on his door. Depakote and Gabapentin was held due to ECT tomorrow per order. Other due medications given. Denies experiencing side effects.     Medical Concerns: pt denies pain.   Appetite: Consumed 100% of share of dinner and took approximately 500 ml of fluids.   Sleep: pt denies concerns.  LBM: \"today\"  ADLs: Independent.   PRNs given: No PRN medications given this shift.    On suicide, assault, elopement, sexual and fall precautions. Needs attended to. No further concerns noted as of this time.        Upon interview, patient was interviewed in his room, shortly after returning from ECT, patient appears calm, bright, cooperative with no apparent distress. Patient described his mood as good and affect flat-neutral, seems to be mood congruent.  Patient did not talk so much.  When writer asked how patient was doing, he simply replies living one day at a time. Patient explained the ECT experience was not that bad.  He recollected that his ECT was monthly prior to coming to the hospital here wondering why he receives it every 2 weeks now.  I explained to the patient that is probably this is the first time of receiving " ECT at this hospital.  Patient remains optimistic that one of those numerous referrals by the [facility] would accept him, states he will keep his fingers crossed.  Patient denies, A/VH, SI/HI, SIB/NSSIB.         Medications:      Inpatient Administered Meds                     Current Facility-Administered Medications   Medication Dose Route Frequency Provider Last Rate Last Admin    atorvastatin (LIPITOR) tablet 40 mg  40 mg Oral Daily Jaswinder Claros MD   40 mg at 04/10/24 0818    cloZAPine (CLOZARIL) tablet 100 mg  100 mg Oral At Bedtime Jaswinder Claros MD   100 mg at 04/09/24 1848    cloZAPine (CLOZARIL) tablet 50 mg  50 mg Oral Daily Jaswinder Claros MD   50 mg at 04/10/24 0818    divalproex sodium extended-release (DEPAKOTE ER) 24 hr tablet 500 mg  500 mg Oral BID Jaswinder Claros MD   500 mg at 04/10/24 0818    gabapentin (NEURONTIN) tablet 600 mg  600 mg Oral BID Jaswinder Claros MD   600 mg at 04/10/24 0818    hydrochlorothiazide (HYDRODIURIL) tablet 25 mg  25 mg Oral Daily Jaswinder Claros MD   25 mg at 04/10/24 0818    nicotine (NICODERM CQ) 21 MG/24HR 24 hr patch 1 patch  1 patch Transdermal Daily Xavi Medeiros MD   1 patch at 04/10/24 0821              Allergies:      Allergies             Allergies   Allergen Reactions    Haloperidol Confusion and Other (See Comments)       Prone to EPSE. COnsider IM Zyprexa or be sure to give with benadryl              Labs:      Recent Results               Recent Results (from the past 336 hour(s))   EKG 12-lead, complete     Collection Time: 04/15/24  9:20 AM   Result Value Ref Range     Systolic Blood Pressure   mmHg     Diastolic Blood Pressure   mmHg     Ventricular Rate 85 BPM     Atrial Rate 85 BPM     ME Interval 152 ms     QRS Duration 94 ms      ms     QTc 449 ms     P Axis 73 degrees     R AXIS 1 degrees     T Axis 29 degrees     Interpretation ECG           Sinus rhythm  No previous ECGs  available  Confirmed by fellow William Chahal ([id number]) on 4/17/2024 9:31:06 AM  Confirmed by MD LILIAN, PENNY ([id number]) on 4/17/2024 10:20:26 PM      Comprehensive metabolic panel     Collection Time: 04/15/24  9:53 AM   Result Value Ref Range     Sodium 139 135 - 145 mmol/L     Potassium 4.2 3.4 - 5.3 mmol/L     Carbon Dioxide (CO2) 29 22 - 29 mmol/L     Anion Gap 10 7 - 15 mmol/L     Urea Nitrogen 18.6 6.0 - 20.0 mg/dL     Creatinine 0.97 0.67 - 1.17 mg/dL     GFR Estimate >90 >60 mL/min/1.73m2     Calcium 8.9 8.6 - 10.0 mg/dL     Chloride 100 98 - 107 mmol/L     Glucose 117 (H) 70 - 99 mg/dL     Alkaline Phosphatase 73 40 - 150 U/L     AST 31 0 - 45 U/L     ALT 28 0 - 70 U/L     Protein Total 7.2 6.4 - 8.3 g/dL     Albumin 4.5 3.5 - 5.2 g/dL     Bilirubin Total 0.7 <=1.2 mg/dL   CBC with platelets and differential     Collection Time: 04/15/24  9:53 AM   Result Value Ref Range     WBC Count 6.3 4.0 - 11.0 10e3/uL     RBC Count 5.09 4.40 - 5.90 10e6/uL     Hemoglobin 14.0 13.3 - 17.7 g/dL     Hematocrit 44.9 40.0 - 53.0 %     MCV 88 78 - 100 fL     MCH 27.5 26.5 - 33.0 pg     MCHC 31.2 (L) 31.5 - 36.5 g/dL     RDW 13.2 10.0 - 15.0 %     Platelet Count 234 150 - 450 10e3/uL     % Neutrophils 59 %     % Lymphocytes 33 %     % Monocytes 5 %     % Eosinophils 2 %     % Basophils 1 %     % Immature Granulocytes 0 %     NRBCs per 100 WBC 0 <1 /100     Absolute Neutrophils 3.8 1.6 - 8.3 10e3/uL     Absolute Lymphocytes 2.1 0.8 - 5.3 10e3/uL     Absolute Monocytes 0.3 0.0 - 1.3 10e3/uL     Absolute Eosinophils 0.1 0.0 - 0.7 10e3/uL     Absolute Basophils 0.0 0.0 - 0.2 10e3/uL     Absolute Immature Granulocytes 0.0 <=0.4 10e3/uL     Absolute NRBCs 0.0 10e3/uL   WBC and Differential     Collection Time: 04/17/24 12:48 PM   Result Value Ref Range     WBC Count 5.6 4.0 - 11.0 10e3/uL     % Neutrophils 68 %     % Lymphocytes 25 %     % Monocytes 5 %     % Eosinophils 1 %     % Basophils 1 %     % Immature Granulocytes 0 %      NRBCs per 100 WBC 0 <1 /100     Absolute Neutrophils 3.8 1.6 - 8.3 10e3/uL     Absolute Lymphocytes 1.4 0.8 - 5.3 10e3/uL     Absolute Monocytes 0.3 0.0 - 1.3 10e3/uL     Absolute Eosinophils 0.0 0.0 - 0.7 10e3/uL     Absolute Basophils 0.0 0.0 - 0.2 10e3/uL     Absolute Immature Granulocytes 0.0 <=0.4 10e3/uL     Absolute NRBCs 0.0 10e3/uL   Extra Green Top (Lithium Heparin) Tube     Collection Time: 04/17/24 12:48 PM   Result Value Ref Range     Hold Specimen [facility]     Valproic acid     Collection Time: 04/18/24  8:46 AM   Result Value Ref Range     Valproic acid 60.3   ug/mL   WBC and Differential     Collection Time: 04/24/24  8:12 AM   Result Value Ref Range     WBC Count 5.1 4.0 - 11.0 10e3/uL     % Neutrophils 47 %     % Lymphocytes 42 %     % Monocytes 9 %     % Eosinophils 1 %     % Basophils 1 %     % Immature Granulocytes 0 %     NRBCs per 100 WBC 0 <1 /100     Absolute Neutrophils 2.4 1.6 - 8.3 10e3/uL     Absolute Lymphocytes 2.1 0.8 - 5.3 10e3/uL     Absolute Monocytes 0.5 0.0 - 1.3 10e3/uL     Absolute Eosinophils 0.1 0.0 - 0.7 10e3/uL     Absolute Basophils 0.0 0.0 - 0.2 10e3/uL     Absolute Immature Granulocytes 0.0 <=0.4 10e3/uL     Absolute NRBCs 0.0 10e3/uL   Extra Green Top (Lithium Heparin) Tube     Collection Time: 04/24/24  8:12 AM   Result Value Ref Range     Hold Specimen [facility]                Psychiatric Examination:      /84 (BP Location: Left arm, Patient Position: Sitting, Cuff Size: Adult Regular)   Pulse 94   Temp 97.8  F (36.6  C) (Oral)   Resp 12   Ht 1.829 m (6')   Wt 107.1 kg (236 lb 1.6 oz)   SpO2 98%   BMI 32.02 kg/m    Weight is 236 lbs 1.6 oz  Body mass index is 32.02 kg/m .     Weight over time:          Vitals:     04/03/24 1151 04/09/24 0826   Weight: 108 kg (238 lb) 107.1 kg (236 lb 1.6 oz)         Orthostatic Vitals           Most Recent       Sitting Orthostatic /85 04/09 0826     Sitting Orthostatic Pulse (bpm) 73 04/09 0826     Standing Orthostatic BP  "155/92 04/09 0826     Standing Orthostatic Pulse (bpm) 69 04/09 0826             Cardiometabolic risk assessment. 04/10/24     Reviewed patient profile for cardiometabolic risk factors     Date taken /Value  REFERENCE RANGE   Abdominal Obesity  (Waist Circumference)   See nursing flowsheet Women ?35 in (88 cm)   Men ?40 in (102 cm)       Triglycerides                Triglycerides   Date Value Ref Range Status   02/26/2013 71 0 - 150 mg/dL Final       Comment:       Fasting specimen         ?150 mg/dL (1.7 mmol/L) or current treatment for elevated triglycerides   HDL cholesterol            HDL Cholesterol   Date Value Ref Range Status   02/26/2013 45 40 - 110 mg/dL Final   ]    Women <50 mg/dL (1.3 mmol/L) in women or current treatment for low HDL cholesterol  Men <40 mg/dL (1 mmol/L) in men or current treatment for low HDL cholesterol      Fasting plasma glucose (FPG)           Lab Results   Component Value Date      04/08/2024     GLC 83 02/26/2013        FPG ?100 mg/dL (5.6 mmol/L) or treatment for elevated blood glucose   Blood pressure        BP Readings from Last 3 Encounters:   04/08/24 127/84   02/26/13 129/74    Blood pressure ?130/85 mmHg or treatment for elevated blood pressure   Family History  See family history      Mental Status Exam:  Appearance: awake, alert, dressed in a hospital scrubs, appeared as age stated  Attitude:  Very calm, cooperative  Eye Contact:  good  Mood: \" Good\"  Affect:  Flat, neutral mood congruent  Speech:  clear, normal tone and volume  Language: fluent and intact in English  Psychomotor, Gait, Musculoskeletal:  no evidence of tardive dyskinesia, dystonia, or tics  Throught Process: Linear, Logical, goal directed  Associations:  No Loosening of associations  Thought Content:  no evidence of suicidal ideation or homicidal ideation, no auditory hallucinations present, and no visual hallucinations present  Insight: True emotional awareness  Judgement: fair  Oriented to:  " time, person, and place  Attention Span and Concentration: Good  Recent and Remote Memory: Intact  Fund of Knowledge: Appropriate             Precautions:                Behavioral Orders   Procedures    Assault precautions    Code 1 - Restrict to Unit    Elopement precautions    AS Extended Care       Until discharge, Extended Care to offer psychotherapeutic services to mental health patients boarding for admission or stabilization. These services are to include but are not limited to: individual psychotherapy, diagnostic assessment, case management and care planning, safety planning, etc. This may include up to 1 visit per day. If patient is physically located at [facility] or [facility], group psychotherapy up to 2 time per day may be offered.     AS Extended Care       Until discharge, Extended Care to offer psychotherapeutic services to mental health patients boarding for admission or stabilization. These services are to include but are not limited to: individual psychotherapy, diagnostic assessment, case management and care planning, safety planning, etc. This may include up to 1 visit per day. If patient is physically located at [facility] or [facility], group psychotherapy up to 2 time per day may be offered.     Routine Programming       As clinically indicated    Sexual precautions    Status 15       Every 15 minutes.    Status Individual Observation       Patient SIO status reviewed with team/RN.  Please also refer to RN/team documentation for add'l detail.     -2:1 SIO staff to monitor following which have contributed to patient being on SIO: at least 1 male staff     Assault risk     -Possible interventions SIO staff could use to support patient's treatment progress:  Verbal de-escalation/ Medication administration     -When following observed, team will review discontinuation of SIO:     When patient is not longer aggressive, sexually inappropriate or intrusive.       Order Specific Question:   CONTINUOUS 24 hours / day        Answer:   Other       Order Specific Question:   Specify distance       Answer:   10 foot       Order Specific Question:   Indications for SIO       Answer:   Assault risk       Order Specific Question:   Indications for SIO       Answer:   Severe intrusiveness    Suicide precautions: Suicide Risk: HIGH; Clinical rationale to override score: response to medication       Patients on Suicide Precautions should have a Combination Diet ordered that includes a Diet selection(s) AND a Behavioral Tray selection for Safe Tray - with utensils, or Safe Tray - NO utensils          Order Specific Question:   Suicide Risk       Answer:   HIGH       Order Specific Question:   Clinical rationale to override score:       Answer:   response to medication           Diagnoses:         Schizoaffective disorder, bipolar type (H)  Polysubstance abuse (H)  Agitation     Clinically Significant Risk Factors                          # Obesity: Estimated body mass index is 32.02 kg/m  as calculated from the following:    Height as of this encounter: 1.829 m (6').    Weight as of this encounter: 107.1 kg (236 lb 1.6 oz)., PRESENT ON ADMISSION     # Financial/Environmental Concerns: other (see comments) (lost [facility] housing)     # Housing Instability: noted in nursing assessment          Assessment & Plan:      Assessment and hospital summary:  Ward Dawson is a -34- old male with a previous diagnosis of Schizoaffective disorder bipolar type who presented to the ED for a significant behavioral change, verbal agitation, worsening psychosocial stress, in the context of substance use.  Factors that make the mental health crisis life threatening or complex are:  Patient was brought to the ED from [facility] following his [place] treatment this [place] [facility].  Patient states he does not know why he is here and also states he knows why he is here.  He presents as manic, disorganized, and confused.   He is distractable and not a clear historian  at this time.  Patient states he has not slept in a long time and received narcan last night.  Per ACT team  and [facility] staff patient has been elevated, intrusive, and disruptive.  Patient broke a window yesterday.  IRTS reports patient had turned table over and tried using them as skate board ramps.  Patient was noted for have been fairly stable prior to his pass this weekend.  CM reports patient went to a skateboarding competition in [place].  Patient returned in a manic state.  CM reports pt may not appear as manic as he has been the past few days due to sedation and ECT this morning.  She reports patient has not slept in 3 days.  IRTS reports pt received narcan twice last night.  Patient has been trespassed from the [facility] facility.  S is postive for cannabis..         Today's Changes: 5/1/24  No changes made to medications     Target psychiatric symptoms and interventions:  Admitted on 4/9/24 to station 12N  Clozaril, 50 mg every morning and 200 mg at bedtime  --Depakote ER, 500 mg daily and 1000 mg at bedtime for mood stabilization   --Gabapentin, 900 mg twice a day for pain  - Ibuprofen tablet 400 mg orally every 6 hours prn for pain  --Additional medications will include B52, Zyprexa, trazodone, hydroxyzine     Risks, benefits, and alternatives discussed at length with patient.      Acute Medical Problems and Treatments:  Acute medical concerns:  - No acute medical concerns     Pertinent labs/imaging:  Recent Results      Recent Results               Recent Results (from the past 336 hour(s))   Valproic acid     Collection Time: 04/05/24  9:39 PM   Result Value Ref Range     Valproic acid 68.4   ug/mL   Asymptomatic COVID-19 Virus (Coronavirus) by PCR Nasopharyngeal     Collection Time: 04/05/24  9:40 PM     Specimen: Nasopharyngeal; Swab   Result Value Ref Range     SARS CoV2 PCR Negative Negative   Glucose by meter     Collection Time: 04/08/24  8:21 PM   Result Value Ref Range     GLUCOSE  BY METER POCT 117 (H) 70 - 99 mg/dL   WBC and Differential     Collection Time: 04/10/24 10:59 AM   Result Value Ref Range     WBC Count 5.7 4.0 - 11.0 10e3/uL     % Neutrophils 51 %     % Lymphocytes 37 %     % Monocytes 9 %     % Eosinophils 2 %     % Basophils 1 %     % Immature Granulocytes 0 %     NRBCs per 100 WBC 0 <1 /100     Absolute Neutrophils 3.0 1.6 - 8.3 10e3/uL     Absolute Lymphocytes 2.1 0.8 - 5.3 10e3/uL     Absolute Monocytes 0.5 0.0 - 1.3 10e3/uL     Absolute Eosinophils 0.1 0.0 - 0.7 10e3/uL     Absolute Basophils 0.0 0.0 - 0.2 10e3/uL     Absolute Immature Granulocytes 0.0 <=0.4 10e3/uL     Absolute NRBCs 0.0 10e3/uL   EKG 12-lead, complete     Collection Time: 04/15/24  9:20 AM   Result Value Ref Range     Systolic Blood Pressure   mmHg     Diastolic Blood Pressure   mmHg     Ventricular Rate 85 BPM     Atrial Rate 85 BPM     HI Interval 152 ms     QRS Duration 94 ms      ms     QTc 449 ms     P Axis 73 degrees     R AXIS 1 degrees     T Axis 29 degrees     Interpretation ECG           Sinus rhythm  No previous ECGs available  Confirmed by fellow William Chahal ([id number]) on 4/17/2024 9:31:06 AM  Confirmed by MD LILIAN, PENNY ([id number]) on 4/17/2024 10:20:26 PM      Comprehensive metabolic panel     Collection Time: 04/15/24  9:53 AM   Result Value Ref Range     Sodium 139 135 - 145 mmol/L     Potassium 4.2 3.4 - 5.3 mmol/L     Carbon Dioxide (CO2) 29 22 - 29 mmol/L     Anion Gap 10 7 - 15 mmol/L     Urea Nitrogen 18.6 6.0 - 20.0 mg/dL     Creatinine 0.97 0.67 - 1.17 mg/dL     GFR Estimate >90 >60 mL/min/1.73m2     Calcium 8.9 8.6 - 10.0 mg/dL     Chloride 100 98 - 107 mmol/L     Glucose 117 (H) 70 - 99 mg/dL     Alkaline Phosphatase 73 40 - 150 U/L     AST 31 0 - 45 U/L     ALT 28 0 - 70 U/L     Protein Total 7.2 6.4 - 8.3 g/dL     Albumin 4.5 3.5 - 5.2 g/dL     Bilirubin Total 0.7 <=1.2 mg/dL   CBC with platelets and differential     Collection Time: 04/15/24  9:53 AM   Result Value Ref  Range     WBC Count 6.3 4.0 - 11.0 10e3/uL     RBC Count 5.09 4.40 - 5.90 10e6/uL     Hemoglobin 14.0 13.3 - 17.7 g/dL     Hematocrit 44.9 40.0 - 53.0 %     MCV 88 78 - 100 fL     MCH 27.5 26.5 - 33.0 pg     MCHC 31.2 (L) 31.5 - 36.5 g/dL     RDW 13.2 10.0 - 15.0 %     Platelet Count 234 150 - 450 10e3/uL     % Neutrophils 59 %     % Lymphocytes 33 %     % Monocytes 5 %     % Eosinophils 2 %     % Basophils 1 %     % Immature Granulocytes 0 %     NRBCs per 100 WBC 0 <1 /100     Absolute Neutrophils 3.8 1.6 - 8.3 10e3/uL     Absolute Lymphocytes 2.1 0.8 - 5.3 10e3/uL     Absolute Monocytes 0.3 0.0 - 1.3 10e3/uL     Absolute Eosinophils 0.1 0.0 - 0.7 10e3/uL     Absolute Basophils 0.0 0.0 - 0.2 10e3/uL     Absolute Immature Granulocytes 0.0 <=0.4 10e3/uL     Absolute NRBCs 0.0 10e3/uL   WBC and Differential     Collection Time: 04/17/24 12:48 PM   Result Value Ref Range     WBC Count 5.6 4.0 - 11.0 10e3/uL     % Neutrophils 68 %     % Lymphocytes 25 %     % Monocytes 5 %     % Eosinophils 1 %     % Basophils 1 %     % Immature Granulocytes 0 %     NRBCs per 100 WBC 0 <1 /100     Absolute Neutrophils 3.8 1.6 - 8.3 10e3/uL     Absolute Lymphocytes 1.4 0.8 - 5.3 10e3/uL     Absolute Monocytes 0.3 0.0 - 1.3 10e3/uL     Absolute Eosinophils 0.0 0.0 - 0.7 10e3/uL     Absolute Basophils 0.0 0.0 - 0.2 10e3/uL     Absolute Immature Granulocytes 0.0 <=0.4 10e3/uL     Absolute NRBCs 0.0 10e3/uL   Extra Green Top (Lithium Heparin) Tube     Collection Time: 04/17/24 12:48 PM   Result Value Ref Range     Hold Specimen JI                    Behavioral/Psychological/Social:  - Encourage unit programming     Safety  Placed on the following Precautions: Suicide, Assault, Elopement and Sexual precautions  - Continue precautions as noted above  -  2:1 staff acuity for intrusive, assaultive behaviors  - Status 15 minute checks  - Legal Status: voluntary     Disposition Plan   Reason for ongoing admission: poses an imminent risk to self  and poses an imminent risk to others  Discharge location: [facility] facility or [facility]  Discharge Medications: not ordered  Follow-up Appointments: not scheduled     Entered by: AMY Yu CNP on 04/01/2024  at 11:00 am         Attestation:   Patient has been seen and evaluated by Phan gill, MICK, APRN CNP, PMP-BC  The patient was counseled on nature of illness and treatment plan/options  Care was coordinated with treatment team  Total time > 30 minutes
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
## 2024-05-02 ENCOUNTER — TELEPHONE (OUTPATIENT)
Dept: BEHAVIORAL HEALTH | Facility: CLINIC | Age: 34
End: 2024-05-02
Payer: MEDICARE

## 2024-05-02 PROCEDURE — 99232 SBSQ HOSP IP/OBS MODERATE 35: CPT | Performed by: CLINICAL NURSE SPECIALIST

## 2024-05-02 PROCEDURE — 250N000013 HC RX MED GY IP 250 OP 250 PS 637: Performed by: CLINICAL NURSE SPECIALIST

## 2024-05-02 PROCEDURE — 250N000013 HC RX MED GY IP 250 OP 250 PS 637: Performed by: PHYSICIAN ASSISTANT

## 2024-05-02 PROCEDURE — 250N000013 HC RX MED GY IP 250 OP 250 PS 637: Performed by: FAMILY MEDICINE

## 2024-05-02 PROCEDURE — 250N000013 HC RX MED GY IP 250 OP 250 PS 637: Performed by: EMERGENCY MEDICINE

## 2024-05-02 PROCEDURE — 124N000002 HC R&B MH UMMC

## 2024-05-02 RX ADMIN — QUETIAPINE 100 MG: 100 TABLET ORAL at 21:24

## 2024-05-02 RX ADMIN — ATORVASTATIN CALCIUM 40 MG: 40 TABLET, FILM COATED ORAL at 10:17

## 2024-05-02 RX ADMIN — FLUTICASONE PROPIONATE 1 SPRAY: 50 SPRAY, METERED NASAL at 10:17

## 2024-05-02 RX ADMIN — NICOTINE 1 PATCH: 21 PATCH, EXTENDED RELEASE TRANSDERMAL at 10:15

## 2024-05-02 RX ADMIN — DIVALPROEX SODIUM 500 MG: 500 TABLET, FILM COATED, EXTENDED RELEASE ORAL at 10:15

## 2024-05-02 RX ADMIN — Medication 900 MG: at 21:25

## 2024-05-02 RX ADMIN — Medication 900 MG: at 10:14

## 2024-05-02 RX ADMIN — LORATADINE 10 MG: 10 TABLET ORAL at 10:16

## 2024-05-02 RX ADMIN — CLOZAPINE 50 MG: 50 TABLET ORAL at 10:15

## 2024-05-02 RX ADMIN — HYDROCHLOROTHIAZIDE 25 MG: 25 TABLET ORAL at 10:16

## 2024-05-02 RX ADMIN — PANTOPRAZOLE SODIUM 40 MG: 40 TABLET, DELAYED RELEASE ORAL at 10:17

## 2024-05-02 RX ADMIN — POLYETHYLENE GLYCOL 3350 17 G: 17 POWDER, FOR SOLUTION ORAL at 10:17

## 2024-05-02 RX ADMIN — DIVALPROEX SODIUM 1000 MG: 500 TABLET, FILM COATED, EXTENDED RELEASE ORAL at 21:24

## 2024-05-02 RX ADMIN — CLOZAPINE 200 MG: 200 TABLET ORAL at 21:24

## 2024-05-02 ASSESSMENT — ACTIVITIES OF DAILY LIVING (ADL)
DRESS: INDEPENDENT;SCRUBS (BEHAVIORAL HEALTH)
ADLS_ACUITY_SCORE: 28
ORAL_HYGIENE: INDEPENDENT
ADLS_ACUITY_SCORE: 28
ORAL_HYGIENE: INDEPENDENT
ADLS_ACUITY_SCORE: 28
HYGIENE/GROOMING: INDEPENDENT
ADLS_ACUITY_SCORE: 28
HYGIENE/GROOMING: INDEPENDENT
DRESS: INDEPENDENT
ADLS_ACUITY_SCORE: 28

## 2024-05-02 NOTE — PLAN OF CARE
BEH IP Unit Acuity Rating Score (UARS)  Patient is given one point for every criteria they meet.    CRITERIA SCORING   On a 72 hour hold, court hold, committed, stay of commitment, or revocation. 0    Patient LOS on BEH unit exceeds 20 days. 1  LOS: 24   Patient under guardianship, 55+, otherwise medically complex, or under age 11. 0   Suicide ideation without relief of precipitating factors. 0   Current plan for suicide. 0   Current plan for homicide. 0   Imminent risk or actual attempt to seriously harm another without relief of factors precipitating the attempt. 1   Severe dysfunction in daily living (ex: complete neglect for self care, extreme disruption in vegetative function, extreme deterioration in social interactions). 0   Recent (last 7 days) or current physical aggression in the ED or on unit. 0   Restraints or seclusion episode in past 72 hours. 0   Recent (last 7 days) or current verbal aggression, agitation, yelling, etc., while in the ED or unit. 0   Active psychosis. 0   Need for constant or near constant redirection (from leaving, from others, etc).  0   Intrusive or disruptive behaviors. 0   Patient requires 3 or more hours of individualized nursing care per 8-hour shift (i.e. for ADLs, meds, therapeutic interventions). 0   TOTAL 2

## 2024-05-02 NOTE — PROGRESS NOTES
"The patient's care was discussed with the treatment team during the daily team meeting and/or staff's chart notes were reviewed. Staff report patient slept 7 hours with no issues of concerns overnight. Staff report: Patient observed sleeping at the first part of the shift. He came out of his room to have dinner and was watching television with peers in the lounge. He was pleasant and cooperative upon approach. Mood was calm. Described his day as \"good\". He denies having anxiety and depression. Patient denies SI/SIB/HI. He contracted for safety. He denies experiencing any type of hallucinations.      Patient was reminded regarding ECT tomorrow morning and that he would have to be on NPO after midnight. He verbalized understanding. All cups and snacks removed from his room. NPO after midnight sign placed on his door. Depakote and Gabapentin was held due to ECT tomorrow per order. Other due medications given. Denies experiencing side effects.     Medical Concerns: pt denies pain.   Appetite: Consumed 100% of share of dinner and took approximately 500 ml of fluids.   Sleep: pt denies concerns.  LBM: \"today\"  ADLs: Independent.   PRNs given: No PRN medications given this shift.    On suicide, assault, elopement, sexual and fall precautions. Needs attended to. No further concerns noted as of this time.        Upon interview, patient was interviewed in his room,  patient appears brighter, calm, cooperative with no apparent distress. Patient reported a good mood, stating he is pretty much content with himself, his affect is neutral.  Patient still believes in living life one day at time, patient reports that his medication is working with no side effects.  States he is missing his son, and has been calling his mother but did not take the phone, he stated that he will continue to try. Patient reports that he has not gotten an update from the [facility] this morning regarding referral status but writer informed the patient that the " [facility] is working frantically with those referrals.  Patient denies auditory or visual hallucinations, denies suicidal or homicidal ideation and thoughts of self-harm.  Patient seems to have no further questions at this time.           Medications:      Inpatient Administered Meds                     Current Facility-Administered Medications   Medication Dose Route Frequency Provider Last Rate Last Admin    atorvastatin (LIPITOR) tablet 40 mg  40 mg Oral Daily Jaswinder Claros MD   40 mg at 04/10/24 0818    cloZAPine (CLOZARIL) tablet 100 mg  100 mg Oral At Bedtime Jaswinder Claros MD   100 mg at 04/09/24 1848    cloZAPine (CLOZARIL) tablet 50 mg  50 mg Oral Daily Jaswinder Claros MD   50 mg at 04/10/24 0818    divalproex sodium extended-release (DEPAKOTE ER) 24 hr tablet 500 mg  500 mg Oral BID Jaswinder Claros MD   500 mg at 04/10/24 0818    gabapentin (NEURONTIN) tablet 600 mg  600 mg Oral BID Jaswinder Claros MD   600 mg at 04/10/24 0818    hydrochlorothiazide (HYDRODIURIL) tablet 25 mg  25 mg Oral Daily Jaswinder Claros MD   25 mg at 04/10/24 0818    nicotine (NICODERM CQ) 21 MG/24HR 24 hr patch 1 patch  1 patch Transdermal Daily Xavi Medeiros MD   1 patch at 04/10/24 0821              Allergies:      Allergies             Allergies   Allergen Reactions    Haloperidol Confusion and Other (See Comments)       Prone to EPSE. COnsider IM Zyprexa or be sure to give with benadryl              Labs:      Recent Results               Recent Results (from the past 336 hour(s))   EKG 12-lead, complete     Collection Time: 04/15/24  9:20 AM   Result Value Ref Range     Systolic Blood Pressure   mmHg     Diastolic Blood Pressure   mmHg     Ventricular Rate 85 BPM     Atrial Rate 85 BPM     MT Interval 152 ms     QRS Duration 94 ms      ms     QTc 449 ms     P Axis 73 degrees     R AXIS 1 degrees     T Axis 29 degrees     Interpretation ECG           Sinus  rhythm  No previous ECGs available  Confirmed by fellow William Chahal ([id number]) on 4/17/2024 9:31:06 AM  Confirmed by MD QUINTANA HENRI ([id number]) on 4/17/2024 10:20:26 PM      Comprehensive metabolic panel     Collection Time: 04/15/24  9:53 AM   Result Value Ref Range     Sodium 139 135 - 145 mmol/L     Potassium 4.2 3.4 - 5.3 mmol/L     Carbon Dioxide (CO2) 29 22 - 29 mmol/L     Anion Gap 10 7 - 15 mmol/L     Urea Nitrogen 18.6 6.0 - 20.0 mg/dL     Creatinine 0.97 0.67 - 1.17 mg/dL     GFR Estimate >90 >60 mL/min/1.73m2     Calcium 8.9 8.6 - 10.0 mg/dL     Chloride 100 98 - 107 mmol/L     Glucose 117 (H) 70 - 99 mg/dL     Alkaline Phosphatase 73 40 - 150 U/L     AST 31 0 - 45 U/L     ALT 28 0 - 70 U/L     Protein Total 7.2 6.4 - 8.3 g/dL     Albumin 4.5 3.5 - 5.2 g/dL     Bilirubin Total 0.7 <=1.2 mg/dL   CBC with platelets and differential     Collection Time: 04/15/24  9:53 AM   Result Value Ref Range     WBC Count 6.3 4.0 - 11.0 10e3/uL     RBC Count 5.09 4.40 - 5.90 10e6/uL     Hemoglobin 14.0 13.3 - 17.7 g/dL     Hematocrit 44.9 40.0 - 53.0 %     MCV 88 78 - 100 fL     MCH 27.5 26.5 - 33.0 pg     MCHC 31.2 (L) 31.5 - 36.5 g/dL     RDW 13.2 10.0 - 15.0 %     Platelet Count 234 150 - 450 10e3/uL     % Neutrophils 59 %     % Lymphocytes 33 %     % Monocytes 5 %     % Eosinophils 2 %     % Basophils 1 %     % Immature Granulocytes 0 %     NRBCs per 100 WBC 0 <1 /100     Absolute Neutrophils 3.8 1.6 - 8.3 10e3/uL     Absolute Lymphocytes 2.1 0.8 - 5.3 10e3/uL     Absolute Monocytes 0.3 0.0 - 1.3 10e3/uL     Absolute Eosinophils 0.1 0.0 - 0.7 10e3/uL     Absolute Basophils 0.0 0.0 - 0.2 10e3/uL     Absolute Immature Granulocytes 0.0 <=0.4 10e3/uL     Absolute NRBCs 0.0 10e3/uL   WBC and Differential     Collection Time: 04/17/24 12:48 PM   Result Value Ref Range     WBC Count 5.6 4.0 - 11.0 10e3/uL     % Neutrophils 68 %     % Lymphocytes 25 %     % Monocytes 5 %     % Eosinophils 1 %     % Basophils 1 %     %  Immature Granulocytes 0 %     NRBCs per 100 WBC 0 <1 /100     Absolute Neutrophils 3.8 1.6 - 8.3 10e3/uL     Absolute Lymphocytes 1.4 0.8 - 5.3 10e3/uL     Absolute Monocytes 0.3 0.0 - 1.3 10e3/uL     Absolute Eosinophils 0.0 0.0 - 0.7 10e3/uL     Absolute Basophils 0.0 0.0 - 0.2 10e3/uL     Absolute Immature Granulocytes 0.0 <=0.4 10e3/uL     Absolute NRBCs 0.0 10e3/uL   Extra Green Top (Lithium Heparin) Tube     Collection Time: 04/17/24 12:48 PM   Result Value Ref Range     Hold Specimen [facility]     Valproic acid     Collection Time: 04/18/24  8:46 AM   Result Value Ref Range     Valproic acid 60.3   ug/mL   WBC and Differential     Collection Time: 04/24/24  8:12 AM   Result Value Ref Range     WBC Count 5.1 4.0 - 11.0 10e3/uL     % Neutrophils 47 %     % Lymphocytes 42 %     % Monocytes 9 %     % Eosinophils 1 %     % Basophils 1 %     % Immature Granulocytes 0 %     NRBCs per 100 WBC 0 <1 /100     Absolute Neutrophils 2.4 1.6 - 8.3 10e3/uL     Absolute Lymphocytes 2.1 0.8 - 5.3 10e3/uL     Absolute Monocytes 0.5 0.0 - 1.3 10e3/uL     Absolute Eosinophils 0.1 0.0 - 0.7 10e3/uL     Absolute Basophils 0.0 0.0 - 0.2 10e3/uL     Absolute Immature Granulocytes 0.0 <=0.4 10e3/uL     Absolute NRBCs 0.0 10e3/uL   Extra Green Top (Lithium Heparin) Tube     Collection Time: 04/24/24  8:12 AM   Result Value Ref Range     Hold Specimen [facility]                Psychiatric Examination:      /84 (BP Location: Left arm, Patient Position: Sitting, Cuff Size: Adult Regular)   Pulse 94   Temp 97.8  F (36.6  C) (Oral)   Resp 12   Ht 1.829 m (6')   Wt 107.1 kg (236 lb 1.6 oz)   SpO2 98%   BMI 32.02 kg/m    Weight is 236 lbs 1.6 oz  Body mass index is 32.02 kg/m .     Weight over time:          Vitals:     04/03/24 1151 04/09/24 0826   Weight: 108 kg (238 lb) 107.1 kg (236 lb 1.6 oz)         Orthostatic Vitals           Most Recent       Sitting Orthostatic /85 04/09 0826     Sitting Orthostatic Pulse (bpm) 73 04/09 0826  "    Standing Orthostatic /92 04/09 0826     Standing Orthostatic Pulse (bpm) 69 04/09 0826             Cardiometabolic risk assessment. 04/10/24     Reviewed patient profile for cardiometabolic risk factors     Date taken /Value  REFERENCE RANGE   Abdominal Obesity  (Waist Circumference)   See nursing flowsheet Women ?35 in (88 cm)   Men ?40 in (102 cm)       Triglycerides                Triglycerides   Date Value Ref Range Status   02/26/2013 71 0 - 150 mg/dL Final       Comment:       Fasting specimen         ?150 mg/dL (1.7 mmol/L) or current treatment for elevated triglycerides   HDL cholesterol            HDL Cholesterol   Date Value Ref Range Status   02/26/2013 45 40 - 110 mg/dL Final   ]    Women <50 mg/dL (1.3 mmol/L) in women or current treatment for low HDL cholesterol  Men <40 mg/dL (1 mmol/L) in men or current treatment for low HDL cholesterol      Fasting plasma glucose (FPG)           Lab Results   Component Value Date      04/08/2024     GLC 83 02/26/2013        FPG ?100 mg/dL (5.6 mmol/L) or treatment for elevated blood glucose   Blood pressure        BP Readings from Last 3 Encounters:   04/08/24 127/84   02/26/13 129/74    Blood pressure ?130/85 mmHg or treatment for elevated blood pressure   Family History  See family history      Mental Status Exam:  Appearance: awake, alert, dressed in a hospital scrubs, appeared as age stated  Attitude:  Very calm, cooperative  Eye Contact:  good  Mood: \" content\"  Affect:  Neutral mood congruent  Speech:  clear, normal tone and volume  Language: fluent and intact in English  Psychomotor, Gait, Musculoskeletal:  no evidence of tardive dyskinesia, dystonia, or tics  Throught Process: Linear, Logical, goal directed  Associations:  No Loosening of associations  Thought Content:  no evidence of suicidal ideation or homicidal ideation, no auditory hallucinations present, and no visual hallucinations present  Insight: True emotional " awareness  Judgement: fair  Oriented to:  time, person, and place  Attention Span and Concentration: Good  Recent and Remote Memory: Intact  Fund of Knowledge: Appropriate             Precautions:                Behavioral Orders   Procedures    Assault precautions    Code 1 - Restrict to Unit    Elopement precautions    [facility]       Until discharge, Extended Care to offer psychotherapeutic services to mental health patients boarding for admission or stabilization. These services are to include but are not limited to: individual psychotherapy, diagnostic assessment, case management and care planning, safety planning, etc. This may include up to 1 visit per day. If patient is physically located at [facility] or [facility], group psychotherapy up to 2 time per day may be offered.     [facility]       Until discharge, Extended Care to offer psychotherapeutic services to mental health patients boarding for admission or stabilization. These services are to include but are not limited to: individual psychotherapy, diagnostic assessment, case management and care planning, safety planning, etc. This may include up to 1 visit per day. If patient is physically located at [facility] or [facility], group psychotherapy up to 2 time per day may be offered.     Routine Programming       As clinically indicated    Sexual precautions    Status 15       Every 15 minutes.    Status Individual Observation       Patient SIO status reviewed with team/RN.  Please also refer to RN/team documentation for add'l detail.     -2:1 SIO staff to monitor following which have contributed to patient being on SIO: at least 1 male staff     Assault risk     -Possible interventions SIO staff could use to support patient's treatment progress:  Verbal de-escalation/ Medication administration     -When following observed, team will review discontinuation of SIO:     When patient is not longer aggressive, sexually inappropriate or intrusive.       Order  Specific Question:   CONTINUOUS 24 hours / day       Answer:   Other       Order Specific Question:   Specify distance       Answer:   10 foot       Order Specific Question:   Indications for SIO       Answer:   Assault risk       Order Specific Question:   Indications for SIO       Answer:   Severe intrusiveness    Suicide precautions: Suicide Risk: HIGH; Clinical rationale to override score: response to medication       Patients on Suicide Precautions should have a Combination Diet ordered that includes a Diet selection(s) AND a Behavioral Tray selection for Safe Tray - with utensils, or Safe Tray - NO utensils          Order Specific Question:   Suicide Risk       Answer:   HIGH       Order Specific Question:   Clinical rationale to override score:       Answer:   response to medication           Diagnoses:         Schizoaffective disorder, bipolar type (H)  Polysubstance abuse (H)  Agitation     Clinically Significant Risk Factors                          # Obesity: Estimated body mass index is 32.02 kg/m  as calculated from the following:    Height as of this encounter: 1.829 m (6').    Weight as of this encounter: 107.1 kg (236 lb 1.6 oz)., PRESENT ON ADMISSION     # Financial/Environmental Concerns: other (see comments) (lost [facility] housing)     # Housing Instability: noted in nursing assessment          Assessment & Plan:      Assessment and hospital summary:  Ward Dawson is a -34- old male with a previous diagnosis of Schizoaffective disorder bipolar type who presented to the ED for a significant behavioral change, verbal agitation, worsening psychosocial stress, in the context of substance use.  Factors that make the mental health crisis life threatening or complex are:  Patient was brought to the ED from [facility] following his ECT treatment this morning at [facility].  Patient states he does not know why he is here and also states he knows why he is here.  He presents as manic, disorganized, and confused.    He is distractable and not a clear historian at this time.  Patient states he has not slept in a long time and received narcan last night.  Per ACT team  and [facility] staff patient has been elevated, intrusive, and disruptive.  Patient broke a window yesterday.  IRTS reports patient had turned table over and tried using them as skate board ramps.  Patient was noted for have been fairly stable prior to his pass this weekend.  CM reports patient went to a skateboarding competition in [place].  Patient returned in a manic state.  CM reports pt may not appear as manic as he has been the past few days due to sedation and ECT this morning.  She reports patient has not slept in 3 days.  IRTS reports pt received narcan twice last night.  Patient has been trespassed from the IRTS facility.  UDS is postive for cannabis..         Today's Changes: 5/2/24  No changes made to medications     Target psychiatric symptoms and interventions:  Admitted on 4/9/24 to station 12N  Clozaril, 50 mg every morning and 200 mg at bedtime  --Depakote ER, 500 mg daily and 1000 mg at bedtime for mood stabilization   --Gabapentin, 900 mg twice a day for pain  - Ibuprofen tablet 400 mg orally every 6 hours prn for pain  --Additional medications will include B52, Zyprexa, trazodone, hydroxyzine     Risks, benefits, and alternatives discussed at length with patient.      Acute Medical Problems and Treatments:  Acute medical concerns:  - No acute medical concerns     Pertinent labs/imaging:  Recent Results      Recent Results               Recent Results (from the past 336 hour(s))   Valproic acid     Collection Time: 04/05/24  9:39 PM   Result Value Ref Range     Valproic acid 68.4   ug/mL   Asymptomatic COVID-19 Virus (Coronavirus) by PCR Nasopharyngeal     Collection Time: 04/05/24  9:40 PM     Specimen: Nasopharyngeal; Swab   Result Value Ref Range     SARS CoV2 PCR Negative Negative   Glucose by meter     Collection Time: 04/08/24   8:21 PM   Result Value Ref Range     GLUCOSE BY METER POCT 117 (H) 70 - 99 mg/dL   WBC and Differential     Collection Time: 04/10/24 10:59 AM   Result Value Ref Range     WBC Count 5.7 4.0 - 11.0 10e3/uL     % Neutrophils 51 %     % Lymphocytes 37 %     % Monocytes 9 %     % Eosinophils 2 %     % Basophils 1 %     % Immature Granulocytes 0 %     NRBCs per 100 WBC 0 <1 /100     Absolute Neutrophils 3.0 1.6 - 8.3 10e3/uL     Absolute Lymphocytes 2.1 0.8 - 5.3 10e3/uL     Absolute Monocytes 0.5 0.0 - 1.3 10e3/uL     Absolute Eosinophils 0.1 0.0 - 0.7 10e3/uL     Absolute Basophils 0.0 0.0 - 0.2 10e3/uL     Absolute Immature Granulocytes 0.0 <=0.4 10e3/uL     Absolute NRBCs 0.0 10e3/uL   EKG 12-lead, complete     Collection Time: 04/15/24  9:20 AM   Result Value Ref Range     Systolic Blood Pressure   mmHg     Diastolic Blood Pressure   mmHg     Ventricular Rate 85 BPM     Atrial Rate 85 BPM     MI Interval 152 ms     QRS Duration 94 ms      ms     QTc 449 ms     P Axis 73 degrees     R AXIS 1 degrees     T Axis 29 degrees     Interpretation ECG           Sinus rhythm  No previous ECGs available  Confirmed by fellow William Chahal ([id number]) on 4/17/2024 9:31:06 AM  Confirmed by MD LILIAN, PENNY ([id number]) on 4/17/2024 10:20:26 PM      Comprehensive metabolic panel     Collection Time: 04/15/24  9:53 AM   Result Value Ref Range     Sodium 139 135 - 145 mmol/L     Potassium 4.2 3.4 - 5.3 mmol/L     Carbon Dioxide (CO2) 29 22 - 29 mmol/L     Anion Gap 10 7 - 15 mmol/L     Urea Nitrogen 18.6 6.0 - 20.0 mg/dL     Creatinine 0.97 0.67 - 1.17 mg/dL     GFR Estimate >90 >60 mL/min/1.73m2     Calcium 8.9 8.6 - 10.0 mg/dL     Chloride 100 98 - 107 mmol/L     Glucose 117 (H) 70 - 99 mg/dL     Alkaline Phosphatase 73 40 - 150 U/L     AST 31 0 - 45 U/L     ALT 28 0 - 70 U/L     Protein Total 7.2 6.4 - 8.3 g/dL     Albumin 4.5 3.5 - 5.2 g/dL     Bilirubin Total 0.7 <=1.2 mg/dL   CBC with platelets and differential     Collection  Time: 04/15/24  9:53 AM   Result Value Ref Range     WBC Count 6.3 4.0 - 11.0 10e3/uL     RBC Count 5.09 4.40 - 5.90 10e6/uL     Hemoglobin 14.0 13.3 - 17.7 g/dL     Hematocrit 44.9 40.0 - 53.0 %     MCV 88 78 - 100 fL     MCH 27.5 26.5 - 33.0 pg     MCHC 31.2 (L) 31.5 - 36.5 g/dL     RDW 13.2 10.0 - 15.0 %     Platelet Count 234 150 - 450 10e3/uL     % Neutrophils 59 %     % Lymphocytes 33 %     % Monocytes 5 %     % Eosinophils 2 %     % Basophils 1 %     % Immature Granulocytes 0 %     NRBCs per 100 WBC 0 <1 /100     Absolute Neutrophils 3.8 1.6 - 8.3 10e3/uL     Absolute Lymphocytes 2.1 0.8 - 5.3 10e3/uL     Absolute Monocytes 0.3 0.0 - 1.3 10e3/uL     Absolute Eosinophils 0.1 0.0 - 0.7 10e3/uL     Absolute Basophils 0.0 0.0 - 0.2 10e3/uL     Absolute Immature Granulocytes 0.0 <=0.4 10e3/uL     Absolute NRBCs 0.0 10e3/uL   WBC and Differential     Collection Time: 04/17/24 12:48 PM   Result Value Ref Range     WBC Count 5.6 4.0 - 11.0 10e3/uL     % Neutrophils 68 %     % Lymphocytes 25 %     % Monocytes 5 %     % Eosinophils 1 %     % Basophils 1 %     % Immature Granulocytes 0 %     NRBCs per 100 WBC 0 <1 /100     Absolute Neutrophils 3.8 1.6 - 8.3 10e3/uL     Absolute Lymphocytes 1.4 0.8 - 5.3 10e3/uL     Absolute Monocytes 0.3 0.0 - 1.3 10e3/uL     Absolute Eosinophils 0.0 0.0 - 0.7 10e3/uL     Absolute Basophils 0.0 0.0 - 0.2 10e3/uL     Absolute Immature Granulocytes 0.0 <=0.4 10e3/uL     Absolute NRBCs 0.0 10e3/uL   Extra Green Top (Lithium Heparin) Tube     Collection Time: 04/17/24 12:48 PM   Result Value Ref Range     Hold Specimen [facility]                    Behavioral/Psychological/Social:  - Encourage unit programming     Safety  Placed on the following Precautions: Suicide, Assault, Elopement and Sexual precautions  - Continue precautions as noted above  -  2:1 staff acuity for intrusive, assaultive behaviors  - Status 15 minute checks  - Legal Status: voluntary     Disposition Plan   Reason for ongoing  admission: poses an imminent risk to self and poses an imminent risk to others  Discharge location: IRTS facility or CD  Discharge Medications: not ordered  Follow-up Appointments: not scheduled     Entered by: AMY Yu CNP on 04/01/2024  at 11:00 am         Attestation:   Patient has been seen and evaluated by Phan gill, MICK, APRN CNP, PMHNP-BC  The patient was counseled on nature of illness and treatment plan/options  Care was coordinated with treatment team  Total time > 30 minutes

## 2024-05-02 NOTE — PLAN OF CARE
Team Note Due:  Friday    Assessment/Intervention/Current Symtoms and Care Coordination:  Chart review and met with team, discussed pt progress, symptomology, and response to treatment.  Discussed the discharge plan and any potential impediments to discharge.    Called [facility] to get referral update. Spoke to admissions. Declined due to indecent exposure conviction.     Called [facility] to get referral update. No answer.     Called [facility] to get referral update. Emailed to previous admissions coordinator. Forwarded to new admissions coordinator at [email or web address].    Discharge Plan or Goal:  When patient's symptoms have reduced and a safe discharge can be facilitated, options include: Residential treatment, IRTS, shelter      Barriers to Discharge:  Discharge planning for residential treatment as patient continues to stabilize      Referral Status:  [facility] 4/19 - declined 4/19   [facility] 4/19 - declined 4/29  [facility] 4/22 - declined 4/29  [facility] 4/22  [facility] 4/22 - declined 4/24  [facility] 4/29 - declined 5/2  [facility] 4/30  [facility] 4/30  [facility] 4/30  [facility] 4/30  [facility] 4/30 - declined 4/30  [facility] 4/30     Legal Status:  Voluntary    Contacts:  ACT : Heidy Pop, [phone] (WAN)  ACT : [place] [phone] (WAN)  ACT Psychiatrist: Camron Watson MD, [phone] (WAN)  Mom: Candis, [phone] (Consent)  Friend: Nabila Peters, [phone] (WAN)   [facility] Intake: [email or web address] (WAN)     Upcoming Meetings and Dates/Important Information and next steps:  Discharge planning

## 2024-05-02 NOTE — PLAN OF CARE
"  Problem: Adult Behavioral Health Plan of Care  Goal: Develops/Participates in Therapeutic [place] to Support Successful Transition  Outcome: Progressing    Patient is calm and cooperative all evening shift. He is is visible in milieu. He is social with staff and other pts. He spent most of the evening watching movies and watching basketball in the lounge. He denies all mental health symptoms including anxiety, depression, paranoia, delusions and SI/SIB/HI. He also denies hearing voices. He is ready for discharge and he states he is just waiting. No acute behavioral concerns this shift. Pt is medication compliant and vitals compliant. Pt is kempt. No problems with appetite. Pt is sleeping well. No medication side effects stated or observed. No medical concerns this shift.     Pt is optimistic about leaving the hospital. \"I have to do better for my family, I have to control my anger and I have to stop doing anything that is negative. The drugs? I have to quit, it is a must because It won't get me anywhere. I got to grow up and do better\"      /83 (Patient Position: Sitting, Cuff Size: Adult Large)   Pulse 77   Temp 98.3  F (36.8  C) (Oral)   Resp 18   Ht 1.829 m (6')   Wt 108 kg (238 lb)   SpO2 97%   BMI 32.28 kg/m          "

## 2024-05-02 NOTE — PLAN OF CARE
Problem: Psychotic Signs/Symptoms  Goal: Improved Sleep (Psychotic Signs/Symptoms)  Intervention: Promote Healthy Sleep Hygiene  Recent Flowsheet Documentation  Taken 5/2/2024 7430 by Peyton Vuong RN  Sleep Hygiene Promotion:   noise level reduced   regular sleep pattern promoted   room lighting adjusted   Goal Outcome Evaluation:      Patient appeared to sleep 7 hours this night shift.  No prns or snacks given or requested.  No concerns were reported or noted.

## 2024-05-02 NOTE — TELEPHONE ENCOUNTER
R: 3:50pm- Per Pattie and Dr. Coronel, Pt is moving as a transfer to [facility].     5:17pm- JUAN Gutierrez confirmed that Pt is going to [facility].  Pt is placed in 10's queue.

## 2024-05-02 NOTE — PLAN OF CARE
"RN Assessment    SI: denies  SIB: denies  HI: denies  AVH: denies, none observed  Thought process: WDL  Anxiety: denies  Depression: denies  Sleep: WDL- appears to sleep intermittently during the day  Affect & Mood: affect flat, slightly brightens upon approach, mood presents calm and pt states he is feeling \"good\"  Behavior: pt is resting in bed at start of shift. Pt does come out to milieu and watch tv with peers. Minimally social with peers. Pt is cooperative and pleasant with staff.     Order received for pt to transfer to station 10N this evening. Writer informed pt and pt okay with this. Writer informed pt that he will not have his own tv on station 10 and he is okay with this. Report given to station 10 RN around 1815.     Medication SE: none reported or observed  Hygiene: adequate   VS: WDL  Pain: denies  Medical Concerns: Per chart, pt is on maintenance ECT. Last treatment 5/1/24, next treatment 5/15/24 if pt is still hospitalized.   Appetite & Fluid intake: adequate   Bowel & Bladder: no issues reported    "

## 2024-05-02 NOTE — PLAN OF CARE
"  Problem: Adult Behavioral Health Plan of Care  Goal: Plan of Care Review  Outcome: Progressing      Pt continued to remain withdrawn and isolated to his room this shift. Pt was encouraged to attend groups and socialize with peers and patient verbalized understanding, but still did not leave room.   Pt denied all mental health symptoms, including SI/SIB/HI. Pt did endorse lower back paint at 6/10. Pt refused pharmacological interventions, but accepted heat packs. Pt was medication compliant. Pt had adequate PO intake. No signs of physical distress were noted. Pt made appropriate eye contact throughout the assessment. Pt's affect was blunted, but mood was described as \"fine\" and patient appeared calm.        "

## 2024-05-03 PROCEDURE — 250N000013 HC RX MED GY IP 250 OP 250 PS 637: Performed by: PHYSICIAN ASSISTANT

## 2024-05-03 PROCEDURE — 99232 SBSQ HOSP IP/OBS MODERATE 35: CPT | Performed by: CLINICAL NURSE SPECIALIST

## 2024-05-03 PROCEDURE — 250N000013 HC RX MED GY IP 250 OP 250 PS 637: Performed by: FAMILY MEDICINE

## 2024-05-03 PROCEDURE — 124N000002 HC R&B MH UMMC

## 2024-05-03 PROCEDURE — 250N000013 HC RX MED GY IP 250 OP 250 PS 637: Performed by: CLINICAL NURSE SPECIALIST

## 2024-05-03 RX ADMIN — POLYETHYLENE GLYCOL 3350 17 G: 17 POWDER, FOR SOLUTION ORAL at 08:53

## 2024-05-03 RX ADMIN — PANTOPRAZOLE SODIUM 40 MG: 40 TABLET, DELAYED RELEASE ORAL at 08:54

## 2024-05-03 RX ADMIN — DIVALPROEX SODIUM 1000 MG: 500 TABLET, FILM COATED, EXTENDED RELEASE ORAL at 21:24

## 2024-05-03 RX ADMIN — ATORVASTATIN CALCIUM 40 MG: 40 TABLET, FILM COATED ORAL at 08:53

## 2024-05-03 RX ADMIN — Medication 900 MG: at 08:54

## 2024-05-03 RX ADMIN — CLOZAPINE 200 MG: 200 TABLET ORAL at 21:24

## 2024-05-03 RX ADMIN — HYDROCHLOROTHIAZIDE 25 MG: 25 TABLET ORAL at 08:54

## 2024-05-03 RX ADMIN — QUETIAPINE 100 MG: 100 TABLET ORAL at 21:24

## 2024-05-03 RX ADMIN — CLOZAPINE 50 MG: 50 TABLET ORAL at 08:54

## 2024-05-03 RX ADMIN — DIVALPROEX SODIUM 500 MG: 500 TABLET, FILM COATED, EXTENDED RELEASE ORAL at 08:53

## 2024-05-03 RX ADMIN — Medication 900 MG: at 21:23

## 2024-05-03 RX ADMIN — LORATADINE 10 MG: 10 TABLET ORAL at 08:53

## 2024-05-03 ASSESSMENT — ACTIVITIES OF DAILY LIVING (ADL)
ADLS_ACUITY_SCORE: 28
ADLS_ACUITY_SCORE: 28
ADLS_ACUITY_SCORE: 29
HYGIENE/GROOMING: INDEPENDENT
ADLS_ACUITY_SCORE: 28
LAUNDRY: UNABLE TO COMPLETE
ADLS_ACUITY_SCORE: 28
ORAL_HYGIENE: INDEPENDENT
ADLS_ACUITY_SCORE: 28
DRESS: SCRUBS (BEHAVIORAL HEALTH);INDEPENDENT
DRESS: INDEPENDENT
ADLS_ACUITY_SCORE: 28
ADLS_ACUITY_SCORE: 29
ORAL_HYGIENE: INDEPENDENT
ADLS_ACUITY_SCORE: 29
ADLS_ACUITY_SCORE: 28
ADLS_ACUITY_SCORE: 29
ADLS_ACUITY_SCORE: 28
HYGIENE/GROOMING: INDEPENDENT
ADLS_ACUITY_SCORE: 28

## 2024-05-03 NOTE — PLAN OF CARE
"[facility] 10  Occupational Therapy Behavioral Health Assessment    Patient Name: Ward \"Cristel\"ASHLEIGH Dawson    Description: OT staff met with pt to review the role of occupational therapy and explain the value of having them involved in their treatment plan including options to meet current needs/self-identified goals. The below evaluation is a compilation of functional performance observation and information obtained from an OT self-assessment.     Clinical impression through direct observation:    04/19/24 1500   General Information   Date Initially Attended OT 04/19/24   Special Considerations first group with pt today, presentation very different than reported at admission   Clinical Impression   Affect Appropriate to situation   Orientation Oriented to person, place and time   Appearance and ADLs General cleanliness observed in most areas   Attention to Internal Stimuli No observed signs   Interaction Skills Initiates appropriately with staff;Interacts appropriately with peers   Ability to Communicate Needs Independent   Verbal Content Clear   Ability to Maintain Boundaries Maintains appropriate verbal boundaries   Participation Independently participates   Concentration Concentrates 30+ minutes   Ability to Concentrate With structure   Follows and Comprehends Directions Independently follows 2 step verbal directions   Memory Delayed and immediate recall intact   Organization Independently organizes all tasks   Decision Making Independent   Planning and Problem Solving Independently plans ahead   Ability to Apply and Learn Concepts Comprehends concepts, but needs assist to apply   Frustrations / Stress Tolerance Independently identifies and applies coping skills   Level of Insight Identifies needs with structure/support   Self Esteem Can identify positives   Social Supports Identifies utilizing supports       Patient indicated success in: (Bolded items indicate activities the pt selected)   Time spent with " "family or friends  Relaxing and enjoying myself  Having a satisfying routine  Work or volunteering   Concentrating on my tasks     Living independently  Taking care of the place where I live  Transportation  Managing my finances  Managing my basic needs (food, medicine, sleep)  Learning new things  Ability to pursue my goals  Other:    Patient indicated barriers to success are: (Bolded items indicate activities the pt selected)   Difficulty concentrating  Memory problems  Physical health/Chronic Pain  Lacks support (emotional/physical/spiritual)  Motivation/mood  Finances  Using drugs or alcohol  Sleeping too much or not enough  Missing work/appointments  Bothered by lights or sounds in the environment  Bothered by touch, texture, or movement  Lack of satisfying daily routine   Living environment  Transportation  Other: [none selected]         Patient indicated these supports: (Bolded items indicate activities the pt selected)   Safe place to live  Family, friends or caregivers to help out when needed  A best friend or significant other  Pets  Belief in myself and abilities  Routines and rituals that support my wellness  Access to email, social media, phone calls  Leisure supplies to support my interests and hobbies  Professionals: , Therapist, etc.  Other:      Patient identified these emotional, physical or mental health concerns: \"N/A\"    Patient lorraine with these concerns: \"Music\"    Patient enjoys spending time with: \"My son & friends & family\"    Identified enjoyment in activities: \"Skateboarding & making music\"    Stressors or changes in the past year: \"Housing & employment\"    Patient identified values: \"Strong relationships\"    Patient's goal for the future is \"build my own house\"    Goals selected by patient to work on with OT: (Bolded items indicate activities the pt selected)   Have hope for the future  Feel better  Learn ways to stay well and avoid coming to the hospital  Share my thoughts " and feelings  Feel more confident  Improve my sleep  Improve my concentration  Relax and enjoy myself  Improve my relationships with others  Handle my frustrations  Develop a satisfying daily routine  Manage my physical pain  Explore use of sensory coping strategies  Other:     Assessment: Patient would benefit from continue engagement in OT groups that support healthy recovery, specifically exploration of positive coping skills for symptom management/relapse prevention.    Plan: Initiate care plan goals and interventions.    Patient participated in goal(s) selection and understands the plan of care: Yes    IP OT Goal:  Identify and practice using healthy coping skills to manage stress and reduce symptoms.    Plan for Next Treatment: Provide graded occupation-based activities for increased success and ongoing assessment.     Lala Valente, OT on 5/3/2024 at 3:31 PM

## 2024-05-03 NOTE — PROGRESS NOTES
Patient Active Problem List   Diagnosis    CARDIOVASCULAR SCREENING; LDL GOAL LESS THAN 160    Schizoaffective disorder, bipolar type (H)    Antisocial personality disorder (H)    Cannabis abuse    Agitation    Polysubstance abuse (H)       Rehab Group  Attended group session   Start Time: 1020   End Time: 1220   Time Total: 30 (no charge)   #7 attended   Group Type: occupational therapy   Group Topic Covered: coping skills   Group Session Detail:  OT clinic   Patient Response/Contribution:Cooperative with task, Organized, and Actively engaged   Patient Participation Detail:    Pt actively participated in the last x30 minutes (no charge) of occupational therapy clinic to facilitate coping skill exploration, creative expression within personally meaningful activities, and clinical observation of social, cognitive, and kinesthetic performance skills. Pt response: Oriented to group materials and subsequently independent to initiate, gather materials, sequence, and adjust to workspace demands as needed. Demonstrated good focus, planning, and attention to detail for selected structured creative expression task. Organized in his task approach, and observed selecting a symmetrical pattern for his task. Able to ask for assistance as needed, and appeared comfortable interacting with peers and staff, though spent a majority of this group quietly focused on his task. Calm, pleasant, and engaged. Affect appeared to brighten on approach.          Train & Education Service Per Session 45 + Minutes () OT Group code

## 2024-05-03 NOTE — PROGRESS NOTES
The patient's care was discussed with the treatment team during the daily team meeting and/or staff's chart notes were reviewed. Staff report patient slept 7 hours with no behavioral concerns overnight. Staff report: Patient's transfer from  went well at 7:45 pm patient was shown his room 103, he presented with a bright affect. He stayed in the lounge area with peers watching TV for sometime.He reported doing fine, complained of back pain but declined PRN. He took his medications as scheduled, no concerns at this time.       Upon interview, patient was interviewed in his room,  patient appears with flat affects but smiles while writer was having conversation with him. Patient remains calm, cooperative with no apparent distress. Patient reported a good mood, states that he likes the new unit that things seemed calm here. Discussed about referral updates with the patient and he responds that any time something comes through is acceptable by him. Per the discussion with the CTC regarding if patient would be a candidate for group home, the CTC states patient would need a CADI waver to go to the group home but not sure if he does have one. Patient does not know if he had a CADI waver or not, he states that he does not object to going to a  but seems not to like it, at least not my preference patient said. Patient denies A/VH, SI/HI, SIB/NSSIB.             Medications:      Inpatient Administered Meds                     Current Facility-Administered Medications   Medication Dose Route Frequency Provider Last Rate Last Admin    atorvastatin (LIPITOR) tablet 40 mg  40 mg Oral Daily Jaswinder Claros MD   40 mg at 04/10/24 0818    cloZAPine (CLOZARIL) tablet 100 mg  100 mg Oral At Bedtime Jaswinder Claros MD   100 mg at 04/09/24 1848    cloZAPine (CLOZARIL) tablet 50 mg  50 mg Oral Daily Jaswinder Claros MD   50 mg at 04/10/24 0818    divalproex sodium extended-release (DEPAKOTE ER) 24 hr  tablet 500 mg  500 mg Oral BID Jaswinder Claros MD   500 mg at 04/10/24 0818    gabapentin (NEURONTIN) tablet 600 mg  600 mg Oral BID Jaswinder Claros MD   600 mg at 04/10/24 0818    hydrochlorothiazide (HYDRODIURIL) tablet 25 mg  25 mg Oral Daily Jaswinder Claros MD   25 mg at 04/10/24 0818    nicotine (NICODERM CQ) 21 MG/24HR 24 hr patch 1 patch  1 patch Transdermal Daily Xavi Medeiros MD   1 patch at 04/10/24 0821              Allergies:      Allergies             Allergies   Allergen Reactions    Haloperidol Confusion and Other (See Comments)       Prone to EPSE. COnsider IM Zyprexa or be sure to give with benadryl              Labs:      Recent Results               Recent Results (from the past 336 hour(s))   EKG 12-lead, complete     Collection Time: 04/15/24  9:20 AM   Result Value Ref Range     Systolic Blood Pressure   mmHg     Diastolic Blood Pressure   mmHg     Ventricular Rate 85 BPM     Atrial Rate 85 BPM     IN Interval 152 ms     QRS Duration 94 ms      ms     QTc 449 ms     P Axis 73 degrees     R AXIS 1 degrees     T Axis 29 degrees     Interpretation ECG           Sinus rhythm  No previous ECGs available  Confirmed by fellow William Chahal ([id number]) on 4/17/2024 9:31:06 AM  Confirmed by MD LILIAN, PENNY [id number]) on 4/17/2024 10:20:26 PM      Comprehensive metabolic panel     Collection Time: 04/15/24  9:53 AM   Result Value Ref Range     Sodium 139 135 - 145 mmol/L     Potassium 4.2 3.4 - 5.3 mmol/L     Carbon Dioxide (CO2) 29 22 - 29 mmol/L     Anion Gap 10 7 - 15 mmol/L     Urea Nitrogen 18.6 6.0 - 20.0 mg/dL     Creatinine 0.97 0.67 - 1.17 mg/dL     GFR Estimate >90 >60 mL/min/1.73m2     Calcium 8.9 8.6 - 10.0 mg/dL     Chloride 100 98 - 107 mmol/L     Glucose 117 (H) 70 - 99 mg/dL     Alkaline Phosphatase 73 40 - 150 U/L     AST 31 0 - 45 U/L     ALT 28 0 - 70 U/L     Protein Total 7.2 6.4 - 8.3 g/dL     Albumin 4.5 3.5 - 5.2 g/dL     Bilirubin Total 0.7  <=1.2 mg/dL   CBC with platelets and differential     Collection Time: 04/15/24  9:53 AM   Result Value Ref Range     WBC Count 6.3 4.0 - 11.0 10e3/uL     RBC Count 5.09 4.40 - 5.90 10e6/uL     Hemoglobin 14.0 13.3 - 17.7 g/dL     Hematocrit 44.9 40.0 - 53.0 %     MCV 88 78 - 100 fL     MCH 27.5 26.5 - 33.0 pg     MCHC 31.2 (L) 31.5 - 36.5 g/dL     RDW 13.2 10.0 - 15.0 %     Platelet Count 234 150 - 450 10e3/uL     % Neutrophils 59 %     % Lymphocytes 33 %     % Monocytes 5 %     % Eosinophils 2 %     % Basophils 1 %     % Immature Granulocytes 0 %     NRBCs per 100 WBC 0 <1 /100     Absolute Neutrophils 3.8 1.6 - 8.3 10e3/uL     Absolute Lymphocytes 2.1 0.8 - 5.3 10e3/uL     Absolute Monocytes 0.3 0.0 - 1.3 10e3/uL     Absolute Eosinophils 0.1 0.0 - 0.7 10e3/uL     Absolute Basophils 0.0 0.0 - 0.2 10e3/uL     Absolute Immature Granulocytes 0.0 <=0.4 10e3/uL     Absolute NRBCs 0.0 10e3/uL   WBC and Differential     Collection Time: 04/17/24 12:48 PM   Result Value Ref Range     WBC Count 5.6 4.0 - 11.0 10e3/uL     % Neutrophils 68 %     % Lymphocytes 25 %     % Monocytes 5 %     % Eosinophils 1 %     % Basophils 1 %     % Immature Granulocytes 0 %     NRBCs per 100 WBC 0 <1 /100     Absolute Neutrophils 3.8 1.6 - 8.3 10e3/uL     Absolute Lymphocytes 1.4 0.8 - 5.3 10e3/uL     Absolute Monocytes 0.3 0.0 - 1.3 10e3/uL     Absolute Eosinophils 0.0 0.0 - 0.7 10e3/uL     Absolute Basophils 0.0 0.0 - 0.2 10e3/uL     Absolute Immature Granulocytes 0.0 <=0.4 10e3/uL     Absolute NRBCs 0.0 10e3/uL   Extra Green Top (Lithium Heparin) Tube     Collection Time: 04/17/24 12:48 PM   Result Value Ref Range     Hold Specimen JI     Valproic acid     Collection Time: 04/18/24  8:46 AM   Result Value Ref Range     Valproic acid 60.3   ug/mL   WBC and Differential     Collection Time: 04/24/24  8:12 AM   Result Value Ref Range     WBC Count 5.1 4.0 - 11.0 10e3/uL     % Neutrophils 47 %     % Lymphocytes 42 %     % Monocytes 9 %     %  Eosinophils 1 %     % Basophils 1 %     % Immature Granulocytes 0 %     NRBCs per 100 WBC 0 <1 /100     Absolute Neutrophils 2.4 1.6 - 8.3 10e3/uL     Absolute Lymphocytes 2.1 0.8 - 5.3 10e3/uL     Absolute Monocytes 0.5 0.0 - 1.3 10e3/uL     Absolute Eosinophils 0.1 0.0 - 0.7 10e3/uL     Absolute Basophils 0.0 0.0 - 0.2 10e3/uL     Absolute Immature Granulocytes 0.0 <=0.4 10e3/uL     Absolute NRBCs 0.0 10e3/uL   Extra Green Top (Lithium Heparin) Tube     Collection Time: 04/24/24  8:12 AM   Result Value Ref Range     Hold Specimen [facility]                Psychiatric Examination:      /84 (BP Location: Left arm, Patient Position: Sitting, Cuff Size: Adult Regular)   Pulse 94   Temp 97.8  F (36.6  C) (Oral)   Resp 12   Ht 1.829 m (6')   Wt 107.1 kg (236 lb 1.6 oz)   SpO2 98%   BMI 32.02 kg/m    Weight is 236 lbs 1.6 oz  Body mass index is 32.02 kg/m .     Weight over time:          Vitals:     04/03/24 1151 04/09/24 0826   Weight: 108 kg (238 lb) 107.1 kg (236 lb 1.6 oz)         Orthostatic Vitals           Most Recent       Sitting Orthostatic /85 04/09 0826     Sitting Orthostatic Pulse (bpm) 73 04/09 0826     Standing Orthostatic /92 04/09 0826     Standing Orthostatic Pulse (bpm) 69 04/09 0826             Cardiometabolic risk assessment. 04/10/24     Reviewed patient profile for cardiometabolic risk factors     Date taken /Value  REFERENCE RANGE   Abdominal Obesity  (Waist Circumference)   See nursing flowsheet Women ?35 in (88 cm)   Men ?40 in (102 cm)       Triglycerides                Triglycerides   Date Value Ref Range Status   02/26/2013 71 0 - 150 mg/dL Final       Comment:       Fasting specimen         ?150 mg/dL (1.7 mmol/L) or current treatment for elevated triglycerides   HDL cholesterol            HDL Cholesterol   Date Value Ref Range Status   02/26/2013 45 40 - 110 mg/dL Final   ]    Women <50 mg/dL (1.3 mmol/L) in women or current treatment for low HDL cholesterol  Men <40  "mg/dL (1 mmol/L) in men or current treatment for low HDL cholesterol      Fasting plasma glucose (FPG)           Lab Results   Component Value Date      04/08/2024     GLC 83 02/26/2013        FPG ?100 mg/dL (5.6 mmol/L) or treatment for elevated blood glucose   Blood pressure        BP Readings from Last 3 Encounters:   04/08/24 127/84   02/26/13 129/74    Blood pressure ?130/85 mmHg or treatment for elevated blood pressure   Family History  See family history      Mental Status Exam:  Appearance: awake, alert, dressed in a hospital scrubs, appeared as age stated  Attitude:  Very calm, cooperative  Eye Contact:  good  Mood: \" good\"  Affect: Flat  Speech:  clear, normal tone and volume  Language: fluent and intact in English  Psychomotor, Gait, Musculoskeletal:  no evidence of tardive dyskinesia, dystonia, or tics  Throught Process: Linear, Logical, goal directed  Associations:  No Loosening of associations  Thought Content:  no evidence of suicidal ideation or homicidal ideation, no auditory hallucinations present, and no visual hallucinations present  Insight: True emotional awareness  Judgement: fair  Oriented to:  time, person, and place  Attention Span and Concentration: Good  Recent and Remote Memory: Intact  Fund of Knowledge: Appropriate             Precautions:                Behavioral Orders   Procedures    Assault precautions    Code 1 - Restrict to Unit    Elopement precautions    [facility] Extended Care       Until discharge, Extended Care to offer psychotherapeutic services to mental health patients boarding for admission or stabilization. These services are to include but are not limited to: individual psychotherapy, diagnostic assessment, case management and care planning, safety planning, etc. This may include up to 1 visit per day. If patient is physically located at [facility] or [facility], group psychotherapy up to 2 time per day may be offered.     [facility] Extended Care       Until discharge, Extended " Care to offer psychotherapeutic services to mental health patients boarding for admission or stabilization. These services are to include but are not limited to: individual psychotherapy, diagnostic assessment, case management and care planning, safety planning, etc. This may include up to 1 visit per day. If patient is physically located at [facility] or [facility], group psychotherapy up to 2 time per day may be offered.     Routine Programming       As clinically indicated    Sexual precautions    Status 15       Every 15 minutes.    Status Individual Observation       Patient SIO status reviewed with team/RN.  Please also refer to RN/team documentation for add'l detail.     -2:1 SIO staff to monitor following which have contributed to patient being on SIO: at least 1 male staff     Assault risk     -Possible interventions SIO staff could use to support patient's treatment progress:  Verbal de-escalation/ Medication administration     -When following observed, team will review discontinuation of SIO:     When patient is not longer aggressive, sexually inappropriate or intrusive.       Order Specific Question:   CONTINUOUS 24 hours / day       Answer:   Other       Order Specific Question:   Specify distance       Answer:   10 foot       Order Specific Question:   Indications for SIO       Answer:   Assault risk       Order Specific Question:   Indications for SIO       Answer:   Severe intrusiveness    Suicide precautions: Suicide Risk: HIGH; Clinical rationale to override score: response to medication       Patients on Suicide Precautions should have a Combination Diet ordered that includes a Diet selection(s) AND a Behavioral Tray selection for Safe Tray - with utensils, or Safe Tray - NO utensils          Order Specific Question:   Suicide Risk       Answer:   HIGH       Order Specific Question:   Clinical rationale to override score:       Answer:   response to medication           Diagnoses:         Schizoaffective  disorder, bipolar type (H)  Polysubstance abuse (H)  Agitation     Clinically Significant Risk Factors                          # Obesity: Estimated body mass index is 32.02 kg/m  as calculated from the following:    Height as of this encounter: 1.829 m (6').    Weight as of this encounter: 107.1 kg (236 lb 1.6 oz)., PRESENT ON ADMISSION     # Financial/Environmental Concerns: other (see comments) (lost IRTS housing)     # Housing Instability: noted in nursing assessment          Assessment & Plan:      Assessment and hospital summary:  Ward Dawson is a -34- old male with a previous diagnosis of Schizoaffective disorder bipolar type who presented to the ED for a significant behavioral change, verbal agitation, worsening psychosocial stress, in the context of substance use.  Factors that make the mental health crisis life threatening or complex are:  Patient was brought to the ED from [facility] following his ECT treatment this morning at [facility].  Patient states he does not know why he is here and also states he knows why he is here.  He presents as manic, disorganized, and confused.   He is distractable and not a clear historian at this time.  Patient states he has not slept in a long time and received narcan last night.  Per ACT team  and [facility] staff patient has been elevated, intrusive, and disruptive.  Patient broke a window yesterday.  IRTS reports patient had turned table over and tried using them as skate board ramps.  Patient was noted for have been fairly stable prior to his pass this weekend.  CM reports patient went to a skateboarding competition in [place].  Patient returned in a manic state.  CM reports pt may not appear as manic as he has been the past few days due to sedation and ECT this morning.  She reports patient has not slept in 3 days.  IRTS reports pt received narcan twice last night.  Patient has been trespassed from the IRTS facility.  S is postive for cannabis..          Today's Changes: 5/2/24  No changes made to medications     Target psychiatric symptoms and interventions:  Admitted on 4/9/24 to station 12N  Clozaril, 50 mg every morning and 200 mg at bedtime  --Depakote ER, 500 mg daily and 1000 mg at bedtime for mood stabilization   --Gabapentin, 900 mg twice a day for pain  - Ibuprofen tablet 400 mg orally every 6 hours prn for pain  --Additional medications will include B52, Zyprexa, trazodone, hydroxyzine     Risks, benefits, and alternatives discussed at length with patient.      Acute Medical Problems and Treatments:  Acute medical concerns:  - No acute medical concerns     Pertinent labs/imaging:  Recent Results      Recent Results               Recent Results (from the past 336 hour(s))   Valproic acid     Collection Time: 04/05/24  9:39 PM   Result Value Ref Range     Valproic acid 68.4   ug/mL   Asymptomatic COVID-19 Virus (Coronavirus) by PCR Nasopharyngeal     Collection Time: 04/05/24  9:40 PM     Specimen: Nasopharyngeal; Swab   Result Value Ref Range     SARS CoV2 PCR Negative Negative   Glucose by meter     Collection Time: 04/08/24  8:21 PM   Result Value Ref Range     GLUCOSE BY METER POCT 117 (H) 70 - 99 mg/dL   WBC and Differential     Collection Time: 04/10/24 10:59 AM   Result Value Ref Range     WBC Count 5.7 4.0 - 11.0 10e3/uL     % Neutrophils 51 %     % Lymphocytes 37 %     % Monocytes 9 %     % Eosinophils 2 %     % Basophils 1 %     % Immature Granulocytes 0 %     NRBCs per 100 WBC 0 <1 /100     Absolute Neutrophils 3.0 1.6 - 8.3 10e3/uL     Absolute Lymphocytes 2.1 0.8 - 5.3 10e3/uL     Absolute Monocytes 0.5 0.0 - 1.3 10e3/uL     Absolute Eosinophils 0.1 0.0 - 0.7 10e3/uL     Absolute Basophils 0.0 0.0 - 0.2 10e3/uL     Absolute Immature Granulocytes 0.0 <=0.4 10e3/uL     Absolute NRBCs 0.0 10e3/uL   EKG 12-lead, complete     Collection Time: 04/15/24  9:20 AM   Result Value Ref Range     Systolic Blood Pressure   mmHg     Diastolic Blood Pressure    mmHg     Ventricular Rate 85 BPM     Atrial Rate 85 BPM     SD Interval 152 ms     QRS Duration 94 ms      ms     QTc 449 ms     P Axis 73 degrees     R AXIS 1 degrees     T Axis 29 degrees     Interpretation ECG           Sinus rhythm  No previous ECGs available  Confirmed by fellow William Chahal ([id number]) on 4/17/2024 9:31:06 AM  Confirmed by MD QUINTANA HENRI ([id number]) on 4/17/2024 10:20:26 PM      Comprehensive metabolic panel     Collection Time: 04/15/24  9:53 AM   Result Value Ref Range     Sodium 139 135 - 145 mmol/L     Potassium 4.2 3.4 - 5.3 mmol/L     Carbon Dioxide (CO2) 29 22 - 29 mmol/L     Anion Gap 10 7 - 15 mmol/L     Urea Nitrogen 18.6 6.0 - 20.0 mg/dL     Creatinine 0.97 0.67 - 1.17 mg/dL     GFR Estimate >90 >60 mL/min/1.73m2     Calcium 8.9 8.6 - 10.0 mg/dL     Chloride 100 98 - 107 mmol/L     Glucose 117 (H) 70 - 99 mg/dL     Alkaline Phosphatase 73 40 - 150 U/L     AST 31 0 - 45 U/L     ALT 28 0 - 70 U/L     Protein Total 7.2 6.4 - 8.3 g/dL     Albumin 4.5 3.5 - 5.2 g/dL     Bilirubin Total 0.7 <=1.2 mg/dL   CBC with platelets and differential     Collection Time: 04/15/24  9:53 AM   Result Value Ref Range     WBC Count 6.3 4.0 - 11.0 10e3/uL     RBC Count 5.09 4.40 - 5.90 10e6/uL     Hemoglobin 14.0 13.3 - 17.7 g/dL     Hematocrit 44.9 40.0 - 53.0 %     MCV 88 78 - 100 fL     MCH 27.5 26.5 - 33.0 pg     MCHC 31.2 (L) 31.5 - 36.5 g/dL     RDW 13.2 10.0 - 15.0 %     Platelet Count 234 150 - 450 10e3/uL     % Neutrophils 59 %     % Lymphocytes 33 %     % Monocytes 5 %     % Eosinophils 2 %     % Basophils 1 %     % Immature Granulocytes 0 %     NRBCs per 100 WBC 0 <1 /100     Absolute Neutrophils 3.8 1.6 - 8.3 10e3/uL     Absolute Lymphocytes 2.1 0.8 - 5.3 10e3/uL     Absolute Monocytes 0.3 0.0 - 1.3 10e3/uL     Absolute Eosinophils 0.1 0.0 - 0.7 10e3/uL     Absolute Basophils 0.0 0.0 - 0.2 10e3/uL     Absolute Immature Granulocytes 0.0 <=0.4 10e3/uL     Absolute NRBCs 0.0 10e3/uL   WBC  and Differential     Collection Time: 04/17/24 12:48 PM   Result Value Ref Range     WBC Count 5.6 4.0 - 11.0 10e3/uL     % Neutrophils 68 %     % Lymphocytes 25 %     % Monocytes 5 %     % Eosinophils 1 %     % Basophils 1 %     % Immature Granulocytes 0 %     NRBCs per 100 WBC 0 <1 /100     Absolute Neutrophils 3.8 1.6 - 8.3 10e3/uL     Absolute Lymphocytes 1.4 0.8 - 5.3 10e3/uL     Absolute Monocytes 0.3 0.0 - 1.3 10e3/uL     Absolute Eosinophils 0.0 0.0 - 0.7 10e3/uL     Absolute Basophils 0.0 0.0 - 0.2 10e3/uL     Absolute Immature Granulocytes 0.0 <=0.4 10e3/uL     Absolute NRBCs 0.0 10e3/uL   Extra Green Top (Lithium Heparin) Tube     Collection Time: 04/17/24 12:48 PM   Result Value Ref Range     Hold Specimen [facility]                    Behavioral/Psychological/Social:  - Encourage unit programming     Safety  Placed on the following Precautions: Suicide, Assault, Elopement and Sexual precautions  - Continue precautions as noted above  -  2:1 staff acuity for intrusive, assaultive behaviors  - Status 15 minute checks  - Legal Status: voluntary     Disposition Plan   Reason for ongoing admission: poses an imminent risk to self and poses an imminent risk to others  Discharge location: IRTS facility or CD  Discharge Medications: not ordered  Follow-up Appointments: not scheduled     Entered by: AMY Yu CNP on 05/01/2024  at 11:30 am         Attestation:   Patient has been seen and evaluated by Phan gill DNP, APRN CNP, PMVAP-BC  The patient was counseled on nature of illness and treatment plan/options  Care was coordinated with treatment team  Total time > 30 minutes

## 2024-05-03 NOTE — PLAN OF CARE
Goal Outcome Evaluation:             Patient's transfer from  went well at 7:45 pm patient was shown his room 103, he presented with a bright affect. [facility].He reported doing fine, complained of back pain but declined PRN. He took his medications as scheduled, no concerns at this time.

## 2024-05-03 NOTE — PLAN OF CARE
Problem: Adult Behavioral Health Plan of Care  Goal: Adheres to Safety Considerations for Self and Others  Intervention: Develop and Maintain Individualized Safety Plan  Recent Flowsheet Documentation  Taken 5/3/2024 0142 by Lesley Bowen RN  Safety Measures: safety rounds completed     Problem: Adult Behavioral Health Plan of Care  Goal: Absence of New-Onset Illness or Injury  Intervention: Identify and Manage Fall Risk  Recent Flowsheet Documentation  Taken 5/3/2024 0142 by Lesley Bowen RN  Safety Measures: safety rounds completed     Problem: Adult Behavioral Health Plan of Care  Goal: Adheres to Safety Considerations for Self and Others  Outcome: Progressing  Intervention: Develop and Maintain Individualized Safety Plan  Recent Flowsheet Documentation  Taken 5/3/2024 0142 by Lesley Bowen RN  Safety Measures: safety rounds completed     Problem: Adult Behavioral Health Plan of Care  Goal: Optimized Coping Skills in Response to Life Stressors  Outcome: Progressing     Problem: Sleep Disturbance  Goal: Adequate Sleep/Rest  Outcome: Progressing   Goal Outcome Evaluation:       Patient was calm and slept all night without any concerns. Safety checks were completed every 15 minutes, and patient did not show signs of any distress. No pain was reported, and no medication was given. We will continue to observe and report any update to his care team.    Sleep Hours: 7.

## 2024-05-03 NOTE — PLAN OF CARE
"  Problem: Anxiety Signs/Symptoms  Goal: Improved Mood Symptoms (Anxiety Signs/Symptoms)  Outcome: Progressing   Goal Outcome Evaluation:    RN Assessment:  SI/Self harm:  pt denies  Aggression/agitation/HI:  none reported or observed  AVH:  pt denies, does not appear to be responding to internal stimuli  Sleep: adequate per pt report  PRN Med: No PRNs administered this shift  Medication AE: none reported or observed  Physical Complaints/Issues: pt states \"just my usual lower back pain\", declined intervention  I & O: eating and drinking well  LBM: pt denies  ADLs: independent  Visits: none this shift  Vitals:  WNL  COVID 19 Assessment:  no symptoms present  Milieu Participation: minimal, pt took a nap throughout the afternoon and was observed in the milieu at lunch time, watching a movie in the milieu  Behavior: overall calm, pleasant and cooperative. Pt is easily engaged and polite in interactions. Pt shows a mostly flat affect, though does brighten with humor.   No other concerns at this time. Nursing will continue to monitor and assess.     "

## 2024-05-03 NOTE — PLAN OF CARE
"Team Note Due:  Friday    Assessment/Intervention/Current Symtoms and Care Coordination:  Chart review and met with team, discussed pt progress, symptomology, and response to treatment.  Discussed the discharge plan and any potential impediments to discharge.  [facility] called Yamila and spoke with her on the phone. Yamila stated Cristel is not able to return home with his mother, he has been decompensating over the last few months, may be open to going to treatment, was agreeable to the idea of board and lodge if Cristel was open to it, and stated she has worked with Cristel on applying for some subsidized housing programs. She later called back and informed [facility] that Cristel may be interested in [facility] after previous CTC had spoke with him about this.     [facility] received the following Teams message from previous CTC: \"I have a voicemail from Letty at [facility]. Yesterday I believe she told me the indecent exposure charge disqualified him from [facility], but this message is saying he can be placed on the waitlist with that charge.   It will be a month out for the program. If you want to place him on the waitlist the number is [phone]\"  [facility] called Letty and confirmed he can be placed on the list. [facility] was informed that he likely wouldn't be placed until June.     Called Shawn Trejo to get referral update. Emailed to previous admissions coordinator. Forwarded to new admissions coordinator at [email or web address].    Discharge Plan or Goal:  When patient's symptoms have reduced and a safe discharge can be facilitated, options include: Residential treatment, IRTS, shelter      Barriers to Discharge:  Discharge planning for residential treatment as patient continues to stabilize      Referral Status:  [facility] 4/19 - declined 4/19  [facility] 4/19 - declined 4/29  [facility] 4/22 - declined 4/29  [facility] 4/22  [facility] 4/22 - declined 4/24  [facility] 4/29 - 5/3 placed on waitlist-likely wont be " placed until June.   [facility] 4[facility]30  [facility]  [facility]  [facility] 4/[facility]  [facility] 4/30 - declined 4/30  [facility] 4/30     Legal Status:  Voluntary    Contacts:  ACT : Heidy Pop, [phone] (WAN)  ACT : Yamila [phone] (WAN)  [facility] Psychiatrist: Camron Watson MD, [phone] (WAN)  Mom: Candis, [phone] (Consent)  Friend: Nabila Peters, [phone] (WAN)   [facility] Intake: [email or web address] (WAN)     Upcoming Meetings and Dates/Important Information and next steps:  Discharge planning

## 2024-05-03 NOTE — PROGRESS NOTES
Pt was transferred to station 10 room 103 @ 7:45 pm.  Pt was walked over with 2 staff with all of his belongings and chart.  The move went smoothly without any difficulty.

## 2024-05-04 PROCEDURE — 124N000002 HC R&B MH UMMC

## 2024-05-04 PROCEDURE — 250N000013 HC RX MED GY IP 250 OP 250 PS 637: Performed by: PHYSICIAN ASSISTANT

## 2024-05-04 PROCEDURE — 250N000013 HC RX MED GY IP 250 OP 250 PS 637: Performed by: CLINICAL NURSE SPECIALIST

## 2024-05-04 PROCEDURE — 250N000013 HC RX MED GY IP 250 OP 250 PS 637: Performed by: FAMILY MEDICINE

## 2024-05-04 RX ADMIN — FLUTICASONE PROPIONATE 1 SPRAY: 50 SPRAY, METERED NASAL at 09:26

## 2024-05-04 RX ADMIN — LORATADINE 10 MG: 10 TABLET ORAL at 09:27

## 2024-05-04 RX ADMIN — Medication 900 MG: at 09:27

## 2024-05-04 RX ADMIN — POLYETHYLENE GLYCOL 3350 17 G: 17 POWDER, FOR SOLUTION ORAL at 09:26

## 2024-05-04 RX ADMIN — CLOZAPINE 200 MG: 200 TABLET ORAL at 21:01

## 2024-05-04 RX ADMIN — PANTOPRAZOLE SODIUM 40 MG: 40 TABLET, DELAYED RELEASE ORAL at 09:27

## 2024-05-04 RX ADMIN — ATORVASTATIN CALCIUM 40 MG: 40 TABLET, FILM COATED ORAL at 09:27

## 2024-05-04 RX ADMIN — QUETIAPINE 100 MG: 100 TABLET ORAL at 21:01

## 2024-05-04 RX ADMIN — DIVALPROEX SODIUM 1000 MG: 500 TABLET, FILM COATED, EXTENDED RELEASE ORAL at 21:01

## 2024-05-04 RX ADMIN — CLOZAPINE 50 MG: 50 TABLET ORAL at 09:26

## 2024-05-04 RX ADMIN — Medication 900 MG: at 21:01

## 2024-05-04 RX ADMIN — DIVALPROEX SODIUM 500 MG: 500 TABLET, FILM COATED, EXTENDED RELEASE ORAL at 09:27

## 2024-05-04 RX ADMIN — HYDROCHLOROTHIAZIDE 25 MG: 25 TABLET ORAL at 09:27

## 2024-05-04 ASSESSMENT — ACTIVITIES OF DAILY LIVING (ADL)
ADLS_ACUITY_SCORE: 29
ADLS_ACUITY_SCORE: 29
LAUNDRY: UNABLE TO COMPLETE
ADLS_ACUITY_SCORE: 29
ADLS_ACUITY_SCORE: 29
HYGIENE/GROOMING: INDEPENDENT
ADLS_ACUITY_SCORE: 29
DRESS: INDEPENDENT
ADLS_ACUITY_SCORE: 29
ORAL_HYGIENE: INDEPENDENT
ADLS_ACUITY_SCORE: 29
HYGIENE/GROOMING: INDEPENDENT
DRESS: SCRUBS (BEHAVIORAL HEALTH);INDEPENDENT
ADLS_ACUITY_SCORE: 29
ORAL_HYGIENE: INDEPENDENT
ADLS_ACUITY_SCORE: 29
ADLS_ACUITY_SCORE: 29

## 2024-05-04 NOTE — PROVIDER NOTIFICATION
05/04/24 0655   Sleep/Rest   Night Time # Hours 7 hours     Pt had a quiet night with no behavioral or safety concerns. Pt was observed sleeping the entire night, no acute events noted or reported.

## 2024-05-04 NOTE — PLAN OF CARE
Problem: Adult Behavioral Health Plan of Care  Goal: Adheres to Safety Considerations for Self and Others  Outcome: Progressing  Intervention: Develop and Maintain Individualized Safety Plan  Recent Flowsheet Documentation  Taken 5/3/2024 1900 by Alisia Marie RN  Safety Measures:   clinical history reviewed   safety rounds completed   suicide check-in completed   Goal Outcome Evaluation:    Plan of Care Reviewed With: patient          Patient was sleeping at the start of the shift, woke up for dinner. Patient presents with a flat affect but bright upon approach. Mood is calm. Patient denied all mental health symptoms including anxiety, depression, SI, HI, SIB, AVH. Patient denied pain and all other physical discomfort.     Patient had an adequate oral intake, denied any concerns with bowel and bladder. Patient was medication compliant, no PRN was utilized this shift.     Patient has been safe on the unit, no agitation, behavioral outbursts or acute safety concerns. Staff will continue to follow the plan of care.

## 2024-05-04 NOTE — PLAN OF CARE
Problem: Anxiety Signs/Symptoms  Goal: Enhanced Social, Occupational or Functional Skills (Anxiety Signs/Symptoms)  Outcome: Progressing   Goal Outcome Evaluation:    RN Assessment:  SI/Self harm:  pt denies  Aggression/agitation/HI:  none reported or observed  AVH:  pt denies, does not appear to be responding to internal stimuli  Sleep: adequate per pt  PRN Med: No PRNs administered this shift  Medication AE: none reported or observed  Physical Complaints/Issues: pt denies  I & O: eating and drinking well  LBM: yesterday, denies loose stools  ADLs: independent  Visits: none this shift  Vitals:  WNL  COVID 19 Assessment:  no symptoms present  Milieu Participation: visible in the lounge for brief periods, observed interacting with select peers. Pt spent most of the shift in his room though did present for meals and medications.   Behavior: appropriate boundaries, no inappropriate behavior or outbursts. Pt is pleasant, calm and polite in interactions. Pt shows good eye contact and speech is linear and coherent. Pt is cooperative with care.   No other concerns at this time. Nursing will continue to monitor and assess.

## 2024-05-05 PROCEDURE — 250N000013 HC RX MED GY IP 250 OP 250 PS 637: Performed by: FAMILY MEDICINE

## 2024-05-05 PROCEDURE — 250N000013 HC RX MED GY IP 250 OP 250 PS 637: Performed by: CLINICAL NURSE SPECIALIST

## 2024-05-05 PROCEDURE — 124N000002 HC R&B MH UMMC

## 2024-05-05 PROCEDURE — 250N000013 HC RX MED GY IP 250 OP 250 PS 637: Performed by: PHYSICIAN ASSISTANT

## 2024-05-05 RX ADMIN — POLYETHYLENE GLYCOL 3350 17 G: 17 POWDER, FOR SOLUTION ORAL at 08:35

## 2024-05-05 RX ADMIN — LORATADINE 10 MG: 10 TABLET ORAL at 08:35

## 2024-05-05 RX ADMIN — DIVALPROEX SODIUM 500 MG: 500 TABLET, FILM COATED, EXTENDED RELEASE ORAL at 08:35

## 2024-05-05 RX ADMIN — PANTOPRAZOLE SODIUM 40 MG: 40 TABLET, DELAYED RELEASE ORAL at 08:35

## 2024-05-05 RX ADMIN — ATORVASTATIN CALCIUM 40 MG: 40 TABLET, FILM COATED ORAL at 08:35

## 2024-05-05 RX ADMIN — CLOZAPINE 200 MG: 200 TABLET ORAL at 20:17

## 2024-05-05 RX ADMIN — DIVALPROEX SODIUM 1000 MG: 500 TABLET, FILM COATED, EXTENDED RELEASE ORAL at 20:17

## 2024-05-05 RX ADMIN — Medication 900 MG: at 20:17

## 2024-05-05 RX ADMIN — HYDROCHLOROTHIAZIDE 25 MG: 25 TABLET ORAL at 08:35

## 2024-05-05 RX ADMIN — QUETIAPINE 100 MG: 100 TABLET ORAL at 20:17

## 2024-05-05 RX ADMIN — FLUTICASONE PROPIONATE 1 SPRAY: 50 SPRAY, METERED NASAL at 08:37

## 2024-05-05 RX ADMIN — Medication 900 MG: at 08:35

## 2024-05-05 RX ADMIN — CLOZAPINE 50 MG: 50 TABLET ORAL at 08:35

## 2024-05-05 ASSESSMENT — ACTIVITIES OF DAILY LIVING (ADL)
ADLS_ACUITY_SCORE: 29
DRESS: INDEPENDENT
ADLS_ACUITY_SCORE: 29
ORAL_HYGIENE: INDEPENDENT
ADLS_ACUITY_SCORE: 29
LAUNDRY: UNABLE TO COMPLETE
ADLS_ACUITY_SCORE: 29
ADLS_ACUITY_SCORE: 29
ORAL_HYGIENE: INDEPENDENT
HYGIENE/GROOMING: INDEPENDENT
ADLS_ACUITY_SCORE: 29
HYGIENE/GROOMING: INDEPENDENT
ADLS_ACUITY_SCORE: 29
DRESS: INDEPENDENT;SCRUBS (BEHAVIORAL HEALTH)

## 2024-05-05 NOTE — PLAN OF CARE
Problem: Anxiety Signs/Symptoms  Goal: Improved Mood Symptoms (Anxiety Signs/Symptoms)  Outcome: Adequate for Care Transition   Goal Outcome Evaluation:    RN Assessment:  SI/Self harm:  pt denies  Aggression/agitation/HI: none reported or observed    AVH:  pt denies, not observed responding to internal stimuli  Sleep: adequate  PRN Med: No PRNs administered this shift  Medication AE: none reported or observed  Physical Complaints/Issues: pt denies  I & O: eating and drinking well  LBM: yesterday, denies concerns  ADLs: independent  Visits: none this shift  Vitals:  WNL  COVID 19 Assessment:  no symptoms present  Milieu Participation: pt spent most of the shift resting in his room, came out to the milieu after lunch and has been social with a select peer since then in the dining room.  Behavior: overall quite pleasant, calm and cooperative. Pt makes good eye contact and speech is logical. Pt is somewhat dismissive of assessments, denies all mental health and physical health symptoms.   No other concerns at this time. Nursing will continue to monitor and assess.

## 2024-05-05 NOTE — PLAN OF CARE
Problem: Seclusion/Restraint, Behavioral  Goal: Seclusion/Behavioral Restraint Goal: Absence of Harm or Injury  Intervention: Implement Least Restrictive Safety Strategies  Recent Flowsheet Documentation  Taken 5/5/2024 0412 by Lesley Bowen RN  Safety Measures: safety rounds completed     Problem: Adult Behavioral Health Plan of Care  Goal: Adheres to Safety Considerations for Self and Others  Intervention: Develop and Maintain Individualized Safety Plan  Recent Flowsheet Documentation  Taken 5/5/2024 0412 by Lesley Bowen RN  Safety Measures: safety rounds completed  Goal: Absence of New-Onset Illness or Injury  Intervention: Identify and Manage Fall Risk  Recent Flowsheet Documentation  Taken 5/5/2024 0412 by Lesley Bowen RN  Safety Measures: safety rounds completed     Problem: Adult Behavioral Health Plan of Care  Goal: Adheres to Safety Considerations for Self and Others  Outcome: Progressing  Intervention: Develop and Maintain Individualized Safety Plan  Recent Flowsheet Documentation  Taken 5/5/2024 0412 by Lesley Bowen RN  Safety Measures: safety rounds completed  Goal: Absence of New-Onset Illness or Injury  Intervention: Identify and Manage Fall Risk  Recent Flowsheet Documentation  Taken 5/5/2024 0412 by Lesley Bowen RN  Safety Measures: safety rounds completed     Problem: Sleep Disturbance  Goal: Adequate Sleep/Rest  Outcome: Progressing     Problem: Psychotic Signs/Symptoms  Goal: Improved Sleep (Psychotic Signs/Symptoms)  Intervention: Promote Healthy Sleep Hygiene  Recent Flowsheet Documentation  Taken 5/5/2024 0412 by Lesley Bowen RN  Sleep Hygiene Promotion: regular sleep pattern promoted     Problem: Anxiety Signs/Symptoms  Goal: Improved Sleep (Anxiety Signs/Symptoms)  Intervention: Promote Healthy Sleep Hygiene  Recent Flowsheet Documentation  Taken 5/5/2024 0412 by Lesley Bowen RN  Sleep Hygiene Promotion: regular sleep pattern promoted   Goal Outcome Evaluation:       Patient  was calm and slept all night without any safety concern.No pain was reported, and no medication was given. No belabored breathing observed during safety rounding.       Sleep Hours: 7

## 2024-05-05 NOTE — PLAN OF CARE
Problem: Adult Behavioral Health Plan of Care  Goal: Adheres to Safety Considerations for Self and Others  Outcome: Progressing  Intervention: Develop and Maintain Individualized Safety Plan  Recent Flowsheet Documentation  Taken 5/4/2024 2029 by Alisia Marie RN  Safety Measures:   clinical history reviewed   safety rounds completed   suicide check-in completed   Goal Outcome Evaluation:    Plan of Care Reviewed With: patient          Patient was visible in the lounge this shift, observed interacting with a particular peer. Patient presents with a full range affect, mood is calm. Patient denied all mental health symptoms including anxiety, depression, SI, HI, SIB, AVH. Patient denied racing or intrusive thoughts. Patient denied pain. Insight and judgment is appropriate.    Patient had an adequate oral intake, no reports of bowel and bladder concerns. Patient was medication compliant, no PRN was utilized this shift and no side effects of medication was reported or observed.     Patient has remained safe on the unit, no agitation, behavioral outbursts or acute safety concerns. Staff will continue to follow the plan of care.    /84 (BP Location: Right arm)   Pulse 76   Temp 97.4  F (36.3  C) (Oral)   Resp 16   Ht 1.829 m (6')   Wt 108 kg (238 lb)   SpO2 97%   BMI 32.28 kg/m

## 2024-05-06 PROCEDURE — 99232 SBSQ HOSP IP/OBS MODERATE 35: CPT | Performed by: CLINICAL NURSE SPECIALIST

## 2024-05-06 PROCEDURE — 250N000013 HC RX MED GY IP 250 OP 250 PS 637: Performed by: PHYSICIAN ASSISTANT

## 2024-05-06 PROCEDURE — 250N000013 HC RX MED GY IP 250 OP 250 PS 637: Performed by: CLINICAL NURSE SPECIALIST

## 2024-05-06 PROCEDURE — 124N000002 HC R&B MH UMMC

## 2024-05-06 PROCEDURE — 250N000013 HC RX MED GY IP 250 OP 250 PS 637: Performed by: FAMILY MEDICINE

## 2024-05-06 RX ADMIN — HYDROCHLOROTHIAZIDE 25 MG: 25 TABLET ORAL at 08:43

## 2024-05-06 RX ADMIN — LORATADINE 10 MG: 10 TABLET ORAL at 08:41

## 2024-05-06 RX ADMIN — POLYETHYLENE GLYCOL 3350 17 G: 17 POWDER, FOR SOLUTION ORAL at 08:40

## 2024-05-06 RX ADMIN — Medication 900 MG: at 21:13

## 2024-05-06 RX ADMIN — ATORVASTATIN CALCIUM 40 MG: 40 TABLET, FILM COATED ORAL at 08:40

## 2024-05-06 RX ADMIN — QUETIAPINE 100 MG: 100 TABLET ORAL at 21:14

## 2024-05-06 RX ADMIN — CLOZAPINE 200 MG: 200 TABLET ORAL at 21:14

## 2024-05-06 RX ADMIN — DIVALPROEX SODIUM 1000 MG: 500 TABLET, FILM COATED, EXTENDED RELEASE ORAL at 21:14

## 2024-05-06 RX ADMIN — CLOZAPINE 50 MG: 50 TABLET ORAL at 08:41

## 2024-05-06 RX ADMIN — Medication 900 MG: at 08:41

## 2024-05-06 RX ADMIN — FLUTICASONE PROPIONATE 1 SPRAY: 50 SPRAY, METERED NASAL at 08:41

## 2024-05-06 RX ADMIN — PANTOPRAZOLE SODIUM 40 MG: 40 TABLET, DELAYED RELEASE ORAL at 08:41

## 2024-05-06 RX ADMIN — DIVALPROEX SODIUM 500 MG: 500 TABLET, FILM COATED, EXTENDED RELEASE ORAL at 08:40

## 2024-05-06 ASSESSMENT — ACTIVITIES OF DAILY LIVING (ADL)
ADLS_ACUITY_SCORE: 29
ORAL_HYGIENE: INDEPENDENT
ADLS_ACUITY_SCORE: 29
HYGIENE/GROOMING: INDEPENDENT
ADLS_ACUITY_SCORE: 29
DRESS: INDEPENDENT
ADLS_ACUITY_SCORE: 29
LAUNDRY: UNABLE TO COMPLETE
ADLS_ACUITY_SCORE: 29
DRESS: INDEPENDENT
ORAL_HYGIENE: INDEPENDENT
HYGIENE/GROOMING: INDEPENDENT
ADLS_ACUITY_SCORE: 29
ADLS_ACUITY_SCORE: 29
LAUNDRY: WITH SUPERVISION
ADLS_ACUITY_SCORE: 29

## 2024-05-06 NOTE — PLAN OF CARE
Problem: Adult Behavioral Health Plan of Care  Goal: Adheres to Safety Considerations for Self and Others  Intervention: Develop and Maintain Individualized Safety Plan  Recent Flowsheet Documentation  Taken 5/6/2024 0312 by Lesley Bowen RN  Safety Measures: safety rounds completed  Goal: Absence of New-Onset Illness or Injury  Intervention: Identify and Manage Fall Risk  Recent Flowsheet Documentation  Taken 5/6/2024 0312 by Lesley Bowen RN  Safety Measures: safety rounds completed     Problem: Sleep Disturbance  Goal: Adequate Sleep/Rest  Outcome: Progressing     Problem: Psychotic Signs/Symptoms  Goal: Improved Sleep (Psychotic Signs/Symptoms)  Intervention: Promote Healthy Sleep Hygiene  Recent Flowsheet Documentation  Taken 5/6/2024 0312 by Lesley Bowen RN  Sleep Hygiene Promotion: regular sleep pattern promoted   Goal Outcome Evaluation:       Patient slept all night without any safety concern. He reported no pain, and no medication was given. Safety checks were conducted, and patient did not seem seem to be in distress. We will continue to observe this patient and report any changes.    Sleep Hours: 7

## 2024-05-06 NOTE — PLAN OF CARE
Problem: Adult Behavioral Health Plan of Care  Goal: Adheres to Safety Considerations for Self and Others  Outcome: Progressing  Intervention: Develop and Maintain Individualized Safety Plan  Recent Flowsheet Documentation  Taken 5/5/2024 1615 by Alisia Marie RN  Safety Measures:   clinical history reviewed   safety rounds completed   suicide check-in completed   Goal Outcome Evaluation:    Plan of Care Reviewed With: patient          Patient was visible in the lounge, observed interacting with select peer. Patient presents with a full range affect, mood is calm. Patient denied all mental health symptoms. Patient reports lower back pain but declined intervention. Insight and judgment is limited.     Patient had an adequate oral intake, denied any concerns with bowel and bladder. Patient was medication compliant, no PRN med was given this shift.     Patient has remained safe on the unit. No agitation, behavioral outbursts or acute safety concerns. Staff will continue to follow the plan of care.

## 2024-05-06 NOTE — PLAN OF CARE
Problem: Anxiety Signs/Symptoms  Goal: Optimized Cognitive Function (Anxiety Signs/Symptoms)  Outcome: Progressing  Flowsheets (Taken 5/6/2024 1102)  Mutually Determined Action Steps (Optimized Cognitive Function): participates in one-to-one sessions   Goal Outcome Evaluation:    RN Assessment:  SI/Self harm:  pt denies  Aggression/agitation/HI:  none reported or observed  AVH:  pt does not appear to be responding to internal stimuli  Sleep: adequate per pt report  PRN Med: No PRNs administered this shift  Medication AE: none reported or observed  Physical Complaints/Issues: pt denies  I & O: eating and drinking well  LBM: yesterday (5/5/24)  ADLs: independent  Visits: none this shift  Vitals:  WNL  COVID 19 Assessment:  no symptoms present  Milieu Participation: visible, will engage when approached  Behavior: overall calm, pleasant and cooperative. Pt appears flat at times but brightens with interaction. Pt spent most of the shift resting in his room, comes out for meals. Not observed socializing with peers. No other concerns at this time. Nursing will continue to monitor and assess.

## 2024-05-06 NOTE — PLAN OF CARE
Team Note Due:  Friday    Assessment/Intervention/Current Symtoms and Care Coordination:  Chart review and met with team, discussed pt progress, symptomology, and response to treatment.  Discussed the discharge plan and any potential impediments to discharge.    [facility] met with Cristel to discuss discharge plans. [facility] updated Cristel that he has been denied several placements for [facility] Tx programs, and that he has been placed on the [facility] Waitlist. [facility] recommended IRTS referrals and [facility] gave verbal permission for [facility] to make IRTS referrals.   [facility] made IRTS referrals to [facility], [facility], [facility], [facility], [facility], [facility] and [facility]     Discharge Plan or Goal:  When patient's symptoms have reduced and a safe discharge can be facilitated, options include: Residential treatment, IRTS, shelter      Barriers to Discharge:  Discharge planning for residential treatment as patient continues to stabilize      Referral Status:  IRTS:   [facility]: faxed 5/6,  [facility]: faxed 5/6  [facility]: faxed 5/6  [facility]: faxed 5/6  [facility]: faxed 5/6  [facility]: faxed 5/6  [facility]: faxed 5/6    [facility] Tx:  [facility] 4/19 - declined 4/19  [facility] 4/19 - declined 4/29  [facility] 4/22 - declined 4/29  [facility] 4/22  [facility] 4/22 - declined 4/24  [facility] 4/29 - 5/3 placed on waitlist-likely wont be placed until June.   [facility] 4/30  [facility] 4/30  [facility] 4/30  [facility] 4/30  [facility] 4/30 - declined 4/30  [facility] 4/30     Legal Status:  Voluntary    Contacts:  [facility] : Heidy Pop, [phone] (WAN)  [facility] : Yamila [phone] (WAN)  [facility] Psychiatrist: Camron Watson MD, [phone] (WAN)  Mom: Candis, [phone] (Consent)  Friend: Nabila AlmeidaJakobCristobal, [phone] (WAN)  [facility] Intake: [email or web address] (WAN)     Upcoming Meetings and Dates/Important Information and next steps:  Call  Nellie at [facility] back [phone] regarding referral questions   Call [facility] back regarding IRTS referral   Discharge planning

## 2024-05-06 NOTE — PROGRESS NOTES
The patient's care was discussed with the treatment team during the daily team meeting and/or staff's chart notes were reviewed. Staff report patient slept 7 hours with no behavioral concerns overnight. Evening staff report: Patient was visible in the lounge, observed interacting with select peer. Patient presents with a full range affect, mood is calm. Patient denied all mental health symptoms. Patient reports lower back pain but declined intervention. Insight and judgment is limited.   Patient had an adequate oral intake, denied any concerns with bowel and bladder. Patient was medication compliant, no PRN med was given this shift.   Patient has remained safe on the unit. No agitation, behavioral outbursts or acute safety concerns. Staff will continue to follow the plan of care.        Upon interview, patient reports doing well, informed writer of his meeting with the new [facility], that they are still awaiting placement, that Best informed him that [facility] has placed him on their wait list and that this could be over a month. That Best also discussed about IRTS referrals with him and he is in agreement. Patient is a bit frustrated with being denied several placements for [facility] as he has made up his mind to remain clean of drugs. Patient was reassured that at least one of those referrals would go through and that he should not despair over this. Patient is calm, cooperative and in good mood. Patient denies A/VH, SI/HI, and thoughts of self-harm.             Medications:      Inpatient Administered Meds                     Current Facility-Administered Medications   Medication Dose Route Frequency Provider Last Rate Last Admin    atorvastatin (LIPITOR) tablet 40 mg  40 mg Oral Daily Jaswinder Claros MD   40 mg at 04/10/24 0818    cloZAPine (CLOZARIL) tablet 100 mg  100 mg Oral At Bedtime Jaswinder Claros MD   100 mg at 04/09/24 1848    cloZAPine (CLOZARIL) tablet 50 mg  50 mg Oral Daily  Jaswinder Claros MD   50 mg at 04/10/24 0818    divalproex sodium extended-release (DEPAKOTE ER) 24 hr tablet 500 mg  500 mg Oral BID Jaswinder Claros MD   500 mg at 04/10/24 0818    gabapentin (NEURONTIN) tablet 600 mg  600 mg Oral BID Jaswinder Claros MD   600 mg at 04/10/24 0818    hydrochlorothiazide (HYDRODIURIL) tablet 25 mg  25 mg Oral Daily Jaswinder Claros MD   25 mg at 04/10/24 0818    nicotine (NICODERM CQ) 21 MG/24HR 24 hr patch 1 patch  1 patch Transdermal Daily Xavi Medeiros MD   1 patch at 04/10/24 0821              Allergies:      Allergies             Allergies   Allergen Reactions    Haloperidol Confusion and Other (See Comments)       Prone to EPSE. COnsider IM Zyprexa or be sure to give with benadryl              Labs:      Recent Results               Recent Results (from the past 336 hour(s))   EKG 12-lead, complete     Collection Time: 04/15/24  9:20 AM   Result Value Ref Range     Systolic Blood Pressure   mmHg     Diastolic Blood Pressure   mmHg     Ventricular Rate 85 BPM     Atrial Rate 85 BPM     AR Interval 152 ms     QRS Duration 94 ms      ms     QTc 449 ms     P Axis 73 degrees     R AXIS 1 degrees     T Axis 29 degrees     Interpretation ECG           Sinus rhythm  No previous ECGs available  Confirmed by fellow William Chahal ([id number]) on 4/17/2024 9:31:06 AM  Confirmed by MD QUINTANA HENRI ([id number]) on 4/17/2024 10:20:26 PM      Comprehensive metabolic panel     Collection Time: 04/15/24  9:53 AM   Result Value Ref Range     Sodium 139 135 - 145 mmol/L     Potassium 4.2 3.4 - 5.3 mmol/L     Carbon Dioxide (CO2) 29 22 - 29 mmol/L     Anion Gap 10 7 - 15 mmol/L     Urea Nitrogen 18.6 6.0 - 20.0 mg/dL     Creatinine 0.97 0.67 - 1.17 mg/dL     GFR Estimate >90 >60 mL/min/1.73m2     Calcium 8.9 8.6 - 10.0 mg/dL     Chloride 100 98 - 107 mmol/L     Glucose 117 (H) 70 - 99 mg/dL     Alkaline Phosphatase 73 40 - 150 U/L     AST 31 0 - 45 U/L      ALT 28 0 - 70 U/L     Protein Total 7.2 6.4 - 8.3 g/dL     Albumin 4.5 3.5 - 5.2 g/dL     Bilirubin Total 0.7 <=1.2 mg/dL   CBC with platelets and differential     Collection Time: 04/15/24  9:53 AM   Result Value Ref Range     WBC Count 6.3 4.0 - 11.0 10e3/uL     RBC Count 5.09 4.40 - 5.90 10e6/uL     Hemoglobin 14.0 13.3 - 17.7 g/dL     Hematocrit 44.9 40.0 - 53.0 %     MCV 88 78 - 100 fL     MCH 27.5 26.5 - 33.0 pg     MCHC 31.2 (L) 31.5 - 36.5 g/dL     RDW 13.2 10.0 - 15.0 %     Platelet Count 234 150 - 450 10e3/uL     % Neutrophils 59 %     % Lymphocytes 33 %     % Monocytes 5 %     % Eosinophils 2 %     % Basophils 1 %     % Immature Granulocytes 0 %     NRBCs per 100 WBC 0 <1 /100     Absolute Neutrophils 3.8 1.6 - 8.3 10e3/uL     Absolute Lymphocytes 2.1 0.8 - 5.3 10e3/uL     Absolute Monocytes 0.3 0.0 - 1.3 10e3/uL     Absolute Eosinophils 0.1 0.0 - 0.7 10e3/uL     Absolute Basophils 0.0 0.0 - 0.2 10e3/uL     Absolute Immature Granulocytes 0.0 <=0.4 10e3/uL     Absolute NRBCs 0.0 10e3/uL   WBC and Differential     Collection Time: 04/17/24 12:48 PM   Result Value Ref Range     WBC Count 5.6 4.0 - 11.0 10e3/uL     % Neutrophils 68 %     % Lymphocytes 25 %     % Monocytes 5 %     % Eosinophils 1 %     % Basophils 1 %     % Immature Granulocytes 0 %     NRBCs per 100 WBC 0 <1 /100     Absolute Neutrophils 3.8 1.6 - 8.3 10e3/uL     Absolute Lymphocytes 1.4 0.8 - 5.3 10e3/uL     Absolute Monocytes 0.3 0.0 - 1.3 10e3/uL     Absolute Eosinophils 0.0 0.0 - 0.7 10e3/uL     Absolute Basophils 0.0 0.0 - 0.2 10e3/uL     Absolute Immature Granulocytes 0.0 <=0.4 10e3/uL     Absolute NRBCs 0.0 10e3/uL   Extra Green Top (Lithium Heparin) Tube     Collection Time: 04/17/24 12:48 PM   Result Value Ref Range     Hold Specimen JI     Valproic acid     Collection Time: 04/18/24  8:46 AM   Result Value Ref Range     Valproic acid 60.3   ug/mL   WBC and Differential     Collection Time: 04/24/24  8:12 AM   Result Value Ref Range      WBC Count 5.1 4.0 - 11.0 10e3/uL     % Neutrophils 47 %     % Lymphocytes 42 %     % Monocytes 9 %     % Eosinophils 1 %     % Basophils 1 %     % Immature Granulocytes 0 %     NRBCs per 100 WBC 0 <1 /100     Absolute Neutrophils 2.4 1.6 - 8.3 10e3/uL     Absolute Lymphocytes 2.1 0.8 - 5.3 10e3/uL     Absolute Monocytes 0.5 0.0 - 1.3 10e3/uL     Absolute Eosinophils 0.1 0.0 - 0.7 10e3/uL     Absolute Basophils 0.0 0.0 - 0.2 10e3/uL     Absolute Immature Granulocytes 0.0 <=0.4 10e3/uL     Absolute NRBCs 0.0 10e3/uL   Extra Green Top (Lithium Heparin) Tube     Collection Time: 04/24/24  8:12 AM   Result Value Ref Range     Hold Specimen [facility]                Psychiatric Examination:      /84 (BP Location: Left arm, Patient Position: Sitting, Cuff Size: Adult Regular)   Pulse 94   Temp 97.8  F (36.6  C) (Oral)   Resp 12   Ht 1.829 m (6')   Wt 107.1 kg (236 lb 1.6 oz)   SpO2 98%   BMI 32.02 kg/m    Weight is 236 lbs 1.6 oz  Body mass index is 32.02 kg/m .     Weight over time:          Vitals:     04/03/24 1151 04/09/24 0826   Weight: 108 kg (238 lb) 107.1 kg (236 lb 1.6 oz)         Orthostatic Vitals           Most Recent       Sitting Orthostatic /85 04/09 0826     Sitting Orthostatic Pulse (bpm) 73 04/09 0826     Standing Orthostatic /92 04/09 0826     Standing Orthostatic Pulse (bpm) 69 04/09 0826             Cardiometabolic risk assessment. 04/10/24     Reviewed patient profile for cardiometabolic risk factors     Date taken /Value  REFERENCE RANGE   Abdominal Obesity  (Waist Circumference)   See nursing flowsheet Women ?35 in (88 cm)   Men ?40 in (102 cm)       Triglycerides                Triglycerides   Date Value Ref Range Status   02/26/2013 71 0 - 150 mg/dL Final       Comment:       Fasting specimen         ?150 mg/dL (1.7 mmol/L) or current treatment for elevated triglycerides   HDL cholesterol            HDL Cholesterol   Date Value Ref Range Status   02/26/2013 45 40 - 110 mg/dL  "Final   ]    Women <50 mg/dL (1.3 mmol/L) in women or current treatment for low HDL cholesterol  Men <40 mg/dL (1 mmol/L) in men or current treatment for low HDL cholesterol      Fasting plasma glucose (FPG)           Lab Results   Component Value Date      04/08/2024     GLC 83 02/26/2013        FPG ?100 mg/dL (5.6 mmol/L) or treatment for elevated blood glucose   Blood pressure        BP Readings from Last 3 Encounters:   04/08/24 127/84   02/26/13 129/74    Blood pressure ?130/85 mmHg or treatment for elevated blood pressure   Family History  See family history      Mental Status Exam:  Appearance: awake, alert, dressed in a hospital scrubs, appeared as age stated  Attitude:  calm, cooperative  Eye Contact:  good  Mood: \" good\"  Affect: Flat  Speech:  clear, normal tone and volume  Language: fluent and intact in English  Psychomotor, Gait, Musculoskeletal:  no evidence of tardive dyskinesia, dystonia, or tics  Throught Process: Linear, Logical, goal directed  Associations:  No Loosening of associations  Thought Content:  no evidence of suicidal ideation or homicidal ideation, no auditory hallucinations present, and no visual hallucinations present  Insight: True emotional awareness  Judgement: fair  Oriented to:  time, person, and place  Attention Span and Concentration: Good  Recent and Remote Memory: Intact  Fund of Knowledge: Appropriate             Precautions:                Behavioral Orders   Procedures    Assault precautions    Code 1 - Restrict to Unit    Elopement precautions    [facility] Extended Care       Until discharge, Extended Care to offer psychotherapeutic services to mental health patients boarding for admission or stabilization. These services are to include but are not limited to: individual psychotherapy, diagnostic assessment, case management and care planning, safety planning, etc. This may include up to 1 visit per day. If patient is physically located at [facility] or [facility], group " psychotherapy up to 2 time per day may be offered.     [facility] Extended [facility]       Until discharge, Extended Care to offer psychotherapeutic services to mental health patients boarding for admission or stabilization. These services are to include but are not limited to: individual psychotherapy, diagnostic assessment, case management and care planning, safety planning, etc. This may include up to 1 visit per day. If patient is physically located at [facility] or [facility], group psychotherapy up to 2 time per day may be offered.     Routine Programming       As clinically indicated    Sexual precautions    Status 15       Every 15 minutes.             Assault risk     -When following observed, team will review discontinuation of SIO:     When patient is not longer aggressive, sexually inappropriate or intrusive.       Order Specific Question:   CONTINUOUS 24 hours / day       Answer:   Other       Order Specific Question:   Specify distance       Answer:   10 foot       Order Specific Question:   Indications for SIO       Answer:   Assault risk       Order Specific Question:   Indications for SIO       Answer:   Severe intrusiveness    Suicide precautions: Suicide Risk: HIGH; Clinical rationale to override score: response to medication       Patients on Suicide Precautions should have a Combination Diet ordered that includes a Diet selection(s) AND a Behavioral Tray selection for Safe Tray - with utensils, or Safe Tray - NO utensils          Order Specific Question:   Suicide Risk       Answer:   HIGH       Order Specific Question:   Clinical rationale to override score:       Answer:   response to medication           Diagnoses:         Schizoaffective disorder, bipolar type (H)  Polysubstance abuse (H)  Agitation     Clinically Significant Risk Factors                          # Obesity: Estimated body mass index is 32.02 kg/m  as calculated from the following:    Height as of this encounter: 1.829 m (6').    Weight as of this  encounter: 107.1 kg (236 lb 1.6 oz)., PRESENT ON ADMISSION     # Financial/Environmental Concerns: other (see comments) (lost IRTS housing)     # Housing Instability: noted in nursing assessment          Assessment & Plan:      Assessment and hospital summary:  Ward Dawson is a -34- old male with a previous diagnosis of Schizoaffective disorder bipolar type who presented to the ED for a significant behavioral change, verbal agitation, worsening psychosocial stress, in the context of substance use.  Factors that make the mental health crisis life threatening or complex are:  Patient was brought to the ED from [facility] following his ECT treatment this morning at [facility].  Patient states he does not know why he is here and also states he knows why he is here.  He presents as manic, disorganized, and confused.   He is distractable and not a clear historian at this time.  Patient states he has not slept in a long time and received narcan last night.  Per ACT team  and [facility] IRTS staff patient has been elevated, intrusive, and disruptive.  Patient broke a window yesterday.  IRTS reports patient had turned table over and tried using them as skate board ramps.  Patient was noted for have been fairly stable prior to his pass this weekend.  CM reports patient went to a skateboarding competition in [place].  Patient returned in a manic state.  CM reports pt may not appear as manic as he has been the past few days due to sedation and ECT this morning.  She reports patient has not slept in 3 days.  IRTS reports pt received narcan twice last night.  Patient has been trespassed from the IRTS facility.  [facility] is postive for cannabis..         Today's Changes: 5/6/24  No changes made to medications     Target psychiatric symptoms and interventions:  Admitted on 4/9/24 to station 12N  Clozaril, 50 mg every morning and 200 mg at bedtime  --Depakote ER, 500 mg daily and 1000 mg at bedtime for mood stabilization    --Gabapentin, 900 mg twice a day for pain  - Ibuprofen tablet 400 mg orally every 6 hours prn for pain  --Additional medications will include B52, Zyprexa, trazodone, hydroxyzine     Risks, benefits, and alternatives discussed at length with patient.      Acute Medical Problems and Treatments:  Acute medical concerns:  - No acute medical concerns     Pertinent labs/imaging:  Recent Results      Recent Results               Recent Results (from the past 336 hour(s))   Valproic acid     Collection Time: 04/05/24  9:39 PM   Result Value Ref Range     Valproic acid 68.4   ug/mL   Asymptomatic COVID-19 Virus (Coronavirus) by PCR Nasopharyngeal     Collection Time: 04/05/24  9:40 PM     Specimen: Nasopharyngeal; Swab   Result Value Ref Range     SARS CoV2 PCR Negative Negative   Glucose by meter     Collection Time: 04/08/24  8:21 PM   Result Value Ref Range     GLUCOSE BY METER POCT 117 (H) 70 - 99 mg/dL   WBC and Differential     Collection Time: 04/10/24 10:59 AM   Result Value Ref Range     WBC Count 5.7 4.0 - 11.0 10e3/uL     % Neutrophils 51 %     % Lymphocytes 37 %     % Monocytes 9 %     % Eosinophils 2 %     % Basophils 1 %     % Immature Granulocytes 0 %     NRBCs per 100 WBC 0 <1 /100     Absolute Neutrophils 3.0 1.6 - 8.3 10e3/uL     Absolute Lymphocytes 2.1 0.8 - 5.3 10e3/uL     Absolute Monocytes 0.5 0.0 - 1.3 10e3/uL     Absolute Eosinophils 0.1 0.0 - 0.7 10e3/uL     Absolute Basophils 0.0 0.0 - 0.2 10e3/uL     Absolute Immature Granulocytes 0.0 <=0.4 10e3/uL     Absolute NRBCs 0.0 10e3/uL   EKG 12-lead, complete     Collection Time: 04/15/24  9:20 AM   Result Value Ref Range     Systolic Blood Pressure   mmHg     Diastolic Blood Pressure   mmHg     Ventricular Rate 85 BPM     Atrial Rate 85 BPM     RI Interval 152 ms     QRS Duration 94 ms      ms     QTc 449 ms     P Axis 73 degrees     R AXIS 1 degrees     T Axis 29 degrees     Interpretation ECG           Sinus rhythm  No previous ECGs  available  Confirmed by fellow William Chahal ([id number]) on 4/17/2024 9:31:06 AM  Confirmed by MD LILIAN, PENNY ([id number]) on 4/17/2024 10:20:26 PM      Comprehensive metabolic panel     Collection Time: 04/15/24  9:53 AM   Result Value Ref Range     Sodium 139 135 - 145 mmol/L     Potassium 4.2 3.4 - 5.3 mmol/L     Carbon Dioxide (CO2) 29 22 - 29 mmol/L     Anion Gap 10 7 - 15 mmol/L     Urea Nitrogen 18.6 6.0 - 20.0 mg/dL     Creatinine 0.97 0.67 - 1.17 mg/dL     GFR Estimate >90 >60 mL/min/1.73m2     Calcium 8.9 8.6 - 10.0 mg/dL     Chloride 100 98 - 107 mmol/L     Glucose 117 (H) 70 - 99 mg/dL     Alkaline Phosphatase 73 40 - 150 U/L     AST 31 0 - 45 U/L     ALT 28 0 - 70 U/L     Protein Total 7.2 6.4 - 8.3 g/dL     Albumin 4.5 3.5 - 5.2 g/dL     Bilirubin Total 0.7 <=1.2 mg/dL   CBC with platelets and differential     Collection Time: 04/15/24  9:53 AM   Result Value Ref Range     WBC Count 6.3 4.0 - 11.0 10e3/uL     RBC Count 5.09 4.40 - 5.90 10e6/uL     Hemoglobin 14.0 13.3 - 17.7 g/dL     Hematocrit 44.9 40.0 - 53.0 %     MCV 88 78 - 100 fL     MCH 27.5 26.5 - 33.0 pg     MCHC 31.2 (L) 31.5 - 36.5 g/dL     RDW 13.2 10.0 - 15.0 %     Platelet Count 234 150 - 450 10e3/uL     % Neutrophils 59 %     % Lymphocytes 33 %     % Monocytes 5 %     % Eosinophils 2 %     % Basophils 1 %     % Immature Granulocytes 0 %     NRBCs per 100 WBC 0 <1 /100     Absolute Neutrophils 3.8 1.6 - 8.3 10e3/uL     Absolute Lymphocytes 2.1 0.8 - 5.3 10e3/uL     Absolute Monocytes 0.3 0.0 - 1.3 10e3/uL     Absolute Eosinophils 0.1 0.0 - 0.7 10e3/uL     Absolute Basophils 0.0 0.0 - 0.2 10e3/uL     Absolute Immature Granulocytes 0.0 <=0.4 10e3/uL     Absolute NRBCs 0.0 10e3/uL   WBC and Differential     Collection Time: 04/17/24 12:48 PM   Result Value Ref Range     WBC Count 5.6 4.0 - 11.0 10e3/uL     % Neutrophils 68 %     % Lymphocytes 25 %     % Monocytes 5 %     % Eosinophils 1 %     % Basophils 1 %     % Immature Granulocytes 0 %      NRBCs per 100 WBC 0 <1 /100     Absolute Neutrophils 3.8 1.6 - 8.3 10e3/uL     Absolute Lymphocytes 1.4 0.8 - 5.3 10e3/uL     Absolute Monocytes 0.3 0.0 - 1.3 10e3/uL     Absolute Eosinophils 0.0 0.0 - 0.7 10e3/uL     Absolute Basophils 0.0 0.0 - 0.2 10e3/uL     Absolute Immature Granulocytes 0.0 <=0.4 10e3/uL     Absolute NRBCs 0.0 10e3/uL   Extra Green Top (Lithium Heparin) Tube     Collection Time: 04/17/24 12:48 PM   Result Value Ref Range     Hold Specimen JIC                    Behavioral/Psychological/Social:  - Encourage unit programming     Safety  Placed on the following Precautions: Suicide, Assault, Elopement and Sexual precautions  - Continue precautions as noted above  -  2:1 staff acuity for intrusive, assaultive behaviors  - Status 15 minute checks  - Legal Status: voluntary     Disposition Plan   Reason for ongoing admission: poses an imminent risk to self and poses an imminent risk to others  Discharge location: IR facility or CD  Discharge Medications: not ordered  Follow-up Appointments: not scheduled     Entered by: AMY Yu CNP on 05/01/2024  at 11:30 am         Attestation:   Patient has been seen and evaluated by me, Phan Sanchez, MICK, APRN CNP, PMVAP-BC  The patient was counseled on nature of illness and treatment plan/options  Care was coordinated with treatment team

## 2024-05-06 NOTE — PLAN OF CARE
BEH IP Unit Acuity Rating Score (UARS)  Patient is given one point for every criteria they meet.    CRITERIA SCORING   On a 72 hour hold, court hold, committed, stay of commitment, or revocation. 0    Patient LOS on BEH unit exceeds 20 days. 1  LOS: 24   Patient under guardianship, 55+, otherwise medically complex, or under age 11. 0   Suicide ideation without relief of precipitating factors. 0   Current plan for suicide. 0   Current plan for homicide. 0   Imminent risk or actual attempt to seriously harm another without relief of factors precipitating the attempt. 1   Severe dysfunction in daily living (ex: complete neglect for self care, extreme disruption in vegetative function, extreme deterioration in social interactions). 0   Recent (last 7 days) or current physical aggression in the ED or on unit. 0   Restraints or seclusion episode in past 72 hours. 0   Recent (last 7 days) or current verbal aggression, agitation, yelling, etc., while in the ED or unit. 0   Active psychosis. 1   Need for constant or near constant redirection (from leaving, from others, etc).  0   Intrusive or disruptive behaviors. 0   Patient requires 3 or more hours of individualized nursing care per 8-hour shift (i.e. for ADLs, meds, therapeutic interventions). 0   TOTAL 3

## 2024-05-07 PROCEDURE — 99232 SBSQ HOSP IP/OBS MODERATE 35: CPT | Performed by: CLINICAL NURSE SPECIALIST

## 2024-05-07 PROCEDURE — 250N000013 HC RX MED GY IP 250 OP 250 PS 637: Performed by: FAMILY MEDICINE

## 2024-05-07 PROCEDURE — 250N000013 HC RX MED GY IP 250 OP 250 PS 637: Performed by: CLINICAL NURSE SPECIALIST

## 2024-05-07 PROCEDURE — 250N000013 HC RX MED GY IP 250 OP 250 PS 637: Performed by: PHYSICIAN ASSISTANT

## 2024-05-07 PROCEDURE — 124N000002 HC R&B MH UMMC

## 2024-05-07 RX ADMIN — FLUTICASONE PROPIONATE 1 SPRAY: 50 SPRAY, METERED NASAL at 08:22

## 2024-05-07 RX ADMIN — CLOZAPINE 50 MG: 50 TABLET ORAL at 08:18

## 2024-05-07 RX ADMIN — LORATADINE 10 MG: 10 TABLET ORAL at 08:18

## 2024-05-07 RX ADMIN — Medication 900 MG: at 08:17

## 2024-05-07 RX ADMIN — ATORVASTATIN CALCIUM 40 MG: 40 TABLET, FILM COATED ORAL at 08:18

## 2024-05-07 RX ADMIN — DIVALPROEX SODIUM 1000 MG: 500 TABLET, FILM COATED, EXTENDED RELEASE ORAL at 19:58

## 2024-05-07 RX ADMIN — HYDROCHLOROTHIAZIDE 25 MG: 25 TABLET ORAL at 08:18

## 2024-05-07 RX ADMIN — Medication 900 MG: at 19:58

## 2024-05-07 RX ADMIN — DIVALPROEX SODIUM 500 MG: 500 TABLET, FILM COATED, EXTENDED RELEASE ORAL at 08:18

## 2024-05-07 RX ADMIN — QUETIAPINE 100 MG: 100 TABLET ORAL at 19:58

## 2024-05-07 RX ADMIN — POLYETHYLENE GLYCOL 3350 17 G: 17 POWDER, FOR SOLUTION ORAL at 08:17

## 2024-05-07 RX ADMIN — CLOZAPINE 200 MG: 200 TABLET ORAL at 20:02

## 2024-05-07 RX ADMIN — PANTOPRAZOLE SODIUM 40 MG: 40 TABLET, DELAYED RELEASE ORAL at 08:18

## 2024-05-07 ASSESSMENT — ACTIVITIES OF DAILY LIVING (ADL)
ADLS_ACUITY_SCORE: 29
DRESS: INDEPENDENT
ORAL_HYGIENE: INDEPENDENT
ADLS_ACUITY_SCORE: 29
HYGIENE/GROOMING: INDEPENDENT
ADLS_ACUITY_SCORE: 29
DRESS: INDEPENDENT
HYGIENE/GROOMING: INDEPENDENT
ORAL_HYGIENE: INDEPENDENT
ADLS_ACUITY_SCORE: 29

## 2024-05-07 NOTE — PROGRESS NOTES
"The patient's care was discussed with the treatment team during the daily team meeting and/or staff's chart notes were reviewed. Staff report patient slept 7 hours with no behavioral concerns overnight. Pt has a blunted affect with somber mood attending some groups. Pt was withdrawn to himself and mostly isolative to his room. Pt rates anxiety at 0/10 and depression 0/10. Pt rates pain at 0/10. Pt reports no SI/HI/SIB and contracts for safety. Pt denies any hallucinations and not noted responding to any internal stimuli. Pt was medication compliant. No reported or observed medication side effects. Continue current POC.     Upon interview, patient reports his mood is \"content\" affect remains pleasant and mood congruent. Patient was cooperative with assessment, remains optimistic that one of the referrals would accept him, writer informed the patient that the CTC has expanded his referral to cover a wider range that hopefully one of them would consider him. Patient reports that he has been calling his mother and the call went to  each time, believing that she is blocking his calls. Writer informed the patient that he would give his mother a call. I did call the patient mother, Candis Milner [phone] and it went to a , left a short message to call her back tomorrow.  Patient maintains the slogan \"living one day at a time.\" Patient denies A/VH, SI/HI and thoughts of self harm.           Medications:      Inpatient Administered Meds                     Current Facility-Administered Medications   Medication Dose Route Frequency Provider Last Rate Last Admin    atorvastatin (LIPITOR) tablet 40 mg  40 mg Oral Daily Jaswinder Claros MD   40 mg at 04/10/24 0818    cloZAPine (CLOZARIL) tablet 100 mg  100 mg Oral At Bedtime Jaswinder Claros MD   100 mg at 04/09/24 1848    cloZAPine (CLOZARIL) tablet 50 mg  50 mg Oral Daily Jaswinder Claros MD   50 mg at 04/10/24 0818    divalproex sodium " extended-release (DEPAKOTE ER) 24 hr tablet 500 mg  500 mg Oral BID Jaswinder Claros MD   500 mg at 04/10/24 0818    gabapentin (NEURONTIN) tablet 600 mg  600 mg Oral BID Jaswinder Claros MD   600 mg at 04/10/24 0818    hydrochlorothiazide (HYDRODIURIL) tablet 25 mg  25 mg Oral Daily Jaswinder Claros MD   25 mg at 04/10/24 0818    nicotine (NICODERM CQ) 21 MG/24HR 24 hr patch 1 patch  1 patch Transdermal Daily Xavi Medeiros MD   1 patch at 04/10/24 0821              Allergies:      Allergies             Allergies   Allergen Reactions    Haloperidol Confusion and Other (See Comments)       Prone to EPSE. COnsider IM Zyprexa or be sure to give with benadryl              Labs:      Recent Results               Recent Results (from the past 336 hour(s))   EKG 12-lead, complete     Collection Time: 04/15/24  9:20 AM   Result Value Ref Range     Systolic Blood Pressure   mmHg     Diastolic Blood Pressure   mmHg     Ventricular Rate 85 BPM     Atrial Rate 85 BPM     IN Interval 152 ms     QRS Duration 94 ms      ms     QTc 449 ms     P Axis 73 degrees     R AXIS 1 degrees     T Axis 29 degrees     Interpretation ECG           Sinus rhythm  No previous ECGs available  Confirmed by fellow William Chahal ([id number]) on 4/17/2024 9:31:06 AM  Confirmed by MD LILIAN, PENNY [id number]) on 4/17/2024 10:20:26 PM      Comprehensive metabolic panel     Collection Time: 04/15/24  9:53 AM   Result Value Ref Range     Sodium 139 135 - 145 mmol/L     Potassium 4.2 3.4 - 5.3 mmol/L     Carbon Dioxide (CO2) 29 22 - 29 mmol/L     Anion Gap 10 7 - 15 mmol/L     Urea Nitrogen 18.6 6.0 - 20.0 mg/dL     Creatinine 0.97 0.67 - 1.17 mg/dL     GFR Estimate >90 >60 mL/min/1.73m2     Calcium 8.9 8.6 - 10.0 mg/dL     Chloride 100 98 - 107 mmol/L     Glucose 117 (H) 70 - 99 mg/dL     Alkaline Phosphatase 73 40 - 150 U/L     AST 31 0 - 45 U/L     ALT 28 0 - 70 U/L     Protein Total 7.2 6.4 - 8.3 g/dL     Albumin 4.5 3.5  - 5.2 g/dL     Bilirubin Total 0.7 <=1.2 mg/dL   CBC with platelets and differential     Collection Time: 04/15/24  9:53 AM   Result Value Ref Range     WBC Count 6.3 4.0 - 11.0 10e3/uL     RBC Count 5.09 4.40 - 5.90 10e6/uL     Hemoglobin 14.0 13.3 - 17.7 g/dL     Hematocrit 44.9 40.0 - 53.0 %     MCV 88 78 - 100 fL     MCH 27.5 26.5 - 33.0 pg     MCHC 31.2 (L) 31.5 - 36.5 g/dL     RDW 13.2 10.0 - 15.0 %     Platelet Count 234 150 - 450 10e3/uL     % Neutrophils 59 %     % Lymphocytes 33 %     % Monocytes 5 %     % Eosinophils 2 %     % Basophils 1 %     % Immature Granulocytes 0 %     NRBCs per 100 WBC 0 <1 /100     Absolute Neutrophils 3.8 1.6 - 8.3 10e3/uL     Absolute Lymphocytes 2.1 0.8 - 5.3 10e3/uL     Absolute Monocytes 0.3 0.0 - 1.3 10e3/uL     Absolute Eosinophils 0.1 0.0 - 0.7 10e3/uL     Absolute Basophils 0.0 0.0 - 0.2 10e3/uL     Absolute Immature Granulocytes 0.0 <=0.4 10e3/uL     Absolute NRBCs 0.0 10e3/uL   WBC and Differential     Collection Time: 04/17/24 12:48 PM   Result Value Ref Range     WBC Count 5.6 4.0 - 11.0 10e3/uL     % Neutrophils 68 %     % Lymphocytes 25 %     % Monocytes 5 %     % Eosinophils 1 %     % Basophils 1 %     % Immature Granulocytes 0 %     NRBCs per 100 WBC 0 <1 /100     Absolute Neutrophils 3.8 1.6 - 8.3 10e3/uL     Absolute Lymphocytes 1.4 0.8 - 5.3 10e3/uL     Absolute Monocytes 0.3 0.0 - 1.3 10e3/uL     Absolute Eosinophils 0.0 0.0 - 0.7 10e3/uL     Absolute Basophils 0.0 0.0 - 0.2 10e3/uL     Absolute Immature Granulocytes 0.0 <=0.4 10e3/uL     Absolute NRBCs 0.0 10e3/uL   Extra Green Top (Lithium Heparin) Tube     Collection Time: 04/17/24 12:48 PM   Result Value Ref Range     Hold Specimen [facility]     Valproic acid     Collection Time: 04/18/24  8:46 AM   Result Value Ref Range     Valproic acid 60.3   ug/mL   WBC and Differential     Collection Time: 04/24/24  8:12 AM   Result Value Ref Range     WBC Count 5.1 4.0 - 11.0 10e3/uL     % Neutrophils 47 %     %  Lymphocytes 42 %     % Monocytes 9 %     % Eosinophils 1 %     % Basophils 1 %     % Immature Granulocytes 0 %     NRBCs per 100 WBC 0 <1 /100     Absolute Neutrophils 2.4 1.6 - 8.3 10e3/uL     Absolute Lymphocytes 2.1 0.8 - 5.3 10e3/uL     Absolute Monocytes 0.5 0.0 - 1.3 10e3/uL     Absolute Eosinophils 0.1 0.0 - 0.7 10e3/uL     Absolute Basophils 0.0 0.0 - 0.2 10e3/uL     Absolute Immature Granulocytes 0.0 <=0.4 10e3/uL     Absolute NRBCs 0.0 10e3/uL   Extra Green Top (Lithium Heparin) Tube     Collection Time: 04/24/24  8:12 AM   Result Value Ref Range     Hold Specimen JI                Psychiatric Examination:      /84 (BP Location: Left arm, Patient Position: Sitting, Cuff Size: Adult Regular)   Pulse 94   Temp 97.8  F (36.6  C) (Oral)   Resp 12   Ht 1.829 m (6')   Wt 107.1 kg (236 lb 1.6 oz)   SpO2 98%   BMI 32.02 kg/m    Weight is 236 lbs 1.6 oz  Body mass index is 32.02 kg/m .     Weight over time:          Vitals:     04/03/24 1151 04/09/24 0826   Weight: 108 kg (238 lb) 107.1 kg (236 lb 1.6 oz)         Orthostatic Vitals           Most Recent       Sitting Orthostatic /85 04/09 0826     Sitting Orthostatic Pulse (bpm) 73 04/09 0826     Standing Orthostatic /92 04/09 0826     Standing Orthostatic Pulse (bpm) 69 04/09 0826             Cardiometabolic risk assessment. 04/10/24     Reviewed patient profile for cardiometabolic risk factors     Date taken /Value  REFERENCE RANGE   Abdominal Obesity  (Waist Circumference)   See nursing flowsheet Women ?35 in (88 cm)   Men ?40 in (102 cm)       Triglycerides                Triglycerides   Date Value Ref Range Status   02/26/2013 71 0 - 150 mg/dL Final       Comment:       Fasting specimen         ?150 mg/dL (1.7 mmol/L) or current treatment for elevated triglycerides   HDL cholesterol            HDL Cholesterol   Date Value Ref Range Status   02/26/2013 45 40 - 110 mg/dL Final   ]    Women <50 mg/dL (1.3 mmol/L) in women or current  "treatment for low HDL cholesterol  Men <40 mg/dL (1 mmol/L) in men or current treatment for low HDL cholesterol      Fasting plasma glucose (FPG)           Lab Results   Component Value Date      04/08/2024     GLC 83 02/26/2013        FPG ?100 mg/dL (5.6 mmol/L) or treatment for elevated blood glucose   Blood pressure        BP Readings from Last 3 Encounters:   04/08/24 127/84   02/26/13 129/74    Blood pressure ?130/85 mmHg or treatment for elevated blood pressure   Family History  See family history      Mental Status Exam:  Appearance: awake, alert, dressed in a hospital scrubs, appeared as age stated  Attitude:  calm, cooperative  Eye Contact:  good  Mood: \" good\"  Affect: Flat  Speech:  clear, normal tone and volume  Language: fluent and intact in English  Psychomotor, Gait, Musculoskeletal:  no evidence of tardive dyskinesia, dystonia, or tics  Throught Process: Linear, Logical, goal directed  Associations:  No Loosening of associations  Thought Content:  no evidence of suicidal ideation or homicidal ideation, no auditory hallucinations present, and no visual hallucinations present  Insight: True emotional awareness  Judgement: fair  Oriented to:  time, person, and place  Attention Span and Concentration: Good  Recent and Remote Memory: Intact  Fund of Knowledge: Appropriate             Precautions:                Behavioral Orders   Procedures    Assault precautions    Code 1 - Restrict to Unit    Elopement precautions    [facility] Extended Care       Until discharge, Extended Care to offer psychotherapeutic services to mental health patients boarding for admission or stabilization. These services are to include but are not limited to: individual psychotherapy, diagnostic assessment, case management and care planning, safety planning, etc. This may include up to 1 visit per day. If patient is physically located at [facility] or [facility], group psychotherapy up to 2 time per day may be offered.     [facility] Extended " Care       Until discharge, Extended Care to offer psychotherapeutic services to mental health patients boarding for admission or stabilization. These services are to include but are not limited to: individual psychotherapy, diagnostic assessment, case management and care planning, safety planning, etc. This may include up to 1 visit per day. If patient is physically located at [facility] or [facility], group psychotherapy up to 2 time per day may be offered.     Routine Programming       As clinically indicated    Sexual precautions    Status 15       Every 15 minutes.                Assault risk     -When following observed, team will review discontinuation of SIO:     When patient is not longer aggressive, sexually inappropriate or intrusive.       Order Specific Question:   CONTINUOUS 24 hours / day       Answer:   Other       Order Specific Question:   Specify distance       Answer:   10 foot       Order Specific Question:   Indications for SIO       Answer:   Assault risk       Order Specific Question:   Indications for SIO       Answer:   Severe intrusiveness    Suicide precautions: Suicide Risk: HIGH; Clinical rationale to override score: response to medication       Patients on Suicide Precautions should have a Combination Diet ordered that includes a Diet selection(s) AND a Behavioral Tray selection for Safe Tray - with utensils, or Safe Tray - NO utensils          Order Specific Question:   Suicide Risk       Answer:   HIGH       Order Specific Question:   Clinical rationale to override score:       Answer:   response to medication           Diagnoses:         Schizoaffective disorder, bipolar type (H)  Polysubstance abuse (H)  Agitation     Clinically Significant Risk Factors                          # Obesity: Estimated body mass index is 32.02 kg/m  as calculated from the following:    Height as of this encounter: 1.829 m (6').    Weight as of this encounter: 107.1 kg (236 lb 1.6 oz)., PRESENT ON ADMISSION     #  Financial/Environmental Concerns: other (see comments) (lost [facility] housing)     # Housing Instability: noted in nursing assessment          Assessment & Plan:      Assessment and hospital summary:  Ward Dawson is a -34- old male with a previous diagnosis of Schizoaffective disorder bipolar type who presented to the ED for a significant behavioral change, verbal agitation, worsening psychosocial stress, in the context of substance use.  Factors that make the mental health crisis life threatening or complex are:  Patient was brought to the ED from [facility] following his ECT treatment this morning at [facility].  Patient states he does not know why he is here and also states he knows why he is here.  He presents as manic, disorganized, and confused.   He is distractable and not a clear historian at this time.  Patient states he has not slept in a long time and received narcan last night.  Per ACT team  and [facility] staff patient has been elevated, intrusive, and disruptive.  Patient broke a window yesterday.  IRTS reports patient had turned table over and tried using them as skate board ramps.  Patient was noted for have been fairly stable prior to his pass this weekend.  CM reports patient went to a skateboarding competition in [place].  Patient returned in a manic state.  CM reports pt may not appear as manic as he has been the past few days due to sedation and ECT this morning.  She reports patient has not slept in 3 days.  IRTS reports pt received narcan twice last night.  Patient has been trespassed from the IRTS facility.  S is postive for cannabis..         Today's Changes: 5/7/24  No changes made to medications     Target psychiatric symptoms and interventions:  Admitted on 4/9/24 to station 12N  Clozaril, 50 mg every morning and 200 mg at bedtime  --Depakote ER, 500 mg daily and 1000 mg at bedtime for mood stabilization   --Gabapentin, 900 mg twice a day for pain  - Ibuprofen tablet 400 mg  orally every 6 hours prn for pain  --Additional medications will include B52, Zyprexa, trazodone, hydroxyzine     Risks, benefits, and alternatives discussed at length with patient.      Acute Medical Problems and Treatments:  Acute medical concerns:  - No acute medical concerns     Pertinent labs/imaging:  Recent Results      Recent Results (from the past 336 hour(s))   WBC and Differential    Collection Time: 04/24/24  8:12 AM   Result Value Ref Range    WBC Count 5.1 4.0 - 11.0 10e3/uL    % Neutrophils 47 %    % Lymphocytes 42 %    % Monocytes 9 %    % Eosinophils 1 %    % Basophils 1 %    % Immature Granulocytes 0 %    NRBCs per 100 WBC 0 <1 /100    Absolute Neutrophils 2.4 1.6 - 8.3 10e3/uL    Absolute Lymphocytes 2.1 0.8 - 5.3 10e3/uL    Absolute Monocytes 0.5 0.0 - 1.3 10e3/uL    Absolute Eosinophils 0.1 0.0 - 0.7 10e3/uL    Absolute Basophils 0.0 0.0 - 0.2 10e3/uL    Absolute Immature Granulocytes 0.0 <=0.4 10e3/uL    Absolute NRBCs 0.0 10e3/uL   Extra Green Top (Lithium Heparin) Tube    Collection Time: 04/24/24  8:12 AM   Result Value Ref Range    Hold Specimen [facility]    WBC and Differential    Collection Time: 05/01/24  9:12 AM   Result Value Ref Range    WBC Count 5.7 4.0 - 11.0 10e3/uL    % Neutrophils 51 %    % Lymphocytes 38 %    % Monocytes 8 %    % Eosinophils 2 %    % Basophils 1 %    % Immature Granulocytes 0 %    NRBCs per 100 WBC 0 <1 /100    Absolute Neutrophils 2.9 1.6 - 8.3 10e3/uL    Absolute Lymphocytes 2.1 0.8 - 5.3 10e3/uL    Absolute Monocytes 0.4 0.0 - 1.3 10e3/uL    Absolute Eosinophils 0.1 0.0 - 0.7 10e3/uL    Absolute Basophils 0.0 0.0 - 0.2 10e3/uL    Absolute Immature Granulocytes 0.0 <=0.4 10e3/uL    Absolute NRBCs 0.0 10e3/uL       Recent Results               Recent Results (from the past 336 hour(s))   Valproic acid     Collection Time: 04/05/24  9:39 PM   Result Value Ref Range     Valproic acid 68.4   ug/mL   Asymptomatic COVID-19 Virus (Coronavirus) by PCR Nasopharyngeal      Collection Time: 04/05/24  9:40 PM     Specimen: Nasopharyngeal; Swab   Result Value Ref Range     SARS CoV2 PCR Negative Negative   Glucose by meter     Collection Time: 04/08/24  8:21 PM   Result Value Ref Range     GLUCOSE BY METER POCT 117 (H) 70 - 99 mg/dL   WBC and Differential     Collection Time: 04/10/24 10:59 AM   Result Value Ref Range     WBC Count 5.7 4.0 - 11.0 10e3/uL     % Neutrophils 51 %     % Lymphocytes 37 %     % Monocytes 9 %     % Eosinophils 2 %     % Basophils 1 %     % Immature Granulocytes 0 %     NRBCs per 100 WBC 0 <1 /100     Absolute Neutrophils 3.0 1.6 - 8.3 10e3/uL     Absolute Lymphocytes 2.1 0.8 - 5.3 10e3/uL     Absolute Monocytes 0.5 0.0 - 1.3 10e3/uL     Absolute Eosinophils 0.1 0.0 - 0.7 10e3/uL     Absolute Basophils 0.0 0.0 - 0.2 10e3/uL     Absolute Immature Granulocytes 0.0 <=0.4 10e3/uL     Absolute NRBCs 0.0 10e3/uL   EKG 12-lead, complete     Collection Time: 04/15/24  9:20 AM   Result Value Ref Range     Systolic Blood Pressure   mmHg     Diastolic Blood Pressure   mmHg     Ventricular Rate 85 BPM     Atrial Rate 85 BPM     KS Interval 152 ms     QRS Duration 94 ms      ms     QTc 449 ms     P Axis 73 degrees     R AXIS 1 degrees     T Axis 29 degrees     Interpretation ECG           Sinus rhythm  No previous ECGs available  Confirmed by fellow William Chahal ([id number]) on 4/17/2024 9:31:06 AM  Confirmed by MD LILIAN, PENNY [id number]) on 4/17/2024 10:20:26 PM      Comprehensive metabolic panel     Collection Time: 04/15/24  9:53 AM   Result Value Ref Range     Sodium 139 135 - 145 mmol/L     Potassium 4.2 3.4 - 5.3 mmol/L     Carbon Dioxide (CO2) 29 22 - 29 mmol/L     Anion Gap 10 7 - 15 mmol/L     Urea Nitrogen 18.6 6.0 - 20.0 mg/dL     Creatinine 0.97 0.67 - 1.17 mg/dL     GFR Estimate >90 >60 mL/min/1.73m2     Calcium 8.9 8.6 - 10.0 mg/dL     Chloride 100 98 - 107 mmol/L     Glucose 117 (H) 70 - 99 mg/dL     Alkaline Phosphatase 73 40 - 150 U/L     AST 31 0 - 45 U/L      ALT 28 0 - 70 U/L     Protein Total 7.2 6.4 - 8.3 g/dL     Albumin 4.5 3.5 - 5.2 g/dL     Bilirubin Total 0.7 <=1.2 mg/dL   CBC with platelets and differential     Collection Time: 04/15/24  9:53 AM   Result Value Ref Range     WBC Count 6.3 4.0 - 11.0 10e3/uL     RBC Count 5.09 4.40 - 5.90 10e6/uL     Hemoglobin 14.0 13.3 - 17.7 g/dL     Hematocrit 44.9 40.0 - 53.0 %     MCV 88 78 - 100 fL     MCH 27.5 26.5 - 33.0 pg     MCHC 31.2 (L) 31.5 - 36.5 g/dL     RDW 13.2 10.0 - 15.0 %     Platelet Count 234 150 - 450 10e3/uL     % Neutrophils 59 %     % Lymphocytes 33 %     % Monocytes 5 %     % Eosinophils 2 %     % Basophils 1 %     % Immature Granulocytes 0 %     NRBCs per 100 WBC 0 <1 /100     Absolute Neutrophils 3.8 1.6 - 8.3 10e3/uL     Absolute Lymphocytes 2.1 0.8 - 5.3 10e3/uL     Absolute Monocytes 0.3 0.0 - 1.3 10e3/uL     Absolute Eosinophils 0.1 0.0 - 0.7 10e3/uL     Absolute Basophils 0.0 0.0 - 0.2 10e3/uL     Absolute Immature Granulocytes 0.0 <=0.4 10e3/uL     Absolute NRBCs 0.0 10e3/uL   WBC and Differential     Collection Time: 04/17/24 12:48 PM   Result Value Ref Range     WBC Count 5.6 4.0 - 11.0 10e3/uL     % Neutrophils 68 %     % Lymphocytes 25 %     % Monocytes 5 %     % Eosinophils 1 %     % Basophils 1 %     % Immature Granulocytes 0 %     NRBCs per 100 WBC 0 <1 /100     Absolute Neutrophils 3.8 1.6 - 8.3 10e3/uL     Absolute Lymphocytes 1.4 0.8 - 5.3 10e3/uL     Absolute Monocytes 0.3 0.0 - 1.3 10e3/uL     Absolute Eosinophils 0.0 0.0 - 0.7 10e3/uL     Absolute Basophils 0.0 0.0 - 0.2 10e3/uL     Absolute Immature Granulocytes 0.0 <=0.4 10e3/uL     Absolute NRBCs 0.0 10e3/uL   Extra Green Top (Lithium Heparin) Tube     Collection Time: 04/17/24 12:48 PM   Result Value Ref Range     Hold Specimen JI                    Behavioral/Psychological/Social:  - Encourage unit programming     Safety  Placed on the following Precautions: Suicide, Assault, Elopement and Sexual precautions  - Continue  precautions as noted above  -  2:1 staff acuity for intrusive, assaultive behaviors  - Status 15 minute checks  - Legal Status: voluntary     Disposition Plan   Reason for ongoing admission: poses an imminent risk to self and poses an imminent risk to others  Discharge location: I[facility]S facility or   Discharge Medications: not ordered  Follow-up Appointments: not scheduled     Entered by: AMY Yu CNP on 05/01/2024  at 11:30 am         Attestation:   Patient has been seen and evaluated by me, Phan Sanchez, MICK, AMY CNP, PMHNP-BC  The patient was counseled on nature of illness and treatment plan/options  Care was coordinated with treatment team

## 2024-05-07 NOTE — PROVIDER NOTIFICATION
05/07/24 1437   Individualization/Patient Specific Goals   Patient Personal Strengths expressive of emotions;expressive of needs;family/social support   Patient Vulnerabilities lacks insight into illness;substance abuse/addiction   Interprofessional Rounds   Summary Sleeps a lot, not going to many groups, taking medications, meeting with treatment team. No longer on SIO. No longer loud, labile, or aggressive.   Participants advanced practice nurse;nursing;CTC   Behavioral Team Discussion   Participants Yulissa Sanchez APRN, CNP; Zeb RN; PRABHAKAR Jewell   Progress Progressing well, responded well to ECT. Engaging in treatment planning.   Anticipated length of stay 2-7 days   Anticipated Discharge Disposition [facility]         PRECAUTIONS AND SAFETY    Behavioral Orders   Procedures    1:1 Nursing     Please change 2:1 SIO to 2:1 staff acuity    Assault precautions    Code 2    Code 2 - 1:1 Staff Supervision     For ECT only -     Electroconvulsive therapy     Series of up to 12 treatments. Begin Date: 4/17/24     Treating Psychiatrist providing ECT:   Liz Cade MD     Notified on:  04/12/24 y    Elopement precautions    Fall precautions    [facility] Extended Care     Until discharge, Extended Care to offer psychotherapeutic services to mental health patients boarding for admission or stabilization. These services are to include but are not limited to: individual psychotherapy, diagnostic assessment, case management and care planning, safety planning, etc. This may include up to 1 visit per day. If patient is physically located at [facility] or [facility], group psychotherapy up to 2 time per day may be offered.     Routine Programming     As clinically indicated    Sexual precautions    Status 15     Every 15 minutes.    Suicide precautions: Suicide Risk: HIGH; Clinical rationale to override score: response to medication     Patients on Suicide Precautions should have a Combination Diet ordered that includes a Diet  selection(s) AND a Behavioral Tray selection for Safe Tray - with utensils, or Safe Tray - NO utensils       Order Specific Question:   Suicide Risk     Answer:   HIGH     Order Specific Question:   Clinical rationale to override score:     Answer:   response to medication       Safety  Safety WDL: WDL  Patient Location: patient room, own  Observed Behavior: calm, sitting  Observed Behavior (Comment): sleeping, watching TV with peers  Safety Measures: safety rounds completed  Diversional Activity: music  De-Escalation Techniques: diversional activity encouraged, verbally redirected  Suicidality: Status 15, Unpredictable frequency of checking on patient  Assault: status 15, private room  Elopement Assessment: Statements about wanting to leave  Elopement Interventions: status 15, signs posted on unit entrance / exit doors  Sexual: private room, status 15

## 2024-05-07 NOTE — PLAN OF CARE
Patient isolative to room part of the evening. Visible in milieu watching movies in the lounge later in the evening. Affect flat, mood is calm. Denies SI, SIB, HI, or hallucinations.   VSS, eating and drinking fluids. Denies any pain. Medication compliant this evening. No PRN medications given.   Patient is working with CTC regarding discharge placement options. Patient is hopeful to go to an IRTS facility.   Denies any concerns or unmet needs.  /80 (BP Location: Left arm)   Pulse 69   Temp 98.5  F (36.9  C) (Oral)   Resp 16   Ht 1.829 m (6')   Wt 108 kg (238 lb)   SpO2 97%   BMI 32.28 kg/m     Problem: Anxiety Signs/Symptoms  Goal: Optimized Energy Level (Anxiety Signs/Symptoms)  Outcome: Progressing  Intervention: Optimize Energy Level  Recent Flowsheet Documentation  Taken 5/6/2024 1816 by Nicole Manning, RN  Patient Performed Hygiene: undressed  Activity (Behavioral Health): up ad juan  Goal: Enhanced Social, Occupational or Functional Skills (Anxiety Signs/Symptoms)  Intervention: Promote Social, Occupational and Functional Ability  Recent Flowsheet Documentation  Taken 5/6/2024 1816 by Nicole Manning RN  Trust Relationship/Rapport:   care explained   emotional support provided   choices provided   empathic listening provided   questions answered   questions encouraged   thoughts/feelings acknowledged   reassurance provided   Goal Outcome Evaluation:

## 2024-05-07 NOTE — PLAN OF CARE
Pt has a blunted affect with somber mood attending some groups. Pt was withdrawn to himself and mostly isolative to his room. Pt rates anxiety at 0/10 and depression 0/10. Pt rates pain at 0/10. Pt reports no SI/HI/SIB and contracts for safety. Pt denies any hallucinations and not noted responding to any internal stimuli. Pt was medication compliant. No reported or observed medication side effects. Continue current POC.    Plan of Care Reviewed With: patient Plan of Care Reviewed With: patient    Overall Patient Progress: no changeOverall Patient Progress: no change

## 2024-05-07 NOTE — PLAN OF CARE
Problem: Adult Behavioral Health Plan of Care  Goal: Adheres to Safety Considerations for Self and Others  Intervention: Develop and Maintain Individualized Safety Plan  Recent Flowsheet Documentation  Taken 5/7/2024 2621 by Lesley Bowen RN  Safety Measures: safety rounds completed     Problem: Sleep Disturbance  Goal: Adequate Sleep/Rest  Outcome: Progressing   Goal Outcome Evaluation:      Patient was calm, and slept most part of the shift without any safety concern. Safety checks were completed per unit protocol, no labored breathing was observed, and no pain was reported.     Sleep Hours: 7
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
## 2024-05-07 NOTE — DISCHARGE INSTRUCTIONS
Behavioral Discharge Planning and Instructions    Summary: You were admitted to [facility] on 4/3/2024 due to Schizoaffective disorder and transferred to [facility] on 4/6/2024. You were treated by Phan Sanchez DNP, APRN, CNP and discharged on 5/14/2024 to [facility] .  .      Continue to follow with your ACT Team:  [facility] ACT Team    - Heidy Pop (phone: [phone])   - Yamila (phone: [phone])  Psychiatrist - Camron Watson MD (phone: [phone])    Main Diagnosis:   Schizoaffective disorder, bipolar type     Health Care Follow-up:   If no appointments scheduled, explain: Has an ACT Team that will follow up in the community.     Attend all scheduled appointments with your outpatient providers. Call at least 24 hours in advance if you need to reschedule an appointment to ensure continued access to your outpatient providers.     Major Treatments, Procedures and Findings:  You were provided with: a psychiatric assessment, medication evaluation and/or management, group therapy, individual therapy, and CD evaluation/assessment    Symptoms to Report: Feeling more aggressive, increased confusion, losing more sleep, mood getting worse, or thoughts of suicide.    Early warning signs can include: Increased depression or anxiety sleep disturbances increased thoughts or behaviors of suicide or self-harm  increased unusual thinking, such as paranoia or hearing voices.    Safety and Wellness: Take all medicines as directed. Make no changes unless your doctor suggests them. Follow treatment recommendations. Refrain from alcohol and non-prescribed drugs. Ask your support system to help you reduce your access to items that could harm yourself or others. If there is a concern for safety, call 161.    Resources:   General Mental Health Resources:   National [place] on Mental Illness (AMPARO) [place]: Connect for help, to navigate the mental  "health system, and for support and for resources. Call: 5-669-CTZD-Helps / 7-524-127-7751  Crisis Text Line: The 24/7 emergency service is available if you or someone you know is experiencing a psychiatric or mental health crisis. Text: \"[place]\" to 520636  [place] Warmline: Are you an adult needing support? Talk to a specialist who has firsthand experience living with a mental health condition. Call: 134.272.9643  Text: \"Support\" to 68545  [facility].org/support/[place]-warmline/  National Suicide Prevention Lifeline: The 24/7 lifeline provides support when in distress, has prevention and crisis resources for you or your loved ones, and resources for professionals. Call 1-519-866-HIKK (7758)  Peer Support Connection Warmlines: Phyo-kb-elat telephone support that s safe and supportive. Open 5 p.m. to 9 a.m. Call or text: 1-332.997.7611   COVID Cares Stress Phone Support Service. Any [place] experiencing stress can call 455-RFTD1MN (950-381-0194) for free telephone support from 9am to 9pm every day. The service is a collaboration with volunteers from the [place] Psychiatric Society, the [place] Psychological Association, the [place] Black Psych[employer], and [facility]. The free service is also accessible at YouRenew where searchers can also find psychiatric and mental health services availability and real-time Substance Use Disorder Treatment program openings.  Adult Rehabilitative Mental Health Services (ARMHS): https://[place].gov/dhs/partners-and-providers/policies-procedures/adult-mental-health/adult-rehabilitative-mental-health-services/armhs-certified-providers/  Mental Health Association of [place] (www.mentalhealth.org): 932.518.7220 or 321-142-6534  Self- Management and Recovery Training., SMART-- Toll free: 721.286.4897  www.[employer].org  [facility] Crisis Response 011 234-4026  [facility] Crisis Response 411-819-0143  [facility] Crisis Response " 542.370.6560  [facility] Crisis (COPE) Response - Adult [phone]  [facility] Crisis Response - Adult [phone]  [facility] Rapid Response 674-356-8083    Outpatient Psychiatry/Therapy Resources:   [facility] - (phone: 648.286.4152) https://www.TRELYS/care/specialty/mental-behavioral-health/  https://www.TRELYS/care/find/doctors/psychologists/  [facility] - (phone: 4-009-WCYHVIUF) https://www.[facility].org/treatments/Counseling-Adult  [facility] - (phone: 281.689.9086) https://mnInova Children's HospitalXerion Advanced Battery/  [facility] - (phone: 505.359.3588)  https://BookFresh/  [facility] - (phone: 1-180.518.8250) https://www.iNeed/  [facility] - (phone: 427.575.9129) https://www.Savage IOetherapy.Prosodic/  [facility] - (phone: 439.623.6450) https://GroupPrice/  [facility] - (phone: 452.343.1049) https://walkin.org/    General Medication Instructions:   See your medication sheet(s) for instructions.   Take all medicines as directed.  Make no changes unless your doctor suggests them.   Go to all your doctor visits.  Be sure to have all your required lab tests. This way, your medicines can be refilled on time.  Do not use any drugs not prescribed by your doctor.  Avoid alcohol.    Advance Directives:   Scanned document on file with [employer]? No scanned doc  Is document scanned? No. Copy Requested.  Honoring Choices Your Rights Handout: Informed and given  Was more information offered? Pt declined    The Treatment team has appreciated the opportunity to work with you. If you have any questions or concerns about your recent admission, you can contact the unit which can receive your call 24 hours a day, 7 days a week. They will be able to get in touch with a Provider if needed. The unit number is [phone].

## 2024-05-08 LAB
BASOPHILS # BLD AUTO: 0 10E3/UL (ref 0–0.2)
BASOPHILS NFR BLD AUTO: 1 %
EOSINOPHIL # BLD AUTO: 0.1 10E3/UL (ref 0–0.7)
EOSINOPHIL NFR BLD AUTO: 2 %
IMM GRANULOCYTES # BLD: 0 10E3/UL
IMM GRANULOCYTES NFR BLD: 0 %
LYMPHOCYTES # BLD AUTO: 2.4 10E3/UL (ref 0.8–5.3)
LYMPHOCYTES NFR BLD AUTO: 49 %
MONOCYTES # BLD AUTO: 0.4 10E3/UL (ref 0–1.3)
MONOCYTES NFR BLD AUTO: 7 %
NEUTROPHILS # BLD AUTO: 2 10E3/UL (ref 1.6–8.3)
NEUTROPHILS NFR BLD AUTO: 41 %
NRBC # BLD AUTO: 0 10E3/UL
NRBC BLD AUTO-RTO: 0 /100
WBC # BLD AUTO: 5 10E3/UL (ref 4–11)

## 2024-05-08 PROCEDURE — 250N000013 HC RX MED GY IP 250 OP 250 PS 637: Performed by: CLINICAL NURSE SPECIALIST

## 2024-05-08 PROCEDURE — 250N000013 HC RX MED GY IP 250 OP 250 PS 637: Performed by: FAMILY MEDICINE

## 2024-05-08 PROCEDURE — 85048 AUTOMATED LEUKOCYTE COUNT: CPT | Performed by: FAMILY MEDICINE

## 2024-05-08 PROCEDURE — 250N000013 HC RX MED GY IP 250 OP 250 PS 637: Performed by: PHYSICIAN ASSISTANT

## 2024-05-08 PROCEDURE — 99232 SBSQ HOSP IP/OBS MODERATE 35: CPT | Performed by: CLINICAL NURSE SPECIALIST

## 2024-05-08 PROCEDURE — 124N000002 HC R&B MH UMMC

## 2024-05-08 PROCEDURE — 36415 COLL VENOUS BLD VENIPUNCTURE: CPT | Performed by: FAMILY MEDICINE

## 2024-05-08 RX ADMIN — ATORVASTATIN CALCIUM 40 MG: 40 TABLET, FILM COATED ORAL at 08:23

## 2024-05-08 RX ADMIN — PANTOPRAZOLE SODIUM 40 MG: 40 TABLET, DELAYED RELEASE ORAL at 08:24

## 2024-05-08 RX ADMIN — POLYETHYLENE GLYCOL 3350 17 G: 17 POWDER, FOR SOLUTION ORAL at 08:23

## 2024-05-08 RX ADMIN — HYDROCHLOROTHIAZIDE 25 MG: 25 TABLET ORAL at 08:24

## 2024-05-08 RX ADMIN — Medication 900 MG: at 08:23

## 2024-05-08 RX ADMIN — DIVALPROEX SODIUM 500 MG: 500 TABLET, FILM COATED, EXTENDED RELEASE ORAL at 08:23

## 2024-05-08 RX ADMIN — FLUTICASONE PROPIONATE 1 SPRAY: 50 SPRAY, METERED NASAL at 08:27

## 2024-05-08 RX ADMIN — LORATADINE 10 MG: 10 TABLET ORAL at 08:23

## 2024-05-08 RX ADMIN — QUETIAPINE 100 MG: 100 TABLET ORAL at 20:38

## 2024-05-08 RX ADMIN — Medication 900 MG: at 20:38

## 2024-05-08 RX ADMIN — DIVALPROEX SODIUM 1000 MG: 500 TABLET, FILM COATED, EXTENDED RELEASE ORAL at 20:38

## 2024-05-08 RX ADMIN — CLOZAPINE 200 MG: 200 TABLET ORAL at 20:38

## 2024-05-08 RX ADMIN — CLOZAPINE 50 MG: 50 TABLET ORAL at 08:23

## 2024-05-08 ASSESSMENT — ACTIVITIES OF DAILY LIVING (ADL)
ADLS_ACUITY_SCORE: 29
LAUNDRY: WITH SUPERVISION
ADLS_ACUITY_SCORE: 29
LAUNDRY: WITH SUPERVISION
ADLS_ACUITY_SCORE: 29
HYGIENE/GROOMING: INDEPENDENT
ADLS_ACUITY_SCORE: 29
DRESS: INDEPENDENT
ADLS_ACUITY_SCORE: 29
ADLS_ACUITY_SCORE: 29
DRESS: INDEPENDENT
ADLS_ACUITY_SCORE: 29
ORAL_HYGIENE: INDEPENDENT
ADLS_ACUITY_SCORE: 29
ADLS_ACUITY_SCORE: 29
HYGIENE/GROOMING: INDEPENDENT
ADLS_ACUITY_SCORE: 29
ORAL_HYGIENE: INDEPENDENT
ADLS_ACUITY_SCORE: 29
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
## 2024-05-08 NOTE — PROGRESS NOTES
The patient's care was discussed with the treatment team during the daily team meeting and/or staff's chart notes were reviewed. Staff report patient slept 7 hours with no behavioral concerns.  Evening staff report: Patient was calm and cooperative during the shift. He was visible in the milieu. He was social with one other peer. He was out on the lounge watching tv most of the shift.Pt denies all mental health symptoms including depression,anxiety, paranoia, SI/SIB and HI. Pt was medication compliant. No behavior or concerns noted during this shift.  Will continue POC.     Upon interview, patient reports nothing is new from yesterday, states he's doing fine and remain content, patient was calm and cooperative with this assessment. Writer informed the patient about the latest decline by "[employer] Patient took the news calmly with no overt reaction, he was however reassured that he would hopefully be accepted by those who already put him on their wait list. Patient reports that his CM suggested a referral to [facility], this was communicated to [facility] and he reports that he would put the referral through. When writer asked if the patient has a goal for today, he states he would like to talk to his friend, attend groups and do some reading. Reviewed patient lab (WBC with diff), everything came back WNL.  Patient denies A/VH, SI/HI, or thoughts of self-harm..           Medications:      Inpatient Administered Meds                     Current Facility-Administered Medications   Medication Dose Route Frequency Provider Last Rate Last Admin    atorvastatin (LIPITOR) tablet 40 mg  40 mg Oral Daily Jaswinder Claros MD   40 mg at 04/10/24 0818    cloZAPine (CLOZARIL) tablet 100 mg  100 mg Oral At Bedtime Jaswinder Claros MD   100 mg at 04/09/24 1848    cloZAPine (CLOZARIL) tablet 50 mg  50 mg Oral Daily Jaswinder Claros MD   50 mg at 04/10/24 0818    divalproex sodium extended-release  (DEPAKOTE ER) 24 hr tablet 500 mg  500 mg Oral BID Jaswinder Claros MD   500 mg at 04/10/24 0818    gabapentin (NEURONTIN) tablet 600 mg  600 mg Oral BID Jaswinder Claros MD   600 mg at 04/10/24 0818    hydrochlorothiazide (HYDRODIURIL) tablet 25 mg  25 mg Oral Daily Jaswinder Claros MD   25 mg at 04/10/24 0818    nicotine (NICODERM CQ) 21 MG/24HR 24 hr patch 1 patch  1 patch Transdermal Daily Xavi Medeiros MD   1 patch at 04/10/24 0821              Allergies:      Allergies             Allergies   Allergen Reactions    Haloperidol Confusion and Other (See Comments)       Prone to EPSE. COnsider IM Zyprexa or be sure to give with benadryl              Labs:     Recent Results (from the past 336 hour(s))   WBC and Differential    Collection Time: 05/01/24  9:12 AM   Result Value Ref Range    WBC Count 5.7 4.0 - 11.0 10e3/uL    % Neutrophils 51 %    % Lymphocytes 38 %    % Monocytes 8 %    % Eosinophils 2 %    % Basophils 1 %    % Immature Granulocytes 0 %    NRBCs per 100 WBC 0 <1 /100    Absolute Neutrophils 2.9 1.6 - 8.3 10e3/uL    Absolute Lymphocytes 2.1 0.8 - 5.3 10e3/uL    Absolute Monocytes 0.4 0.0 - 1.3 10e3/uL    Absolute Eosinophils 0.1 0.0 - 0.7 10e3/uL    Absolute Basophils 0.0 0.0 - 0.2 10e3/uL    Absolute Immature Granulocytes 0.0 <=0.4 10e3/uL    Absolute NRBCs 0.0 10e3/uL   WBC and Differential    Collection Time: 05/08/24  8:07 AM   Result Value Ref Range    WBC Count 5.0 4.0 - 11.0 10e3/uL    % Neutrophils 41 %    % Lymphocytes 49 %    % Monocytes 7 %    % Eosinophils 2 %    % Basophils 1 %    % Immature Granulocytes 0 %    NRBCs per 100 WBC 0 <1 /100    Absolute Neutrophils 2.0 1.6 - 8.3 10e3/uL    Absolute Lymphocytes 2.4 0.8 - 5.3 10e3/uL    Absolute Monocytes 0.4 0.0 - 1.3 10e3/uL    Absolute Eosinophils 0.1 0.0 - 0.7 10e3/uL    Absolute Basophils 0.0 0.0 - 0.2 10e3/uL    Absolute Immature Granulocytes 0.0 <=0.4 10e3/uL    Absolute NRBCs 0.0 10e3/uL            Psychiatric Examination:      /84 (BP Location: Left arm, Patient Position: Sitting, Cuff Size: Adult Regular)   Pulse 94   Temp 97.8  F (36.6  C) (Oral)   Resp 12   Ht 1.829 m (6')   Wt 107.1 kg (236 lb 1.6 oz)   SpO2 98%   BMI 32.02 kg/m    Weight is 236 lbs 1.6 oz  Body mass index is 32.02 kg/m .     Weight over time:          Vitals:     04/03/24 1151 04/09/24 0826   Weight: 108 kg (238 lb) 107.1 kg (236 lb 1.6 oz)         Orthostatic Vitals           Most Recent       Sitting Orthostatic /85 04/09 0826     Sitting Orthostatic Pulse (bpm) 73 04/09 0826     Standing Orthostatic /92 04/09 0826     Standing Orthostatic Pulse (bpm) 69 04/09 0826             Cardiometabolic risk assessment. 04/10/24     Reviewed patient profile for cardiometabolic risk factors     Date taken /Value  REFERENCE RANGE   Abdominal Obesity  (Waist Circumference)   See nursing flowsheet Women ?35 in (88 cm)   Men ?40 in (102 cm)       Triglycerides                Triglycerides   Date Value Ref Range Status   02/26/2013 71 0 - 150 mg/dL Final       Comment:       Fasting specimen         ?150 mg/dL (1.7 mmol/L) or current treatment for elevated triglycerides   HDL cholesterol            HDL Cholesterol   Date Value Ref Range Status   02/26/2013 45 40 - 110 mg/dL Final   ]    Women <50 mg/dL (1.3 mmol/L) in women or current treatment for low HDL cholesterol  Men <40 mg/dL (1 mmol/L) in men or current treatment for low HDL cholesterol      Fasting plasma glucose (FPG)           Lab Results   Component Value Date      04/08/2024     GLC 83 02/26/2013        FPG ?100 mg/dL (5.6 mmol/L) or treatment for elevated blood glucose   Blood pressure        BP Readings from Last 3 Encounters:   04/08/24 127/84   02/26/13 129/74    Blood pressure ?130/85 mmHg or treatment for elevated blood pressure   Family History  See family history      Mental Status Exam:  Appearance: awake, alert, dressed in a hospital scrubs,  "appeared as age stated  Attitude:  calm, cooperative  Eye Contact:  good  Mood: \" fine\"  Affect: Flat - neutral  Speech:  clear, normal tone and volume  Language: fluent and intact in English  Psychomotor, Gait, Musculoskeletal:  no evidence of tardive dyskinesia, dystonia, or tics  Throught Process: Linear, Logical, goal directed  Associations:  No Loosening of associations  Thought Content:  no evidence of suicidal ideation or homicidal ideation, no auditory hallucinations present, and no visual hallucinations present  Insight: True emotional awareness  Judgement: fair  Oriented to:  time, person, and place  Attention Span and Concentration: Good  Recent and Remote Memory: Intact  Fund of Knowledge: Appropriate             Precautions:                Behavioral Orders   Procedures    Assault precautions    Code 1 - Restrict to Unit    Elopement precautions    [facility]       Until discharge, Extended Care to offer psychotherapeutic services to mental health patients boarding for admission or stabilization. These services are to include but are not limited to: individual psychotherapy, diagnostic assessment, case management and care planning, safety planning, etc. This may include up to 1 visit per day. If patient is physically located at [facility] or [facility], group psychotherapy up to 2 time per day may be offered.     [facility]       Until discharge, Extended Care to offer psychotherapeutic services to mental health patients boarding for admission or stabilization. These services are to include but are not limited to: individual psychotherapy, diagnostic assessment, case management and care planning, safety planning, etc. This may include up to 1 visit per day. If patient is physically located at [facility] or [facility], group psychotherapy up to 2 time per day may be offered.     Routine Programming       As clinically indicated    Sexual precautions    Status 15       Every 15 minutes.                Assault " risk     -When following observed, team will review discontinuation of SIO:     When patient is not longer aggressive, sexually inappropriate or intrusive.       Order Specific Question:   CONTINUOUS 24 hours / day       Answer:   Other       Order Specific Question:   Specify distance       Answer:   10 foot       Order Specific Question:   Indications for SIO       Answer:   Assault risk       Order Specific Question:   Indications for SIO       Answer:   Severe intrusiveness    Suicide precautions: Suicide Risk: HIGH; Clinical rationale to override score: response to medication       Patients on Suicide Precautions should have a Combination Diet ordered that includes a Diet selection(s) AND a Behavioral Tray selection for Safe Tray - with utensils, or Safe Tray - NO utensils          Order Specific Question:   Suicide Risk       Answer:   HIGH       Order Specific Question:   Clinical rationale to override score:       Answer:   response to medication           Diagnoses:         Schizoaffective disorder, bipolar type (H)  Polysubstance abuse (H)  Agitation     Clinically Significant Risk Factors                          # Obesity: Estimated body mass index is 32.02 kg/m  as calculated from the following:    Height as of this encounter: 1.829 m (6').    Weight as of this encounter: 107.1 kg (236 lb 1.6 oz)., PRESENT ON ADMISSION     # Financial/Environmental Concerns: other (see comments) (lost [facility] housing)     # Housing Instability: noted in nursing assessment          Assessment & Plan:      Assessment and hospital summary:  Ward Dawson is a -34- old male with a previous diagnosis of Schizoaffective disorder bipolar type who presented to the ED for a significant behavioral change, verbal agitation, worsening psychosocial stress, in the context of substance use.  Factors that make the mental health crisis life threatening or complex are:  Patient was brought to the ED from [facility] [place] [facility]  Patient states he does not know why he is here and also states he knows why he is here.  He presents as manic, disorganized, and confused.   He is distractable and not a clear historian at this time.  Patient states he has not slept in a long time and received narcan last night.  Per ACT team  and [facility] staff patient has been elevated, intrusive, and disruptive.  Patient broke a window yesterday.  IRTS reports patient had turned table over and tried using them as skate board ramps.  Patient was noted for have been fairly stable prior to his pass this weekend.  CM reports patient went to a skateboarding competition in [place].  Patient returned in a manic state.  CM reports pt may not appear as manic as he has been the past few days due to sedation and ECT this morning.  She reports patient has not slept in 3 days.  IRTS reports pt received narcan twice last night.  Patient has been trespassed from the [facility] facility.  S is postive for cannabis..         Today's Changes: 5/8/24  No changes made to medications     Target psychiatric symptoms and interventions:  Admitted on 4/9/24 to station 12N  Clozaril, 50 mg every morning and 200 mg at bedtime  --Depakote ER, 500 mg daily and 1000 mg at bedtime for mood stabilization   --Gabapentin, 900 mg twice a day for pain  - Ibuprofen tablet 400 mg orally every 6 hours prn for pain  --Additional medications will include B52, Zyprexa, trazodone, hydroxyzine     Risks, benefits, and alternatives discussed at length with patient.      Acute Medical Problems and Treatments:  Acute medical concerns:  - No acute medical concerns     Pertinent labs/imaging:  Recent Results      Recent Results         Recent Results (from the past 336 hour(s))   WBC and Differential     Collection Time: 04/24/24  8:12 AM   Result Value Ref Range     WBC Count 5.1 4.0 - 11.0 10e3/uL     % Neutrophils 47 %     % Lymphocytes 42 %     % Monocytes 9 %     %  Eosinophils 1 %     % Basophils 1 %     % Immature Granulocytes 0 %     NRBCs per 100 WBC 0 <1 /100     Absolute Neutrophils 2.4 1.6 - 8.3 10e3/uL     Absolute Lymphocytes 2.1 0.8 - 5.3 10e3/uL     Absolute Monocytes 0.5 0.0 - 1.3 10e3/uL     Absolute Eosinophils 0.1 0.0 - 0.7 10e3/uL     Absolute Basophils 0.0 0.0 - 0.2 10e3/uL     Absolute Immature Granulocytes 0.0 <=0.4 10e3/uL     Absolute NRBCs 0.0 10e3/uL   Extra Green Top (Lithium Heparin) Tube     Collection Time: 04/24/24  8:12 AM   Result Value Ref Range     Hold Specimen JI     WBC and Differential     Collection Time: 05/01/24  9:12 AM   Result Value Ref Range     WBC Count 5.7 4.0 - 11.0 10e3/uL     % Neutrophils 51 %     % Lymphocytes 38 %     % Monocytes 8 %     % Eosinophils 2 %     % Basophils 1 %     % Immature Granulocytes 0 %     NRBCs per 100 WBC 0 <1 /100     Absolute Neutrophils 2.9 1.6 - 8.3 10e3/uL     Absolute Lymphocytes 2.1 0.8 - 5.3 10e3/uL     Absolute Monocytes 0.4 0.0 - 1.3 10e3/uL     Absolute Eosinophils 0.1 0.0 - 0.7 10e3/uL     Absolute Basophils 0.0 0.0 - 0.2 10e3/uL     Absolute Immature Granulocytes 0.0 <=0.4 10e3/uL     Absolute NRBCs 0.0 10e3/uL            Recent Results               Recent Results (from the past 336 hour(s))   Valproic acid     Collection Time: 04/05/24  9:39 PM   Result Value Ref Range     Valproic acid 68.4   ug/mL   Asymptomatic COVID-19 Virus (Coronavirus) by PCR Nasopharyngeal     Collection Time: 04/05/24  9:40 PM     Specimen: Nasopharyngeal; Swab   Result Value Ref Range     SARS CoV2 PCR Negative Negative   Glucose by meter     Collection Time: 04/08/24  8:21 PM   Result Value Ref Range     GLUCOSE BY METER POCT 117 (H) 70 - 99 mg/dL   WBC and Differential     Collection Time: 04/10/24 10:59 AM   Result Value Ref Range     WBC Count 5.7 4.0 - 11.0 10e3/uL     % Neutrophils 51 %     % Lymphocytes 37 %     % Monocytes 9 %     % Eosinophils 2 %     % Basophils 1 %     % Immature Granulocytes 0 %      NRBCs per 100 WBC 0 <1 /100     Absolute Neutrophils 3.0 1.6 - 8.3 10e3/uL     Absolute Lymphocytes 2.1 0.8 - 5.3 10e3/uL     Absolute Monocytes 0.5 0.0 - 1.3 10e3/uL     Absolute Eosinophils 0.1 0.0 - 0.7 10e3/uL     Absolute Basophils 0.0 0.0 - 0.2 10e3/uL     Absolute Immature Granulocytes 0.0 <=0.4 10e3/uL     Absolute NRBCs 0.0 10e3/uL   EKG 12-lead, complete     Collection Time: 04/15/24  9:20 AM   Result Value Ref Range     Systolic Blood Pressure   mmHg     Diastolic Blood Pressure   mmHg     Ventricular Rate 85 BPM     Atrial Rate 85 BPM     TN Interval 152 ms     QRS Duration 94 ms      ms     QTc 449 ms     P Axis 73 degrees     R AXIS 1 degrees     T Axis 29 degrees     Interpretation ECG           Sinus rhythm  No previous ECGs available  Confirmed by fellow William Chahal ([id number]) on 4/17/2024 9:31:06 AM  Confirmed by MD LILIAN, PENNY [id number]) on 4/17/2024 10:20:26 PM      Comprehensive metabolic panel     Collection Time: 04/15/24  9:53 AM   Result Value Ref Range     Sodium 139 135 - 145 mmol/L     Potassium 4.2 3.4 - 5.3 mmol/L     Carbon Dioxide (CO2) 29 22 - 29 mmol/L     Anion Gap 10 7 - 15 mmol/L     Urea Nitrogen 18.6 6.0 - 20.0 mg/dL     Creatinine 0.97 0.67 - 1.17 mg/dL     GFR Estimate >90 >60 mL/min/1.73m2     Calcium 8.9 8.6 - 10.0 mg/dL     Chloride 100 98 - 107 mmol/L     Glucose 117 (H) 70 - 99 mg/dL     Alkaline Phosphatase 73 40 - 150 U/L     AST 31 0 - 45 U/L     ALT 28 0 - 70 U/L     Protein Total 7.2 6.4 - 8.3 g/dL     Albumin 4.5 3.5 - 5.2 g/dL     Bilirubin Total 0.7 <=1.2 mg/dL   CBC with platelets and differential     Collection Time: 04/15/24  9:53 AM   Result Value Ref Range     WBC Count 6.3 4.0 - 11.0 10e3/uL     RBC Count 5.09 4.40 - 5.90 10e6/uL     Hemoglobin 14.0 13.3 - 17.7 g/dL     Hematocrit 44.9 40.0 - 53.0 %     MCV 88 78 - 100 fL     MCH 27.5 26.5 - 33.0 pg     MCHC 31.2 (L) 31.5 - 36.5 g/dL     RDW 13.2 10.0 - 15.0 %     Platelet Count 234 150 - 450 10e3/uL      % Neutrophils 59 %     % Lymphocytes 33 %     % Monocytes 5 %     % Eosinophils 2 %     % Basophils 1 %     % Immature Granulocytes 0 %     NRBCs per 100 WBC 0 <1 /100     Absolute Neutrophils 3.8 1.6 - 8.3 10e3/uL     Absolute Lymphocytes 2.1 0.8 - 5.3 10e3/uL     Absolute Monocytes 0.3 0.0 - 1.3 10e3/uL     Absolute Eosinophils 0.1 0.0 - 0.7 10e3/uL     Absolute Basophils 0.0 0.0 - 0.2 10e3/uL     Absolute Immature Granulocytes 0.0 <=0.4 10e3/uL     Absolute NRBCs 0.0 10e3/uL   WBC and Differential     Collection Time: 04/17/24 12:48 PM   Result Value Ref Range     WBC Count 5.6 4.0 - 11.0 10e3/uL     % Neutrophils 68 %     % Lymphocytes 25 %     % Monocytes 5 %     % Eosinophils 1 %     % Basophils 1 %     % Immature Granulocytes 0 %     NRBCs per 100 WBC 0 <1 /100     Absolute Neutrophils 3.8 1.6 - 8.3 10e3/uL     Absolute Lymphocytes 1.4 0.8 - 5.3 10e3/uL     Absolute Monocytes 0.3 0.0 - 1.3 10e3/uL     Absolute Eosinophils 0.0 0.0 - 0.7 10e3/uL     Absolute Basophils 0.0 0.0 - 0.2 10e3/uL     Absolute Immature Granulocytes 0.0 <=0.4 10e3/uL     Absolute NRBCs 0.0 10e3/uL   Extra Green Top (Lithium Heparin) Tube     Collection Time: 04/17/24 12:48 PM   Result Value Ref Range     Hold Specimen JIC                    Behavioral/Psychological/Social:  - Encourage unit programming     Safety  Placed on the following Precautions: Suicide, Assault, Elopement and Sexual precautions  - Continue precautions as noted above  -  2:1 staff acuity for intrusive, assaultive behaviors  - Status 15 minute checks  - Legal Status: voluntary     Disposition Plan   Reason for ongoing admission: poses an imminent risk to self and poses an imminent risk to others  Discharge location: [facility] facility or [facility]  Discharge Medications: not ordered  Follow-up Appointments: not scheduled     Entered by: Phan Sanchez DNP, AMY JAMA on 05/8/2024  at 11:50 am         Attestation:   Patient has been seen and evaluated by Phan gill  Daniel, MICK, APRN CNP, PMHNP-BC  The patient was counseled on nature of illness and treatment plan/options  Care was coordinated with treatment team

## 2024-05-08 NOTE — PLAN OF CARE
Goal Outcome Evaluation:       Patient was calm and cooperative during the shift. He was visible in the milieu. He was social with one other peer. He was out on the lounge watching tv most of the shift.Pt denies all mental health symptoms including depression,anxiety, paranoia, SI/SIB and HI. Pt was medication compliant. No behavior or concerns noted during this shift.  Will continue POC.

## 2024-05-08 NOTE — PLAN OF CARE
Problem: Adult Inpatient Plan of Care  Goal: Optimal Comfort and Wellbeing  Intervention: Provide Person-Centered Care  Recent Flowsheet Documentation  Taken 5/8/2024 1433 by Gina Burger RN  Trust Relationship/Rapport:   care explained   choices provided   emotional support provided   empathic listening provided   questions answered   questions encouraged   reassurance provided   thoughts/feelings acknowledged   Goal Outcome Evaluation:    Pt spent the majority of the shift resting in his room. Pt presented for meals, and returned to his room to rest. Pt is cooperative and engaged when approached. Pt denies having any concerns, is waiting for placement. Pt is calm, pleasant and cooperative in interactions. No inappropriate behavior noted. Affect is flat, brightens with interaction.  No other concerns at this time. Nursing will continue to monitor and assess.

## 2024-05-08 NOTE — PLAN OF CARE
Problem: Sleep Disturbance  Goal: Adequate Sleep/Rest  Outcome: Progressing  Intervention: Promote Sleep/Rest  Patient in room sleeping when received this shift. Safety 15 checks and precautions in place. Patient slept approximately 7 hours. No prn given or requested. No other known concerns at this time. This writer will continue to monitor and offer therapeutic support as needed for the rest of the shift

## 2024-05-09 PROCEDURE — 250N000013 HC RX MED GY IP 250 OP 250 PS 637: Performed by: FAMILY MEDICINE

## 2024-05-09 PROCEDURE — 99232 SBSQ HOSP IP/OBS MODERATE 35: CPT | Performed by: CLINICAL NURSE SPECIALIST

## 2024-05-09 PROCEDURE — H2032 ACTIVITY THERAPY, PER 15 MIN: HCPCS

## 2024-05-09 PROCEDURE — 124N000002 HC R&B MH UMMC

## 2024-05-09 PROCEDURE — 250N000013 HC RX MED GY IP 250 OP 250 PS 637: Performed by: CLINICAL NURSE SPECIALIST

## 2024-05-09 PROCEDURE — 250N000013 HC RX MED GY IP 250 OP 250 PS 637: Performed by: PHYSICIAN ASSISTANT

## 2024-05-09 RX ADMIN — DIVALPROEX SODIUM 1000 MG: 500 TABLET, FILM COATED, EXTENDED RELEASE ORAL at 19:47

## 2024-05-09 RX ADMIN — FLUTICASONE PROPIONATE 1 SPRAY: 50 SPRAY, METERED NASAL at 08:50

## 2024-05-09 RX ADMIN — QUETIAPINE 100 MG: 100 TABLET ORAL at 19:47

## 2024-05-09 RX ADMIN — Medication 900 MG: at 08:50

## 2024-05-09 RX ADMIN — CLOZAPINE 50 MG: 50 TABLET ORAL at 08:50

## 2024-05-09 RX ADMIN — CLOZAPINE 200 MG: 200 TABLET ORAL at 19:47

## 2024-05-09 RX ADMIN — HYDROCHLOROTHIAZIDE 25 MG: 25 TABLET ORAL at 08:51

## 2024-05-09 RX ADMIN — DIVALPROEX SODIUM 500 MG: 500 TABLET, FILM COATED, EXTENDED RELEASE ORAL at 08:50

## 2024-05-09 RX ADMIN — PANTOPRAZOLE SODIUM 40 MG: 40 TABLET, DELAYED RELEASE ORAL at 08:50

## 2024-05-09 RX ADMIN — ATORVASTATIN CALCIUM 40 MG: 40 TABLET, FILM COATED ORAL at 08:50

## 2024-05-09 RX ADMIN — Medication 900 MG: at 19:47

## 2024-05-09 RX ADMIN — POLYETHYLENE GLYCOL 3350 17 G: 17 POWDER, FOR SOLUTION ORAL at 08:49

## 2024-05-09 RX ADMIN — LORATADINE 10 MG: 10 TABLET ORAL at 08:50

## 2024-05-09 ASSESSMENT — ACTIVITIES OF DAILY LIVING (ADL)
ADLS_ACUITY_SCORE: 29
HYGIENE/GROOMING: INDEPENDENT
ADLS_ACUITY_SCORE: 29
ORAL_HYGIENE: INDEPENDENT
ADLS_ACUITY_SCORE: 29
HYGIENE/GROOMING: INDEPENDENT
ORAL_HYGIENE: INDEPENDENT
ADLS_ACUITY_SCORE: 29
DRESS: INDEPENDENT
LAUNDRY: WITH SUPERVISION
ADLS_ACUITY_SCORE: 29
LAUNDRY: WITH SUPERVISION
ADLS_ACUITY_SCORE: 29
ADLS_ACUITY_SCORE: 29
DRESS: INDEPENDENT
ADLS_ACUITY_SCORE: 29

## 2024-05-09 NOTE — PROGRESS NOTES
NOC Shift Report     Pt in bed at beginning of shift, breathing quiet and unlabored. Pt slept through the shift for 7 hours. No pt complaints or concerns at this time. No PRNs given. Will continue to monitor.

## 2024-05-09 NOTE — PROGRESS NOTES
"The patient's care was discussed with the treatment team during the daily team meeting and/or staff's chart notes were reviewed. Staff report patient slept 7 hours with no behavioral concerns.  Evening staff report: Pt's symptoms are noted to be improving significantly. During check in pt stated \" I'm waiting to go to an IRTS.\" Pt was visible in the lounge intermittently. Presented with a calm mood and brightened upon approach. Pt denied SI/HI as well as auditory and visual hallucinations. Denied anxiety and depressive symptoms. Pt is good behavioral control and socializes with select peer. Takes his medications as prescribed. Denies having any problem with eating or sleeping.   Plan: Continue to provide safe environment and therapeutic milieu.       Upon interview, patient was interviewed in his room, patient appears calm, relaxed and not in any apparent distress, patient was very cooperative with the assessment.  Patient reports that everything is the same and nothing has changed from the previous days, he reports sleeping good and resting well and that his medication is working well with no side effects experienced.  Patient had some labs done yesterday and the results were all within normal limits, this was conveyed to the patient.    This writer called patient's mother Mrs. Alf Chirinos [phone] per patient's request  Patient thought his mother was avoiding him by not taking his phone whenever he called. Patient's mother explained that she has not been calling [place] because of the court restraints order for him not to contact her, she however reported calling the nurses and asking about how the patient is doing, she informed me that she spoke with [facility] the [facility] and that [facility] informed her that they are still looking for IRTS placement for him.  The mother was a little bit concerned about this asking this writer that she remembered being told that we were processing the patient to go to chemical dependency " treatment she wonders why the IRTS at this time.  According to Mrs. Alf Fam was in the IRTS before and seemed not to benefit from it as there was no remarkable changes in him.    Writer explained to the patient's mother that Cristel had been denied acceptance into some CD treatment probably based on his history.  She stated that she was informed by [facility] that they will re-present the referral to those CD treatment Centers again. The mother reported that patient was supposed to have a criminal court hearing today but could not as the patient is in the hospital, she stated that she pleaded with the court to change the date but they refused stating it would count against him as abscondment. The mother was requesting a letter from the provider to show that the patient has been in the hospital all this while.  Patient's mother also expressed concerns over his clozapine, she stated that the 300 mg that patient was receiving in the morning and in the evening before coming to the hospital was kind of making him delusional.  Writer informed patient's mother that we started the Clozapine tapering from beginning and is currently on 50 mg in the morning and 200 mg at bedtime and the patient is doing super nicely on this current dose.  Meanwhile, patient denies auditory or visual hallucinations, denies suicidal or homicidal ideation or thoughts of self-harm.           Medications:      Inpatient Administered Meds                     Current Facility-Administered Medications   Medication Dose Route Frequency Provider Last Rate Last Admin    atorvastatin (LIPITOR) tablet 40 mg  40 mg Oral Daily Jaswinder Claros MD   40 mg at 04/10/24 0818    cloZAPine (CLOZARIL) tablet 100 mg  100 mg Oral At Bedtime Jaswinder Claros MD   100 mg at 04/09/24 1848    cloZAPine (CLOZARIL) tablet 50 mg  50 mg Oral Daily Jaswinder Claros MD   50 mg at 04/10/24 0818    divalproex sodium extended-release (DEPAKOTE  ER) 24 hr tablet 500 mg  500 mg Oral BID Jaswinder Claros MD   500 mg at 04/10/24 0818    gabapentin (NEURONTIN) tablet 600 mg  600 mg Oral BID Jaswinder Claros MD   600 mg at 04/10/24 0818    hydrochlorothiazide (HYDRODIURIL) tablet 25 mg  25 mg Oral Daily Jaswinder Claros MD   25 mg at 04/10/24 0818    nicotine (NICODERM CQ) 21 MG/24HR 24 hr patch 1 patch  1 patch Transdermal Daily Xavi Medeiros MD   1 patch at 04/10/24 0821              Allergies:      Allergies             Allergies   Allergen Reactions    Haloperidol Confusion and Other (See Comments)       Prone to EPSE. COnsider IM Zyprexa or be sure to give with benadryl              Labs:      Recent Results         Recent Results (from the past 336 hour(s))   WBC and Differential     Collection Time: 05/01/24  9:12 AM   Result Value Ref Range     WBC Count 5.7 4.0 - 11.0 10e3/uL     % Neutrophils 51 %     % Lymphocytes 38 %     % Monocytes 8 %     % Eosinophils 2 %     % Basophils 1 %     % Immature Granulocytes 0 %     NRBCs per 100 WBC 0 <1 /100     Absolute Neutrophils 2.9 1.6 - 8.3 10e3/uL     Absolute Lymphocytes 2.1 0.8 - 5.3 10e3/uL     Absolute Monocytes 0.4 0.0 - 1.3 10e3/uL     Absolute Eosinophils 0.1 0.0 - 0.7 10e3/uL     Absolute Basophils 0.0 0.0 - 0.2 10e3/uL     Absolute Immature Granulocytes 0.0 <=0.4 10e3/uL     Absolute NRBCs 0.0 10e3/uL   WBC and Differential     Collection Time: 05/08/24  8:07 AM   Result Value Ref Range     WBC Count 5.0 4.0 - 11.0 10e3/uL     % Neutrophils 41 %     % Lymphocytes 49 %     % Monocytes 7 %     % Eosinophils 2 %     % Basophils 1 %     % Immature Granulocytes 0 %     NRBCs per 100 WBC 0 <1 /100     Absolute Neutrophils 2.0 1.6 - 8.3 10e3/uL     Absolute Lymphocytes 2.4 0.8 - 5.3 10e3/uL     Absolute Monocytes 0.4 0.0 - 1.3 10e3/uL     Absolute Eosinophils 0.1 0.0 - 0.7 10e3/uL     Absolute Basophils 0.0 0.0 - 0.2 10e3/uL     Absolute Immature Granulocytes 0.0 <=0.4  10e3/uL     Absolute NRBCs 0.0 10e3/uL              Psychiatric Examination:      /84 (BP Location: Left arm, Patient Position: Sitting, Cuff Size: Adult Regular)   Pulse 94   Temp 97.8  F (36.6  C) (Oral)   Resp 12   Ht 1.829 m (6')   Wt 107.1 kg (236 lb 1.6 oz)   SpO2 98%   BMI 32.02 kg/m    Weight is 236 lbs 1.6 oz  Body mass index is 32.02 kg/m .     Weight over time:          Vitals:     04/03/24 1151 04/09/24 0826   Weight: 108 kg (238 lb) 107.1 kg (236 lb 1.6 oz)         Orthostatic Vitals           Most Recent       Sitting Orthostatic /85 04/09 0826     Sitting Orthostatic Pulse (bpm) 73 04/09 0826     Standing Orthostatic /92 04/09 0826     Standing Orthostatic Pulse (bpm) 69 04/09 0826             Cardiometabolic risk assessment. 04/10/24     Reviewed patient profile for cardiometabolic risk factors     Date taken /Value  REFERENCE RANGE   Abdominal Obesity  (Waist Circumference)   See nursing flowsheet Women ?35 in (88 cm)   Men ?40 in (102 cm)       Triglycerides                Triglycerides   Date Value Ref Range Status   02/26/2013 71 0 - 150 mg/dL Final       Comment:       Fasting specimen         ?150 mg/dL (1.7 mmol/L) or current treatment for elevated triglycerides   HDL cholesterol            HDL Cholesterol   Date Value Ref Range Status   02/26/2013 45 40 - 110 mg/dL Final   ]    Women <50 mg/dL (1.3 mmol/L) in women or current treatment for low HDL cholesterol  Men <40 mg/dL (1 mmol/L) in men or current treatment for low HDL cholesterol      Fasting plasma glucose (FPG)           Lab Results   Component Value Date      04/08/2024     GLC 83 02/26/2013        FPG ?100 mg/dL (5.6 mmol/L) or treatment for elevated blood glucose   Blood pressure        BP Readings from Last 3 Encounters:   04/08/24 127/84   02/26/13 129/74    Blood pressure ?130/85 mmHg or treatment for elevated blood pressure   Family History  See family history      Mental Status  "Exam:  Appearance: awake, alert, dressed in a hospital scrubs, appeared as age stated  Attitude:  calm, cooperative  Eye Contact:  good  Mood: \" good\"  Affect: Flat - neutral  Speech:  clear, normal tone and volume  Language: fluent and intact in English  Psychomotor, Gait, Musculoskeletal:  no evidence of tardive dyskinesia, dystonia, or tics  Throught Process: Linear, Logical, goal directed  Associations:  No Loosening of associations  Thought Content:  no evidence of suicidal ideation or homicidal ideation, no auditory hallucinations present, and no visual hallucinations present  Insight: True emotional awareness  Judgement: fair  Oriented to:  time, person, and place  Attention Span and Concentration: Good  Recent and Remote Memory: Intact  Fund of Knowledge: Appropriate             Precautions:                Behavioral Orders   Procedures    Assault precautions    Code 1 - Restrict to Unit    Elopement precautions    [facility]       Until discharge, Extended Care to offer psychotherapeutic services to mental health patients boarding for admission or stabilization. These services are to include but are not limited to: individual psychotherapy, diagnostic assessment, case management and care planning, safety planning, etc. This may include up to 1 visit per day. If patient is physically located at [facility] or [facility], group psychotherapy up to 2 time per day may be offered.     [facility]       Until discharge, Extended Care to offer psychotherapeutic services to mental health patients boarding for admission or stabilization. These services are to include but are not limited to: individual psychotherapy, diagnostic assessment, case management and care planning, safety planning, etc. This may include up to 1 visit per day. If patient is physically located at [facility] or [facility], group psychotherapy up to 2 time per day may be offered.     Routine Programming       As clinically indicated    Sexual " precautions    Status 15       Every 15 minutes.                Assault risk     -When following observed, team will review discontinuation of SIO:     When patient is not longer aggressive, sexually inappropriate or intrusive.       Order Specific Question:   CONTINUOUS 24 hours / day       Answer:   Other       Order Specific Question:   Specify distance       Answer:   10 foot       Order Specific Question:   Indications for SIO       Answer:   Assault risk       Order Specific Question:   Indications for SIO       Answer:   Severe intrusiveness    Suicide precautions: Suicide Risk: HIGH; Clinical rationale to override score: response to medication       Patients on Suicide Precautions should have a Combination Diet ordered that includes a Diet selection(s) AND a Behavioral Tray selection for Safe Tray - with utensils, or Safe Tray - NO utensils          Order Specific Question:   Suicide Risk       Answer:   HIGH       Order Specific Question:   Clinical rationale to override score:       Answer:   response to medication           Diagnoses:         Schizoaffective disorder, bipolar type (H)  Polysubstance abuse (H)  Agitation     Clinically Significant Risk Factors                          # Obesity: Estimated body mass index is 32.02 kg/m  as calculated from the following:    Height as of this encounter: 1.829 m (6').    Weight as of this encounter: 107.1 kg (236 lb 1.6 oz)., PRESENT ON ADMISSION     # Financial/Environmental Concerns: other (see comments) (lost IRTS housing)     # Housing Instability: noted in nursing assessment          Assessment & Plan:      Assessment and hospital summary:  Ward Dawson is a -34- old male with a previous diagnosis of Schizoaffective disorder bipolar type who presented to the ED for a significant behavioral change, verbal agitation, worsening psychosocial stress, in the context of substance use.  Factors that make the mental health crisis life threatening or complex  are:  Patient was brought to the ED from [facility] I[facility] following his ECT treatment this morning at [place].  Patient states he does not know why he is here and also states he knows why he is here.  He presents as manic, disorganized, and confused.   He is distractable and not a clear historian at this time.  Patient states he has not slept in a long time and received narcan last night.  Per ACT team  and [facility] IRT[facility] staff patient has been elevated, intrusive, and disruptive.  Patient broke a window yesterday.  IRTS reports patient had turned table over and tried using them as skate board ramps.  Patient was noted for have been fairly stable prior to his pass this weekend.  CM reports patient went to a skateboarding competition in [place].  Patient returned in a manic state.  CM reports pt may not appear as manic as he has been the past few days due to sedation and ECT this morning.  She reports patient has not slept in 3 days.  IRTS reports pt received narcan twice last night.  Patient has been trespassed from the IRTS facility.  [facility] is postive for cannabis..         Today's Changes: 5/9/24  No changes made to medications     Target psychiatric symptoms and interventions:  Admitted on 4/9/24 to station 12N  Clozaril, 50 mg every morning and 200 mg at bedtime  --Depakote ER, 500 mg daily and 1000 mg at bedtime for mood stabilization   --Gabapentin, 900 mg twice a day for pain  - Ibuprofen tablet 400 mg orally every 6 hours prn for pain  --Additional medications will include B52, Zyprexa, trazodone, hydroxyzine     Risks, benefits, and alternatives discussed at length with patient.      Acute Medical Problems and Treatments:  Acute medical concerns:  - No acute medical concerns     Pertinent labs/imaging:  Recent Results      Recent Results             Recent Results (from the past 336 hour(s))   WBC and Differential     Collection Time: 04/24/24  8:12 AM   Result Value Ref Range     WBC Count 5.1 4.0 - 11.0  10e3/uL     % Neutrophils 47 %     % Lymphocytes 42 %     % Monocytes 9 %     % Eosinophils 1 %     % Basophils 1 %     % Immature Granulocytes 0 %     NRBCs per 100 WBC 0 <1 /100     Absolute Neutrophils 2.4 1.6 - 8.3 10e3/uL     Absolute Lymphocytes 2.1 0.8 - 5.3 10e3/uL     Absolute Monocytes 0.5 0.0 - 1.3 10e3/uL     Absolute Eosinophils 0.1 0.0 - 0.7 10e3/uL     Absolute Basophils 0.0 0.0 - 0.2 10e3/uL     Absolute Immature Granulocytes 0.0 <=0.4 10e3/uL     Absolute NRBCs 0.0 10e3/uL   Extra Green Top (Lithium Heparin) Tube     Collection Time: 04/24/24  8:12 AM   Result Value Ref Range     Hold Specimen [facility]     WBC and Differential     Collection Time: 05/01/24  9:12 AM   Result Value Ref Range     WBC Count 5.7 4.0 - 11.0 10e3/uL     % Neutrophils 51 %     % Lymphocytes 38 %     % Monocytes 8 %     % Eosinophils 2 %     % Basophils 1 %     % Immature Granulocytes 0 %     NRBCs per 100 WBC 0 <1 /100     Absolute Neutrophils 2.9 1.6 - 8.3 10e3/uL     Absolute Lymphocytes 2.1 0.8 - 5.3 10e3/uL     Absolute Monocytes 0.4 0.0 - 1.3 10e3/uL     Absolute Eosinophils 0.1 0.0 - 0.7 10e3/uL     Absolute Basophils 0.0 0.0 - 0.2 10e3/uL     Absolute Immature Granulocytes 0.0 <=0.4 10e3/uL     Absolute NRBCs 0.0 10e3/uL            Recent Results               Recent Results (from the past 336 hour(s))   Valproic acid     Collection Time: 04/05/24  9:39 PM   Result Value Ref Range     Valproic acid 68.4   ug/mL   Asymptomatic COVID-19 Virus (Coronavirus) by PCR Nasopharyngeal     Collection Time: 04/05/24  9:40 PM     Specimen: Nasopharyngeal; Swab   Result Value Ref Range     SARS CoV2 PCR Negative Negative   Glucose by meter     Collection Time: 04/08/24  8:21 PM   Result Value Ref Range     GLUCOSE BY METER POCT 117 (H) 70 - 99 mg/dL   WBC and Differential     Collection Time: 04/10/24 10:59 AM   Result Value Ref Range     WBC Count 5.7 4.0 - 11.0 10e3/uL     % Neutrophils 51 %     % Lymphocytes 37 %     % Monocytes 9  %     % Eosinophils 2 %     % Basophils 1 %     % Immature Granulocytes 0 %     NRBCs per 100 WBC 0 <1 /100     Absolute Neutrophils 3.0 1.6 - 8.3 10e3/uL     Absolute Lymphocytes 2.1 0.8 - 5.3 10e3/uL     Absolute Monocytes 0.5 0.0 - 1.3 10e3/uL     Absolute Eosinophils 0.1 0.0 - 0.7 10e3/uL     Absolute Basophils 0.0 0.0 - 0.2 10e3/uL     Absolute Immature Granulocytes 0.0 <=0.4 10e3/uL     Absolute NRBCs 0.0 10e3/uL   EKG 12-lead, complete     Collection Time: 04/15/24  9:20 AM   Result Value Ref Range     Systolic Blood Pressure   mmHg     Diastolic Blood Pressure   mmHg     Ventricular Rate 85 BPM     Atrial Rate 85 BPM     MT Interval 152 ms     QRS Duration 94 ms      ms     QTc 449 ms     P Axis 73 degrees     R AXIS 1 degrees     T Axis 29 degrees     Interpretation ECG           Sinus rhythm  No previous ECGs available  Confirmed by fellow William Chahal ([id number]) on 4/17/2024 9:31:06 AM  Confirmed by MD LILIAN, PENNY ([id number]) on 4/17/2024 10:20:26 PM      Comprehensive metabolic panel     Collection Time: 04/15/24  9:53 AM   Result Value Ref Range     Sodium 139 135 - 145 mmol/L     Potassium 4.2 3.4 - 5.3 mmol/L     Carbon Dioxide (CO2) 29 22 - 29 mmol/L     Anion Gap 10 7 - 15 mmol/L     Urea Nitrogen 18.6 6.0 - 20.0 mg/dL     Creatinine 0.97 0.67 - 1.17 mg/dL     GFR Estimate >90 >60 mL/min/1.73m2     Calcium 8.9 8.6 - 10.0 mg/dL     Chloride 100 98 - 107 mmol/L     Glucose 117 (H) 70 - 99 mg/dL     Alkaline Phosphatase 73 40 - 150 U/L     AST 31 0 - 45 U/L     ALT 28 0 - 70 U/L     Protein Total 7.2 6.4 - 8.3 g/dL     Albumin 4.5 3.5 - 5.2 g/dL     Bilirubin Total 0.7 <=1.2 mg/dL   CBC with platelets and differential     Collection Time: 04/15/24  9:53 AM   Result Value Ref Range     WBC Count 6.3 4.0 - 11.0 10e3/uL     RBC Count 5.09 4.40 - 5.90 10e6/uL     Hemoglobin 14.0 13.3 - 17.7 g/dL     Hematocrit 44.9 40.0 - 53.0 %     MCV 88 78 - 100 fL     MCH 27.5 26.5 - 33.0 pg     MCHC 31.2 (L) 31.5  - 36.5 g/dL     RDW 13.2 10.0 - 15.0 %     Platelet Count 234 150 - 450 10e3/uL     % Neutrophils 59 %     % Lymphocytes 33 %     % Monocytes 5 %     % Eosinophils 2 %     % Basophils 1 %     % Immature Granulocytes 0 %     NRBCs per 100 WBC 0 <1 /100     Absolute Neutrophils 3.8 1.6 - 8.3 10e3/uL     Absolute Lymphocytes 2.1 0.8 - 5.3 10e3/uL     Absolute Monocytes 0.3 0.0 - 1.3 10e3/uL     Absolute Eosinophils 0.1 0.0 - 0.7 10e3/uL     Absolute Basophils 0.0 0.0 - 0.2 10e3/uL     Absolute Immature Granulocytes 0.0 <=0.4 10e3/uL     Absolute NRBCs 0.0 10e3/uL   WBC and Differential     Collection Time: 04/17/24 12:48 PM   Result Value Ref Range     WBC Count 5.6 4.0 - 11.0 10e3/uL     % Neutrophils 68 %     % Lymphocytes 25 %     % Monocytes 5 %     % Eosinophils 1 %     % Basophils 1 %     % Immature Granulocytes 0 %     NRBCs per 100 WBC 0 <1 /100     Absolute Neutrophils 3.8 1.6 - 8.3 10e3/uL     Absolute Lymphocytes 1.4 0.8 - 5.3 10e3/uL     Absolute Monocytes 0.3 0.0 - 1.3 10e3/uL     Absolute Eosinophils 0.0 0.0 - 0.7 10e3/uL     Absolute Basophils 0.0 0.0 - 0.2 10e3/uL     Absolute Immature Granulocytes 0.0 <=0.4 10e3/uL     Absolute NRBCs 0.0 10e3/uL   Extra Green Top (Lithium Heparin) Tube     Collection Time: 04/17/24 12:48 PM   Result Value Ref Range     Hold Specimen [facility]                    Behavioral/Psychological/Social:  - Encourage unit programming     Safety  Placed on the following Precautions: Suicide, Assault, Elopement and Sexual precautions  - Continue precautions as noted above  -  2:1 staff acuity for intrusive, assaultive behaviors  - Status 15 minute checks  - Legal Status: voluntary     Disposition Plan   Reason for ongoing admission: poses an imminent risk to self and poses an imminent risk to others  Discharge location: IRTS facility or CD  Discharge Medications: not ordered  Follow-up Appointments: not scheduled     Entered by: Phan Sanchez DNP, AMY JAMA on 05/9/2024  at 11:53 am

## 2024-05-09 NOTE — PROGRESS NOTES
Rehab Group     Start time: 1615  End time: 1700  Patient time total: 45     attended full group     #2 attended   Group Type: music   Group Topic Covered: self-care, balanced lifestyle, coping skills, relaxation skills , and emotional regulation   Group Session Detail: Cooperatively engaged in Evening Music Relaxation group to decrease anxiety and promote Mindfulness and validation.      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   Expressed gratitude for session time.              Activity Therapy Per 15 minutes () Other Rehab therapies    Patient Active Problem List   Diagnosis    CARDIOVASCULAR SCREENING; LDL GOAL LESS THAN 160    Schizoaffective disorder, bipolar type (H)    Antisocial personality disorder (H)    Cannabis abuse    Agitation    Polysubstance abuse (H)

## 2024-05-09 NOTE — PLAN OF CARE
BEH IP Unit Acuity Rating Score (UARS)  Patient is given one point for every criteria they meet.    CRITERIA SCORING   On a 72 hour hold, court hold, committed, stay of commitment, or revocation. 0    Patient LOS on BEH unit exceeds 20 days. 1  LOS: 31   Patient under guardianship, 55+, otherwise medically complex, or under age 11. 0   Suicide ideation without relief of precipitating factors. 0   Current plan for suicide. 0   Current plan for homicide. 0   Imminent risk or actual attempt to seriously harm another without relief of factors precipitating the attempt. 0   Severe dysfunction in daily living (ex: complete neglect for self care, extreme disruption in vegetative function, extreme deterioration in social interactions). 1   Recent (last 7 days) or current physical aggression in the ED or on unit. 0   Restraints or seclusion episode in past 72 hours. 0   Recent (last 7 days) or current verbal aggression, agitation, yelling, etc., while in the ED or unit. 0   Active psychosis. 1   Need for constant or near constant redirection (from leaving, from others, etc).  0   Intrusive or disruptive behaviors. 0   Patient requires 3 or more hours of individualized nursing care per 8-hour shift (i.e. for ADLs, meds, therapeutic interventions). 0   TOTAL 3

## 2024-05-09 NOTE — PLAN OF CARE
"  Problem: Anxiety Signs/Symptoms  Goal: Improved Sleep (Anxiety Signs/Symptoms)  Outcome: Adequate for Care Transition   Goal Outcome Evaluation:    Pt's symptoms are noted to be improving significantly. During check in pt stated \" I'm waiting to go to an IRTS.\" Pt was visible in the lounge intermittently. Presented with a calm mood and brightened upon approach. Pt denied SI/HI as well as auditory and visual hallucinations. Denied anxiety and depressive symptoms. Pt is good behavioral control and socializes with select peer. Takes his medications as prescribed. Denies having any problem with eating or sleeping.   Plan: Continue to provide safe environment and therapeutic milieu.     "
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
## 2024-05-09 NOTE — PLAN OF CARE
Problem: Adult Behavioral Health Plan of Care  Goal: Absence of New-Onset Illness or Injury  Outcome: Adequate for Care Transition   Goal Outcome Evaluation:    Pt is isolative to his room the majority of this shift. Pt present to the milieu for meals and medications, is cooperative with care. Pt shows a mostly flat affect, though does brighten when engaged. Pt denies having any physical or mental health concerns. Pt is pleasant in interactions. Pt did not attend groups. No inappropriate behavior noted. Pt is medication compliant.

## 2024-05-10 PROCEDURE — 124N000002 HC R&B MH UMMC

## 2024-05-10 PROCEDURE — 250N000013 HC RX MED GY IP 250 OP 250 PS 637: Performed by: CLINICAL NURSE SPECIALIST

## 2024-05-10 PROCEDURE — 250N000013 HC RX MED GY IP 250 OP 250 PS 637: Performed by: EMERGENCY MEDICINE

## 2024-05-10 PROCEDURE — 250N000013 HC RX MED GY IP 250 OP 250 PS 637: Performed by: FAMILY MEDICINE

## 2024-05-10 PROCEDURE — 99232 SBSQ HOSP IP/OBS MODERATE 35: CPT | Performed by: CLINICAL NURSE SPECIALIST

## 2024-05-10 PROCEDURE — 250N000013 HC RX MED GY IP 250 OP 250 PS 637: Performed by: PHYSICIAN ASSISTANT

## 2024-05-10 RX ADMIN — Medication 900 MG: at 08:34

## 2024-05-10 RX ADMIN — HYDROCHLOROTHIAZIDE 25 MG: 25 TABLET ORAL at 08:36

## 2024-05-10 RX ADMIN — POLYETHYLENE GLYCOL 3350 17 G: 17 POWDER, FOR SOLUTION ORAL at 08:30

## 2024-05-10 RX ADMIN — LORATADINE 10 MG: 10 TABLET ORAL at 08:38

## 2024-05-10 RX ADMIN — ATORVASTATIN CALCIUM 40 MG: 40 TABLET, FILM COATED ORAL at 08:32

## 2024-05-10 RX ADMIN — PANTOPRAZOLE SODIUM 40 MG: 40 TABLET, DELAYED RELEASE ORAL at 08:35

## 2024-05-10 RX ADMIN — CLOZAPINE 200 MG: 200 TABLET ORAL at 21:11

## 2024-05-10 RX ADMIN — CLOZAPINE 50 MG: 50 TABLET ORAL at 08:39

## 2024-05-10 RX ADMIN — DIVALPROEX SODIUM 500 MG: 500 TABLET, FILM COATED, EXTENDED RELEASE ORAL at 08:36

## 2024-05-10 RX ADMIN — DIVALPROEX SODIUM 1000 MG: 500 TABLET, FILM COATED, EXTENDED RELEASE ORAL at 21:11

## 2024-05-10 RX ADMIN — QUETIAPINE 100 MG: 100 TABLET ORAL at 21:11

## 2024-05-10 RX ADMIN — Medication 900 MG: at 21:11

## 2024-05-10 ASSESSMENT — ACTIVITIES OF DAILY LIVING (ADL)
ADLS_ACUITY_SCORE: 29

## 2024-05-10 NOTE — PLAN OF CARE
Problem: Adult Inpatient Plan of Care  Goal: Optimal Comfort and Wellbeing  Intervention: Provide Person-Centered Care  Recent Flowsheet Documentation  Taken 5/10/2024 1600 by Oswaldo Rodriguez RN  Trust Relationship/Rapport:   care explained   choices provided   emotional support provided   empathic listening provided   questions answered   questions encouraged   reassurance provided   thoughts/feelings acknowledged   Goal Outcome Evaluation:         RN Assessment:  SI/Self harm:  Denied  Aggression/agitation/HI:  No  AVH:  Denied  Sleep: No issues  PRN Med: No PRNs administered this shift  Medication : Compliant  Physical Complaints/Issues: No issues  I & O: eating and drinking well  LBM:   ADLs: independent  Visits: 0  Vitals:  WNL  COVID 19 Assessment:        Patient does not have new respiratory symptoms.  Patient does not have new sore throat.  Patient does not have a fever greater than 99.5.    Milieu Participation: Visible in milieu  Behavior: Calm and cooperative . Pt is alert and oriented . Denied having pain and discomfort. Ate hundred percent of his dinner. Compliant with all cares and medications. No side effects reported. No acute concerns

## 2024-05-10 NOTE — PLAN OF CARE
BEH IP Unit Acuity Rating Score (UARS)  Patient is given one point for every criteria they meet.    CRITERIA SCORING   On a 72 hour hold, court hold, committed, stay of commitment, or revocation. 0    Patient LOS on BEH unit exceeds 20 days. 1  LOS: 32   Patient under guardianship, 55+, otherwise medically complex, or under age 11. 0   Suicide ideation without relief of precipitating factors. 0   Current plan for suicide. 0   Current plan for homicide. 0   Imminent risk or actual attempt to seriously harm another without relief of factors precipitating the attempt. 0   Severe dysfunction in daily living (ex: complete neglect for self care, extreme disruption in vegetative function, extreme deterioration in social interactions). 1   Recent (last 7 days) or current physical aggression in the ED or on unit. 0   Restraints or seclusion episode in past 72 hours. 0   Recent (last 7 days) or current verbal aggression, agitation, yelling, etc., while in the ED or unit. 0   Active psychosis. 1   Need for constant or near constant redirection (from leaving, from others, etc).  0   Intrusive or disruptive behaviors. 0   Patient requires 3 or more hours of individualized nursing care per 8-hour shift (i.e. for ADLs, meds, therapeutic interventions). 0   TOTAL 3

## 2024-05-10 NOTE — PLAN OF CARE
Problem: Sleep Disturbance  Goal: Adequate Sleep/Rest  Outcome: Adequate for Care Transition   Goal Outcome Evaluation:      Pt had a decent evening shift. Was visible in the lounge for most of the shift. Was withdrawn to himself but did attend the OT group. Presented with flat affect and calm mood. Denied depressive symptoms and anxiety. Denied auditory and visual hallucinations. Denied SI/HI. No paranoid or delusional statement noted. Did not appear to be responding. Pt said that he is waiting for the IRTS to call him. Pt ate dinner in the lounge. Presented as fairly groomed. Took his medications as prescribed. No behavioral outburst.   Plan: Continue to provide safe environment and therapeutic milieu.
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
## 2024-05-10 NOTE — PLAN OF CARE
"  Group Attendance:  attended partial group    Time session began: 1315  Time session ended: 1400  Patient's total time in group: 10    Total # Attendees   3   Group Type psychotherapeutic     Group Topic Covered emotional regulation, insight improvement, and healthy coping skills        Group Session Detail Group members checked in with how they are feeling and a success for the day. The group discussed utilizing music as a therapeutic tool to explore emotions, expression, and promote relaxation. Writer discussed the significance of music in emotional regulation and coping strategies. Group members shared personal experiences and associations with songs they chose.      Patient's response to the group topic/interactions:  Able to recall/repeat information presented, Cooperative with task, Listened actively, Expressed understanding of topic, Organized, and Attentive     Patient Details: Pt attended group for the last 10 minutes. Pt said he was doing ok and had in interview for placement and felt good about it. Pt also shared some goals, stable housing being the primary, making more music and having a \"good life' was among others. Pt was pleasant in group.            No Charge - not enough pt's    Patient Active Problem List   Diagnosis    CARDIOVASCULAR SCREENING; LDL GOAL LESS THAN 160    Schizoaffective disorder, bipolar type (H)    Antisocial personality disorder (H)    Cannabis abuse    Agitation    Polysubstance abuse (H)       "

## 2024-05-10 NOTE — PLAN OF CARE
RN Assessment    SI: denies  SIB: denies  HI: denies  AVH: denies, none observed  Thought process: WDL  Anxiety: WDL  Depression: denies   Sleep: WDL  Affect & Mood: affect flat but brightens upon engagement. Mood is calm.  Behavior: pt is visible in the milieu intermittently. Pt mostly keeps to self, but is social when approached. Pt had interview with [facility] IRTS this afternoon which he stated he is excited about. Pt states he thinks interview went well and he was able to get his own questions answered. Pt is hopeful that he will be accepted and get to discharge soon. Writer discussed pt adhering to plan after discharge. Pt states his medications are working well for him at this times and that he does not have intention to use marijuana again. Pt expressed some insight r/t the negative effects of marijuana with his prescribed medications.     Medication SE: none reported or observed  Hygiene: adequate   VS: WDL  Pain: denies  Medical Concerns: none at this time  Appetite & Fluid intake: adequate   Bowel & Bladder: no issues reported

## 2024-05-11 PROCEDURE — 250N000013 HC RX MED GY IP 250 OP 250 PS 637: Performed by: PHYSICIAN ASSISTANT

## 2024-05-11 PROCEDURE — 124N000002 HC R&B MH UMMC

## 2024-05-11 PROCEDURE — 250N000013 HC RX MED GY IP 250 OP 250 PS 637: Performed by: FAMILY MEDICINE

## 2024-05-11 PROCEDURE — 250N000013 HC RX MED GY IP 250 OP 250 PS 637: Performed by: CLINICAL NURSE SPECIALIST

## 2024-05-11 RX ADMIN — POLYETHYLENE GLYCOL 3350 17 G: 17 POWDER, FOR SOLUTION ORAL at 08:59

## 2024-05-11 RX ADMIN — Medication 900 MG: at 20:48

## 2024-05-11 RX ADMIN — PANTOPRAZOLE SODIUM 40 MG: 40 TABLET, DELAYED RELEASE ORAL at 08:59

## 2024-05-11 RX ADMIN — CLOZAPINE 200 MG: 200 TABLET ORAL at 20:48

## 2024-05-11 RX ADMIN — LORATADINE 10 MG: 10 TABLET ORAL at 08:59

## 2024-05-11 RX ADMIN — ATORVASTATIN CALCIUM 40 MG: 40 TABLET, FILM COATED ORAL at 08:59

## 2024-05-11 RX ADMIN — Medication 900 MG: at 08:59

## 2024-05-11 RX ADMIN — DIVALPROEX SODIUM 1000 MG: 500 TABLET, FILM COATED, EXTENDED RELEASE ORAL at 20:48

## 2024-05-11 RX ADMIN — QUETIAPINE 100 MG: 100 TABLET ORAL at 20:48

## 2024-05-11 RX ADMIN — CLOZAPINE 50 MG: 50 TABLET ORAL at 08:59

## 2024-05-11 RX ADMIN — FLUTICASONE PROPIONATE 1 SPRAY: 50 SPRAY, METERED NASAL at 09:04

## 2024-05-11 RX ADMIN — HYDROCHLOROTHIAZIDE 25 MG: 25 TABLET ORAL at 08:59

## 2024-05-11 RX ADMIN — DIVALPROEX SODIUM 500 MG: 500 TABLET, FILM COATED, EXTENDED RELEASE ORAL at 08:59

## 2024-05-11 ASSESSMENT — ACTIVITIES OF DAILY LIVING (ADL)
ADLS_ACUITY_SCORE: 29
HYGIENE/GROOMING: INDEPENDENT
ADLS_ACUITY_SCORE: 29
LAUNDRY: WITH SUPERVISION
ADLS_ACUITY_SCORE: 29
DRESS: INDEPENDENT
ORAL_HYGIENE: INDEPENDENT
ADLS_ACUITY_SCORE: 29
ADLS_ACUITY_SCORE: 29
ORAL_HYGIENE: INDEPENDENT
LAUNDRY: WITH SUPERVISION
HYGIENE/GROOMING: INDEPENDENT
ADLS_ACUITY_SCORE: 29
DRESS: INDEPENDENT
ADLS_ACUITY_SCORE: 29

## 2024-05-11 NOTE — PLAN OF CARE
RN Assessment    SI: denies  SIB: denies  HI: denies  AVH: denies, none observed  Thought process: WDL  Anxiety: denies  Depression: denies  Sleep: WDL, pt does nap throughout the day  Affect & Mood: flat, brightens upon being engaged. Mood is calm  Behavior: pt is withdrawn to self, isolative to his room. Pt comes out for meals and medications only this shift. Not social with peers. Pt is compliant with medications    Medication SE: none reported or observed  Hygiene: adequate   VS: WDL  Pain: denies  Appetite & Fluid intake: adequate   Bowel & Bladder: no issues reported

## 2024-05-11 NOTE — PLAN OF CARE
Problem: Adult Behavioral Health Plan of Care  Goal: Adheres to Safety Considerations for Self and Others  Outcome: Progressing  Flowsheets (Taken 5/11/2024 1607)  Adheres to Safety Considerations for Self and Others: making progress toward outcome   Goal Outcome Evaluation:             RN Assessment:  SI/Self harm:  Denied  Aggression/agitation/HI:  No  AVH:  Denied  Sleep: No issues  PRN Med: No PRNs administered this shift  Medication : Compliant  Physical Complaints/Issues: No issues  I & O: eating and drinking well  LBM:   ADLs: independent  Visits: 0  Vitals:  WNL  COVID 19 Assessment:        Patient does not have new respiratory symptoms.  Patient does not have new sore throat.  Patient does not have a fever greater than 99.5.    Milieu Participation: Visible in anurag .   Behavior: Calm and cooperative . Pt present with a  flat .Pt is alert and oriented . Denied having pain and discomfort.  Compliant with all cares and medications. No side effects reported. No significant changes. Writer will continue to monitor .

## 2024-05-12 PROCEDURE — 250N000013 HC RX MED GY IP 250 OP 250 PS 637: Performed by: CLINICAL NURSE SPECIALIST

## 2024-05-12 PROCEDURE — 124N000002 HC R&B MH UMMC

## 2024-05-12 PROCEDURE — 250N000013 HC RX MED GY IP 250 OP 250 PS 637: Performed by: PHYSICIAN ASSISTANT

## 2024-05-12 PROCEDURE — 250N000013 HC RX MED GY IP 250 OP 250 PS 637: Performed by: EMERGENCY MEDICINE

## 2024-05-12 PROCEDURE — 250N000013 HC RX MED GY IP 250 OP 250 PS 637: Performed by: FAMILY MEDICINE

## 2024-05-12 RX ADMIN — DIVALPROEX SODIUM 500 MG: 500 TABLET, FILM COATED, EXTENDED RELEASE ORAL at 08:59

## 2024-05-12 RX ADMIN — Medication 900 MG: at 08:58

## 2024-05-12 RX ADMIN — CLOZAPINE 200 MG: 200 TABLET ORAL at 20:41

## 2024-05-12 RX ADMIN — FLUTICASONE PROPIONATE 1 SPRAY: 50 SPRAY, METERED NASAL at 08:58

## 2024-05-12 RX ADMIN — QUETIAPINE 100 MG: 100 TABLET ORAL at 20:41

## 2024-05-12 RX ADMIN — PANTOPRAZOLE SODIUM 40 MG: 40 TABLET, DELAYED RELEASE ORAL at 08:58

## 2024-05-12 RX ADMIN — HYDROCHLOROTHIAZIDE 25 MG: 25 TABLET ORAL at 08:59

## 2024-05-12 RX ADMIN — DIVALPROEX SODIUM 1000 MG: 500 TABLET, FILM COATED, EXTENDED RELEASE ORAL at 20:41

## 2024-05-12 RX ADMIN — ATORVASTATIN CALCIUM 40 MG: 40 TABLET, FILM COATED ORAL at 08:58

## 2024-05-12 RX ADMIN — Medication 900 MG: at 20:42

## 2024-05-12 RX ADMIN — LORATADINE 10 MG: 10 TABLET ORAL at 08:59

## 2024-05-12 RX ADMIN — CLOZAPINE 50 MG: 50 TABLET ORAL at 08:59

## 2024-05-12 RX ADMIN — Medication 1 LOZENGE: at 22:01

## 2024-05-12 RX ADMIN — POLYETHYLENE GLYCOL 3350 17 G: 17 POWDER, FOR SOLUTION ORAL at 08:58

## 2024-05-12 ASSESSMENT — ACTIVITIES OF DAILY LIVING (ADL)
LAUNDRY: WITH SUPERVISION
ADLS_ACUITY_SCORE: 29
DRESS: INDEPENDENT
ADLS_ACUITY_SCORE: 29
HYGIENE/GROOMING: INDEPENDENT
ADLS_ACUITY_SCORE: 29
ORAL_HYGIENE: INDEPENDENT
ADLS_ACUITY_SCORE: 29

## 2024-05-12 NOTE — PLAN OF CARE
RN Assessment    SI: denies  SIB: denies  HI: denies  AVH: denies, none observed  Thought process: WDL  Anxiety: denies  Depression:   Sleep: WDL  Affect & Mood: flat but brightens upon engagement, mood is calm  Behavior: Received order from on call provider, Marlo, for code 3 so pt could go out to [employer] with staff and peers. Pt happy about this as he has been hospitalized for over a month without being outside. pt intermittently in the milieu watching movies with peers. Pt was able to go outside to [employer] which he enjoyed. Pt is compliant with medications except declines nicotine patch stating he does not need it anymore.     Medication SE: none reported or observed   Hygiene: adequate   VS: WDL  Pain: denies  Appetite & Fluid intake: adequate   Bowel & Bladder: no issues reported

## 2024-05-12 NOTE — PLAN OF CARE
Goal Outcome Evaluation:       Pt in bed at the beginning of the shift.Pt slept for 6.75 hours.No behavior or concerns noted during this shift.Pt remains on15 minutes safety checks .Will continue to monitor.

## 2024-05-12 NOTE — PLAN OF CARE
Problem: Adult Behavioral Health Plan of Care  Goal: Adheres to Safety Considerations for Self and Others  Recent Flowsheet Documentation  Taken 5/12/2024 1652 by Oswaldo Rodriguez RN  Adheres to Safety Considerations for Self and Others: making progress toward outcome              RN Assessment:  SI/Self harm:  Denied  Aggression/agitation/HI:  No  AVH:  Denied  Sleep: No issues  PRN Med: No PRNs administered this shift  Medication : Compliant  Physical Complaints/Issues: No issues  I & O: eating and drinking well  LBM:   ADLs: independent  Visits: 0  Vitals:  WNL  COVID 19 Assessment:        Patient does not have new respiratory symptoms.  Patient does not have new sore throat.  Patient does not have a fever greater than 99.5.    Milieu Participation: Visible in anurag .   Behavior: Calm and cooperative . Pt present with a  flat .Pt is alert and oriented . Denied having pain and discomfort.  Compliant with all cares and medications.  Personal hygiene encouraged.No side effects reported. No significant changes. Writer will continue to monitor

## 2024-05-13 PROCEDURE — 124N000002 HC R&B MH UMMC

## 2024-05-13 PROCEDURE — 250N000013 HC RX MED GY IP 250 OP 250 PS 637: Performed by: CLINICAL NURSE SPECIALIST

## 2024-05-13 PROCEDURE — 250N000013 HC RX MED GY IP 250 OP 250 PS 637: Performed by: FAMILY MEDICINE

## 2024-05-13 PROCEDURE — 250N000013 HC RX MED GY IP 250 OP 250 PS 637: Performed by: PHYSICIAN ASSISTANT

## 2024-05-13 PROCEDURE — 99232 SBSQ HOSP IP/OBS MODERATE 35: CPT | Performed by: CLINICAL NURSE SPECIALIST

## 2024-05-13 RX ORDER — CLOZAPINE 200 MG/1
200 TABLET ORAL AT BEDTIME
Qty: 30 TABLET | Refills: 0 | Status: SHIPPED | OUTPATIENT
Start: 2024-05-13

## 2024-05-13 RX ORDER — LORATADINE 10 MG/1
10 TABLET ORAL DAILY
Qty: 30 TABLET | Refills: 0 | Status: SHIPPED | OUTPATIENT
Start: 2024-05-14

## 2024-05-13 RX ORDER — DIVALPROEX SODIUM 500 MG/1
500 TABLET, EXTENDED RELEASE ORAL DAILY
Qty: 30 TABLET | Refills: 0 | Status: SHIPPED | OUTPATIENT
Start: 2024-05-14

## 2024-05-13 RX ORDER — HYDROCHLOROTHIAZIDE 25 MG/1
25 TABLET ORAL DAILY
Qty: 30 TABLET | Refills: 0 | Status: SHIPPED | OUTPATIENT
Start: 2024-05-14

## 2024-05-13 RX ORDER — QUETIAPINE FUMARATE 100 MG/1
100 TABLET, FILM COATED ORAL AT BEDTIME
Qty: 30 TABLET | Refills: 0 | Status: SHIPPED | OUTPATIENT
Start: 2024-05-13

## 2024-05-13 RX ORDER — DIVALPROEX SODIUM 500 MG/1
1000 TABLET, EXTENDED RELEASE ORAL AT BEDTIME
Qty: 60 TABLET | Refills: 0 | Status: SHIPPED | OUTPATIENT
Start: 2024-05-13

## 2024-05-13 RX ORDER — CLOZAPINE 50 MG/1
50 TABLET ORAL DAILY
Qty: 30 TABLET | Refills: 0 | Status: SHIPPED | OUTPATIENT
Start: 2024-05-14

## 2024-05-13 RX ORDER — GABAPENTIN 600 MG/1
900 TABLET ORAL 2 TIMES DAILY
Qty: 90 TABLET | Refills: 0 | Status: SHIPPED | OUTPATIENT
Start: 2024-05-14 | End: 2024-06-13

## 2024-05-13 RX ADMIN — Medication 900 MG: at 20:18

## 2024-05-13 RX ADMIN — DIVALPROEX SODIUM 500 MG: 500 TABLET, FILM COATED, EXTENDED RELEASE ORAL at 09:10

## 2024-05-13 RX ADMIN — QUETIAPINE 100 MG: 100 TABLET ORAL at 20:18

## 2024-05-13 RX ADMIN — HYDROCHLOROTHIAZIDE 25 MG: 25 TABLET ORAL at 09:08

## 2024-05-13 RX ADMIN — CLOZAPINE 200 MG: 200 TABLET ORAL at 20:17

## 2024-05-13 RX ADMIN — ATORVASTATIN CALCIUM 40 MG: 40 TABLET, FILM COATED ORAL at 09:10

## 2024-05-13 RX ADMIN — LORATADINE 10 MG: 10 TABLET ORAL at 09:10

## 2024-05-13 RX ADMIN — FLUTICASONE PROPIONATE 1 SPRAY: 50 SPRAY, METERED NASAL at 09:17

## 2024-05-13 RX ADMIN — DIVALPROEX SODIUM 1000 MG: 500 TABLET, FILM COATED, EXTENDED RELEASE ORAL at 20:17

## 2024-05-13 RX ADMIN — PANTOPRAZOLE SODIUM 40 MG: 40 TABLET, DELAYED RELEASE ORAL at 09:10

## 2024-05-13 RX ADMIN — POLYETHYLENE GLYCOL 3350 17 G: 17 POWDER, FOR SOLUTION ORAL at 09:11

## 2024-05-13 RX ADMIN — CLOZAPINE 50 MG: 50 TABLET ORAL at 09:10

## 2024-05-13 RX ADMIN — Medication 900 MG: at 09:09

## 2024-05-13 ASSESSMENT — ACTIVITIES OF DAILY LIVING (ADL)
ADLS_ACUITY_SCORE: 29
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
## 2024-05-13 NOTE — PLAN OF CARE
Problem: Sleep Disturbance  Goal: Adequate Sleep/Rest  Outcome: Progressing   Goal Outcome Evaluation:         Pt had quiet night.Pt slept for 6.5 hours.No behavior or concerns noted during this shift.Will continue to monitor.

## 2024-05-13 NOTE — DISCHARGE SUMMARY
PSYCHIATRY  DISCHARGE SUMMARY     DATE OF DISCHARGE   05/13/2024           DISCHARGE DIAGNOSIS   Schizoaffective disorder, bipolar type (H)    Patient Active Problem List   Diagnosis     CARDIOVASCULAR SCREENING; LDL GOAL LESS THAN 160     Schizoaffective disorder, bipolar type (H)     Antisocial personality disorder (H)     Cannabis abuse     Agitation     Polysubstance abuse (H)          REASON FOR ADMISSION   Ward Dawson is a -34- old male with a previous diagnosis of Schizoaffective disorder bipolar type who presented to the ED for a significant behavioral change, verbal agitation, worsening psychosocial stress, in the context of substance use.  Factors that make the mental health crisis life threatening or complex are:  Patient was brought to the ED from [facility] following his ECT treatment this morning at [facility].  Patient states he does not know why he is here and also states he knows why he is here.  He presents as manic, disorganized, and confused.  He is distractable and not a clear historian at this time.  Patient states he has not slept in a long time and received narcan last night.  Per ACT team  and [facility] staff patient has been elevated, intrusive, and disruptive.  Patient broke a window yesterday.  IRTS reports patient had turned table over and tried using them as skate board ramps.  Patient was noted for have been fairly stable prior to his pass this weekend.  CM reports patient went to a skateboarding competition in [place].  Patient returned in a manic state.  CM reports pt may not appear as manic as he has been the past few days due to sedation and ECT this morning.  She reports patient has not slept in 3 days.  IRTS reports pt received narcan twice last night.  Patient has been trespassed from the I[facility] facility.  UDS is postive for cannabis..           HOSPITAL COURSE   Admitted due to aforementioned presentation  Patient was brought to the ED from [facility] following his ECT  treatment this morning at [facility].  Patient stated he does not know why he is here and also stated he knows why he is here.  He presented as manic, disorganized, and confused. He was distractable and not a clear historian at that time.  Patient stated he has not slept in a long time and received narcan last night.  Per ACT team  and [facility] staff patient has been elevated, intrusive, and disruptive.  Patient broke a window yesterday.  IRTS reported patient had turned table over and tried using them as skate board ramps.  Patient was noted to have been fairly stable prior to his pass this weekend.  CM reported patient went to a skateboarding competition in [place].  Patient returned in a manic state.  CM reported pt may not appear as manic as he has been the past few days due to sedation and ECT this morning.  She reported patient has not slept in 3 days.  IRTS reported pt received narcan twice last night.  Patient has been trespassed from the [facility] facility.  S is postive for cannabis.    Patient was educated regarding diagnostic and treatment options with risks, benefits and alternatives and adequate verbalization of understanding.  Discussed reviewed in further detail, stressors and events leading to presentation.    Admitted on 4/9/24 to Station 12N with Attending Conchis Hebert MD and under the care of the Hospitalist Phan Sanchez, MICK, APRN, CNP as a voluntary patient. Patient was placed under Status 15 (15 minutes safety checks) and SIO 2:1 for safety.  CBC with platelet and differential, BMP and Utox were obtained.    4/10/24: The patient appeared disorganized, tangential with some rambling but polite, cooperative and respectful. Patient blamed racial profiling as the reason he is here in the hospital, he explained that while he demanded to see his 10 year old son, his own mother ( of the child ) accused him of doing drugs and called the police on him, told some lies against him  "including accusing him of losing her I-Pad. Patient reports to this writer that \"when I say am tired of life, that does not mean that I want to kill myself.\" Patient reported being diagnosed with Schizoaffective disorder bipolar type, he reported that he was always anxious, stated \"My anxiety is terrible,\" he endorsed depression, \"I have depression all the times, anything that restricts my freedom gives me depression.\"  Patient endorsed evens, reported there was a time he did not sleep for 2 weeks yet his energy level was still up, he says at the time he was working on an album. 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 Patient reports having PTSD and OCD, he reports some traumatic experience when his father , several other member of the family  almost the same time.  Patient reports he sells drugs (marijuana) and uses marijuana (his drug of choice). Reports that he could smoke 2 ounces (1/2 a pound per day). Patient reports he uses alcohol occasionally, states he was introduced to alcohol at age 4 by his grandpa. He reports that he had experimented with cocaine, states I did it before, but I don't need cocaine to get high. Patient denies using Meth, \"I don't like it.\"    Patient reports he has been admitted for psychiatric problems in many hospitals across the [place] including [facility], [facility], and [facility] here. He states that he is admitted every single year or more frequently. He reports he attempted suicide by swallowing all his medications at the time his son was born about 10 years ago. According to the patient, that was the only suicide attempt he ever made. Patient reports he has not had chemical dependency treatment but will be willing to have an assessment and CD treatment. Patient is also open to having his maintenance ECT " "here.  Patient reports he has been jailed 5 times for various kuhn and miscellaneous offences and had been imprisoned X 1. Patient hopes not to ever go to correction or prison again. He denies A/VH, denies SI/HI. Patient says he doesn't know about family member with psychiatric diagnosis as family don't talk about it. Patient is currently homeless, hoping to get housing with [facility].     Patient patient is on Clozaril 50 mg every morning and 100 mg at bedtime, Depakote  mg twice a day for mood stabilization gabapentin 600 mg twice a day for pain, Ibuprofen tablets 400 mg every 6 hours as needed for pain, Zyprexa 10 mg 3 times daily as needed, hydroxyzine 50 mg every 6 hours as needed trazodone 50 mg at bedtime as needed, Seroquel 100 mg at bedtime.    4/11/24: The patient was interviewed in the [facility] area of Pod 2, with the SIO staff present. The patient initially had only his pants on not wearing his hospital scrub revealing a naked body from waist upward. Upon writer's prompt, patient puts on his clothes and the yellow hospital sweater. Patient appeared disorganized, tangential, maniacally loud, almost disruptive with loud laughters with flight of ideas, speech was pressured and loud.  Patient was cooperative and respectful but difficult to redirect from his maniac behavior. 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 He told this writer I am a Irish, can't let them suffer, would do something about that. Patient started singing, would ask this writer if he " "knows certain artist or the song. At a time, patient perseverated on writer seeing his room, \"come and see.\" The window was filled with used cups, unopened cans of orange juice (up to 10) with other stuffs littered the room but the bed was neatly made.      Patient informed this writer that he is legally blind and needed his glasses's to see. Patient reported \"my ass (buttocks) itches and was seen openly scratching his butts, he believes the haldol was the culprit. States \"see how I scratch my bum badly.\" Patient started laughing hilariously, giggled and then screamed out uncontrollably and it was frighteningly loud. When writer attempted to redirect, patient states \"I can't stop, I am the devil, you know what they call mammy water?\" (The sea spirit). Patient started making some requests, I need rhino cut (dewayne rivera), I need visine for my eyes, I need Vicodin for my sore throat. Patient also requested to have his cell phone placed in the nursing station to be able to listen to music from it through the blue tooth. \"You do have wifi here don't you? Patient wants this writer to accompany him to the large store-room where the cardio treadmill is kept, asking if they can bring it out for him to use for his exercise need.    Patient grabs the television remote control and started searching the channels, talking, I can connect with this or that, I can do so many things on this device.  Patient reports that the night crew wants me to sleep and states \"you can't make me to sleep, I work night shift, so how do you think I will be able to sleep in the night?\" When asked about his mood, patient responds \" I just want to listen to my music, it makes me feel better and happy.   4/12/24: patient was interviewed in pod 2 lounge, patient appeared restless and very distractible, patient was talkative, with pressured speech, disorganized, he was without a hospital scrub (only the pants on) prior to this writer's arrival, then he " "picked his clothes and wore it. Patient had lots of requests, asking for his tirado watch, cardio-exercise machine, His cell Phone for school and music, he promised that \"I am not going to use it for illegal thing you know?\" Then patient states \"I have just signed an WAN for my IRTS to get me my things.\" How long is it going to take the  to get my things here? \"I request for the  phone number but they didn't give me.\"  Patient was reassured that staff are working with his IRTS to get his things but it is difficult to say how long it was going to take the  to deliver them.  It takes an effort to redirect patient back to the assessment as he continues to be talkative with flight of ideas.    Patient reports his mood as \"just ok while still talking, patient digressed, saying \"I love your outfit Srikanth Shant, you look sharp.\" Patient was very disruptive prior to this assessment, he was pounding hard on the pod 1 & 2 door, nurses station window and yelling out profanities that nursing staff had to medicate him with emergency medication haloperidol 5 mg, lorazepam 2 mg and  diphenhydramine 50 mg  with good effect. Patient informs this writer, \"I want ativan, it works for my anxiety, that's what they gave me in the ED.\" Writer reminded patient that ativan was among the emergency medication that was given him this morning. Patient acknowledged that the medication is calming him down saying \"my anxiety is 6/10 is no longer 10 as it was before.\"  While making request for his phone to be connected with the TV for music, patient started arguing and verbally abusive to the PA's challenging them that they don't know nothing, \"I will connect it myself.\" Patient reports that he slept well and felt well rested, insisted this writer follow him to his room, shows a bible and a quran, unlike the day people, see the overnight staff did a good job, they gave me these when I asked for it. He was trying to remember the " "names but could not, until writer mentioned the names of the night people and then said, yes, he is a very good man. Patient was encouraged to eat his lunch before it gets cold.   The following changes were made to patient's medications  Clozapine 200 mg at bedtime  Gabapentin to 900 mg BID  Depakote to 1000 mg at bedtime starting today    4/15/24: patient was interviewed in his room, patient appears restless, impatient, hyper verbal, distractible, loud pressured speech, disorganized, and maniac. He was without a hospital scrub (only the pants on) showing tattooed upper body from waist upward. Patient was argumentative and verbally abusive to staff, who asked to de-clutter his room, patient stated the room is well organized and nothing needed to be cleaned out. Patient was querying one of his acuity staff to tell him the difference between consolidation and cleaning. Patient still manifesting sxs of evens, though remained calm, attentive and respectful to this writer throughout the interview period.  Patient started the assessment with requests for \"my clothes, my watch, jewelries\" Lots of perseveration for \"I want to have my phone for school work and for music.\" Patient reports that his advisor called this morning but staff would not let the advisor talk to him. Writer educated the patient that the advisor can only talk with him if he signed an WAN for him for confidentiality purpose. Patient says he would sign WAN for him. Throughout this interview, patient observed to be labile, disorganized, tangential, with somatic complaints, he walks slowly in his tennis shoes as if injured on the bilateral feet/ankle that writer had to ask what happened to his legs. Patient reports pain in his left ankles states he recently twisted it. Despite reporting pain, patient continues to laugh out loud inappropriately, screamed and sometimes growled. Patient reports his medications are working but also request for Seroquel \"that " "works for my sleep.\" Patient reports that he was last on 300 mg of Seroquel at bedtime. Patient denies A/VH, SI/HI, denies thoughts of self-harm.   Placed on 100 mg of Quetiapine (Seroquel) at bedtime.    4/16/24: Patient appears calm, cooperative, polite. He requested for his medical record print out, requested for Claritin for allergy, itchy eyes and scratchy skin. Patient requested this writer call his mother and tell her \"no drug in my system except marijuana.\" Patient talked about his upbringing, when he was brought from [facility] and when he went back to [facility], where he schooled etc. Patient informed writer where he lived in [place] and his state of [place], patient then started boasting \"I am the dale of [facility], then jumped to wanting to finish his album, started talking about prominent artists and musicians in [place] [place] and [place] [facility] [place]. Patient also talked about his family, about his father and mother ancestral linage-prominent name in [place]. Patient then resulted to talking about his 3 phones in his luggage that he needed to use for his school work and and his music. Discussed CD assessment with patient and he is amenable to being assessed and going to CD treatment. Patient was informed that now that he is becoming clearer, would be able to participate in assessment with the possibility of considering him for treatment becoming higher. Patient feels he is ready for the assessment today and was reassured the consult will be ordered today. Patient requests to participate in groups, understanding this is therapeutic for him, especially painting. Patient reported that his medications are working with no side effects, writer explained why we stated the Seroquel with 100 mg to start titrating upward and patient demonstrates understanding.     4/17/24: patient had his first maintenance ECT from this hospital today.  Patient appears very calm, cooperative with assessment with rapt/sustained attention span. Speech " "was normal tone and volume, no tangentiality or circumstantiality. Sober reflection, polite with minimal requests. Patient reports the ECT was quick and that his experience was positive. He reports that the CD  interviewed him over the phone, hoping they will consider him for an inpatient or outpatient treatment, but his preference is inpatient especially outside of the [place], patient specifically mentions [facility]. Patient asks if writer has spoken with his mother, patient informed that has not happened but will call her after this assessment.   This writer called patient's mother Alf Chirinos [phone], updated her about the progress patient has made thus far, she was very happy that patient is improving. She added some collateral information that she is the one taking care of Cristel's son and that she doesn't want Cristel to teach him bad habits. She expresses frustration over the patient drug doing habit, sited examples where Scearnestinemariusz refused to listen to her and the repercussions that he faced as a result, she gave examples how the patient was expelled from school due to finding drugs with him and how he travelled to [place] during the Covid-19 era because Marijuana was legalized in [place]. The mother states that he went there contrary to her objection and it was in [place] that he fought with a polis and was jailed for 2 years.      4/18/24: Patient appeared very calm, peaceful, cooperative with assessment. Patient described his mood as \"Calm.\" Patient requested for a chiropractic referral, writer informed would inquire if we do have one otherwise it would be an outpatient referral. Patient was updated with the call I had with his mother yesterday. Patient reported sleeping since morning, appeared a little tired apparently from yesterday ECT. Patient reported he had a blood drawn this morning, was informed it was to check his Depakote level. Patient reports that his medications are " "working with no side effects experienced. Patient reports that he's been sleeping because he feels bored here and nothing to do. States that he does enjoy his music. Patient denies A/VH, SI/HI or thoughts of self-harm     4/19/24: Patient was doing painting with the OT in the Pod 2 lounge, patient reports a good mood, states that he has been sleeping and resting.well, attributed this to his Seroquel. While talking with the patient  Yamila the ACT CM a member of the ACT team from [employer] came to see him. Patient appears very calm, speech is coherent with normal tone and volume, Patient reports that his medications are working well and that he has not experienced any side effects from the medications. Informed we have not received the result for the lab drawn yesterday for Depakote level. Patient is optimistic that the level would be normal since he has been taking his medications. Patient asks if he could get out of here by next week if there is acceptance and bed availability for CD treatment from the referrals made thus far. Patient states he did not remember the name of those who interviewed him 2 days ago. Patient informed that the CTC would tell him and reminded that it is not unusual to forget things after ECT and that the memory would come back to him. Meanwhile patient denies A/VH, SI/HI or thoughts of self-harm.     4/22/24: Visited with patient twice this morning sleeping, but at about 12:30 patient had woken up and taken his medications. Upon interview, patient just woke up from sleep, appears very Calm and cooperative with this assessment. Patient's speech is normal volume and prosody, reports medications are working well with no side effects. Patient denies A/VH, SI/HI, thoughts of self harm. Patient reports sleep is good, describes his appetite as \"so, so.\"Patient is participating in group and taking good care of his personal hygiene. Writer asked if patient has spoken with his mother, states that " "the last time he called, she did not pick the phone. Writer updated patient's mother about the progress made thus far. The mother was glad and promised to call him after leaving work today. [facility] informs this writer that [facility] declined patient, they specified no reason for declining him other than the fact they only provide outpatient treatment and patient wants inpatient treatment. The [facility] has sent out more referrals, reports that [facility] is reviewing his application at this time. Discontinued 1:1 SIO    4/23/24: Calm cooperative, nothing unusual, informed of Decline by [facility], took it calmly and reassured.  4/23/24: Patient reports that his medications are working with no side effects. Reports the  came to draw lab for Clozapine level this morning. Result of both WBC and Clozapine level were WNL.(WBC 5.1; Absolute neutrophils 2.4). Patient reports attending groups, doing everything right to stay compliant and that his focus is to become a better person that he hopes to be. Patient states he would wait patiently till a place is secured for his CD treatment. Patient plans to give his mother a call today. Patient denies A/VH, SI/HI, SIB/NSSIB.     4/25/24: Patient reports \"at last they found a place for me.\" Patient was excited but could not remember the name of the place. [facility] later confirmed its [place]. Asked if patient was feeling tired from his medications, patient replies \"not really,' that he feels bored not having much to do was the reason why he prefers to rest in bed. He states there is no concerns from his medications. Patient was informed that the results of his blood draw he had yesterday were WNL. Writer asked if patient has goals for the day, reports that he would atten     4/26/24: Patient reports that he is doing fine and that his mood is happy.  Patient informed this writer \"I was wondering if you can take haldol out of my medications.\"  Patient explained that Haldol makes him " forgetful, he reported an instance where he received haldol and he blacks out.  Writer informed the patient that he is on prn Haldol and there has not been any evidence of recent use (the last and only use was on 4/15). Patient was educated that the possible cause of his amnesia is the ECT and that it would resolve with time. Patient states that other than this, he feels his medications are working well with no side effects. Patient d patient denies auditory and visual hallucinations he denies suicidal or homicidal ideation and thoughts of self-harm.    4/29/24: Patient appears calm, quiet, and content. Patient reports a smoothly, mellow attitude saying that he is just passing time till when [facility] would be ready for him. Patient reports that he is optimistic that things would go well for him. He states that his medications are working with no side effects. Patient is appreciative of the cares he is receiving here, states that he could sense that he is recovering well (patient had earlier reported he wanted to be clean from drugs). Patient denies A/VH, SI/HI and thoughts of self-harm. Declined by [facility].    4/30/24: appears calm, quiet, and cooperative. Patient reports he is doing good, states he is living the life one day at a time. Patient seems to absorb [facility] decline with calmness, states it is frustrating, but it is what it is believing that when the  right time and the right agency that would accept him comes, nothing can stop it. Patient is optimistic that things would go well with him. Informed the patient about the [facility] referral, states only he doesn't want a place too far from home      5/1/24: Patient had his second maintenance ECT today. Patient appeared calm, bright, cooperative with no apparent distress. Patient described his mood as good and affect flat-neutral, seemed to be mood congruent.  Patient did not talk so much. When writer asked how patient was  doing, he simply replies living one day at a time. Patient explained the ECT experience was not that bad.  He recollected that his ECT was monthly prior to coming to the hospital here wondering why he receives it every 2 weeks now.  I explained to the patient that is probably this is the first time of receiving ECT at this hospital.  Patient remains optimistic that one of those numerous referrals by the [facility] would accept him, states he will keep his fingers crossed.  Patient denies, A/VH, SI/HI, SIB/NSSIB.     5/2/24: Patient appears brighter, calm, cooperative with no apparent distress. Patient reported a good mood, stating he is pretty much content with himself, his affect is neutral. Patient still believes in living life one day at time, patient reports that his medication is working with no side effects.  States he is missing his son, and has been calling his mother but did not take the phone, he stated that he will continue to try. Patient reports that he has not gotten an update from the [facility] this morning regarding referral status but writer informed the patient that the [facility] is working frantically with those referrals.  Patient denies auditory or visual hallucinations, denies suicidal or homicidal ideation and thoughts of self-harm.  Patient seems to have no further questions at this time. Patient was transferred to station 10 N but still under the care of this provider.     5/3/24: Patient remains calm, cooperative with no apparent distress. Patient reported a good mood, states that he likes the new unit that things seemed calm here. Discussed about referral updates with the patient and he responds that any time something comes through is acceptable by him. Per the discussion with the [facility] regarding if patient would be a candidate for group home, the CTC states patient would need a CADI waver to go to the group home but not sure if he does have one. Patient does not know if he had a CADI waver or not, he states  that he does not object to going to a  but seems not to like it, at least not my preference patient said. Patient denies A/VH, SI/HI, SIB/NSSIB.     5/6/24: Patient continued to make notable improvement, Montse Kahn placed the patient on their wait list, patient is a bit frustrated that he had been declined several placements for [facility], was reassured that at least one referral would go through.    5/8/24: Declined by [employer] took it calmly, reported that his CM suggested [place] referral, [facility]. Patient set the goal to call his friend, attend group and do some reading. Reviewed patient lab (WBC with diff), everything came back WNL.     5/9/24: Patient's mother reported patient was supposed to have a criminal court hearing today but could not due to being in the hospital. She stated that the court refused to move the date and threatened to count it as abscondment and that the court would issue a warrant for patient arrest at discharge. She requested they would need a letter that show that patient was in the hospital at the date of the hearing.  5/10/24: Patient visited by the CM (Yamila) who promised to follow up with the court that patient could not attend the court hearing due to his hospitalization. Patient reported he had an interview coming up today at 1 pm.    5/13/24: Patient was excited that he was accepted and would be leaving the hospital tomorrow. Writer discussed the safety plan with the patient and invited the therapist to also go through the safety plan with him in anticipation of tomorrow's discharge. Writer in collaboration with the [facility] Best wrote and signed a letter indicating that patient was in the hospital and discharged on 5/14/24.  5/14/24: patient discharged to [facility] at 11:45.         MENTAL STATUS EXAM   Vitals: /85 (BP Location: Right arm)   Pulse 76   Temp 97.8  F (36.6  C) (Oral)   Resp 16   Ht  1.829 m (6')   Wt 105.5 kg (232 lb 8 oz)   SpO2 94%   BMI 31.53 kg/m      Mental Status:  Appearance:  No apparent distress and Neatly groomed  Mood:  {Mood: Calm  Affect: blunted and flat was was congruent to speech  Suicidal Ideation: PRESENT / ABSENT: absent   Homicidal Ideation: PRESENT / ABSENT: absent   Thought process: unremarkable and Linear and goal directed    Thought content: devoid of  suicidal ideation and suicidal and violent ideation.   Fund of Knowledge: Average  Attention/Concentration: Normal  Language ability:  Intact  Memory:  Immediate recall impaired, Short-term memory intact, and Long-term memory intact  Insight:  fair.  Judgement: limited  Orientation: Yes, x4  Psychomotor Behavior: normal or unremarkable    Muscle Strength and Tone: MuscleStrength: Normal  Gait and Station: Normal         DISCHARGE MEDICATIONS   Discharge Medication Options:   Discharge Medication List as of 5/14/2024 12:14 PM        START taking these medications    Details   loratadine (CLARITIN) 10 MG tablet Take 1 tablet (10 mg) by mouth daily, Disp-30 tablet, R-0, E-Prescribe      QUEtiapine (SEROQUEL) 100 MG tablet Take 1 tablet (100 mg) by mouth at bedtime, Disp-30 tablet, R-0, E-Prescribe           CONTINUE these medications which have CHANGED    Details   !! cloZAPine (CLOZARIL) 200 MG tablet Take 1 tablet (200 mg) by mouth at bedtime, Disp-30 tablet, R-0, E-Prescribe      !! cloZAPine (CLOZARIL) 50 MG tablet Take 1 tablet (50 mg) by mouth daily, Disp-30 tablet, R-0, E-Prescribe      !! divalproex sodium extended-release (DEPAKOTE ER) 500 MG 24 hr tablet Take 2 tablets (1,000 mg) by mouth at bedtime, Disp-60 tablet, R-0, E-Prescribe      !! divalproex sodium extended-release (DEPAKOTE ER) 500 MG 24 hr tablet Take 1 tablet (500 mg) by mouth daily, Disp-30 tablet, R-0, E-Prescribe      gabapentin (NEURONTIN) 600 MG tablet Take 1.5 tablets (900 mg) by mouth 2 times daily for 30 days, Disp-90 tablet, R-0, E-Prescribe       hydrochlorothiazide (HYDRODIURIL) 25 MG tablet Take 1 tablet (25 mg) by mouth daily, Disp-30 tablet, R-0, E-Prescribe       !! - Potential duplicate medications found. Please discuss with provider.        CONTINUE these medications which have NOT CHANGED    Details   albuterol (PROAIR HFA/PROVENTIL HFA/VENTOLIN HFA) 108 (90 Base) MCG/ACT inhaler Inhale 1-2 puffs into the lungs every 4 hours as needed for shortness of breath, Historical      atorvastatin (LIPITOR) 40 MG tablet Take 40 mg by mouth daily, Historical      guaiFENesin (ROBITUSSIN) 20 mg/mL liquid Take 100 mg by mouth every 6 hours as needed, Historical      hydrOXYzine (VISTARIL) 50 MG capsule Take 50 mg by mouth every 6 hours as needed, Historical             Medication adherence issues: MS Med Adherence Y/N: No  Medication side effects: MEDICATION SIDE EFFECTS: no side effects reported         DISCHARGE PLAN   1.  Education given regarding diagnostic and treatment options with risks, benefits and alternatives with adequate verbalization of understanding.  2.  Discharge to [facility].  Upon detailed review of risk factors, patient amenable for release.   3.  Continue aforementioned medications and associated medication changes with follow-up by outpatient mental health provider.  4.  Crisis management planning in place.    5.  Continue efforts for sobriety.  6. Summary: You were admitted to [facility] on 4/3/2024 due to Schizoaffective disorder and transferred to Station 12 [address] on 4/6/2024. You were treated by Phan Sanchez, MICK, APRN, CNP and discharged on 5/14/2024 to [facility] .  .       Continue to follow with your ACT Team:  [facility] ACT Team    - Heidy Pop (phone: [phone])   - Yamila (phone: [phone])  Psychiatrist - Camron Watson MD (phone: [phone])     Main Diagnosis:   Schizoaffective disorder, bipolar type      Health Care Follow-up:   If no  "appointments scheduled, explain: Has an ACT Team that will follow up in the community.      Attend all scheduled appointments with your outpatient providers. Call at least 24 hours in advance if you need to reschedule an appointment to ensure continued access to your outpatient providers.      Major Treatments, Procedures and Findings:  You were provided with: a psychiatric assessment, medication evaluation and/or management, group therapy, individual therapy, and CD evaluation/assessment     Symptoms to Report: Feeling more aggressive, increased confusion, losing more sleep, mood getting worse, or thoughts of suicide.     Early warning signs can include: Increased depression or anxiety sleep disturbances increased thoughts or behaviors of suicide or self-harm  increased unusual thinking, such as paranoia or hearing voices.     Safety and Wellness: Take all medicines as directed. Make no changes unless your doctor suggests them. Follow treatment recommendations. Refrain from alcohol and non-prescribed drugs. Ask your support system to help you reduce your access to items that could harm yourself or others. If there is a concern for safety, call 1.     Resources:   General Mental Health Resources:   National Perkins on Mental Illness (AMPARO) [place]: Connect for help, to navigate the mental health system, and for support and for resources. Call: 7-733-CWTR-Helps / 5-264-742-9516  Crisis Text Line: The 24/7 emergency service is available if you or someone you know is experiencing a psychiatric or mental health crisis. Text: \"[place]\" to 146521  [facility]: Are you an adult needing support? Talk to a specialist who has firsthand experience living with a mental health condition. Call: 676.289.2891  Text: \"Support\" to 26615  mentalhealthmn.org/support/[place]-warmline/  National Suicide Prevention Lifeline: The 24/7 lifeline provides support when in distress, has prevention and crisis resources for you or your " loved ones, and resources for professionals. Call 1-863-532-QBHO (9116)  Peer Support Connection Warmlines: Okmf-fo-zrro telephone support that s safe and supportive. Open 5 p.m. to 9 a.m. Call or text: 1-191.530.6953   COVID Cares Stress Phone Support Service. Any [place] experiencing stress can call 838-XOQMDVUF7WV (871-560-9882) for free telephone support from 9am to 9pm every day. The service is a collaboration with volunteers from the [place] Psychiatric Society, the [place] Psychological Association, the [place] [employer], and Mental Health [place]. The free service is also accessible at [employer] where searchers can also find psychiatric and mental health services availability and real-time Substance Use Disorder Treatment program openings.  Adult Rehabilitative Mental Health Services (ARMHS): https://[place].gov/dhs/partners-and-providers/policies-procedures/adult-mental-health/adult-rehabilitative-mental-health-services/armhs-certified-providers/  Mental Health Association of [place] (www.mentalhealth.org): 371.331.8633 or 968-149-2583  Self- Management and Recovery Training., SMART-- Toll free: 343.752.5770  www.[employer].Testt  [facility] Crisis Response [phone]  [facility] Crisis Response [phone]  [facility] Crisis Response [phone]  [facility] Crisis (COPE) Response - Adult [phone]  [facility] Crisis Response - Adult [phone]  [facility] Rapid Response [phone]     Outpatient Psychiatry/Therapy Resources:   [facility] - (phone: 393.890.8285) https://www.[employer].Geoloqi/care/specialty/mental-behavioral-health/  https://www.[employer]/care/find/doctors/psychologists/  [facility] - (phone: 6-315-BGASKPJR) https://www.[facility].org/treatments/Counseling-Adult  [facility] - (phone: 401.993.6326)  https://[facility].com/  [facility] - (phone: 728.916.6125)  https://[facility].com/  [facility] - (phone: 1-810.924.7537) https://www.[employer].ProPlan/  [facility] - (phone: 800.644.6599) https://www.[facility].ProPlan/  [facility] - (phone: 550.356.8252) https://[employer]/  [facility] - (phone: 539.524.3022) https://walkin.org/     General Medication Instructions:   See your medication sheet(s) for instructions.   Take all medicines as directed.  Make no changes unless your doctor suggests them.   Go to all your doctor visits.  Be sure to have all your required lab tests. This way, your medicines can be refilled on time.  Do not use any drugs not prescribed by your doctor.  Avoid alcohol.     Advance Directives:   Scanned document on file with [employer]? No scanned doc  Is document scanned? No. Copy Requested.  Honoring Choices Your Rights Handout: Informed and given  Was more information offered? Pt declined     The Treatment team has appreciated the opportunity to work with you. If you have any questions or concerns about your recent admission, you can contact the unit which can receive your call 24 hours a day, 7 days a week. They will be able to get in touch with a Provider if needed. The unit number is [phone].    Last Modified by Best Marks, PETERSW at 5/14/24 1032   Nursing and  to review further discharge recommendations.     TOTAL TIME:  Greater than 30 minutes for discharge planning.    This note was created with help of Dragon dictation system. Grammatical / typing errors are not intentional.    Phan Sanchez, DNP, APRN, CNP

## 2024-05-13 NOTE — PLAN OF CARE
BEH IP Unit Acuity Rating Score (UARS)  Patient is given one point for every criteria they meet.    CRITERIA SCORING   On a 72 hour hold, court hold, committed, stay of commitment, or revocation. 0    Patient LOS on BEH unit exceeds 20 days. 1  LOS: 35   Patient under guardianship, 55+, otherwise medically complex, or under age 11. 0   Suicide ideation without relief of precipitating factors. 0   Current plan for suicide. 0   Current plan for homicide. 0   Imminent risk or actual attempt to seriously harm another without relief of factors precipitating the attempt. 0   Severe dysfunction in daily living (ex: complete neglect for self care, extreme disruption in vegetative function, extreme deterioration in social interactions). 1   Recent (last 7 days) or current physical aggression in the ED or on unit. 0   Restraints or seclusion episode in past 72 hours. 0   Recent (last 7 days) or current verbal aggression, agitation, yelling, etc., while in the ED or unit. 0   Active psychosis. 1   Need for constant or near constant redirection (from leaving, from others, etc).  0   Intrusive or disruptive behaviors. 0   Patient requires 3 or more hours of individualized nursing care per 8-hour shift (i.e. for ADLs, meds, therapeutic interventions). 0   TOTAL 3

## 2024-05-13 NOTE — PROVIDER NOTIFICATION
05/13/24 1821   C-SSRS (Daily/Shift Screen)   Q2 Suicidal Thoughts (Since Last Contact) 0-->no   Q6 Suicide Behavior 0-->no   Assess Risk to Self and Maintain Safety   Behavior Management behavioral plan developed;impulse control promoted   Self-Harm Prevention environmental self-harm risks assessed   Enhanced Safety Measures pain management   Promote Psychosocial Wellbeing   Family/Support System Care caregiver stress acknowledged;involvement promoted   Supportive Measures decision-making supported   Establish Safety Plan and Continuity of Care   Safe Transition Promotion personal safety plan developed   Environment of Care Checklist   Potentially harmful objects out of patient reach? yes   Personal belongings secured? yes   Patient dressed in hospital-provided attire only? yes   Plastic bags out of patient reach? yes   Patient care equipment (cords, cables, call bells, lines, and drains) shortened, removed, or accounted for? yes   Potentially toxic materials removed or secured? yes   Sharps container removed or secured? yes   Cabinets secured? yes   Room secured by RN per shift? yes

## 2024-05-13 NOTE — PLAN OF CARE
Problem: Adult Behavioral Health Plan of Care  Goal: Plan of Care Review  Outcome: Progressing  Goal: Adheres to Safety Considerations for Self and Others  Outcome: Progressing  Patient's mood is calm with a flat affect. He is isolative into his room, he only came out for meals, no interaction with peers. He did not participate in unit groups.  Patient denied all MH symptoms. He did not verbalize any paranoid or delusional statement.  He denied pain. He is eating and drinking adequately. Grooming is neglected, staff to continue to encourage patient to attend to his ADLs.   Patient is medication compliant, denied any side effects from meds. No PRNs were given this shift.   /74 (BP Location: Left arm)   Pulse 78   Temp 97.5  F (36.4  C) (Oral)   Resp 16   Ht 1.829 m (6')   Wt 105.5 kg (232 lb 8 oz)   SpO2 100%   BMI 31.53 kg/m

## 2024-05-13 NOTE — PROGRESS NOTES
"The patient's care was discussed with the treatment team during the daily team meeting and/or staff's chart notes were reviewed. Staff report patient slept 6.75 hours with no behavioral concerns.  Evening staff report: Patient does not have new respiratory symptoms.  Patient does not have new sore throat.  Patient does not have a fever greater than 99.5.   Milieu Participation: Visible in anurag .   Behavior: Calm and cooperative . Pt present with a  flat .Pt is alert and oriented . Denied having pain and discomfort.  Compliant with all cares and medications.  Personal hygiene encouraged.No side effects reported. No significant changes. Writer will continue to monitor        Upon interview, patient was interviewed in his room he appears livid, excited that he will be leaving the hospital tomorrow patient reports \"everything is going well.\" Writer discussed the safety plan with the patient and invited the therapist to also go through the safety plan with him in anticipation of tomorrow's discharge.  Patient reports that his medication is working well with no side effects patient denies auditory and visual hallucinations he denies suicidal and homicidal ideation and thoughts of self-harm.  Patient reminded this writer about the letter to the court stating why he was unable to attend last week criminal court hearing.  Writer called patient's mother to update her about tomorrow's discharge as she requested sometimes last week. She reported having spoken with the  Best, and reminded this writer about the letter, she was informed the latter has been written regarding patient's hospitalization.           Allergies:      Allergies             Allergies   Allergen Reactions    Haloperidol Confusion and Other (See Comments)       Prone to EPSE. COnsider IM Zyprexa or be sure to give with benadryl              Labs:      Recent Results             Recent Results (from the past 336 hour(s))   WBC and Differential "     Collection Time: 05/01/24  9:12 AM   Result Value Ref Range     WBC Count 5.7 4.0 - 11.0 10e3/uL     % Neutrophils 51 %     % Lymphocytes 38 %     % Monocytes 8 %     % Eosinophils 2 %     % Basophils 1 %     % Immature Granulocytes 0 %     NRBCs per 100 WBC 0 <1 /100     Absolute Neutrophils 2.9 1.6 - 8.3 10e3/uL     Absolute Lymphocytes 2.1 0.8 - 5.3 10e3/uL     Absolute Monocytes 0.4 0.0 - 1.3 10e3/uL     Absolute Eosinophils 0.1 0.0 - 0.7 10e3/uL     Absolute Basophils 0.0 0.0 - 0.2 10e3/uL     Absolute Immature Granulocytes 0.0 <=0.4 10e3/uL     Absolute NRBCs 0.0 10e3/uL   WBC and Differential     Collection Time: 05/08/24  8:07 AM   Result Value Ref Range     WBC Count 5.0 4.0 - 11.0 10e3/uL     % Neutrophils 41 %     % Lymphocytes 49 %     % Monocytes 7 %     % Eosinophils 2 %     % Basophils 1 %     % Immature Granulocytes 0 %     NRBCs per 100 WBC 0 <1 /100     Absolute Neutrophils 2.0 1.6 - 8.3 10e3/uL     Absolute Lymphocytes 2.4 0.8 - 5.3 10e3/uL     Absolute Monocytes 0.4 0.0 - 1.3 10e3/uL     Absolute Eosinophils 0.1 0.0 - 0.7 10e3/uL     Absolute Basophils 0.0 0.0 - 0.2 10e3/uL     Absolute Immature Granulocytes 0.0 <=0.4 10e3/uL     Absolute NRBCs 0.0 10e3/uL              Psychiatric Examination:      /84 (BP Location: Left arm, Patient Position: Sitting, Cuff Size: Adult Regular)   Pulse 94   Temp 97.8  F (36.6  C) (Oral)   Resp 12   Ht 1.829 m (6')   Wt 107.1 kg (236 lb 1.6 oz)   SpO2 98%   BMI 32.02 kg/m    Weight is 236 lbs 1.6 oz  Body mass index is 32.02 kg/m .     Weight over time:          Vitals:     04/03/24 1151 04/09/24 0826   Weight: 108 kg (238 lb) 107.1 kg (236 lb 1.6 oz)         Orthostatic Vitals           Most Recent       Sitting Orthostatic /85 04/09 0826     Sitting Orthostatic Pulse (bpm) 73 04/09 0826     Standing Orthostatic /92 04/09 0826     Standing Orthostatic Pulse (bpm) 69 04/09 0826             Cardiometabolic risk assessment. 04/10/24    "  Reviewed patient profile for cardiometabolic risk factors     Date taken /Value  REFERENCE RANGE   Abdominal Obesity  (Waist Circumference)   See nursing flowsheet Women ?35 in (88 cm)   Men ?40 in (102 cm)       Triglycerides                Triglycerides   Date Value Ref Range Status   02/26/2013 71 0 - 150 mg/dL Final       Comment:       Fasting specimen         ?150 mg/dL (1.7 mmol/L) or current treatment for elevated triglycerides   HDL cholesterol            HDL Cholesterol   Date Value Ref Range Status   02/26/2013 45 40 - 110 mg/dL Final   ]    Women <50 mg/dL (1.3 mmol/L) in women or current treatment for low HDL cholesterol  Men <40 mg/dL (1 mmol/L) in men or current treatment for low HDL cholesterol      Fasting plasma glucose (FPG)           Lab Results   Component Value Date      04/08/2024     GLC 83 02/26/2013        FPG ?100 mg/dL (5.6 mmol/L) or treatment for elevated blood glucose   Blood pressure        BP Readings from Last 3 Encounters:   04/08/24 127/84   02/26/13 129/74    Blood pressure ?130/85 mmHg or treatment for elevated blood pressure   Family History  See family history      Mental Status Exam:  Appearance: awake, alert, dressed in a hospital scrubs, appeared as age stated  Attitude:  calm, cooperative  Eye Contact:  good  Mood: \"doing well\"  Affect: Blunted  Speech:  clear, normal tone and volume  Language: fluent and intact in English  Psychomotor, Gait, Musculoskeletal:  no evidence of tardive dyskinesia, dystonia, or tics  Throught Process: Linear, Logical, goal directed  Associations:  No Loosening of associations  Thought Content:  no evidence of suicidal ideation or homicidal ideation, no auditory hallucinations present, and no visual hallucinations present  Insight: True emotional awareness  Judgement: fair  Oriented to:  time, person, and place  Attention Span and Concentration: Good  Recent and Remote Memory: Intact  Fund of Knowledge: Appropriate             " Precautions:                Behavioral Orders   Procedures    Assault precautions    Code 1 - Restrict to Unit    Elopement precautions    [facility]       Until discharge, Extended Care to offer psychotherapeutic services to mental health patients boarding for admission or stabilization. These services are to include but are not limited to: individual psychotherapy, diagnostic assessment, case management and care planning, safety planning, etc. This may include up to 1 visit per day. If patient is physically located at [facility] or [facility], group psychotherapy up to 2 time per day may be offered.     [facility]       Until discharge, Extended Care to offer psychotherapeutic services to mental health patients boarding for admission or stabilization. These services are to include but are not limited to: individual psychotherapy, diagnostic assessment, case management and care planning, safety planning, etc. This may include up to 1 visit per day. If patient is physically located at [facility] or [facility], group psychotherapy up to 2 time per day may be offered.     Routine Programming       As clinically indicated    Sexual precautions    Status 15       Every 15 minutes.                Assault risk     -When following observed, team will review discontinuation of SIO:     When patient is not longer aggressive, sexually inappropriate or intrusive.       Order Specific Question:   CONTINUOUS 24 hours / day       Answer:   Other       Order Specific Question:   Specify distance       Answer:   10 foot       Order Specific Question:   Indications for SIO       Answer:   Assault risk       Order Specific Question:   Indications for SIO       Answer:   Severe intrusiveness    Suicide precautions: Suicide Risk: HIGH; Clinical rationale to override score: response to medication       Patients on Suicide Precautions should have a Combination Diet ordered that includes a Diet selection(s) AND a Behavioral Tray selection for  Safe Tray - with utensils, or Safe Tray - NO utensils          Order Specific Question:   Suicide Risk       Answer:   HIGH       Order Specific Question:   Clinical rationale to override score:       Answer:   response to medication           Diagnoses:         Schizoaffective disorder, bipolar type (H)  Polysubstance abuse (H)  Agitation     Clinically Significant Risk Factors                          # Obesity: Estimated body mass index is 32.02 kg/m  as calculated from the following:    Height as of this encounter: 1.829 m (6').    Weight as of this encounter: 107.1 kg (236 lb 1.6 oz)., PRESENT ON ADMISSION     # Financial/Environmental Concerns: other (see comments) (lost [facility] housing)     # Housing Instability: noted in nursing assessment          Assessment & Plan:      Assessment and hospital summary:  Ward Dawson is a -34- old male with a previous diagnosis of Schizoaffective disorder bipolar type who presented to the ED for a significant behavioral change, verbal agitation, worsening psychosocial stress, in the context of substance use.  Factors that make the mental health crisis life threatening or complex are:  Patient was brought to the ED from [facility] following his ECT treatment this morning at [facility].  Patient states he does not know why he is here and also states he knows why he is here.  He presents as manic, disorganized, and confused.   He is distractable and not a clear historian at this time.  Patient states he has not slept in a long time and received narcan last night.  Per ACT team  and [facility] staff patient has been elevated, intrusive, and disruptive.  Patient broke a window yesterday.  IRTS reports patient had turned table over and tried using them as skate board ramps.  Patient was noted for have been fairly stable prior to his pass this weekend.  CM reports patient went to a [place]ms[place]Ping competition in [place].  Patient returned in a manic state.  CM reports pt may  not appear as manic as he has been the past few days due to sedation and ECT this morning.  She reports patient has not slept in 3 days.  IRTS reports pt received narcan twice last night.  Patient has been trespassed from the IRTS facility.  UDS is postive for cannabis..         Today's Changes: 5/13/24  Discharge order placed     Target psychiatric symptoms and interventions:  Admitted on 4/9/24 to station 12N  Clozaril, 50 mg every morning and 200 mg at bedtime  --Depakote ER, 500 mg daily and 1000 mg at bedtime for mood stabilization   --Gabapentin, 900 mg twice a day for pain  - Ibuprofen tablet 400 mg orally every 6 hours prn for pain  --Additional medications will include B52, Zyprexa, trazodone, hydroxyzine     Risks, benefits, and alternatives discussed at length with patient.      Acute Medical Problems and Treatments:  Acute medical concerns:  - No acute medical concerns     Pertinent labs/imaging:  Recent Results      Recent Results             Recent Results (from the past 336 hour(s))   WBC and Differential     Collection Time: 04/24/24  8:12 AM   Result Value Ref Range     WBC Count 5.1 4.0 - 11.0 10e3/uL     % Neutrophils 47 %     % Lymphocytes 42 %     % Monocytes 9 %     % Eosinophils 1 %     % Basophils 1 %     % Immature Granulocytes 0 %     NRBCs per 100 WBC 0 <1 /100     Absolute Neutrophils 2.4 1.6 - 8.3 10e3/uL     Absolute Lymphocytes 2.1 0.8 - 5.3 10e3/uL     Absolute Monocytes 0.5 0.0 - 1.3 10e3/uL     Absolute Eosinophils 0.1 0.0 - 0.7 10e3/uL     Absolute Basophils 0.0 0.0 - 0.2 10e3/uL     Absolute Immature Granulocytes 0.0 <=0.4 10e3/uL     Absolute NRBCs 0.0 10e3/uL   Extra Green Top (Lithium Heparin) Tube     Collection Time: 04/24/24  8:12 AM   Result Value Ref Range     Hold Specimen [facility]     WBC and Differential     Collection Time: 05/01/24  9:12 AM   Result Value Ref Range     WBC Count 5.7 4.0 - 11.0 10e3/uL     % Neutrophils 51 %     % Lymphocytes 38 %     % Monocytes 8 %     %  Eosinophils 2 %     % Basophils 1 %     % Immature Granulocytes 0 %     NRBCs per 100 WBC 0 <1 /100     Absolute Neutrophils 2.9 1.6 - 8.3 10e3/uL     Absolute Lymphocytes 2.1 0.8 - 5.3 10e3/uL     Absolute Monocytes 0.4 0.0 - 1.3 10e3/uL     Absolute Eosinophils 0.1 0.0 - 0.7 10e3/uL     Absolute Basophils 0.0 0.0 - 0.2 10e3/uL     Absolute Immature Granulocytes 0.0 <=0.4 10e3/uL     Absolute NRBCs 0.0 10e3/uL            Recent Results               Recent Results (from the past 336 hour(s))   Valproic acid     Collection Time: 04/05/24  9:39 PM   Result Value Ref Range     Valproic acid 68.4   ug/mL   Asymptomatic COVID-19 Virus (Coronavirus) by PCR Nasopharyngeal     Collection Time: 04/05/24  9:40 PM     Specimen: Nasopharyngeal; Swab   Result Value Ref Range     SARS CoV2 PCR Negative Negative   Glucose by meter     Collection Time: 04/08/24  8:21 PM   Result Value Ref Range     GLUCOSE BY METER POCT 117 (H) 70 - 99 mg/dL   WBC and Differential     Collection Time: 04/10/24 10:59 AM   Result Value Ref Range     WBC Count 5.7 4.0 - 11.0 10e3/uL     % Neutrophils 51 %     % Lymphocytes 37 %     % Monocytes 9 %     % Eosinophils 2 %     % Basophils 1 %     % Immature Granulocytes 0 %     NRBCs per 100 WBC 0 <1 /100     Absolute Neutrophils 3.0 1.6 - 8.3 10e3/uL     Absolute Lymphocytes 2.1 0.8 - 5.3 10e3/uL     Absolute Monocytes 0.5 0.0 - 1.3 10e3/uL     Absolute Eosinophils 0.1 0.0 - 0.7 10e3/uL     Absolute Basophils 0.0 0.0 - 0.2 10e3/uL     Absolute Immature Granulocytes 0.0 <=0.4 10e3/uL     Absolute NRBCs 0.0 10e3/uL   EKG 12-lead, complete     Collection Time: 04/15/24  9:20 AM   Result Value Ref Range     Systolic Blood Pressure   mmHg     Diastolic Blood Pressure   mmHg     Ventricular Rate 85 BPM     Atrial Rate 85 BPM     AK Interval 152 ms     QRS Duration 94 ms      ms     QTc 449 ms     P Axis 73 degrees     R AXIS 1 degrees     T Axis 29 degrees     Interpretation ECG           Sinus  rhythm  No previous ECGs available  Confirmed by fellow William Chahal ([id number]) on 4/17/2024 9:31:06 AM  Confirmed by MD QUINTANA HENRI ([id number]) on 4/17/2024 10:20:26 PM      Comprehensive metabolic panel     Collection Time: 04/15/24  9:53 AM   Result Value Ref Range     Sodium 139 135 - 145 mmol/L     Potassium 4.2 3.4 - 5.3 mmol/L     Carbon Dioxide (CO2) 29 22 - 29 mmol/L     Anion Gap 10 7 - 15 mmol/L     Urea Nitrogen 18.6 6.0 - 20.0 mg/dL     Creatinine 0.97 0.67 - 1.17 mg/dL     GFR Estimate >90 >60 mL/min/1.73m2     Calcium 8.9 8.6 - 10.0 mg/dL     Chloride 100 98 - 107 mmol/L     Glucose 117 (H) 70 - 99 mg/dL     Alkaline Phosphatase 73 40 - 150 U/L     AST 31 0 - 45 U/L     ALT 28 0 - 70 U/L     Protein Total 7.2 6.4 - 8.3 g/dL     Albumin 4.5 3.5 - 5.2 g/dL     Bilirubin Total 0.7 <=1.2 mg/dL   CBC with platelets and differential     Collection Time: 04/15/24  9:53 AM   Result Value Ref Range     WBC Count 6.3 4.0 - 11.0 10e3/uL     RBC Count 5.09 4.40 - 5.90 10e6/uL     Hemoglobin 14.0 13.3 - 17.7 g/dL     Hematocrit 44.9 40.0 - 53.0 %     MCV 88 78 - 100 fL     MCH 27.5 26.5 - 33.0 pg     MCHC 31.2 (L) 31.5 - 36.5 g/dL     RDW 13.2 10.0 - 15.0 %     Platelet Count 234 150 - 450 10e3/uL     % Neutrophils 59 %     % Lymphocytes 33 %     % Monocytes 5 %     % Eosinophils 2 %     % Basophils 1 %     % Immature Granulocytes 0 %     NRBCs per 100 WBC 0 <1 /100     Absolute Neutrophils 3.8 1.6 - 8.3 10e3/uL     Absolute Lymphocytes 2.1 0.8 - 5.3 10e3/uL     Absolute Monocytes 0.3 0.0 - 1.3 10e3/uL     Absolute Eosinophils 0.1 0.0 - 0.7 10e3/uL     Absolute Basophils 0.0 0.0 - 0.2 10e3/uL     Absolute Immature Granulocytes 0.0 <=0.4 10e3/uL     Absolute NRBCs 0.0 10e3/uL   WBC and Differential     Collection Time: 04/17/24 12:48 PM   Result Value Ref Range     WBC Count 5.6 4.0 - 11.0 10e3/uL     % Neutrophils 68 %     % Lymphocytes 25 %     % Monocytes 5 %     % Eosinophils 1 %     % Basophils 1 %     %  Immature Granulocytes 0 %     NRBCs per 100 WBC 0 <1 /100     Absolute Neutrophils 3.8 1.6 - 8.3 10e3/uL     Absolute Lymphocytes 1.4 0.8 - 5.3 10e3/uL     Absolute Monocytes 0.3 0.0 - 1.3 10e3/uL     Absolute Eosinophils 0.0 0.0 - 0.7 10e3/uL     Absolute Basophils 0.0 0.0 - 0.2 10e3/uL     Absolute Immature Granulocytes 0.0 <=0.4 10e3/uL     Absolute NRBCs 0.0 10e3/uL   Extra Green Top (Lithium Heparin) Tube     Collection Time: 04/17/24 12:48 PM   Result Value Ref Range     Hold Specimen JI                    Behavioral/Psychological/Social:  - Encourage unit programming     Safety  Placed on the following Precautions: Suicide, Assault, Elopement and Sexual precautions  - Continue precautions as noted above  -  2:1 staff acuity for intrusive, assaultive behaviors  - Status 15 minute checks  - Legal Status: voluntary     Disposition Plan   Reason for ongoing admission: poses an imminent risk to self and poses an imminent risk to others  Discharge location: [facility] facility or CD  Discharge Medications: not ordered  Follow-up Appointments: not scheduled     Entered by: Phan Sanchez DNP, APRN CNP on 05/13/2024  at 09:17am

## 2024-05-14 VITALS
WEIGHT: 234 LBS | BODY MASS INDEX: 31.69 KG/M2 | HEART RATE: 72 BPM | RESPIRATION RATE: 14 BRPM | DIASTOLIC BLOOD PRESSURE: 84 MMHG | OXYGEN SATURATION: 95 % | HEIGHT: 72 IN | TEMPERATURE: 98.5 F | SYSTOLIC BLOOD PRESSURE: 128 MMHG

## 2024-05-14 PROCEDURE — 250N000013 HC RX MED GY IP 250 OP 250 PS 637: Performed by: PHYSICIAN ASSISTANT

## 2024-05-14 PROCEDURE — 250N000013 HC RX MED GY IP 250 OP 250 PS 637: Performed by: CLINICAL NURSE SPECIALIST

## 2024-05-14 PROCEDURE — 99239 HOSP IP/OBS DSCHRG MGMT >30: CPT | Performed by: CLINICAL NURSE SPECIALIST

## 2024-05-14 PROCEDURE — 250N000013 HC RX MED GY IP 250 OP 250 PS 637: Performed by: FAMILY MEDICINE

## 2024-05-14 RX ADMIN — ATORVASTATIN CALCIUM 40 MG: 40 TABLET, FILM COATED ORAL at 08:06

## 2024-05-14 RX ADMIN — HYDROCHLOROTHIAZIDE 25 MG: 25 TABLET ORAL at 08:06

## 2024-05-14 RX ADMIN — LORATADINE 10 MG: 10 TABLET ORAL at 08:06

## 2024-05-14 RX ADMIN — PANTOPRAZOLE SODIUM 40 MG: 40 TABLET, DELAYED RELEASE ORAL at 08:06

## 2024-05-14 RX ADMIN — DIVALPROEX SODIUM 500 MG: 500 TABLET, FILM COATED, EXTENDED RELEASE ORAL at 08:06

## 2024-05-14 RX ADMIN — POLYETHYLENE GLYCOL 3350 17 G: 17 POWDER, FOR SOLUTION ORAL at 08:07

## 2024-05-14 RX ADMIN — CLOZAPINE 50 MG: 50 TABLET ORAL at 08:06

## 2024-05-14 RX ADMIN — Medication 900 MG: at 08:06

## 2024-05-14 RX ADMIN — FLUTICASONE PROPIONATE 1 SPRAY: 50 SPRAY, METERED NASAL at 08:11

## 2024-05-14 ASSESSMENT — ACTIVITIES OF DAILY LIVING (ADL)
ADLS_ACUITY_SCORE: 29

## 2024-05-14 NOTE — PLAN OF CARE
Patient is alert and oriented. Isolative and withdrawn to his room. Mood is calm. Affect is flat. Patient is not social with peers. Patient came came out for meals, ate 100% of his breakfast. Patient was seen by the attending provider and he will be discharging today.   Writer met with patient and went through discharge instructions with him, he verbalized understanding of the instructions. Patient denied SI/SIB/hallucinations. Patient feels safe to discharge from the hospital. Patient will discharge with all his personal belongings and prescribed medications. A copy of discharge instructions was sent with patient. Patient will discharge to [facility].   Patient discharged from the unit at 1145 accompanied by one staff.  Patient left with his belongings and his prescribed medications. Patient was able to safely transfer to a cab to transport to [facility]

## 2024-05-14 NOTE — PLAN OF CARE
"Goal Outcome Evaluation:    Plan of Care Reviewed With: patient        Pt napped the first few hours of the shift and came out for meals and played some games  and watched TV with peers. Pt denies all mental health psych symptoms and contracted for safety. Pt described mood as \"good\" and presented with flat affect and brighten during interaction. Pt excited for his discharge tomorrow at noon and discharge order in place pending medication from outpatient pharmacy. Complied with all medications and no side effect observed or reported.  Good appetite, ate 100% for dinner, adequate fluid intake, voiding freely and no BM concern during the evening shift. Denies pain and shortness of breath and vital sign stable. /85 (BP Location: Right arm)   Pulse 76   Temp 97.8  F (36.6  C) (Oral)   Resp 16   Ht 1.829 m (6')   Wt 105.5 kg (232 lb 8 oz)   SpO2 94%   BMI 31.53 kg/m               "

## 2024-05-14 NOTE — PLAN OF CARE
Goal Outcome Evaluation:         Pt was in bed at the beginning of the shift.Pt slept for 7 hours.No behavior or concerns noted during this shift.Pt remains on 15 minutes safety checks.Will continue to monitor.

## 2024-05-16 RX ORDER — GUAIFENESIN 200 MG/10ML
200 LIQUID ORAL EVERY 4 HOURS PRN
Qty: 30 ML | Refills: 0 | Status: SHIPPED | OUTPATIENT
Start: 2024-05-16

## 2024-05-16 RX ORDER — ALBUTEROL SULFATE 90 UG/1
2 AEROSOL, METERED RESPIRATORY (INHALATION) EVERY 6 HOURS PRN
Qty: 18 G | Refills: 0 | Status: SHIPPED | OUTPATIENT
Start: 2024-05-16

## 2024-05-16 RX ORDER — HYDROXYZINE HYDROCHLORIDE 25 MG/1
50 TABLET, FILM COATED ORAL 3 TIMES DAILY PRN
Qty: 60 TABLET | Refills: 0 | Status: SHIPPED | OUTPATIENT
Start: 2024-05-16

## 2024-05-16 RX ORDER — ATORVASTATIN CALCIUM 40 MG/1
40 TABLET, FILM COATED ORAL DAILY
Qty: 30 TABLET | Refills: 0 | Status: SHIPPED | OUTPATIENT
Start: 2024-05-16

## 2024-05-30 NOTE — PLAN OF CARE
Patient stopped into the clinic and asked about his results from the CT scan. Patient was informed of the results and recommendations.   RN Assessment:    Pt presented with flat affect. Pt appeared calm, and pt was cooperative while interacting with writer. Pt was alert and oriented x 4. Pt denied having SI, HI, thoughts of SIB, and hallucinations. Pt endorsed having chronic lower back pain rated at 6/10. Pt given regularly scheduled and PRN medications for pain. Pt had no new medical concerns. Pt feels the medications that are currently ordered are working well. No medication side effects endorsed by pt or observed by writer. Pt was isolative and withdrawn this shift. Continue to monitor for safety and changes in medical condition.    Pt refused the Flonase and the nicotine patch ordered to be given at 0800 medication pass.       PRN Medications passed this shift:    0853: Acetaminophen 650 mg PO for lower back pain. When writer assessed for medication efficacy pt was resting in bed with eyes closed.

## 2024-06-01 ENCOUNTER — HEALTH MAINTENANCE LETTER (OUTPATIENT)
Age: 34
End: 2024-06-01

## (undated) RX ORDER — CAFFEINE AND SODIUM BENZOATE 125 MG/ML
INJECTION, SOLUTION INTRAMUSCULAR; INTRAVENOUS
Status: DISPENSED
Start: 2024-04-17

## (undated) RX ORDER — METHOHEXITAL IN WATER/PF 100MG/10ML
SYRINGE (ML) INTRAVENOUS
Status: DISPENSED
Start: 2024-04-17

## (undated) RX ORDER — METHOHEXITAL IN WATER/PF 100MG/10ML
SYRINGE (ML) INTRAVENOUS
Status: DISPENSED
Start: 2024-05-01

## (undated) RX ORDER — LABETALOL HYDROCHLORIDE 5 MG/ML
INJECTION, SOLUTION INTRAVENOUS
Status: DISPENSED
Start: 2024-05-01